# Patient Record
Sex: MALE | Race: WHITE | NOT HISPANIC OR LATINO | Employment: OTHER | URBAN - METROPOLITAN AREA
[De-identification: names, ages, dates, MRNs, and addresses within clinical notes are randomized per-mention and may not be internally consistent; named-entity substitution may affect disease eponyms.]

---

## 2017-01-03 ENCOUNTER — LAB VISIT (OUTPATIENT)
Dept: LAB | Facility: HOSPITAL | Age: 67
End: 2017-01-03
Attending: INTERNAL MEDICINE
Payer: COMMERCIAL

## 2017-01-03 ENCOUNTER — PATIENT MESSAGE (OUTPATIENT)
Dept: ENDOCRINOLOGY | Facility: CLINIC | Age: 67
End: 2017-01-03

## 2017-01-03 DIAGNOSIS — Z00.00 ANNUAL PHYSICAL EXAM: ICD-10-CM

## 2017-01-03 LAB
ALBUMIN SERPL BCP-MCNC: 4.1 G/DL
ALP SERPL-CCNC: 39 U/L
ALT SERPL W/O P-5'-P-CCNC: 15 U/L
ANION GAP SERPL CALC-SCNC: 7 MMOL/L
AST SERPL-CCNC: 18 U/L
BASOPHILS # BLD AUTO: 0.09 K/UL
BASOPHILS NFR BLD: 2.6 %
BILIRUB SERPL-MCNC: 0.5 MG/DL
BUN SERPL-MCNC: 20 MG/DL
CALCIUM SERPL-MCNC: 9.7 MG/DL
CHLORIDE SERPL-SCNC: 106 MMOL/L
CHOLEST/HDLC SERPL: 3.2 {RATIO}
CO2 SERPL-SCNC: 28 MMOL/L
COMPLEXED PSA SERPL-MCNC: 1.6 NG/ML
CREAT SERPL-MCNC: 1.2 MG/DL
DIFFERENTIAL METHOD: ABNORMAL
EOSINOPHIL # BLD AUTO: 0.2 K/UL
EOSINOPHIL NFR BLD: 4.9 %
ERYTHROCYTE [DISTWIDTH] IN BLOOD BY AUTOMATED COUNT: 13.6 %
EST. GFR  (AFRICAN AMERICAN): >60 ML/MIN/1.73 M^2
EST. GFR  (NON AFRICAN AMERICAN): >60 ML/MIN/1.73 M^2
GLUCOSE SERPL-MCNC: 88 MG/DL
HCT VFR BLD AUTO: 42.6 %
HDL/CHOLESTEROL RATIO: 31 %
HDLC SERPL-MCNC: 126 MG/DL
HDLC SERPL-MCNC: 39 MG/DL
HGB BLD-MCNC: 14.3 G/DL
LDLC SERPL CALC-MCNC: 75.4 MG/DL
LYMPHOCYTES # BLD AUTO: 1.6 K/UL
LYMPHOCYTES NFR BLD: 45.7 %
MCH RBC QN AUTO: 29.2 PG
MCHC RBC AUTO-ENTMCNC: 33.6 %
MCV RBC AUTO: 87 FL
MONOCYTES # BLD AUTO: 0.4 K/UL
MONOCYTES NFR BLD: 10.1 %
NEUTROPHILS # BLD AUTO: 1.3 K/UL
NEUTROPHILS NFR BLD: 36.7 %
NONHDLC SERPL-MCNC: 87 MG/DL
PLATELET # BLD AUTO: 287 K/UL
PMV BLD AUTO: 10.7 FL
POTASSIUM SERPL-SCNC: 4.3 MMOL/L
PROT SERPL-MCNC: 7.4 G/DL
RBC # BLD AUTO: 4.89 M/UL
SODIUM SERPL-SCNC: 141 MMOL/L
TRIGL SERPL-MCNC: 58 MG/DL
WBC # BLD AUTO: 3.46 K/UL

## 2017-01-03 PROCEDURE — 85025 COMPLETE CBC W/AUTO DIFF WBC: CPT

## 2017-01-03 PROCEDURE — 80053 COMPREHEN METABOLIC PANEL: CPT

## 2017-01-03 PROCEDURE — 80061 LIPID PANEL: CPT

## 2017-01-03 PROCEDURE — 84153 ASSAY OF PSA TOTAL: CPT

## 2017-01-10 ENCOUNTER — OFFICE VISIT (OUTPATIENT)
Dept: INTERNAL MEDICINE | Facility: CLINIC | Age: 67
End: 2017-01-10
Payer: COMMERCIAL

## 2017-01-10 VITALS
WEIGHT: 201.94 LBS | RESPIRATION RATE: 16 BRPM | SYSTOLIC BLOOD PRESSURE: 108 MMHG | BODY MASS INDEX: 29.91 KG/M2 | DIASTOLIC BLOOD PRESSURE: 74 MMHG | HEIGHT: 69 IN | HEART RATE: 47 BPM | TEMPERATURE: 98 F

## 2017-01-10 DIAGNOSIS — I48.0 AF (PAROXYSMAL ATRIAL FIBRILLATION): ICD-10-CM

## 2017-01-10 DIAGNOSIS — Z00.00 ANNUAL PHYSICAL EXAM: Primary | ICD-10-CM

## 2017-01-10 DIAGNOSIS — I10 ESSENTIAL HYPERTENSION: ICD-10-CM

## 2017-01-10 DIAGNOSIS — Z95.5 S/P CORONARY ARTERY STENT PLACEMENT: ICD-10-CM

## 2017-01-10 DIAGNOSIS — M79.89 SWELLING OF RIGHT HAND: ICD-10-CM

## 2017-01-10 PROCEDURE — 3078F DIAST BP <80 MM HG: CPT | Mod: S$GLB,,, | Performed by: INTERNAL MEDICINE

## 2017-01-10 PROCEDURE — 99999 PR PBB SHADOW E&M-EST. PATIENT-LVL III: CPT | Mod: PBBFAC,,, | Performed by: INTERNAL MEDICINE

## 2017-01-10 PROCEDURE — 99396 PREV VISIT EST AGE 40-64: CPT | Mod: S$GLB,,, | Performed by: INTERNAL MEDICINE

## 2017-01-10 PROCEDURE — 3074F SYST BP LT 130 MM HG: CPT | Mod: S$GLB,,, | Performed by: INTERNAL MEDICINE

## 2017-01-10 RX ORDER — AMLODIPINE AND BENAZEPRIL HYDROCHLORIDE 5; 40 MG/1; MG/1
1 CAPSULE ORAL DAILY
Qty: 90 CAPSULE | Refills: 3 | Status: SHIPPED | OUTPATIENT
Start: 2017-01-10 | End: 2019-01-07 | Stop reason: SDUPTHER

## 2017-01-10 NOTE — PROGRESS NOTES
Subjective:       Patient ID: Dustin Lauren is a 66 y.o. male.    Chief Complaint: Annual Exam (with labs to review)  HISTORY OF PRESENT ILLNESS:  A 66-year-old white male patient comes in with   several complaints.  The patient has some medical problems, for which he is   seeing several people.  The new problem is that he has had some swelling in his   right hand on the MP joints for about the last month.  There is no history of   injury.  The patient has also had left lower back pain on the pelvic rim in the   last 3 weeks and it reactivated the last few days.  The patient has pain in his   left rib anteriorly that is old and had been evaluated before, but it is still   continuing.  The patient also feels that he has some knottiness on his left mid   back laterally.    The patient sees orthopedist at [facility], Endocrine, Dr. Toth; cardiologist,   Dr. Vo, for his atrial fibrillation and was referred to Dr. Lex FLORES (RES)   and Dr. Hugo who are electro-cardiologists to convert him and ablate him, I   believe.  Finally, he sees urologist, Dr. Kent at [facility].    The patient is not on a number of medications.  He needed no refills.    The patient had a blood work done showing normal T4.  Urine normal.  PSA 1.6.    Chemistries normal.  Lipids are normal.  CBC is normal.    PHYSICAL EXAMINATION:  VITAL SIGNS:  Normal.  SKIN:  Fair and healthy.  He has, what appears to be, a lipoma on his left mid   back laterally that is about 1 cm size.  HEENT:  Clear.  NECK:  He has no swelling.  He had some swelling around the angle of the jaw 2   months ago from what appeared to be caterpillar envenomation.  CHEST:  Clear.  HEART:  There is no murmur or gallop.  It appears to be normal sinus.  ABDOMEN:  Obese, nontender.  No organomegaly.  RECTAL:  Not done.  He has actually very prominent MP swelling on his right   hand.  EXTREMITIES:  He has no other abnormalities suggesting rheumatoid, but that is a    consideration since this is quite swollen and he is already scheduled to see   Hand Surgery.  NEUROLOGIC:  Normal.    IMPRESSION:  1.  Prominent swelling, right hand, makes me concerned he may have rheumatoid   arthritis.  2.  Hyperlipidemia, on medication.  3.  History of atrial fibrillation, appears to be in normal sinus now, still on   Eliquis.  4.  Hypertension, controlled.  5.  Hypothyroidism, on replacement.  6.  History of degenerative joint problems, multiple locations, including spine   and knees.    PLAN:  I will see him again if all is well in 1 year.      JDC/HN  dd: 01/15/2017 22:33:29 (CST)  td: 01/16/2017 17:56:04 (CST)  Doc ID   #[id number]  Job ID #[id number]    CC:     Dict [id number]  HPI  Review of Systems   Constitutional: Negative for activity change, appetite change, fatigue and unexpected weight change.   HENT: Negative for dental problem, hearing loss, mouth sores, postnasal drip and sinus pressure.    Eyes: Negative for discharge and visual disturbance.   Respiratory: Negative for cough and shortness of breath.    Cardiovascular: Positive for palpitations. Negative for chest pain.   Gastrointestinal: Negative for abdominal pain, blood in stool, constipation, diarrhea and nausea.   Genitourinary: Negative for dysuria, hematuria and testicular pain.   Musculoskeletal: Positive for arthralgias, back pain and joint swelling. Negative for neck pain.   Skin: Negative for rash.   Neurological: Negative for weakness and headaches.   Psychiatric/Behavioral: Negative for agitation and sleep disturbance.       Objective:      Physical Exam   Constitutional: He is oriented to person, place, and time. He appears well-developed and well-nourished.   HENT:   Head: Normocephalic.   Right Ear: External ear normal.   Left Ear: External ear normal.   Mouth/Throat: Oropharynx is clear and moist.   Eyes: EOM are normal. Pupils are equal, round, and reactive to light. Right eye exhibits no discharge.   Neck: Normal range  of motion. No JVD present. No tracheal deviation present. No thyromegaly present.   Cardiovascular: Normal rate, regular rhythm, normal heart sounds and intact distal pulses.  Exam reveals no gallop.    No murmur heard.  Pulmonary/Chest: Effort normal and breath sounds normal. He has no wheezes. He has no rales.   Abdominal: Soft. Bowel sounds are normal. He exhibits no mass. There is no tenderness.   Genitourinary: Prostate normal and penis normal. Rectal exam shows guaiac negative stool.   Musculoskeletal: Normal range of motion. He exhibits no edema.   Lymphadenopathy:     He has no cervical adenopathy.   Neurological: He is oriented to person, place, and time. He displays normal reflexes. No cranial nerve deficit. Coordination normal.   Skin: Skin is warm. No rash noted. No pallor.   Psychiatric: He has a normal mood and affect.       Assessment:       1. Annual physical exam    2. Essential hypertension    3. S/P coronary artery stent placement    4. AF (paroxysmal atrial fibrillation)    5. Swelling of right hand        Plan:

## 2017-01-19 RX ORDER — AMLODIPINE AND BENAZEPRIL HYDROCHLORIDE 5; 40 MG/1; MG/1
CAPSULE ORAL
Qty: 90 CAPSULE | Refills: 3 | Status: SHIPPED | OUTPATIENT
Start: 2017-01-19 | End: 2017-10-30 | Stop reason: SDUPTHER

## 2017-03-03 ENCOUNTER — LAB VISIT (OUTPATIENT)
Dept: LAB | Facility: HOSPITAL | Age: 67
End: 2017-03-03
Attending: INTERNAL MEDICINE
Payer: COMMERCIAL

## 2017-03-03 DIAGNOSIS — I10 ESSENTIAL HYPERTENSION: ICD-10-CM

## 2017-03-03 DIAGNOSIS — E78.1 HYPERTRIGLYCERIDEMIA: ICD-10-CM

## 2017-03-03 DIAGNOSIS — E89.0 POSTSURGICAL HYPOTHYROIDISM: ICD-10-CM

## 2017-03-03 LAB
ALBUMIN SERPL BCP-MCNC: 3.9 G/DL
ALP SERPL-CCNC: 42 U/L
ALT SERPL W/O P-5'-P-CCNC: 13 U/L
ANION GAP SERPL CALC-SCNC: 5 MMOL/L
AST SERPL-CCNC: 19 U/L
BILIRUB SERPL-MCNC: 0.5 MG/DL
BUN SERPL-MCNC: 20 MG/DL
CALCIUM SERPL-MCNC: 9.1 MG/DL
CHLORIDE SERPL-SCNC: 108 MMOL/L
CO2 SERPL-SCNC: 28 MMOL/L
CREAT SERPL-MCNC: 1.1 MG/DL
EST. GFR  (AFRICAN AMERICAN): >60 ML/MIN/1.73 M^2
EST. GFR  (NON AFRICAN AMERICAN): >60 ML/MIN/1.73 M^2
GLUCOSE SERPL-MCNC: 95 MG/DL
POTASSIUM SERPL-SCNC: 4.4 MMOL/L
PROT SERPL-MCNC: 6.8 G/DL
SODIUM SERPL-SCNC: 141 MMOL/L
T4 FREE SERPL-MCNC: 1.18 NG/DL
TSH SERPL DL<=0.005 MIU/L-ACNC: 1.7 UIU/ML

## 2017-03-03 PROCEDURE — 80053 COMPREHEN METABOLIC PANEL: CPT

## 2017-03-03 PROCEDURE — 84443 ASSAY THYROID STIM HORMONE: CPT

## 2017-03-03 PROCEDURE — 84439 ASSAY OF FREE THYROXINE: CPT

## 2017-03-03 PROCEDURE — 36415 COLL VENOUS BLD VENIPUNCTURE: CPT | Mod: PO

## 2017-03-10 ENCOUNTER — OFFICE VISIT (OUTPATIENT)
Dept: ENDOCRINOLOGY | Facility: CLINIC | Age: 67
End: 2017-03-10
Payer: COMMERCIAL

## 2017-03-10 VITALS
WEIGHT: 203.69 LBS | BODY MASS INDEX: 30.17 KG/M2 | HEART RATE: 64 BPM | SYSTOLIC BLOOD PRESSURE: 138 MMHG | HEIGHT: 69 IN | DIASTOLIC BLOOD PRESSURE: 80 MMHG

## 2017-03-10 DIAGNOSIS — I10 ESSENTIAL HYPERTENSION: ICD-10-CM

## 2017-03-10 DIAGNOSIS — E89.0 POSTSURGICAL HYPOTHYROIDISM: Primary | ICD-10-CM

## 2017-03-10 DIAGNOSIS — E78.1 HYPERTRIGLYCERIDEMIA: ICD-10-CM

## 2017-03-10 PROCEDURE — 1160F RVW MEDS BY RX/DR IN RCRD: CPT | Mod: S$GLB,,, | Performed by: INTERNAL MEDICINE

## 2017-03-10 PROCEDURE — 3079F DIAST BP 80-89 MM HG: CPT | Mod: S$GLB,,, | Performed by: INTERNAL MEDICINE

## 2017-03-10 PROCEDURE — 99999 PR PBB SHADOW E&M-EST. PATIENT-LVL III: CPT | Mod: PBBFAC,,, | Performed by: INTERNAL MEDICINE

## 2017-03-10 PROCEDURE — 99214 OFFICE O/P EST MOD 30 MIN: CPT | Mod: S$GLB,,, | Performed by: INTERNAL MEDICINE

## 2017-03-10 PROCEDURE — 3075F SYST BP GE 130 - 139MM HG: CPT | Mod: S$GLB,,, | Performed by: INTERNAL MEDICINE

## 2017-03-10 NOTE — PROGRESS NOTES
Subjective:      Patient ID: Dustin Lauren is a 66 y.o. male.    Chief Complaint:  Thyroid Problem      History of Present Illness  Mr. Lauren presents today for follow up of hypothyroidism.    66 y/old male with Hypothyroidism s/p surgery for a benign thyroid nodule on 1/27/10.    Had been on 150 mcg daily of thyroid hormone for quite some time, but then developed A Fib in 9/2016. Had a repeat episode of A Fib in 11/2016 and TSH was checked at that time and found to be suppressed at 0.120. Thyroid hormone dose was subsequently decreased to 137 mcg daily and he is taking this dose currently.    Has some fatigue. No cold intolerance. Regular BM's. Has some dry skin. No excessive hair loss. Has occasional PVC's, but no persistent palpitations.     Also with HTN on amlodipine and benazepril and hyperlipidemia on Fibricor and atorvastatin.    Review of Systems   Constitutional: Negative for chills and fever.   Gastrointestinal: Negative for nausea and vomiting.   No CP  No SOB    Objective:   Physical Exam   Nursing note and vitals reviewed.  No thyroid tissue palpated  DTR's 2 +  No tremor  No tachycardia  Lungs CTA b/l  No edema    Lab Review:   Results for DUSTIN LAUREN (MRN [id number]) as of 3/10/2017 16:19   Ref. Range 4/18/2016 07:25 7/25/2016 08:53 11/2/2016 07:42 1/3/2017 08:33 3/3/2017 08:37   TSH Latest Ref Range: 0.400 - 4.000 uIU/mL 0.136 (L) 0.066 (L) 1.471 2.066 1.701   Free T4 Latest Ref Range: 0.71 - 1.51 ng/dL 1.35 1.40 1.08 1.11 1.18     Results for DUSTIN LAUREN (MRN [id number]) as of 3/10/2017 16:19   Ref. Range 3/3/2017 08:37   Sodium Latest Ref Range: 136 - 145 mmol/L 141   Potassium Latest Ref Range: 3.5 - 5.1 mmol/L 4.4   Chloride Latest Ref Range: 95 - 110 mmol/L 108   CO2 Latest Ref Range: 23 - 29 mmol/L 28   Anion Gap Latest Ref Range: 8 - 16 mmol/L 5 (L)   BUN, Bld Latest Ref Range: 8 - 23 mg/dL 20   Creatinine Latest Ref Range: 0.5 - 1.4 mg/dL 1.1   eGFR if non  Latest  Ref Range: >60 mL/min/1.73 m^2 >60.0   eGFR if African American Latest Ref Range: >60 mL/min/1.73 m^2 >60.0   Glucose Latest Ref Range: 70 - 110 mg/dL 95   Calcium Latest Ref Range: 8.7 - 10.5 mg/dL 9.1   Alkaline Phosphatase Latest Ref Range: 55 - 135 U/L 42 (L)   Total Protein Latest Ref Range: 6.0 - 8.4 g/dL 6.8   Albumin Latest Ref Range: 3.5 - 5.2 g/dL 3.9   Total Bilirubin Latest Ref Range: 0.1 - 1.0 mg/dL 0.5   AST Latest Ref Range: 10 - 40 U/L 19   ALT Latest Ref Range: 10 - 44 U/L 13       Assessment:     1. Postsurgical hypothyroidism    2. Hypertriglyceridemia    3. Essential hypertension      Plan:      1. Patient with postsurgical hypothyroidism  --Biochemically and clinically euthyroid  --Will continue Synthroid 137 mcg PO daily at this time  --Will aim to keep TSH in mid normal range to avoid triggering another episode of atrial fibrillation    2.) Continue Fibricor    3.) Bp stable  --Continue current regimen    RTC in 6 months with TSH and Vit D prior to appt    Malick Malik M.D. Staff Endocrinology

## 2017-03-10 NOTE — MR AVS SNAPSHOT
[facility]  [address]  [facility] [address]  Phone: [phone]  Fax: [phone]                  Dustin aLuren   3/10/2017 4:00 PM   Office Visit    Description:  Male : 1950   Provider:  Malick Malik MD   Department:  [facility]           Reason for Visit     Thyroid Problem           Diagnoses this Visit        Comments    Postsurgical hypothyroidism    -  Primary     Hypertriglyceridemia         Essential hypertension                To Do List           Future Appointments        Provider Department Dept Phone    2017 8:30 AM LAB, [facility] [place] [facility] [phone]      Goals (5 Years of Data)     None      [facility]     [facility] Line -  Assistance  Registered nurses in the [facility] provide clinical advisement, health education, appointment booking, and other advisory services.  Call for this free service at [phone].             Medications           Message regarding Medications     Verify the changes and/or additions to your medication regime listed below are the same as discussed with your clinician today.  If any of these changes or additions are incorrect, please notify your healthcare provider.             Verify that the below list of medications is an accurate representation of the medications you are currently taking.  If none reported, the list may be blank. If incorrect, please contact your healthcare provider. Carry this list with you in case of emergency.           Current Medications     amlodipine-benazepril (LOTREL) 5-40 mg per capsule Take 1 capsule by mouth once daily.    amlodipine-benazepril (LOTREL) 5-40 mg per capsule TAKE ONE CAPSULE BY MOUTH ONCE DAILY    apixaban (ELIQUIS) 5 mg Tab Take 5 mg by mouth 2 (two) times daily.    atorvastatin (LIPITOR) 10 MG tablet Take 10 mg by mouth once daily. Every other day    cholecalciferol, vitamin D3, 2,000 unit Cap Take 2  "capsules by mouth once daily.     fenofibric acid (FIBRICOR) 135 mg CpDR TAKE 1 CAPSULE (135 MG TOTAL) BY MOUTH ONCE DAILY.    fish oil-omega-3 fatty acids 300-1,000 mg capsule Take 2 g by mouth 2 (two) times daily.     naftifine (NAFTIN) 2 % Crea Apply 1 application topically daily as needed.    omeprazole (PRILOSEC) 20 MG capsule Take 20 mg by mouth once daily.      prasugrel (EFFIENT) 10 mg Tab Take 10 mg by mouth once daily.    SYNTHROID 137 mcg Tab tablet Take 1 tablet (137 mcg total) by mouth before breakfast.           Clinical Reference Information           Your Vitals Were     BP Pulse Height Weight BMI    138/80 64 5' 9" (1.753 m) 92.4 kg (203 lb 11.3 oz) 30.08 kg/m2      Blood Pressure          Most Recent Value    BP  138/80      Allergies as of 3/10/2017     Codeine    Crestor [Rosuvastatin]    Doxycycline    Keflex [Cephalexin]    Celebrex [Celecoxib]    Mobic [Meloxicam]    Niacin Preparations    Ampicillin    Pcn [Penicillins]      Immunizations Administered on Date of Encounter - 3/10/2017     None      Orders Placed During Today's Visit     Future Labs/Procedures Expected by Expires    TSH  3/10/2017 5/9/2018    Vitamin D  3/10/2017 5/9/2018      Language Assistance Services     ATTENTION: Language assistance services are available, free of charge. Please call [phone].      ATENCIÓN: Si habla español, tiene a camp disposición servicios gratuitos de asistencia lingüística. Llame al [phone].     CHÚ Ý: N?u b?n nói Ti?ng Vi?t, có các d?ch v? h? tr? ngôn ng? mi?n phí dành cho b?n. G?i s? [phone].         [facility] complies with applicable Federal civil rights laws and does not discriminate on the basis of race, color, national origin, age, disability, or sex.        "

## 2017-04-10 ENCOUNTER — TELEPHONE (OUTPATIENT)
Dept: INTERNAL MEDICINE | Facility: CLINIC | Age: 67
End: 2017-04-10

## 2017-04-10 NOTE — TELEPHONE ENCOUNTER
We received paper results from paper chart of  old emg done 3/8/04.  Interpretation was :  Nerve conduction is consistent with moderate bilateral carpal tunnel syndrome. No emg evidence of cervical radiculopathy..    i called and sent patient a copy, he requested originally, and send paper results to [facility] [place] records to have scanned on electronic record.

## 2017-05-16 ENCOUNTER — PATIENT MESSAGE (OUTPATIENT)
Dept: ENDOCRINOLOGY | Facility: CLINIC | Age: 67
End: 2017-05-16

## 2017-05-16 DIAGNOSIS — E89.0 POSTSURGICAL HYPOTHYROIDISM: ICD-10-CM

## 2017-05-17 RX ORDER — LEVOTHYROXINE SODIUM 137 UG/1
137 TABLET ORAL
Qty: 30 TABLET | Refills: 6 | Status: SHIPPED | OUTPATIENT
Start: 2017-05-17 | End: 2017-10-30 | Stop reason: SDUPTHER

## 2017-05-30 DIAGNOSIS — E89.0 POSTSURGICAL HYPOTHYROIDISM: ICD-10-CM

## 2017-06-30 ENCOUNTER — TELEPHONE (OUTPATIENT)
Dept: INTERNAL MEDICINE | Facility: CLINIC | Age: 67
End: 2017-06-30

## 2017-06-30 NOTE — TELEPHONE ENCOUNTER
Wants antibiotic for trip [place] in case needs.  Pharmacy correct.    rx ok?    Please advise   Thanks bernadine (wife called requesting)

## 2017-07-01 RX ORDER — CIPROFLOXACIN 500 MG/1
500 TABLET ORAL 2 TIMES DAILY
Qty: 30 TABLET | Refills: 0 | Status: SHIPPED | OUTPATIENT
Start: 2017-07-01 | End: 2017-10-02

## 2017-07-13 RX ORDER — LEVOTHYROXINE SODIUM 137 UG/1
137 TABLET ORAL
Qty: 30 TABLET | Refills: 6 | OUTPATIENT
Start: 2017-07-13

## 2017-09-11 ENCOUNTER — LAB VISIT (OUTPATIENT)
Dept: LAB | Facility: HOSPITAL | Age: 67
End: 2017-09-11
Attending: INTERNAL MEDICINE
Payer: COMMERCIAL

## 2017-09-11 DIAGNOSIS — E78.1 HYPERTRIGLYCERIDEMIA: ICD-10-CM

## 2017-09-11 DIAGNOSIS — E89.0 POSTSURGICAL HYPOTHYROIDISM: ICD-10-CM

## 2017-09-11 DIAGNOSIS — I10 ESSENTIAL HYPERTENSION: ICD-10-CM

## 2017-09-11 LAB
25(OH)D3+25(OH)D2 SERPL-MCNC: 31 NG/ML
TSH SERPL DL<=0.005 MIU/L-ACNC: 3.44 UIU/ML

## 2017-09-11 PROCEDURE — 84443 ASSAY THYROID STIM HORMONE: CPT

## 2017-09-11 PROCEDURE — 36415 COLL VENOUS BLD VENIPUNCTURE: CPT | Mod: PO

## 2017-09-11 PROCEDURE — 82306 VITAMIN D 25 HYDROXY: CPT

## 2017-09-21 ENCOUNTER — TELEPHONE (OUTPATIENT)
Dept: INTERNAL MEDICINE | Facility: CLINIC | Age: 67
End: 2017-09-21

## 2017-09-21 DIAGNOSIS — E03.9 HYPOTHYROIDISM, UNSPECIFIED TYPE: ICD-10-CM

## 2017-09-21 DIAGNOSIS — Z00.00 ANNUAL PHYSICAL EXAM: ICD-10-CM

## 2017-09-21 DIAGNOSIS — I10 ESSENTIAL HYPERTENSION: Primary | ICD-10-CM

## 2017-09-21 NOTE — TELEPHONE ENCOUNTER
----- Message from Caren Carver sent at 9/21/2017 10:00 AM CDT -----  Contact: self [phone]  Doctor appointment and lab have been scheduled.  Please link lab orders to the lab appointment.  Date of doctor appointment: 01/15   Physical or EP:  Physical   Date of lab appointment:  01/08  Comments:

## 2017-10-02 ENCOUNTER — OFFICE VISIT (OUTPATIENT)
Dept: OPTOMETRY | Facility: CLINIC | Age: 67
End: 2017-10-02
Payer: COMMERCIAL

## 2017-10-02 DIAGNOSIS — H25.13 NUCLEAR SCLEROSIS, BILATERAL: Primary | ICD-10-CM

## 2017-10-02 DIAGNOSIS — H43.393 VITREOUS FLOATERS OF BOTH EYES: ICD-10-CM

## 2017-10-02 PROCEDURE — 92015 DETERMINE REFRACTIVE STATE: CPT | Mod: S$GLB,,, | Performed by: OPTOMETRIST

## 2017-10-02 PROCEDURE — 92014 COMPRE OPH EXAM EST PT 1/>: CPT | Mod: S$GLB,,, | Performed by: OPTOMETRIST

## 2017-10-02 PROCEDURE — 99999 PR PBB SHADOW E&M-EST. PATIENT-LVL II: CPT | Mod: PBBFAC,,, | Performed by: OPTOMETRIST

## 2017-10-02 NOTE — PROGRESS NOTES
HPI     Concerns About Ocular Health    Additional comments: annual exam           Comments   Vision seems the about the same as last visit.  DLS 11/11/16  No itching burning or tearing  No headaches or diplopia  Increased floaters ou  Hx Cats OU       Last edited by Toño Carrillo, OD on 10/2/2017  9:27 AM. (History)        ROS     Negative for: Constitutional, Gastrointestinal, Neurological, Skin,   Genitourinary, Musculoskeletal, HENT, Endocrine, Cardiovascular, Eyes,   Respiratory, Psychiatric, Allergic/Imm, Heme/Lymph    Last edited by Toño Carrillo, OD on 10/2/2017  9:27 AM. (History)        Assessment /Plan     For exam results, see Encounter Report.    Nuclear sclerosis, bilateral    Vitreous floaters of both eyes        1. Cat OU --pt happy w Rx  2. Vitreous floaters--discussed Signs/Symptoms of Retinal Detachment.  Pt to rtc immediately if increased Floaters/Flashes occur      PLAN:    rtc 1 yr

## 2017-10-03 DIAGNOSIS — E78.1 HYPERTRIGLYCERIDEMIA: ICD-10-CM

## 2017-10-03 RX ORDER — FENOFIBRIC ACID 135 MG/1
CAPSULE, DELAYED RELEASE ORAL
Qty: 90 CAPSULE | Refills: 2 | Status: SHIPPED | OUTPATIENT
Start: 2017-10-03 | End: 2017-10-30 | Stop reason: SDUPTHER

## 2017-10-27 ENCOUNTER — PATIENT MESSAGE (OUTPATIENT)
Dept: ENDOCRINOLOGY | Facility: CLINIC | Age: 67
End: 2017-10-27

## 2017-10-27 DIAGNOSIS — E89.0 POSTSURGICAL HYPOTHYROIDISM: Primary | ICD-10-CM

## 2017-10-30 ENCOUNTER — LAB VISIT (OUTPATIENT)
Dept: LAB | Facility: HOSPITAL | Age: 67
End: 2017-10-30
Attending: INTERNAL MEDICINE
Payer: COMMERCIAL

## 2017-10-30 ENCOUNTER — TELEPHONE (OUTPATIENT)
Dept: ENDOCRINOLOGY | Facility: CLINIC | Age: 67
End: 2017-10-30

## 2017-10-30 ENCOUNTER — OFFICE VISIT (OUTPATIENT)
Dept: ENDOCRINOLOGY | Facility: CLINIC | Age: 67
End: 2017-10-30
Payer: COMMERCIAL

## 2017-10-30 VITALS
RESPIRATION RATE: 16 BRPM | HEART RATE: 48 BPM | DIASTOLIC BLOOD PRESSURE: 62 MMHG | HEIGHT: 69 IN | WEIGHT: 202.81 LBS | BODY MASS INDEX: 30.04 KG/M2 | SYSTOLIC BLOOD PRESSURE: 100 MMHG

## 2017-10-30 DIAGNOSIS — I10 ESSENTIAL HYPERTENSION: Primary | ICD-10-CM

## 2017-10-30 DIAGNOSIS — E78.1 HYPERTRIGLYCERIDEMIA: ICD-10-CM

## 2017-10-30 DIAGNOSIS — E89.0 POSTSURGICAL HYPOTHYROIDISM: ICD-10-CM

## 2017-10-30 DIAGNOSIS — E03.9 HYPOTHYROIDISM, UNSPECIFIED TYPE: Primary | ICD-10-CM

## 2017-10-30 DIAGNOSIS — T63.431A: ICD-10-CM

## 2017-10-30 DIAGNOSIS — L03.221 CELLULITIS AND ABSCESS OF NECK: ICD-10-CM

## 2017-10-30 DIAGNOSIS — L02.11 CELLULITIS AND ABSCESS OF NECK: ICD-10-CM

## 2017-10-30 LAB
25(OH)D3+25(OH)D2 SERPL-MCNC: 33 NG/ML
ALBUMIN SERPL BCP-MCNC: 3.9 G/DL
ALP SERPL-CCNC: 41 U/L
ALT SERPL W/O P-5'-P-CCNC: 15 U/L
ANION GAP SERPL CALC-SCNC: 7 MMOL/L
AST SERPL-CCNC: 24 U/L
BILIRUB SERPL-MCNC: 0.5 MG/DL
BUN SERPL-MCNC: 22 MG/DL
CALCIUM SERPL-MCNC: 9.8 MG/DL
CHLORIDE SERPL-SCNC: 104 MMOL/L
CO2 SERPL-SCNC: 28 MMOL/L
CREAT SERPL-MCNC: 1.3 MG/DL
EST. GFR  (AFRICAN AMERICAN): >60 ML/MIN/1.73 M^2
EST. GFR  (NON AFRICAN AMERICAN): 57.3 ML/MIN/1.73 M^2
GLUCOSE SERPL-MCNC: 88 MG/DL
POTASSIUM SERPL-SCNC: 4.4 MMOL/L
PROT SERPL-MCNC: 7.4 G/DL
SODIUM SERPL-SCNC: 139 MMOL/L
T4 FREE SERPL-MCNC: 1.25 NG/DL
TSH SERPL DL<=0.005 MIU/L-ACNC: 4.07 UIU/ML

## 2017-10-30 PROCEDURE — 84439 ASSAY OF FREE THYROXINE: CPT

## 2017-10-30 PROCEDURE — 80053 COMPREHEN METABOLIC PANEL: CPT

## 2017-10-30 PROCEDURE — 84443 ASSAY THYROID STIM HORMONE: CPT

## 2017-10-30 PROCEDURE — 99214 OFFICE O/P EST MOD 30 MIN: CPT | Mod: S$GLB,,, | Performed by: INTERNAL MEDICINE

## 2017-10-30 PROCEDURE — 36415 COLL VENOUS BLD VENIPUNCTURE: CPT

## 2017-10-30 PROCEDURE — 82306 VITAMIN D 25 HYDROXY: CPT

## 2017-10-30 PROCEDURE — 99999 PR PBB SHADOW E&M-EST. PATIENT-LVL III: CPT | Mod: PBBFAC,,, | Performed by: INTERNAL MEDICINE

## 2017-10-30 RX ORDER — LEVOTHYROXINE SODIUM 137 UG/1
137 TABLET ORAL
Qty: 30 TABLET | Refills: 6 | Status: SHIPPED | OUTPATIENT
Start: 2017-10-30 | End: 2017-12-11

## 2017-10-30 RX ORDER — FENOFIBRIC ACID 135 MG/1
CAPSULE, DELAYED RELEASE ORAL
Qty: 90 CAPSULE | Refills: 2 | Status: SHIPPED | OUTPATIENT
Start: 2017-10-30 | End: 2018-05-25 | Stop reason: SDUPTHER

## 2017-10-30 NOTE — PROGRESS NOTES
Subjective:      Patient ID: Dustin Lauren is a 67 y.o. male.    Chief Complaint:  Hypothyroidism      History of Present Illness  Mr. Lauren presents today for follow up of hypothyroidism.  previously seen by  and      Interval HPI:   Could not get an appointment with  so he comes to see  He had an episode of Afib last week - converted to sinus by itself   Has an appointment to see a cardiologist       67 y/old male with Hypothyroidism s/p surgery for a benign thyroid nodule on 1/27/10.    Had been on 150 mcg daily of thyroid hormone for quite some time, but then developed A Fib in 9/2016. Had a repeat episode of A Fib in 11/2016 and TSH was checked at that time and found to be suppressed at 0.120. Thyroid hormone dose was subsequently decreased to 137 mcg daily and he is taking this dose currently.    Has some fatigue. No cold intolerance. Regular BM's. Has some dry skin. No excessive hair loss. Has occasional PVC's, but no persistent palpitations.     Also with HTN on amlodipine and benazepril and hyperlipidemia on Fibricor and atorvastatin.    Review of Systems   Constitutional: Negative for unexpected weight change.   Eyes: Negative for visual disturbance.   Respiratory: Negative for shortness of breath.    Cardiovascular: Negative for chest pain.   Gastrointestinal: Negative for abdominal pain.   Musculoskeletal: Negative for myalgias.   Skin: Negative for wound.   Neurological: Negative for headaches.   Hematological: Does not bruise/bleed easily.   Psychiatric/Behavioral: Negative for sleep disturbance.   No CP  No SOB    Objective:   Physical Exam   Nursing note and vitals reviewed.  No thyroid tissue palpated  DTR's 2 +  No tremor  No tachycardia  Lungs CTA b/l  No edema    Lab Review:   Results for DUSTIN LAUREN (MRN [id number]) as of 3/10/2017 16:19   Ref. Range 4/18/2016 07:25 7/25/2016 08:53 11/2/2016 07:42 1/3/2017 08:33 3/3/2017 08:37   TSH Latest Ref Range: 0.400 -  4.000 uIU/mL 0.136 (L) 0.066 (L) 1.471 2.066 1.701   Free T4 Latest Ref Range: 0.71 - 1.51 ng/dL 1.35 1.40 1.08 1.11 1.18     Results for GAVI DEL RIO (MRN [id number]) as of 3/10/2017 16:19   Ref. Range 3/3/2017 08:37   Sodium Latest Ref Range: 136 - 145 mmol/L 141   Potassium Latest Ref Range: 3.5 - 5.1 mmol/L 4.4   Chloride Latest Ref Range: 95 - 110 mmol/L 108   CO2 Latest Ref Range: 23 - 29 mmol/L 28   Anion Gap Latest Ref Range: 8 - 16 mmol/L 5 (L)   BUN, Bld Latest Ref Range: 8 - 23 mg/dL 20   Creatinine Latest Ref Range: 0.5 - 1.4 mg/dL 1.1   eGFR if non African American Latest Ref Range: >60 mL/min/1.73 m^2 >60.0   eGFR if African American Latest Ref Range: >60 mL/min/1.73 m^2 >60.0   Glucose Latest Ref Range: 70 - 110 mg/dL 95   Calcium Latest Ref Range: 8.7 - 10.5 mg/dL 9.1   Alkaline Phosphatase Latest Ref Range: 55 - 135 U/L 42 (L)   Total Protein Latest Ref Range: 6.0 - 8.4 g/dL 6.8   Albumin Latest Ref Range: 3.5 - 5.2 g/dL 3.9   Total Bilirubin Latest Ref Range: 0.1 - 1.0 mg/dL 0.5   AST Latest Ref Range: 10 - 40 U/L 19   ALT Latest Ref Range: 10 - 44 U/L 13       Assessment:     1. Essential hypertension    2. Postsurgical hypothyroidism    3. Hypertriglyceridemia      Plan:      1. Patient with postsurgical hypothyroidism  --Clinically euthyroid  --Check TSH   --Will continue Synthroid 137 mcg PO daily at this time  --Will aim to keep TSH in mid normal range to avoid triggering another episode of atrial fibrillation    2.) HTn at goal     3.) Afib   Check TSh today   Has cardiologist's appointment today

## 2017-12-03 ENCOUNTER — TELEPHONE (OUTPATIENT)
Dept: INTERNAL MEDICINE | Facility: CLINIC | Age: 67
End: 2017-12-03

## 2017-12-11 DIAGNOSIS — E89.0 POSTSURGICAL HYPOTHYROIDISM: ICD-10-CM

## 2017-12-11 RX ORDER — LEVOTHYROXINE SODIUM 137 UG/1
137 TABLET ORAL
Qty: 30 TABLET | Refills: 5 | Status: SHIPPED | OUTPATIENT
Start: 2017-12-11 | End: 2018-01-09 | Stop reason: SDUPTHER

## 2018-01-02 ENCOUNTER — PATIENT MESSAGE (OUTPATIENT)
Dept: ENDOCRINOLOGY | Facility: CLINIC | Age: 68
End: 2018-01-02

## 2018-01-08 ENCOUNTER — LAB VISIT (OUTPATIENT)
Dept: LAB | Facility: HOSPITAL | Age: 68
End: 2018-01-08
Attending: INTERNAL MEDICINE
Payer: COMMERCIAL

## 2018-01-08 DIAGNOSIS — Z00.00 ANNUAL PHYSICAL EXAM: ICD-10-CM

## 2018-01-08 DIAGNOSIS — E03.9 HYPOTHYROIDISM, UNSPECIFIED TYPE: ICD-10-CM

## 2018-01-08 LAB
ALBUMIN SERPL BCP-MCNC: 4.1 G/DL
ALP SERPL-CCNC: 41 U/L
ALT SERPL W/O P-5'-P-CCNC: 17 U/L
ANION GAP SERPL CALC-SCNC: 10 MMOL/L
AST SERPL-CCNC: 21 U/L
BASOPHILS # BLD AUTO: 0.07 K/UL
BASOPHILS NFR BLD: 1.4 %
BILIRUB SERPL-MCNC: 0.6 MG/DL
BUN SERPL-MCNC: 21 MG/DL
CALCIUM SERPL-MCNC: 9.5 MG/DL
CHLORIDE SERPL-SCNC: 106 MMOL/L
CHOLEST SERPL-MCNC: 145 MG/DL
CHOLEST/HDLC SERPL: 3.2 {RATIO}
CO2 SERPL-SCNC: 25 MMOL/L
COMPLEXED PSA SERPL-MCNC: 1.9 NG/ML
CREAT SERPL-MCNC: 1.3 MG/DL
DIFFERENTIAL METHOD: NORMAL
EOSINOPHIL # BLD AUTO: 0.2 K/UL
EOSINOPHIL NFR BLD: 4 %
ERYTHROCYTE [DISTWIDTH] IN BLOOD BY AUTOMATED COUNT: 13.5 %
EST. GFR  (AFRICAN AMERICAN): >60 ML/MIN/1.73 M^2
EST. GFR  (NON AFRICAN AMERICAN): 57.3 ML/MIN/1.73 M^2
GLUCOSE SERPL-MCNC: 95 MG/DL
HCT VFR BLD AUTO: 42.3 %
HDLC SERPL-MCNC: 45 MG/DL
HDLC SERPL: 31 %
HGB BLD-MCNC: 14.1 G/DL
IMM GRANULOCYTES # BLD AUTO: 0.01 K/UL
IMM GRANULOCYTES NFR BLD AUTO: 0.2 %
LDLC SERPL CALC-MCNC: 90.4 MG/DL
LYMPHOCYTES # BLD AUTO: 1.5 K/UL
LYMPHOCYTES NFR BLD: 30.4 %
MCH RBC QN AUTO: 28.7 PG
MCHC RBC AUTO-ENTMCNC: 33.3 G/DL
MCV RBC AUTO: 86 FL
MONOCYTES # BLD AUTO: 0.7 K/UL
MONOCYTES NFR BLD: 13.5 %
NEUTROPHILS # BLD AUTO: 2.5 K/UL
NEUTROPHILS NFR BLD: 50.5 %
NONHDLC SERPL-MCNC: 100 MG/DL
NRBC BLD-RTO: 0 /100 WBC
PLATELET # BLD AUTO: 247 K/UL
PMV BLD AUTO: 10.9 FL
POTASSIUM SERPL-SCNC: 4.1 MMOL/L
PROT SERPL-MCNC: 7.6 G/DL
RBC # BLD AUTO: 4.92 M/UL
SODIUM SERPL-SCNC: 141 MMOL/L
T4 FREE SERPL-MCNC: 1.02 NG/DL
TRIGL SERPL-MCNC: 48 MG/DL
TSH SERPL DL<=0.005 MIU/L-ACNC: 4.45 UIU/ML
TSH SERPL DL<=0.005 MIU/L-ACNC: 4.45 UIU/ML
WBC # BLD AUTO: 4.96 K/UL

## 2018-01-08 PROCEDURE — 36415 COLL VENOUS BLD VENIPUNCTURE: CPT | Mod: PO

## 2018-01-08 PROCEDURE — 80053 COMPREHEN METABOLIC PANEL: CPT

## 2018-01-08 PROCEDURE — 84443 ASSAY THYROID STIM HORMONE: CPT

## 2018-01-08 PROCEDURE — 84439 ASSAY OF FREE THYROXINE: CPT

## 2018-01-08 PROCEDURE — 85025 COMPLETE CBC W/AUTO DIFF WBC: CPT

## 2018-01-08 PROCEDURE — 84153 ASSAY OF PSA TOTAL: CPT

## 2018-01-08 PROCEDURE — 80061 LIPID PANEL: CPT

## 2018-01-09 ENCOUNTER — PATIENT MESSAGE (OUTPATIENT)
Dept: ENDOCRINOLOGY | Facility: CLINIC | Age: 68
End: 2018-01-09

## 2018-01-09 DIAGNOSIS — E89.0 POSTSURGICAL HYPOTHYROIDISM: ICD-10-CM

## 2018-01-09 RX ORDER — LEVOTHYROXINE SODIUM 137 UG/1
TABLET ORAL
Qty: 32 TABLET | Refills: 5 | Status: SHIPPED | OUTPATIENT
Start: 2018-01-09 | End: 2018-05-25 | Stop reason: SDUPTHER

## 2018-01-10 ENCOUNTER — TELEPHONE (OUTPATIENT)
Dept: INTERNAL MEDICINE | Facility: CLINIC | Age: 68
End: 2018-01-10

## 2018-01-15 ENCOUNTER — OFFICE VISIT (OUTPATIENT)
Dept: INTERNAL MEDICINE | Facility: CLINIC | Age: 68
End: 2018-01-15
Payer: COMMERCIAL

## 2018-01-15 VITALS
TEMPERATURE: 98 F | HEART RATE: 62 BPM | SYSTOLIC BLOOD PRESSURE: 115 MMHG | BODY MASS INDEX: 30.1 KG/M2 | HEIGHT: 69 IN | WEIGHT: 203.25 LBS | DIASTOLIC BLOOD PRESSURE: 71 MMHG | RESPIRATION RATE: 16 BRPM

## 2018-01-15 DIAGNOSIS — Z00.00 ANNUAL PHYSICAL EXAM: Primary | ICD-10-CM

## 2018-01-15 DIAGNOSIS — G57.51 TARSAL TUNNEL SYNDROME OF RIGHT SIDE: ICD-10-CM

## 2018-01-15 DIAGNOSIS — I10 ESSENTIAL HYPERTENSION: ICD-10-CM

## 2018-01-15 DIAGNOSIS — I48.0 AF (PAROXYSMAL ATRIAL FIBRILLATION): ICD-10-CM

## 2018-01-15 DIAGNOSIS — E89.0 POSTSURGICAL HYPOTHYROIDISM: ICD-10-CM

## 2018-01-15 DIAGNOSIS — E78.1 HYPERTRIGLYCERIDEMIA: ICD-10-CM

## 2018-01-15 DIAGNOSIS — Z95.5 S/P CORONARY ARTERY STENT PLACEMENT: ICD-10-CM

## 2018-01-15 PROCEDURE — 99397 PER PM REEVAL EST PAT 65+ YR: CPT | Mod: S$GLB,,, | Performed by: INTERNAL MEDICINE

## 2018-01-15 PROCEDURE — 99999 PR PBB SHADOW E&M-EST. PATIENT-LVL III: CPT | Mod: PBBFAC,,, | Performed by: INTERNAL MEDICINE

## 2018-01-15 NOTE — PROGRESS NOTES
Subjective:       Patient ID: Dustin Lauren is a 67 y.o. male.    Chief Complaint: Annual Exam  HISTORY OF PRESENT ILLNESS:  A 67-year-old white male patient of mine coming in   for an annual review and has had some recent problems.  A week ago, he developed   an upper respiratory infection that seems to be clearing with over-the-counter   medication.  He also has had recurrent atrial fibrillation.  He had an episode   in October that was sustained and had another episode today.  He is on treatment   for this.  He was told that he has that problem.  He sees cardiologist Dr. Vo and rhythm specialist Dr. Pinzon, both at [facility].  The patient   also sees urologist Dr. Kent, also Endocrine at [facility], Dr. Malik, and   orthopedist at [facility].  The patient has no other complaints.  He has been on a   diet and has lost a significant amount of weight.  Since his last exam a year   ago, he is about the same weight.  The patient's heartburn has improved   considerably with the weight loss.    PHYSICAL EXAMINATION:  VITAL SIGNS:  Normal.  SKIN:  Fair and healthy.  He does have rosacea, but that seems better   controlled.  HEENT:  Shows wax in his ears, but he does not complain of it.  I did give him   suggestions on getting the wax out with flushing when he is in the shower.  NECK:  Shows no stiffness, adenopathy or thyroid enlargement.  CHEST:  Clear.  HEART:  There is no murmur or gallop currently, seems to be regular.  ABDOMEN:  Nontender without any organomegaly, mass, fullness.  RECTAL:  Not done.  EXTREMITIES:  He has very prominent somewhat variceal veins of his legs, but he   has no edema.  The feet were checked in detail.  He has intact sensation.  He   has minimal nail disease.  He has no break in the skin.  Good pulses.  I also   found him to have a focal area of numbness that he was aware of on the first toe   of his right foot for the last two months without any injury.    IMPRESSION:  1.  Toe  numbness, likely is from tarsal tunnel.  2.  Paroxysmal atrial fibrillation with a recent episode.  3.  Coronary artery disease with stents.  4.  Status post thyroid ablation, on replacement.  5.  Hypertension, controlled.  6.  High triglyceride.    The patient had lab done prior to this visit showing normal urinalysis, TSH was   in the mid 4 range, but he just had a recent increase in the dose of his   thyroid.  His chemistry is normal, triglycerides now normal, CBC normal.  PSA   and T4 are normal.  I have told him that he should go and get inserts for his   shoes at a running store and gave him a suggestion on wear, and wrote down the   name of his problem for them to fit him.  I will see him again in one year if   all is well.      JDC/HN  dd: 01/15/2018 22:21:42 (CST)  td: 01/16/2018 12:35:58 (CST)  Doc ID   #[id number]  Job ID #[id number]    CC:     Dict [id number]  HPI  Review of Systems   Constitutional: Negative for activity change, appetite change, fatigue and unexpected weight change.   HENT: Positive for congestion, postnasal drip and sinus pressure. Negative for dental problem, hearing loss and mouth sores.    Eyes: Negative for discharge and visual disturbance.   Respiratory: Negative for cough and shortness of breath.    Cardiovascular: Positive for palpitations. Negative for chest pain.   Gastrointestinal: Negative for abdominal pain, blood in stool, constipation, diarrhea and nausea.   Genitourinary: Negative for dysuria, hematuria and testicular pain.   Musculoskeletal: Negative for arthralgias, back pain, joint swelling and neck pain.   Skin: Negative for rash.   Neurological: Positive for numbness. Negative for weakness and headaches.   Psychiatric/Behavioral: Negative for agitation and sleep disturbance.       Objective:      Physical Exam   Constitutional: He is oriented to person, place, and time. He appears well-developed and well-nourished.   HENT:   Head: Normocephalic.   Right Ear: External ear  normal.   Left Ear: External ear normal.   Mouth/Throat: Oropharynx is clear and moist.   Eyes: EOM are normal. Pupils are equal, round, and reactive to light. Right eye exhibits no discharge.   Neck: Normal range of motion. No JVD present. No tracheal deviation present. No thyromegaly present.   Cardiovascular: Normal rate, regular rhythm, normal heart sounds and intact distal pulses.  Exam reveals no gallop.    No murmur heard.  Pulmonary/Chest: Effort normal and breath sounds normal. He has no wheezes. He has no rales.   Abdominal: Soft. Bowel sounds are normal. He exhibits no mass. There is no tenderness.   Genitourinary: Prostate normal and penis normal. Rectal exam shows guaiac negative stool.   Musculoskeletal: Normal range of motion. He exhibits no edema.   Lymphadenopathy:     He has no cervical adenopathy.   Neurological: He is oriented to person, place, and time. He displays normal reflexes. A sensory deficit is present. No cranial nerve deficit. Coordination normal.   Skin: Skin is warm. No rash noted. No pallor.   Psychiatric: He has a normal mood and affect.       Assessment:       1. Annual physical exam    2. AF (paroxysmal atrial fibrillation)    3. S/P coronary artery stent placement    4. Postsurgical hypothyroidism    5. Essential hypertension    6. Hypertriglyceridemia        Plan:

## 2018-02-20 ENCOUNTER — LAB VISIT (OUTPATIENT)
Dept: LAB | Facility: HOSPITAL | Age: 68
End: 2018-02-20
Attending: INTERNAL MEDICINE
Payer: COMMERCIAL

## 2018-02-20 DIAGNOSIS — E89.0 POSTSURGICAL HYPOTHYROIDISM: ICD-10-CM

## 2018-02-20 LAB — TSH SERPL DL<=0.005 MIU/L-ACNC: 1.63 UIU/ML

## 2018-02-20 PROCEDURE — 36415 COLL VENOUS BLD VENIPUNCTURE: CPT | Mod: PO

## 2018-02-20 PROCEDURE — 84443 ASSAY THYROID STIM HORMONE: CPT

## 2018-03-29 RX ORDER — AMLODIPINE AND BENAZEPRIL HYDROCHLORIDE 5; 40 MG/1; MG/1
CAPSULE ORAL
Qty: 90 CAPSULE | Refills: 0 | Status: SHIPPED | OUTPATIENT
Start: 2018-03-29 | End: 2018-07-22 | Stop reason: SDUPTHER

## 2018-05-01 ENCOUNTER — PATIENT MESSAGE (OUTPATIENT)
Dept: ENDOCRINOLOGY | Facility: CLINIC | Age: 68
End: 2018-05-01

## 2018-05-01 DIAGNOSIS — E89.0 POSTSURGICAL HYPOTHYROIDISM: ICD-10-CM

## 2018-05-01 DIAGNOSIS — I10 ESSENTIAL HYPERTENSION: Primary | ICD-10-CM

## 2018-05-18 ENCOUNTER — LAB VISIT (OUTPATIENT)
Dept: LAB | Facility: HOSPITAL | Age: 68
End: 2018-05-18
Attending: INTERNAL MEDICINE
Payer: COMMERCIAL

## 2018-05-18 DIAGNOSIS — I10 ESSENTIAL HYPERTENSION: ICD-10-CM

## 2018-05-18 DIAGNOSIS — E89.0 POSTSURGICAL HYPOTHYROIDISM: ICD-10-CM

## 2018-05-18 LAB
ALBUMIN SERPL BCP-MCNC: 4.1 G/DL
ALP SERPL-CCNC: 36 U/L
ALT SERPL W/O P-5'-P-CCNC: 12 U/L
ANION GAP SERPL CALC-SCNC: 8 MMOL/L
AST SERPL-CCNC: 18 U/L
BILIRUB SERPL-MCNC: 0.5 MG/DL
BUN SERPL-MCNC: 24 MG/DL
CALCIUM SERPL-MCNC: 9.4 MG/DL
CHLORIDE SERPL-SCNC: 105 MMOL/L
CO2 SERPL-SCNC: 26 MMOL/L
CREAT SERPL-MCNC: 1.3 MG/DL
EST. GFR  (AFRICAN AMERICAN): >60 ML/MIN/1.73 M^2
EST. GFR  (NON AFRICAN AMERICAN): 57.3 ML/MIN/1.73 M^2
GLUCOSE SERPL-MCNC: 95 MG/DL
POTASSIUM SERPL-SCNC: 4.5 MMOL/L
PROT SERPL-MCNC: 7.1 G/DL
SODIUM SERPL-SCNC: 139 MMOL/L
T4 FREE SERPL-MCNC: 1.31 NG/DL
TSH SERPL DL<=0.005 MIU/L-ACNC: 0.8 UIU/ML

## 2018-05-18 PROCEDURE — 36415 COLL VENOUS BLD VENIPUNCTURE: CPT | Mod: PO

## 2018-05-18 PROCEDURE — 84439 ASSAY OF FREE THYROXINE: CPT

## 2018-05-18 PROCEDURE — 80053 COMPREHEN METABOLIC PANEL: CPT

## 2018-05-18 PROCEDURE — 84443 ASSAY THYROID STIM HORMONE: CPT

## 2018-05-25 ENCOUNTER — OFFICE VISIT (OUTPATIENT)
Dept: ENDOCRINOLOGY | Facility: CLINIC | Age: 68
End: 2018-05-25
Payer: COMMERCIAL

## 2018-05-25 VITALS
DIASTOLIC BLOOD PRESSURE: 80 MMHG | WEIGHT: 204.38 LBS | HEART RATE: 84 BPM | HEIGHT: 69 IN | BODY MASS INDEX: 30.27 KG/M2 | SYSTOLIC BLOOD PRESSURE: 124 MMHG

## 2018-05-25 DIAGNOSIS — E89.0 POSTSURGICAL HYPOTHYROIDISM: Primary | ICD-10-CM

## 2018-05-25 DIAGNOSIS — E78.1 HYPERTRIGLYCERIDEMIA: ICD-10-CM

## 2018-05-25 DIAGNOSIS — I10 ESSENTIAL HYPERTENSION: ICD-10-CM

## 2018-05-25 PROCEDURE — 3079F DIAST BP 80-89 MM HG: CPT | Mod: CPTII,S$GLB,, | Performed by: INTERNAL MEDICINE

## 2018-05-25 PROCEDURE — 99999 PR PBB SHADOW E&M-EST. PATIENT-LVL III: CPT | Mod: PBBFAC,,, | Performed by: INTERNAL MEDICINE

## 2018-05-25 PROCEDURE — 99214 OFFICE O/P EST MOD 30 MIN: CPT | Mod: S$GLB,,, | Performed by: INTERNAL MEDICINE

## 2018-05-25 PROCEDURE — 3074F SYST BP LT 130 MM HG: CPT | Mod: CPTII,S$GLB,, | Performed by: INTERNAL MEDICINE

## 2018-05-25 PROCEDURE — 3008F BODY MASS INDEX DOCD: CPT | Mod: CPTII,S$GLB,, | Performed by: INTERNAL MEDICINE

## 2018-05-25 RX ORDER — LEVOTHYROXINE SODIUM 137 UG/1
TABLET ORAL
Qty: 32 TABLET | Refills: 5 | Status: SHIPPED | OUTPATIENT
Start: 2018-05-25 | End: 2018-12-10 | Stop reason: SDUPTHER

## 2018-05-25 RX ORDER — FENOFIBRIC ACID 135 MG/1
CAPSULE, DELAYED RELEASE ORAL
Qty: 90 CAPSULE | Refills: 3 | Status: SHIPPED | OUTPATIENT
Start: 2018-05-25 | End: 2018-12-10 | Stop reason: SDUPTHER

## 2018-05-25 NOTE — PROGRESS NOTES
Subjective:      Patient ID: Dustin Lauren is a 67 y.o. male.    Chief Complaint:  Hypothyroidism      History of Present Illness  Mr. Lauren presents today for follow up of hypothyroidism. Last seen by Dr. Sepulveda in 10/2017.     Interval HPI:      67 y/old male with Hypothyroidism s/p surgery for a benign thyroid nodule on 1/27/10.     Had been on 150 mcg daily of thyroid hormone for quite some time, but then developed A Fib in 9/2016. Had a repeat episode of A Fib in 11/2016 and TSH was checked at that time and found to be suppressed at 0.120.     Currently taking Synthroid 137 mcg Mon-Sat with 1.5 tablets on Sunday only.    Good energy. Feels mostly cold during the day. Bowel movements regular. Wt stable. No dry skin.  Rare heart palpitations few seconds at a time. No tremor. Occ anxiousness.     Also with HTN on amlodipine and benazepril and hyperlipidemia on Fibricor and atorvastatin (every other day)    Had brief episode of A Fib in 1/2017.    Review of Systems   Constitutional: Negative for chills and fever.   Gastrointestinal: Negative for nausea and vomiting.   No CP   No SOB    Objective:   Physical Exam   Nursing note and vitals reviewed.  No thyroid tissue palpated  DTR's 2 +  No tremor  No tachycardia  Lungs CTA b/l  No edema    Lab Review:   Results for DUSTIN LAUREN (MRN [id number]) as of 5/26/2018 11:37   Ref. Range 10/30/2017 09:45 1/8/2018 08:44 1/8/2018 08:44 2/20/2018 07:53 5/18/2018 08:41   TSH Latest Ref Range: 0.400 - 4.000 uIU/mL 4.072 (H) 4.453 (H) 4.453 (H) 1.627 0.800   Free T4 Latest Ref Range: 0.71 - 1.51 ng/dL 1.25 1.02   1.31       Assessment:     1. Postsurgical hypothyroidism    2. Hypertriglyceridemia    3. Essential hypertension        Plan:      1. Patient with postsurgical hypothyroidism  --Clinically euthyroid  --TSH WNL  --Will continue Synthroid 137 mcg PO Mon-Sat with 1.5 tablets on Sunday only  --Will avoid TSH suppression with A Fib     2.) On fibrate and statin     3.)   Bp at goal  --Continue current regimen

## 2018-07-06 ENCOUNTER — LAB VISIT (OUTPATIENT)
Dept: LAB | Facility: HOSPITAL | Age: 68
End: 2018-07-06
Attending: INTERNAL MEDICINE
Payer: COMMERCIAL

## 2018-07-06 DIAGNOSIS — E89.0 POSTSURGICAL HYPOTHYROIDISM: ICD-10-CM

## 2018-07-06 DIAGNOSIS — E78.1 HYPERTRIGLYCERIDEMIA: ICD-10-CM

## 2018-07-06 LAB
T4 FREE SERPL-MCNC: 1.15 NG/DL
TSH SERPL DL<=0.005 MIU/L-ACNC: 1.48 UIU/ML

## 2018-07-06 PROCEDURE — 84443 ASSAY THYROID STIM HORMONE: CPT

## 2018-07-06 PROCEDURE — 84439 ASSAY OF FREE THYROXINE: CPT

## 2018-07-06 PROCEDURE — 36415 COLL VENOUS BLD VENIPUNCTURE: CPT | Mod: PO

## 2018-07-22 RX ORDER — AMLODIPINE AND BENAZEPRIL HYDROCHLORIDE 5; 40 MG/1; MG/1
CAPSULE ORAL
Qty: 90 CAPSULE | Refills: 0 | Status: SHIPPED | OUTPATIENT
Start: 2018-07-22 | End: 2018-10-04

## 2018-08-23 ENCOUNTER — TELEPHONE (OUTPATIENT)
Dept: ENDOCRINOLOGY | Facility: CLINIC | Age: 68
End: 2018-08-23

## 2018-08-23 NOTE — TELEPHONE ENCOUNTER
----- Message from Janette Galindo sent at 8/23/2018  3:09 PM CDT -----  Contact: Dustin      [phone]   Patient Requesting Sooner Appointment.     Reason for sooner appt.:  Trying to be seen for a f/u .  Can come any day but not on a Wednesday.   When is the first available appointment?   NO ACCESS     Communication Preference: phone   Additional Information:  Pls call w/ an appt. At either location.

## 2018-10-04 ENCOUNTER — OFFICE VISIT (OUTPATIENT)
Dept: ORTHOPEDICS | Facility: CLINIC | Age: 68
End: 2018-10-04
Payer: COMMERCIAL

## 2018-10-04 ENCOUNTER — HOSPITAL ENCOUNTER (OUTPATIENT)
Dept: RADIOLOGY | Facility: HOSPITAL | Age: 68
Discharge: HOME | End: 2018-10-04
Attending: ORTHOPAEDIC SURGERY
Payer: COMMERCIAL

## 2018-10-04 VITALS
HEART RATE: 57 BPM | DIASTOLIC BLOOD PRESSURE: 69 MMHG | SYSTOLIC BLOOD PRESSURE: 124 MMHG | WEIGHT: 208.75 LBS | BODY MASS INDEX: 30.83 KG/M2

## 2018-10-04 DIAGNOSIS — M25.562 CHRONIC PAIN OF BOTH KNEES: ICD-10-CM

## 2018-10-04 DIAGNOSIS — G89.29 CHRONIC PAIN OF BOTH KNEES: ICD-10-CM

## 2018-10-04 DIAGNOSIS — M25.561 CHRONIC PAIN OF BOTH KNEES: ICD-10-CM

## 2018-10-04 DIAGNOSIS — M17.0 PRIMARY OSTEOARTHRITIS OF BOTH KNEES: ICD-10-CM

## 2018-10-04 DIAGNOSIS — M25.562 CHRONIC PAIN OF BOTH KNEES: Primary | ICD-10-CM

## 2018-10-04 DIAGNOSIS — G89.29 CHRONIC PAIN OF BOTH KNEES: Primary | ICD-10-CM

## 2018-10-04 DIAGNOSIS — M25.561 CHRONIC PAIN OF BOTH KNEES: Primary | ICD-10-CM

## 2018-10-04 PROCEDURE — 73562 X-RAY EXAM OF KNEE 3: CPT | Mod: 26,50,, | Performed by: RADIOLOGY

## 2018-10-04 PROCEDURE — 99999 PR PBB SHADOW E&M-EST. PATIENT-LVL III: CPT | Mod: PBBFAC,,, | Performed by: ORTHOPAEDIC SURGERY

## 2018-10-04 PROCEDURE — 99214 OFFICE O/P EST MOD 30 MIN: CPT | Mod: S$GLB,,, | Performed by: ORTHOPAEDIC SURGERY

## 2018-10-04 PROCEDURE — 3074F SYST BP LT 130 MM HG: CPT | Mod: CPTII,S$GLB,, | Performed by: ORTHOPAEDIC SURGERY

## 2018-10-04 PROCEDURE — 73562 X-RAY EXAM OF KNEE 3: CPT | Mod: TC,50

## 2018-10-04 PROCEDURE — 1101F PT FALLS ASSESS-DOCD LE1/YR: CPT | Mod: CPTII,S$GLB,, | Performed by: ORTHOPAEDIC SURGERY

## 2018-10-04 PROCEDURE — 3078F DIAST BP <80 MM HG: CPT | Mod: CPTII,S$GLB,, | Performed by: ORTHOPAEDIC SURGERY

## 2018-10-04 NOTE — PROGRESS NOTES
HPI:    Dustin Lauren is a 68 y.o. male who is here today for   Chief Complaint   Patient presents with    Follow-up     2yr afterscope both knees   .  Patient has long history of osteoarthritis of both knees.  The left knee has always been the worse and it swelled up the other day while on the treadmill.  Patient has gotten off the treadmill knee seems to be doing better.    Duration: 2 months  Intensity: moderate  Character of pain: achy  Location: He reports that the pain is predominately  medial    Past Medical History:   Diagnosis Date    A-fib     s/p cardioversion    Carpal tunnel syndrome     bilaterally    Cataract     Chronic LBP 11/8/2012    Chronic neck pain 11/8/2012    Coronary artery disease 03/24/2016    s/p 2 stents in RCA and ostium    Diverticulosis of colon     DJD (degenerative joint disease) of cervical spine 11/8/2012    DJD (degenerative joint disease) of lumbar spine 11/8/2012    DJD (degenerative joint disease) of thoracic spine 11/8/2012    GERD (gastroesophageal reflux disease)     Hyperlipidemia     hypertriglyceridemia    Hypertension     Hypothyroidism     s/p surgery    Nephrolithiasis, uric acid     stone evaluation showed uric acid and calcium oxalate    Rosacea     Thyroid disease     Ulcer     Unspecified disorder of kidney and ureter     Vitamin D deficiency disease           Current Outpatient Medications:     amlodipine-benazepril (LOTREL) 5-40 mg per capsule, Take 1 capsule by mouth once daily., Disp: 90 capsule, Rfl: 3    apixaban (ELIQUIS) 5 mg Tab, Take 5 mg by mouth 2 (two) times daily., Disp: , Rfl:     atorvastatin (LIPITOR) 10 MG tablet, Take 10 mg by mouth once daily. Every other day, Disp: , Rfl:     cholecalciferol, vitamin D3, 2,000 unit Cap, Take 2 capsules by mouth once daily. , Disp: , Rfl:     fenofibric acid (FIBRICOR) 135 mg CpDR, TAKE 1 CAPSULE (135 MG TOTAL) BY MOUTH ONCE DAILY., Disp: 90 capsule, Rfl: 3    fish oil-omega-3  fatty acids 300-1,000 mg capsule, Take 2 g by mouth 2 (two) times daily. , Disp: , Rfl:     FLUZONE HIGH-DOSE 2017-18, PF, 180 mcg/0.5 mL vaccine, , Disp: , Rfl:     levothyroxine (SYNTHROID) 137 MCG Tab tablet, Take 1 tablet (137 mcg) by mouth Mon-Sat and take 1.5 tablet (205 mcg) by mouth on Sunday only, Disp: 32 tablet, Rfl: 5    naftifine (NAFTIN) 2 % Crea, Apply 1 application topically daily as needed., Disp: , Rfl:     omeprazole (PRILOSEC) 20 MG capsule, Take 20 mg by mouth once daily.  , Disp: , Rfl:     prasugrel (EFFIENT) 10 mg Tab, Take 10 mg by mouth once daily., Disp: , Rfl:      Review of patient's allergies indicates:   Allergen Reactions    Codeine Nausea Only    Crestor [rosuvastatin] Palpitations    Doxycycline Hives    Keflex [cephalexin] Rash    Celebrex [celecoxib] Other (See Comments)     Upset stomach    Mobic [meloxicam] Other (See Comments)     Stomach pain and nauseous    Niacin preparations Diarrhea    Ampicillin Rash    Pcn [penicillins] Rash        ROS  Constitutional: Negative for fever, Negative for weight loss  HENT Negative for congestion  Cardiovascular: Negative chest pain  Respiratory: Negative Shortness of breath  Heme: Negative excessive bleeding  Skin:NegativeItching, Negative breakdown  Musculoskeletal:Negative for back pain, Positive for joint pain, Positive muscle pain, Positive muscle weakness  Neurological: Negative for numbness and paresthesias   Psychiatric/Behavioral: Negative altered mental status, Negative for depression    Physical Exam:   /69   Pulse (!) 57   Wt 94.7 kg (208 lb 12.4 oz)   BMI 30.83 kg/m²   General appearance: This is a well-developed, Well nourished male No obvious acute distress.  Psychiatric: normal mood and affect;  pleasant and conversant; judgment and thought content normal  Gait is coordinated. Patient has antalgic gait to the left  Cardiovascular: There are no swelling or varicosities present.   Respiratory: normal  respiratory effort   Lymphatic: no adenopathy   Neurologic: alert and oriented to person, place, and time   Deep Tendon Reflexes are normal;  Coordination and Balance: Normal   Musculoskeletal   Neck    ROM shows normal flexion and extension and lateral rotation    Palpation: Non-tender    Stability is normal    Strength is normal    Skin is normal without masses and lesions    Sensation is intact to light touch   Back    ROM showsnormal flexion, extension    and  rotation    Palpation shows no masses    Stability is normal    Strength to flexion and extension well maintained    Core strength is diminished    Skin shows no rashes or cafe au lait spots;     Sensation is intact to light touch    Right Knee  Swelling None  TendernessNone  Range of Motion: 130    Left Knee: Swelling Mild  TendernessMedial joint line  Range of Motion: 125    Radiograph   Osteoarthritis: moderate  Angle: mild varus    Assessment:  Osteoarthritis both knees  Swelling and symptoms resolving in left knee.    Plan:  Low-impact exercises.

## 2018-10-15 RX ORDER — AMLODIPINE AND BENAZEPRIL HYDROCHLORIDE 5; 40 MG/1; MG/1
CAPSULE ORAL
Qty: 90 CAPSULE | Refills: 0 | Status: SHIPPED | OUTPATIENT
Start: 2018-10-15 | End: 2018-11-26 | Stop reason: SDUPTHER

## 2018-11-26 ENCOUNTER — OFFICE VISIT (OUTPATIENT)
Dept: INTERNAL MEDICINE | Facility: CLINIC | Age: 68
End: 2018-11-26
Payer: COMMERCIAL

## 2018-11-26 VITALS
TEMPERATURE: 98 F | RESPIRATION RATE: 16 BRPM | HEART RATE: 60 BPM | HEIGHT: 69 IN | WEIGHT: 207.25 LBS | SYSTOLIC BLOOD PRESSURE: 142 MMHG | DIASTOLIC BLOOD PRESSURE: 80 MMHG | BODY MASS INDEX: 30.7 KG/M2

## 2018-11-26 DIAGNOSIS — J11.1 FLU SYNDROME: Primary | ICD-10-CM

## 2018-11-26 LAB
FLUAV AG SPEC QL IA: NEGATIVE
FLUBV AG SPEC QL IA: NEGATIVE
SPECIMEN SOURCE: NORMAL

## 2018-11-26 PROCEDURE — 1101F PT FALLS ASSESS-DOCD LE1/YR: CPT | Mod: CPTII,S$GLB,, | Performed by: INTERNAL MEDICINE

## 2018-11-26 PROCEDURE — 3077F SYST BP >= 140 MM HG: CPT | Mod: CPTII,S$GLB,, | Performed by: INTERNAL MEDICINE

## 2018-11-26 PROCEDURE — 99214 OFFICE O/P EST MOD 30 MIN: CPT | Mod: S$GLB,,, | Performed by: INTERNAL MEDICINE

## 2018-11-26 PROCEDURE — 87400 INFLUENZA A/B EACH AG IA: CPT | Mod: PO

## 2018-11-26 PROCEDURE — 99999 PR PBB SHADOW E&M-EST. PATIENT-LVL III: CPT | Mod: PBBFAC,,, | Performed by: INTERNAL MEDICINE

## 2018-11-26 PROCEDURE — 3079F DIAST BP 80-89 MM HG: CPT | Mod: CPTII,S$GLB,, | Performed by: INTERNAL MEDICINE

## 2018-11-26 RX ORDER — OSELTAMIVIR PHOSPHATE 75 MG/1
75 CAPSULE ORAL 2 TIMES DAILY
Qty: 10 CAPSULE | Refills: 0 | Status: SHIPPED | OUTPATIENT
Start: 2018-11-26 | End: 2018-12-01

## 2018-11-26 NOTE — PROGRESS NOTES
Subjective:       Patient ID: Dustin Lauren is a 68 y.o. male.    Chief Complaint: Generalized Body Aches (pain 2-4, using tylenol.); Nasal Congestion; Cough (productive cough); Fever (highest at home 101.9  ); and Emesis (just sunday am after pineapple juice.  )  HISTORY OF PRESENT ILLNESS:  A 68-year-old white male patient came in with a   cough that has been productive associated with few days of diarrhea, nausea,   vomiting, headache, generalized achiness, fever.  His nephew was diagnosed as   having influenza and the patient did not receive a flu shot.  The patient is not   short of breath.    PHYSICAL EXAMINATION:  VITAL SIGNS:  Normal.  SKIN:  Fair, healthy.  HEENT:  Clear.  NECK:  Shows no adenopathy, stiffness.  CHEST:  Clear.  ABDOMEN:  Nontender.  No organomegaly.  Bowel sounds are active.    IMPRESSION:  Probable influenza.  The patient was given Tamiflu.  Flu prep was   done that was negative.  He was told to come back after he is cleared in a few   days to get his flu shot.      ELZA/KAVITA  dd: 11/28/2018 14:50:25 (CST)  td: 11/29/2018 09:19:34 (CST)  Doc ID   #[id number]  Job ID #[id number]    CC:     [facility] [id number]  HPI  Review of Systems   Constitutional: Positive for appetite change, chills and fever. Negative for activity change, fatigue and unexpected weight change.   HENT: Positive for congestion, postnasal drip, rhinorrhea, sinus pressure and sore throat. Negative for dental problem, hearing loss and mouth sores.    Eyes: Negative for discharge and visual disturbance.   Respiratory: Positive for cough. Negative for shortness of breath.    Cardiovascular: Negative for chest pain and palpitations.   Gastrointestinal: Negative for abdominal pain, blood in stool, constipation, diarrhea and nausea.   Genitourinary: Negative for dysuria, hematuria and testicular pain.   Musculoskeletal: Positive for myalgias. Negative for arthralgias, back pain, joint swelling and neck pain.   Skin: Negative for rash.    Neurological: Positive for light-headedness and headaches. Negative for weakness.   Psychiatric/Behavioral: Negative for agitation and sleep disturbance.       Objective:      Physical Exam   Constitutional: He is oriented to person, place, and time. He appears well-developed and well-nourished.   HENT:   Head: Normocephalic.   Right Ear: External ear normal.   Left Ear: External ear normal.   Mouth/Throat: Oropharynx is clear and moist.   Eyes: EOM are normal. Pupils are equal, round, and reactive to light. Right eye exhibits no discharge.   Neck: Normal range of motion. No JVD present. No tracheal deviation present. No thyromegaly present.   Cardiovascular: Normal rate, regular rhythm, normal heart sounds and intact distal pulses. Exam reveals no gallop.   No murmur heard.  Pulmonary/Chest: Effort normal and breath sounds normal. He has no wheezes. He has no rales.   Abdominal: Soft. Bowel sounds are normal. He exhibits no mass. There is no tenderness.   Genitourinary: Prostate normal and penis normal. Rectal exam shows guaiac negative stool.   Musculoskeletal: Normal range of motion. He exhibits no edema.   Lymphadenopathy:     He has no cervical adenopathy.   Neurological: He is oriented to person, place, and time. He displays normal reflexes. No cranial nerve deficit. Coordination normal.   Skin: Skin is warm. No rash noted. No pallor.   Psychiatric: He has a normal mood and affect.       Assessment:       1. Flu syndrome        Plan:

## 2018-11-28 ENCOUNTER — PATIENT MESSAGE (OUTPATIENT)
Dept: ENDOCRINOLOGY | Facility: CLINIC | Age: 68
End: 2018-11-28

## 2018-12-03 ENCOUNTER — LAB VISIT (OUTPATIENT)
Dept: LAB | Facility: HOSPITAL | Age: 68
End: 2018-12-03
Attending: INTERNAL MEDICINE
Payer: COMMERCIAL

## 2018-12-03 DIAGNOSIS — E78.1 HYPERTRIGLYCERIDEMIA: ICD-10-CM

## 2018-12-03 DIAGNOSIS — E89.0 POSTSURGICAL HYPOTHYROIDISM: ICD-10-CM

## 2018-12-03 LAB
25(OH)D3+25(OH)D2 SERPL-MCNC: 32 NG/ML
ALBUMIN SERPL BCP-MCNC: 3.9 G/DL
ALP SERPL-CCNC: 43 U/L
ALT SERPL W/O P-5'-P-CCNC: 18 U/L
ANION GAP SERPL CALC-SCNC: 5 MMOL/L
AST SERPL-CCNC: 20 U/L
BILIRUB SERPL-MCNC: 0.5 MG/DL
BUN SERPL-MCNC: 17 MG/DL
CALCIUM SERPL-MCNC: 9.5 MG/DL
CHLORIDE SERPL-SCNC: 108 MMOL/L
CO2 SERPL-SCNC: 29 MMOL/L
CREAT SERPL-MCNC: 1.1 MG/DL
EST. GFR  (AFRICAN AMERICAN): >60 ML/MIN/1.73 M^2
EST. GFR  (NON AFRICAN AMERICAN): >60 ML/MIN/1.73 M^2
GLUCOSE SERPL-MCNC: 102 MG/DL
POTASSIUM SERPL-SCNC: 4.3 MMOL/L
PROT SERPL-MCNC: 7.4 G/DL
SODIUM SERPL-SCNC: 142 MMOL/L
T4 FREE SERPL-MCNC: 1.33 NG/DL
TSH SERPL DL<=0.005 MIU/L-ACNC: 1.37 UIU/ML

## 2018-12-03 PROCEDURE — 84443 ASSAY THYROID STIM HORMONE: CPT

## 2018-12-03 PROCEDURE — 84439 ASSAY OF FREE THYROXINE: CPT

## 2018-12-03 PROCEDURE — 80053 COMPREHEN METABOLIC PANEL: CPT

## 2018-12-03 PROCEDURE — 36415 COLL VENOUS BLD VENIPUNCTURE: CPT | Mod: PO

## 2018-12-03 PROCEDURE — 82306 VITAMIN D 25 HYDROXY: CPT

## 2018-12-10 ENCOUNTER — OFFICE VISIT (OUTPATIENT)
Dept: ENDOCRINOLOGY | Facility: CLINIC | Age: 68
End: 2018-12-10
Attending: INTERNAL MEDICINE
Payer: COMMERCIAL

## 2018-12-10 VITALS
DIASTOLIC BLOOD PRESSURE: 64 MMHG | SYSTOLIC BLOOD PRESSURE: 111 MMHG | HEIGHT: 69 IN | BODY MASS INDEX: 30.89 KG/M2 | WEIGHT: 208.56 LBS | HEART RATE: 89 BPM

## 2018-12-10 DIAGNOSIS — E89.0 POSTSURGICAL HYPOTHYROIDISM: Primary | ICD-10-CM

## 2018-12-10 DIAGNOSIS — I10 ESSENTIAL HYPERTENSION: ICD-10-CM

## 2018-12-10 DIAGNOSIS — E78.1 HYPERTRIGLYCERIDEMIA: ICD-10-CM

## 2018-12-10 PROCEDURE — 3074F SYST BP LT 130 MM HG: CPT | Mod: CPTII,S$GLB,, | Performed by: INTERNAL MEDICINE

## 2018-12-10 PROCEDURE — 99214 OFFICE O/P EST MOD 30 MIN: CPT | Mod: S$GLB,,, | Performed by: INTERNAL MEDICINE

## 2018-12-10 PROCEDURE — 3078F DIAST BP <80 MM HG: CPT | Mod: CPTII,S$GLB,, | Performed by: INTERNAL MEDICINE

## 2018-12-10 PROCEDURE — 1101F PT FALLS ASSESS-DOCD LE1/YR: CPT | Mod: CPTII,S$GLB,, | Performed by: INTERNAL MEDICINE

## 2018-12-10 RX ORDER — FENOFIBRIC ACID 135 MG/1
CAPSULE, DELAYED RELEASE ORAL
Qty: 90 CAPSULE | Refills: 3 | Status: SHIPPED | OUTPATIENT
Start: 2018-12-10 | End: 2019-11-18 | Stop reason: SDUPTHER

## 2018-12-10 RX ORDER — LEVOTHYROXINE SODIUM 137 UG/1
TABLET ORAL
Qty: 32 TABLET | Refills: 11 | Status: SHIPPED | OUTPATIENT
Start: 2018-12-10 | End: 2019-11-18 | Stop reason: SDUPTHER

## 2018-12-10 NOTE — PROGRESS NOTES
Subjective:      Patient ID: Dustin Lauren is a 68 y.o. male.    Chief Complaint:  Hypothyroidism      History of Present Illness  Mr. Lauren presents today for follow up of hypothyroidism. Last visit in 5/2018.     Interval HPI:      68 y/old male with Hypothyroidism s/p surgery for a benign thyroid nodule on 1/27/10.     Had been on 150 mcg of thyroid hormone for quite some time, but then developed A Fib in 9/2016. Had a repeat episode of A Fib in 11/2016 and TSH was checked at that time and found to be suppressed at 0.120.      Currently taking Synthroid 137 mcg Mon-Sat with 1.5 tablets on Sunday only.  Denies missing any doses. Waits over an hour to eat or take other meds.      No overt fatigue. More cold than hot. Bowel movements regular. Wt is stable.   Having occ heart palpitations since he is back in Afib. No tremor or increased anxiousness.      Also with HTN on amlodipine and benazepril.    For HLP he is taking atorvastatin 10 mg every other day and Fibricor daily.     Has been back in A Fib for one week (since 10/3). Per patient ablation being considered.    For vitamin d deficiency he is taking 4000 units of D3 daily.     Review of Systems   Constitutional: Negative for chills and fever.   Gastrointestinal: Negative for nausea and vomiting.       Objective:   Physical Exam   Nursing note and vitals reviewed.  No thyroid tissue palpated  No tremor  DTR's 2 +    Lab Review:   Results for DUSTIN LAUREN (MRN [id number]) as of 12/10/2018 08:26   Ref. Range 12/3/2018 08:52   Sodium Latest Ref Range: 136 - 145 mmol/L 142   Potassium Latest Ref Range: 3.5 - 5.1 mmol/L 4.3   Chloride Latest Ref Range: 95 - 110 mmol/L 108   CO2 Latest Ref Range: 23 - 29 mmol/L 29   Anion Gap Latest Ref Range: 8 - 16 mmol/L 5 (L)   BUN, Bld Latest Ref Range: 8 - 23 mg/dL 17   Creatinine Latest Ref Range: 0.5 - 1.4 mg/dL 1.1   eGFR if non African American Latest Ref Range: >60 mL/min/1.73 m^2 >60.0   eGFR if African American  Latest Ref Range: >60 mL/min/1.73 m^2 >60.0   Glucose Latest Ref Range: 70 - 110 mg/dL 102   Calcium Latest Ref Range: 8.7 - 10.5 mg/dL 9.5   Alkaline Phosphatase Latest Ref Range: 55 - 135 U/L 43 (L)   Total Protein Latest Ref Range: 6.0 - 8.4 g/dL 7.4   Albumin Latest Ref Range: 3.5 - 5.2 g/dL 3.9   Total Bilirubin Latest Ref Range: 0.1 - 1.0 mg/dL 0.5   AST Latest Ref Range: 10 - 40 U/L 20   ALT Latest Ref Range: 10 - 44 U/L 18   Vit D, 25-Hydroxy Latest Ref Range: 30 - 96 ng/mL 32   TSH Latest Ref Range: 0.400 - 4.000 uIU/mL 1.368   Free T4 Latest Ref Range: 0.71 - 1.51 ng/dL 1.33       Assessment:     1. Postsurgical hypothyroidism    2. Essential hypertension      Plan:      1. Patient with postsurgical hypothyroidism  --Clinically and biochemically euthyroid  --TSH WNL  --Will continue Synthroid 137 mcg PO Mon-Sat with 1.5 tablets on Sunday only  --Will avoid TSH suppression with A Fib     2.) On fibrate and statin     3.)  Bp at goal  --Continue current regimen    Malick Malik M.D. Staff Endocrinology

## 2018-12-31 ENCOUNTER — TELEPHONE (OUTPATIENT)
Dept: INTERNAL MEDICINE | Facility: CLINIC | Age: 68
End: 2018-12-31

## 2018-12-31 DIAGNOSIS — Z00.00 ANNUAL PHYSICAL EXAM: Primary | ICD-10-CM

## 2018-12-31 NOTE — TELEPHONE ENCOUNTER
----- Message from Edwin Betancur sent at 12/31/2018 11:25 AM CST -----  Doctor appointment and lab have been scheduled.  Please link lab orders to the lab appointment.  Date of doctor appointment:  1/7  Physical or EP:  Physical  Date of lab appointment:  1/4  Comments:

## 2019-01-04 ENCOUNTER — LAB VISIT (OUTPATIENT)
Dept: LAB | Facility: HOSPITAL | Age: 69
End: 2019-01-04
Attending: INTERNAL MEDICINE
Payer: COMMERCIAL

## 2019-01-04 DIAGNOSIS — Z00.00 ANNUAL PHYSICAL EXAM: ICD-10-CM

## 2019-01-04 LAB
BASOPHILS # BLD AUTO: 0.08 K/UL
BASOPHILS NFR BLD: 2.2 %
CHOLEST SERPL-MCNC: 127 MG/DL
CHOLEST/HDLC SERPL: 3.1 {RATIO}
COMPLEXED PSA SERPL-MCNC: 1.9 NG/ML
DIFFERENTIAL METHOD: ABNORMAL
EOSINOPHIL # BLD AUTO: 0.1 K/UL
EOSINOPHIL NFR BLD: 3.3 %
ERYTHROCYTE [DISTWIDTH] IN BLOOD BY AUTOMATED COUNT: 14 %
HCT VFR BLD AUTO: 44.9 %
HDLC SERPL-MCNC: 41 MG/DL
HDLC SERPL: 32.3 %
HGB BLD-MCNC: 14.4 G/DL
IMM GRANULOCYTES # BLD AUTO: 0.01 K/UL
IMM GRANULOCYTES NFR BLD AUTO: 0.3 %
LDLC SERPL CALC-MCNC: 74 MG/DL
LYMPHOCYTES # BLD AUTO: 1.6 K/UL
LYMPHOCYTES NFR BLD: 42.7 %
MCH RBC QN AUTO: 29.4 PG
MCHC RBC AUTO-ENTMCNC: 32.1 G/DL
MCV RBC AUTO: 92 FL
MONOCYTES # BLD AUTO: 0.4 K/UL
MONOCYTES NFR BLD: 9.5 %
NEUTROPHILS # BLD AUTO: 1.6 K/UL
NEUTROPHILS NFR BLD: 42 %
NONHDLC SERPL-MCNC: 86 MG/DL
NRBC BLD-RTO: 0 /100 WBC
PLATELET # BLD AUTO: 257 K/UL
PMV BLD AUTO: 11.3 FL
RBC # BLD AUTO: 4.9 M/UL
TRIGL SERPL-MCNC: 60 MG/DL
WBC # BLD AUTO: 3.68 K/UL

## 2019-01-04 PROCEDURE — 85025 COMPLETE CBC W/AUTO DIFF WBC: CPT

## 2019-01-04 PROCEDURE — 36415 COLL VENOUS BLD VENIPUNCTURE: CPT | Mod: PO

## 2019-01-04 PROCEDURE — 80061 LIPID PANEL: CPT

## 2019-01-04 PROCEDURE — 84153 ASSAY OF PSA TOTAL: CPT

## 2019-01-07 ENCOUNTER — OFFICE VISIT (OUTPATIENT)
Dept: INTERNAL MEDICINE | Facility: CLINIC | Age: 69
End: 2019-01-07
Payer: COMMERCIAL

## 2019-01-07 VITALS
HEIGHT: 69 IN | TEMPERATURE: 98 F | HEART RATE: 64 BPM | WEIGHT: 202.63 LBS | DIASTOLIC BLOOD PRESSURE: 60 MMHG | SYSTOLIC BLOOD PRESSURE: 100 MMHG | RESPIRATION RATE: 20 BRPM | BODY MASS INDEX: 30.01 KG/M2

## 2019-01-07 DIAGNOSIS — I48.0 AF (PAROXYSMAL ATRIAL FIBRILLATION): ICD-10-CM

## 2019-01-07 DIAGNOSIS — M47.896 OTHER OSTEOARTHRITIS OF SPINE, LUMBAR REGION: ICD-10-CM

## 2019-01-07 DIAGNOSIS — Z00.00 ANNUAL PHYSICAL EXAM: Primary | ICD-10-CM

## 2019-01-07 DIAGNOSIS — E03.9 HYPOTHYROIDISM, UNSPECIFIED TYPE: ICD-10-CM

## 2019-01-07 DIAGNOSIS — Z95.5 S/P CORONARY ARTERY STENT PLACEMENT: ICD-10-CM

## 2019-01-07 DIAGNOSIS — E78.1 HYPERTRIGLYCERIDEMIA: ICD-10-CM

## 2019-01-07 DIAGNOSIS — I10 ESSENTIAL HYPERTENSION: ICD-10-CM

## 2019-01-07 PROCEDURE — 99397 PR PREVENTIVE VISIT,EST,65 & OVER: ICD-10-PCS | Mod: 25,S$GLB,, | Performed by: INTERNAL MEDICINE

## 2019-01-07 PROCEDURE — 90471 IMMUNIZATION ADMIN: CPT | Mod: S$GLB,,, | Performed by: INTERNAL MEDICINE

## 2019-01-07 PROCEDURE — 99999 PR PBB SHADOW E&M-EST. PATIENT-LVL III: ICD-10-PCS | Mod: PBBFAC,,, | Performed by: INTERNAL MEDICINE

## 2019-01-07 PROCEDURE — 90662 FLU VACCINE - HIGH DOSE (65+) PRESERVATIVE FREE IM: ICD-10-PCS | Mod: S$GLB,,, | Performed by: INTERNAL MEDICINE

## 2019-01-07 PROCEDURE — 3074F PR MOST RECENT SYSTOLIC BLOOD PRESSURE < 130 MM HG: ICD-10-PCS | Mod: CPTII,S$GLB,, | Performed by: INTERNAL MEDICINE

## 2019-01-07 PROCEDURE — 3078F PR MOST RECENT DIASTOLIC BLOOD PRESSURE < 80 MM HG: ICD-10-PCS | Mod: CPTII,S$GLB,, | Performed by: INTERNAL MEDICINE

## 2019-01-07 PROCEDURE — 3078F DIAST BP <80 MM HG: CPT | Mod: CPTII,S$GLB,, | Performed by: INTERNAL MEDICINE

## 2019-01-07 PROCEDURE — 3074F SYST BP LT 130 MM HG: CPT | Mod: CPTII,S$GLB,, | Performed by: INTERNAL MEDICINE

## 2019-01-07 PROCEDURE — 99999 PR PBB SHADOW E&M-EST. PATIENT-LVL III: CPT | Mod: PBBFAC,,, | Performed by: INTERNAL MEDICINE

## 2019-01-07 PROCEDURE — 99397 PER PM REEVAL EST PAT 65+ YR: CPT | Mod: 25,S$GLB,, | Performed by: INTERNAL MEDICINE

## 2019-01-07 PROCEDURE — 90662 IIV NO PRSV INCREASED AG IM: CPT | Mod: S$GLB,,, | Performed by: INTERNAL MEDICINE

## 2019-01-07 PROCEDURE — 90471 FLU VACCINE - HIGH DOSE (65+) PRESERVATIVE FREE IM: ICD-10-PCS | Mod: S$GLB,,, | Performed by: INTERNAL MEDICINE

## 2019-01-07 RX ORDER — AMLODIPINE AND BENAZEPRIL HYDROCHLORIDE 5; 40 MG/1; MG/1
1 CAPSULE ORAL DAILY
Qty: 90 CAPSULE | Refills: 3 | Status: SHIPPED | OUTPATIENT
Start: 2019-01-07 | End: 2019-11-24 | Stop reason: ALTCHOICE

## 2019-01-07 NOTE — PROGRESS NOTES
Subjective:       Patient ID: Dustin Lauren is a 68 y.o. male.    Chief Complaint: No chief complaint on file.  HISTORY OF PRESENT ILLNESS:  A 68-year-old white male patient of mine coming in   for annual review and has been overall doing pretty well.  However, he had an   episode of atrial fibrillation again for which he went to [facility] on   December 18th and was electro-converted by Dr. Pinzon.  His medication was not   changed otherwise.  The patient had blood work done prior to this visit, which   was reviewed with him showing urinalysis with a small amount of blood in it,   which I told him we will follow and normal PSA, CBC, lipids.  The patient also   sees urologist, Dr. Kent, cardiologist, Dr. Vo, and endocrine Dr. Malick Malik.  The patient has had no further atrial fibrillation.    PHYSICAL EXAMINATION:  VITAL SIGNS:  Normal.  SKIN:  Fair, healthy except for a bruise on his left leg medially and distal   part of his thigh.  He is not certain how he got that.  HEENT:  Clear.  NECK:  Shows no adenopathy, thyroid enlargement or bruit.  CHEST:  Clear.  HEART:  There is no murmur or gallop.  ABDOMEN:  Nontender without any organomegaly.  RECTAL:  Not done since he sees Urology.  EXTREMITIES:  Shows some prominent variceal veins in his legs, but no edema.    The feet were checked in detail.  He has good sensation, minimal nail disease.    No break in the skin.  Good pulses.    IMPRESSION:  1.  Recent episode of atrial fibrillation, now appears to be in normal sinus.  2.  Status post coronary PTCA.  3.  Coronary artery disease.  4.  Hypertension, controlled.  5.  Hyperlipidemia, on medication.  6.  Hypothyroidism, followed by Endocrine.  7.  Bruise, left leg, appears to be just from an injury.  8.  Spinal osteoarthritis, currently giving him no trouble.  He was given a flu   shot.  I will see him again in six months.      JNICHOLAS/IN  dd: 01/27/2019 19:24:56 (CST)  td: 01/27/2019 23:41:38 (CST)  Doc  ID   #[id number]  Job ID #[id number]    CC:     [facility] [id number]  [facility]  Review of Systems   Constitutional: Negative for activity change, appetite change, fatigue and unexpected weight change.   HENT: Negative for dental problem, hearing loss, mouth sores, postnasal drip and sinus pressure.    Eyes: Negative for discharge and visual disturbance.   Respiratory: Negative for cough and shortness of breath.    Cardiovascular: Positive for palpitations. Negative for chest pain.   Gastrointestinal: Negative for abdominal pain, blood in stool, constipation, diarrhea and nausea.   Genitourinary: Positive for enuresis and frequency. Negative for dysuria, hematuria and testicular pain.   Musculoskeletal: Negative for arthralgias, back pain, joint swelling and neck pain.   Skin: Positive for wound. Negative for rash.   Neurological: Negative for weakness and headaches.   Psychiatric/Behavioral: Negative for agitation and sleep disturbance.       Objective:      Physical Exam   Constitutional: He is oriented to person, place, and time. He appears well-developed and well-nourished.   HENT:   Head: Normocephalic.   Right Ear: External ear normal.   Left Ear: External ear normal.   Mouth/Throat: Oropharynx is clear and moist.   Eyes: EOM are normal. Pupils are equal, round, and reactive to light. Right eye exhibits no discharge.   Neck: Normal range of motion. No JVD present. No tracheal deviation present. No thyromegaly present.   Cardiovascular: Normal rate, regular rhythm, normal heart sounds and intact distal pulses. Exam reveals no gallop.   No murmur heard.  Pulmonary/Chest: Effort normal and breath sounds normal. He has no wheezes. He has no rales.   Abdominal: Soft. Bowel sounds are normal. He exhibits no mass. There is no tenderness.   Genitourinary: Prostate normal and penis normal. Rectal exam shows guaiac negative stool.   Musculoskeletal: Normal range of motion. He exhibits no edema.   Lymphadenopathy:     He has no cervical  adenopathy.   Neurological: He is oriented to person, place, and time. He displays normal reflexes. No cranial nerve deficit. Coordination normal.   Skin: Skin is warm. Rash noted. There is erythema. No pallor.   Psychiatric: He has a normal mood and affect.       Assessment:       1. Annual physical exam    2. AF (paroxysmal atrial fibrillation)    3. S/P coronary artery stent placement    4. Essential hypertension    5. Hypertriglyceridemia    6. Hypothyroidism, unspecified type    7. Other osteoarthritis of spine, lumbar region        Plan:

## 2019-02-21 ENCOUNTER — OFFICE VISIT (OUTPATIENT)
Dept: OPTOMETRY | Facility: CLINIC | Age: 69
End: 2019-02-21
Payer: COMMERCIAL

## 2019-02-21 DIAGNOSIS — Z13.5 GLAUCOMA SCREENING: ICD-10-CM

## 2019-02-21 DIAGNOSIS — H25.13 NUCLEAR SCLEROSIS, BILATERAL: Primary | ICD-10-CM

## 2019-02-21 DIAGNOSIS — H52.03 HYPEROPIA WITH PRESBYOPIA OF BOTH EYES: ICD-10-CM

## 2019-02-21 DIAGNOSIS — H52.4 HYPEROPIA WITH PRESBYOPIA OF BOTH EYES: ICD-10-CM

## 2019-02-21 PROCEDURE — 92015 PR REFRACTION: ICD-10-PCS | Mod: S$GLB,,, | Performed by: OPTOMETRIST

## 2019-02-21 PROCEDURE — 99999 PR PBB SHADOW E&M-EST. PATIENT-LVL II: CPT | Mod: PBBFAC,,, | Performed by: OPTOMETRIST

## 2019-02-21 PROCEDURE — 92015 DETERMINE REFRACTIVE STATE: CPT | Mod: S$GLB,,, | Performed by: OPTOMETRIST

## 2019-02-21 PROCEDURE — 92014 PR EYE EXAM, EST PATIENT,COMPREHESV: ICD-10-PCS | Mod: S$GLB,,, | Performed by: OPTOMETRIST

## 2019-02-21 PROCEDURE — 92014 COMPRE OPH EXAM EST PT 1/>: CPT | Mod: S$GLB,,, | Performed by: OPTOMETRIST

## 2019-02-21 PROCEDURE — 99999 PR PBB SHADOW E&M-EST. PATIENT-LVL II: ICD-10-PCS | Mod: PBBFAC,,, | Performed by: OPTOMETRIST

## 2019-02-21 NOTE — PROGRESS NOTES
ERUM PAGE 10/2017  Glasses about 1 yr. Old.  Night driving not as good. Using   systane prn.  Hx cats OU    Last edited by Toño Carrillo, OD on 2/21/2019  8:33 AM. (History)        ROS     Negative for: Constitutional, Gastrointestinal, Neurological, Skin,   Genitourinary, Musculoskeletal, HENT, Endocrine, Cardiovascular, Eyes,   Respiratory, Psychiatric, Allergic/Imm, Heme/Lymph    Last edited by Toño Carrillo, OD on 2/21/2019  8:33 AM. (History)        Assessment /Plan     For exam results, see Encounter Report.    Nuclear sclerosis, bilateral    Glaucoma screening    Hyperopia with presbyopia of both eyes      1. Cat OU--pt happy w spex  2. D-chalasis/brow ptosis--discussed surgery--pt wishes to wait    PLAN:    rtc 1 yr

## 2019-03-29 ENCOUNTER — TELEPHONE (OUTPATIENT)
Dept: ADMINISTRATIVE | Facility: HOSPITAL | Age: 69
End: 2019-03-29

## 2019-05-14 ENCOUNTER — PATIENT MESSAGE (OUTPATIENT)
Dept: ENDOCRINOLOGY | Facility: CLINIC | Age: 69
End: 2019-05-14

## 2019-05-24 ENCOUNTER — LAB VISIT (OUTPATIENT)
Dept: LAB | Facility: HOSPITAL | Age: 69
End: 2019-05-24
Attending: INTERNAL MEDICINE
Payer: COMMERCIAL

## 2019-05-24 DIAGNOSIS — E89.0 POSTSURGICAL HYPOTHYROIDISM: ICD-10-CM

## 2019-05-24 DIAGNOSIS — I10 ESSENTIAL HYPERTENSION: ICD-10-CM

## 2019-05-24 DIAGNOSIS — E78.1 HYPERTRIGLYCERIDEMIA: ICD-10-CM

## 2019-05-24 LAB
ALBUMIN SERPL BCP-MCNC: 3.9 G/DL (ref 3.5–5.2)
ALP SERPL-CCNC: 37 U/L (ref 55–135)
ALT SERPL W/O P-5'-P-CCNC: 15 U/L (ref 10–44)
ANION GAP SERPL CALC-SCNC: 5 MMOL/L (ref 8–16)
AST SERPL-CCNC: 18 U/L (ref 10–40)
BILIRUB SERPL-MCNC: 0.5 MG/DL (ref 0.1–1)
BUN SERPL-MCNC: 22 MG/DL (ref 8–23)
CALCIUM SERPL-MCNC: 9.8 MG/DL (ref 8.7–10.5)
CHLORIDE SERPL-SCNC: 106 MMOL/L (ref 95–110)
CO2 SERPL-SCNC: 29 MMOL/L (ref 23–29)
CREAT SERPL-MCNC: 1.2 MG/DL (ref 0.5–1.4)
EST. GFR  (AFRICAN AMERICAN): >60 ML/MIN/1.73 M^2
EST. GFR  (NON AFRICAN AMERICAN): >60 ML/MIN/1.73 M^2
GLUCOSE SERPL-MCNC: 93 MG/DL (ref 70–110)
POTASSIUM SERPL-SCNC: 4.7 MMOL/L (ref 3.5–5.1)
PROT SERPL-MCNC: 7.1 G/DL (ref 6–8.4)
SODIUM SERPL-SCNC: 140 MMOL/L (ref 136–145)
T4 FREE SERPL-MCNC: 1.14 NG/DL (ref 0.71–1.51)
TSH SERPL DL<=0.005 MIU/L-ACNC: 1.67 UIU/ML (ref 0.4–4)

## 2019-05-24 PROCEDURE — 80053 COMPREHEN METABOLIC PANEL: CPT

## 2019-05-24 PROCEDURE — 36415 COLL VENOUS BLD VENIPUNCTURE: CPT | Mod: PO

## 2019-05-24 PROCEDURE — 84439 ASSAY OF FREE THYROXINE: CPT

## 2019-05-24 PROCEDURE — 84443 ASSAY THYROID STIM HORMONE: CPT

## 2019-06-10 ENCOUNTER — OFFICE VISIT (OUTPATIENT)
Dept: ENDOCRINOLOGY | Facility: CLINIC | Age: 69
End: 2019-06-10
Attending: INTERNAL MEDICINE
Payer: COMMERCIAL

## 2019-06-10 VITALS
HEIGHT: 69 IN | WEIGHT: 212.06 LBS | HEART RATE: 60 BPM | DIASTOLIC BLOOD PRESSURE: 60 MMHG | BODY MASS INDEX: 31.41 KG/M2 | SYSTOLIC BLOOD PRESSURE: 120 MMHG

## 2019-06-10 DIAGNOSIS — E89.0 POSTSURGICAL HYPOTHYROIDISM: Primary | ICD-10-CM

## 2019-06-10 DIAGNOSIS — I10 ESSENTIAL HYPERTENSION: ICD-10-CM

## 2019-06-10 DIAGNOSIS — E78.5 HYPERLIPIDEMIA, UNSPECIFIED HYPERLIPIDEMIA TYPE: ICD-10-CM

## 2019-06-10 PROCEDURE — 99214 OFFICE O/P EST MOD 30 MIN: CPT | Mod: S$GLB,,, | Performed by: INTERNAL MEDICINE

## 2019-06-10 PROCEDURE — 3074F SYST BP LT 130 MM HG: CPT | Mod: CPTII,S$GLB,, | Performed by: INTERNAL MEDICINE

## 2019-06-10 PROCEDURE — 99214 PR OFFICE/OUTPT VISIT, EST, LEVL IV, 30-39 MIN: ICD-10-PCS | Mod: S$GLB,,, | Performed by: INTERNAL MEDICINE

## 2019-06-10 PROCEDURE — 3078F DIAST BP <80 MM HG: CPT | Mod: CPTII,S$GLB,, | Performed by: INTERNAL MEDICINE

## 2019-06-10 PROCEDURE — 3078F PR MOST RECENT DIASTOLIC BLOOD PRESSURE < 80 MM HG: ICD-10-PCS | Mod: CPTII,S$GLB,, | Performed by: INTERNAL MEDICINE

## 2019-06-10 PROCEDURE — 3074F PR MOST RECENT SYSTOLIC BLOOD PRESSURE < 130 MM HG: ICD-10-PCS | Mod: CPTII,S$GLB,, | Performed by: INTERNAL MEDICINE

## 2019-06-10 PROCEDURE — 1101F PT FALLS ASSESS-DOCD LE1/YR: CPT | Mod: CPTII,S$GLB,, | Performed by: INTERNAL MEDICINE

## 2019-06-10 PROCEDURE — 1101F PR PT FALLS ASSESS DOC 0-1 FALLS W/OUT INJ PAST YR: ICD-10-PCS | Mod: CPTII,S$GLB,, | Performed by: INTERNAL MEDICINE

## 2019-06-10 RX ORDER — TAMSULOSIN HYDROCHLORIDE 0.4 MG/1
0.4 CAPSULE ORAL DAILY
COMMUNITY
Start: 2019-05-29 | End: 2023-03-03 | Stop reason: SDUPTHER

## 2019-06-10 NOTE — PROGRESS NOTES
Subjective:      Patient ID: Dustin Lauren is a 68 y.o. male.    Chief Complaint:  postsurgical hypothyroidism      History of Present Illness  Mr. Lauren presents today for follow up of hypothyroidism. Last visit in 12/2018.     Interval HPI:   With Hypothyroidism s/p surgery for a benign thyroid nodule on 1/27/10.     Had been on 150 mcg of thyroid hormone for quite some time, but then developed A Fib in 9/2016. Had a repeat episode of A Fib in 11/2016 and TSH was checked at that time and found to be suppressed at 0.120.      Currently taking Synthroid 137 mcg Mon-Sat with 1.5 tablets on Sunday only.  Denies missing any doses. Waits over an hour to eat or take other meds.  More recently he had been taking the thyroid hormone with amlodipine, but he will plan to start  the meds again.      No overt fatigue. More cold than hot. Bowel movements regular. Wt is stable.   Had cardioversion after last visit and no longer in A Fib. No tremor or increased anxiousness.      Also with HTN on amlodipine and benazepril.     For HLP he is taking atorvastatin 10 mg every other day and Fibricor daily.     For vitamin d deficiency he is taking 4000 units of D3 daily.      Review of Systems   Constitutional: Negative for chills and fever.   Gastrointestinal: Negative for nausea and vomiting.   No CP  No SOB    Objective:   Physical Exam   Nursing note and vitals reviewed.  No thyroid tissue palpated  No tremor  DTR's 2 +  Lungs CTA b/l    Lab Review:   Results for DUSTIN LAUREN (MRN [id number]) as of 6/10/2019 08:10   Ref. Range 5/24/2019 08:43   Sodium Latest Ref Range: 136 - 145 mmol/L 140   Potassium Latest Ref Range: 3.5 - 5.1 mmol/L 4.7   Chloride Latest Ref Range: 95 - 110 mmol/L 106   CO2 Latest Ref Range: 23 - 29 mmol/L 29   Anion Gap Latest Ref Range: 8 - 16 mmol/L 5 (L)   BUN, Bld Latest Ref Range: 8 - 23 mg/dL 22   Creatinine Latest Ref Range: 0.5 - 1.4 mg/dL 1.2   eGFR if non  Latest Ref  Range: >60 mL/min/1.73 m^2 >60.0   eGFR if African American Latest Ref Range: >60 mL/min/1.73 m^2 >60.0   Glucose Latest Ref Range: 70 - 110 mg/dL 93   Calcium Latest Ref Range: 8.7 - 10.5 mg/dL 9.8   Alkaline Phosphatase Latest Ref Range: 55 - 135 U/L 37 (L)   PROTEIN TOTAL Latest Ref Range: 6.0 - 8.4 g/dL 7.1   Albumin Latest Ref Range: 3.5 - 5.2 g/dL 3.9   BILIRUBIN TOTAL Latest Ref Range: 0.1 - 1.0 mg/dL 0.5   AST Latest Ref Range: 10 - 40 U/L 18   ALT Latest Ref Range: 10 - 44 U/L 15   TSH Latest Ref Range: 0.400 - 4.000 uIU/mL 1.669   Free T4 Latest Ref Range: 0.71 - 1.51 ng/dL 1.14       Assessment:     1. Postsurgical hypothyroidism    2. Hyperlipidemia, unspecified hyperlipidemia type    3. Essential hypertension        Plan:      1. Patient with postsurgical hypothyroidism  --Clinically and biochemically euthyroid  --Recent TSH at goal  --Will continue Synthroid 137 mcg PO Mon-Sat with 1.5 tablets on Sunday only  --Will avoid TSH suppression with A Fib     2.) On fibrate and statin     3.)  Bp at goal  --Continue current regimen    RTC in 6-8 months with labs prior to appt   Malick Malik M.D. Staff Endocrinology

## 2019-11-11 ENCOUNTER — LAB VISIT (OUTPATIENT)
Dept: LAB | Facility: HOSPITAL | Age: 69
End: 2019-11-11
Attending: INTERNAL MEDICINE
Payer: COMMERCIAL

## 2019-11-11 DIAGNOSIS — I10 ESSENTIAL HYPERTENSION: ICD-10-CM

## 2019-11-11 DIAGNOSIS — E78.5 HYPERLIPIDEMIA, UNSPECIFIED HYPERLIPIDEMIA TYPE: ICD-10-CM

## 2019-11-11 DIAGNOSIS — E89.0 POSTSURGICAL HYPOTHYROIDISM: ICD-10-CM

## 2019-11-11 LAB
ALBUMIN SERPL BCP-MCNC: 4.3 G/DL (ref 3.5–5.2)
ALP SERPL-CCNC: 38 U/L (ref 55–135)
ALT SERPL W/O P-5'-P-CCNC: 15 U/L (ref 10–44)
ANION GAP SERPL CALC-SCNC: 7 MMOL/L (ref 8–16)
AST SERPL-CCNC: 22 U/L (ref 10–40)
BILIRUB SERPL-MCNC: 0.6 MG/DL (ref 0.1–1)
BUN SERPL-MCNC: 20 MG/DL (ref 8–23)
CALCIUM SERPL-MCNC: 9.4 MG/DL (ref 8.7–10.5)
CHLORIDE SERPL-SCNC: 106 MMOL/L (ref 95–110)
CO2 SERPL-SCNC: 28 MMOL/L (ref 23–29)
CREAT SERPL-MCNC: 1.2 MG/DL (ref 0.5–1.4)
EST. GFR  (AFRICAN AMERICAN): >60 ML/MIN/1.73 M^2
EST. GFR  (NON AFRICAN AMERICAN): >60 ML/MIN/1.73 M^2
GLUCOSE SERPL-MCNC: 100 MG/DL (ref 70–110)
POTASSIUM SERPL-SCNC: 4.2 MMOL/L (ref 3.5–5.1)
PROT SERPL-MCNC: 7.3 G/DL (ref 6–8.4)
SODIUM SERPL-SCNC: 141 MMOL/L (ref 136–145)
T4 FREE SERPL-MCNC: 1.31 NG/DL (ref 0.71–1.51)
TSH SERPL DL<=0.005 MIU/L-ACNC: 1.39 UIU/ML (ref 0.4–4)

## 2019-11-11 PROCEDURE — 80053 COMPREHEN METABOLIC PANEL: CPT

## 2019-11-11 PROCEDURE — 84439 ASSAY OF FREE THYROXINE: CPT

## 2019-11-11 PROCEDURE — 36415 COLL VENOUS BLD VENIPUNCTURE: CPT | Mod: PO

## 2019-11-11 PROCEDURE — 84443 ASSAY THYROID STIM HORMONE: CPT

## 2019-11-14 ENCOUNTER — PATIENT OUTREACH (OUTPATIENT)
Dept: ADMINISTRATIVE | Facility: OTHER | Age: 69
End: 2019-11-14

## 2019-11-18 ENCOUNTER — OFFICE VISIT (OUTPATIENT)
Dept: ENDOCRINOLOGY | Facility: CLINIC | Age: 69
End: 2019-11-18
Attending: INTERNAL MEDICINE
Payer: COMMERCIAL

## 2019-11-18 VITALS
WEIGHT: 206.44 LBS | HEART RATE: 60 BPM | BODY MASS INDEX: 30.58 KG/M2 | HEIGHT: 69 IN | DIASTOLIC BLOOD PRESSURE: 63 MMHG | SYSTOLIC BLOOD PRESSURE: 120 MMHG

## 2019-11-18 DIAGNOSIS — E89.0 POSTSURGICAL HYPOTHYROIDISM: Primary | ICD-10-CM

## 2019-11-18 DIAGNOSIS — E78.5 HYPERLIPIDEMIA, UNSPECIFIED HYPERLIPIDEMIA TYPE: ICD-10-CM

## 2019-11-18 DIAGNOSIS — I10 ESSENTIAL HYPERTENSION: ICD-10-CM

## 2019-11-18 DIAGNOSIS — E78.1 HYPERTRIGLYCERIDEMIA: ICD-10-CM

## 2019-11-18 PROCEDURE — 1101F PT FALLS ASSESS-DOCD LE1/YR: CPT | Mod: CPTII,S$GLB,, | Performed by: INTERNAL MEDICINE

## 2019-11-18 PROCEDURE — 3074F PR MOST RECENT SYSTOLIC BLOOD PRESSURE < 130 MM HG: ICD-10-PCS | Mod: CPTII,S$GLB,, | Performed by: INTERNAL MEDICINE

## 2019-11-18 PROCEDURE — 3078F DIAST BP <80 MM HG: CPT | Mod: CPTII,S$GLB,, | Performed by: INTERNAL MEDICINE

## 2019-11-18 PROCEDURE — 99214 PR OFFICE/OUTPT VISIT, EST, LEVL IV, 30-39 MIN: ICD-10-PCS | Mod: S$GLB,,, | Performed by: INTERNAL MEDICINE

## 2019-11-18 PROCEDURE — 99214 OFFICE O/P EST MOD 30 MIN: CPT | Mod: S$GLB,,, | Performed by: INTERNAL MEDICINE

## 2019-11-18 PROCEDURE — 3074F SYST BP LT 130 MM HG: CPT | Mod: CPTII,S$GLB,, | Performed by: INTERNAL MEDICINE

## 2019-11-18 PROCEDURE — 1101F PR PT FALLS ASSESS DOC 0-1 FALLS W/OUT INJ PAST YR: ICD-10-PCS | Mod: CPTII,S$GLB,, | Performed by: INTERNAL MEDICINE

## 2019-11-18 PROCEDURE — 3078F PR MOST RECENT DIASTOLIC BLOOD PRESSURE < 80 MM HG: ICD-10-PCS | Mod: CPTII,S$GLB,, | Performed by: INTERNAL MEDICINE

## 2019-11-18 RX ORDER — LEVOTHYROXINE SODIUM 137 UG/1
TABLET ORAL
Qty: 32 TABLET | Refills: 11 | Status: SHIPPED | OUTPATIENT
Start: 2019-11-18 | End: 2020-11-10 | Stop reason: SDUPTHER

## 2019-11-18 RX ORDER — FENOFIBRIC ACID 135 MG/1
CAPSULE, DELAYED RELEASE ORAL
Qty: 90 CAPSULE | Refills: 3 | Status: SHIPPED | OUTPATIENT
Start: 2019-11-18 | End: 2020-12-22 | Stop reason: SDUPTHER

## 2019-11-18 NOTE — ASSESSMENT & PLAN NOTE
--Patient with postsurgical hypothyroidism  --Clinically and biochemically euthyroid  --TSH remains at normal and at goal  --Will continue Synthroid 137 mcg PO Mon-Sat with 1.5 tablets on Sunday only - refill sent to pharmacy  --Will avoid TSH suppression with A Fib

## 2019-11-18 NOTE — PROGRESS NOTES
Subjective:      Patient ID: Dustin Lauren is a 69 y.o. male.    Chief Complaint:  Hypothyroidism      History of Present Illness  Mr. Lauren presents today for follow up of hypothyroidism. Last visit in 6/2019.      Interval HPI:   With Hypothyroidism s/p surgery for a benign thyroid nodule on 1/27/10.     Had been on 150 mcg of thyroid hormone for quite some time, but then developed A Fib in 9/2016. Had a repeat episode of A Fib in 11/2016 and TSH was checked at that time and found to be suppressed at 0.120.      Currently taking Synthroid 137 mcg Mon-Sat with 1.5 tablets on Sunday only.  Denies missing any doses. Waits at least one hour before taking other meds or food.   Denies palpitations, tremor or increased anxiousness.     Had a brief episode of A Fib a few months ago that resolved in less than a day without intervention.      Also with HTN on amlodipine and benazepril.     For HLP he is taking atorvastatin 10 mg every other day and Fibricor daily.     For vitamin d deficiency he is taking 4000 units of D3 daily.     Review of Systems   Constitutional: Negative for chills and fever.   Gastrointestinal: Negative for nausea and vomiting.       Objective:   Physical Exam   Nursing note and vitals reviewed.  No thyroid tissue palpated  No tremor   DTR's 2 +  No tachycardia  Lungs CTA b/l    BP Readings from Last 3 Encounters:   11/18/19 120/63   06/10/19 120/60   01/07/19 100/60     Wt Readings from Last 1 Encounters:   11/18/19 1428 93.6 kg (206 lb 7.4 oz)       Body mass index is 30.49 kg/m².    Lab Review:   Lab Results   Component Value Date    HGBA1C 5.6 04/17/2006     Lab Results   Component Value Date    CHOL 127 01/04/2019    HDL 41 01/04/2019    LDLCALC 74.0 01/04/2019    TRIG 60 01/04/2019    CHOLHDL 32.3 01/04/2019     Lab Results   Component Value Date     11/11/2019    K 4.2 11/11/2019     11/11/2019    CO2 28 11/11/2019     11/11/2019    BUN 20 11/11/2019    CREATININE 1.2  11/11/2019    CALCIUM 9.4 11/11/2019    PROT 7.3 11/11/2019    ALBUMIN 4.3 11/11/2019    BILITOT 0.6 11/11/2019    ALKPHOS 38 (L) 11/11/2019    AST 22 11/11/2019    ALT 15 11/11/2019    ANIONGAP 7 (L) 11/11/2019    ESTGFRAFRICA >60.0 11/11/2019    EGFRNONAA >60.0 11/11/2019    TSH 1.394 11/11/2019         Assessment and Plan     Postsurgical hypothyroidism  --Patient with postsurgical hypothyroidism  --Clinically and biochemically euthyroid  --Recent TSH at goal  --Will continue Synthroid 137 mcg PO Mon-Sat with 1.5 tablets on Sunday only  --Will avoid TSH suppression with A Fib    Hyperlipidemia  --On fibrate and statin    Hypertension  --Bp at goal  --Continue current regimen    RTC in 6 months    Malick Malik M.D. Staff Endocrinology

## 2019-11-24 RX ORDER — AMLODIPINE AND BENAZEPRIL HYDROCHLORIDE 5; 40 MG/1; MG/1
CAPSULE ORAL
Qty: 90 CAPSULE | Refills: 3 | OUTPATIENT
Start: 2019-11-24

## 2019-11-24 RX ORDER — AMLODIPINE AND VALSARTAN 5; 160 MG/1; MG/1
1 TABLET ORAL DAILY
Qty: 90 TABLET | Refills: 3 | Status: SHIPPED | OUTPATIENT
Start: 2019-11-24 | End: 2020-11-19

## 2019-11-24 NOTE — TELEPHONE ENCOUNTER
I have switched his BP med and would like him to record BP and bring them in with him for visit in two weeks

## 2019-11-25 ENCOUNTER — TELEPHONE (OUTPATIENT)
Dept: INTERNAL MEDICINE | Facility: CLINIC | Age: 69
End: 2019-11-25

## 2019-11-25 NOTE — TELEPHONE ENCOUNTER
----- Message from Tawana Leiva sent at 11/25/2019  2:46 PM CST -----  Contact: self/[phone]  Patient is returning a phone call.  Who left a message for the patient: Conchita  Does patient know what this is regarding: Medication change    Comments:

## 2019-11-25 NOTE — TELEPHONE ENCOUNTER
I discussed change in rx  with patient and he is going to check with card at  dr rausch to make sure he is ok with this..

## 2019-12-18 RX ORDER — AMLODIPINE AND BENAZEPRIL HYDROCHLORIDE 5; 40 MG/1; MG/1
CAPSULE ORAL
Qty: 30 CAPSULE | Refills: 2 | OUTPATIENT
Start: 2019-12-18

## 2019-12-27 ENCOUNTER — TELEPHONE (OUTPATIENT)
Dept: INTERNAL MEDICINE | Facility: CLINIC | Age: 69
End: 2019-12-27

## 2019-12-27 DIAGNOSIS — Z00.00 ANNUAL PHYSICAL EXAM: Primary | ICD-10-CM

## 2019-12-27 NOTE — TELEPHONE ENCOUNTER
----- Message from Sreekanth Mcnally sent at 12/27/2019  2:49 PM CST -----  Contact: self    Doctor appointment and lab have been scheduled.  Please link lab orders to the lab appointment.  Date of doctor appointment:  01/07/20  Date of lab appointment:  01/03/20  Physical or EP: physical  Comments: Patient would like a PSA

## 2020-01-03 ENCOUNTER — LAB VISIT (OUTPATIENT)
Dept: LAB | Facility: HOSPITAL | Age: 70
End: 2020-01-03
Attending: INTERNAL MEDICINE
Payer: COMMERCIAL

## 2020-01-03 DIAGNOSIS — Z00.00 ANNUAL PHYSICAL EXAM: ICD-10-CM

## 2020-01-03 LAB
ALBUMIN SERPL BCP-MCNC: 3.9 G/DL (ref 3.5–5.2)
ALP SERPL-CCNC: 42 U/L (ref 55–135)
ALT SERPL W/O P-5'-P-CCNC: 14 U/L (ref 10–44)
ANION GAP SERPL CALC-SCNC: 8 MMOL/L (ref 8–16)
AST SERPL-CCNC: 18 U/L (ref 10–40)
BASOPHILS # BLD AUTO: 0.07 K/UL (ref 0–0.2)
BASOPHILS NFR BLD: 1.7 % (ref 0–1.9)
BILIRUB SERPL-MCNC: 0.5 MG/DL (ref 0.1–1)
BUN SERPL-MCNC: 20 MG/DL (ref 8–23)
CALCIUM SERPL-MCNC: 9.3 MG/DL (ref 8.7–10.5)
CHLORIDE SERPL-SCNC: 107 MMOL/L (ref 95–110)
CHOLEST SERPL-MCNC: 117 MG/DL (ref 120–199)
CHOLEST/HDLC SERPL: 3 {RATIO} (ref 2–5)
CO2 SERPL-SCNC: 26 MMOL/L (ref 23–29)
COMPLEXED PSA SERPL-MCNC: 2.2 NG/ML (ref 0–4)
CREAT SERPL-MCNC: 1.4 MG/DL (ref 0.5–1.4)
DIFFERENTIAL METHOD: ABNORMAL
EOSINOPHIL # BLD AUTO: 0.1 K/UL (ref 0–0.5)
EOSINOPHIL NFR BLD: 2.5 % (ref 0–8)
ERYTHROCYTE [DISTWIDTH] IN BLOOD BY AUTOMATED COUNT: 13.9 % (ref 11.5–14.5)
EST. GFR  (AFRICAN AMERICAN): 59.7 ML/MIN/1.73 M^2
EST. GFR  (NON AFRICAN AMERICAN): 51.6 ML/MIN/1.73 M^2
ESTIMATED AVG GLUCOSE: 120 MG/DL (ref 68–131)
GLUCOSE SERPL-MCNC: 96 MG/DL (ref 70–110)
HBA1C MFR BLD HPLC: 5.8 % (ref 4–5.6)
HCT VFR BLD AUTO: 41.5 % (ref 40–54)
HDLC SERPL-MCNC: 39 MG/DL (ref 40–75)
HDLC SERPL: 33.3 % (ref 20–50)
HGB BLD-MCNC: 13.2 G/DL (ref 14–18)
IMM GRANULOCYTES # BLD AUTO: 0.01 K/UL (ref 0–0.04)
IMM GRANULOCYTES NFR BLD AUTO: 0.2 % (ref 0–0.5)
LDLC SERPL CALC-MCNC: 65.6 MG/DL (ref 63–159)
LYMPHOCYTES # BLD AUTO: 1.5 K/UL (ref 1–4.8)
LYMPHOCYTES NFR BLD: 38 % (ref 18–48)
MCH RBC QN AUTO: 28.9 PG (ref 27–31)
MCHC RBC AUTO-ENTMCNC: 31.8 G/DL (ref 32–36)
MCV RBC AUTO: 91 FL (ref 82–98)
MONOCYTES # BLD AUTO: 0.4 K/UL (ref 0.3–1)
MONOCYTES NFR BLD: 8.7 % (ref 4–15)
NEUTROPHILS # BLD AUTO: 2 K/UL (ref 1.8–7.7)
NEUTROPHILS NFR BLD: 48.9 % (ref 38–73)
NONHDLC SERPL-MCNC: 78 MG/DL
NRBC BLD-RTO: 0 /100 WBC
PLATELET # BLD AUTO: 248 K/UL (ref 150–350)
PMV BLD AUTO: 11 FL (ref 9.2–12.9)
POTASSIUM SERPL-SCNC: 4.2 MMOL/L (ref 3.5–5.1)
PROT SERPL-MCNC: 7.1 G/DL (ref 6–8.4)
RBC # BLD AUTO: 4.57 M/UL (ref 4.6–6.2)
SODIUM SERPL-SCNC: 141 MMOL/L (ref 136–145)
T4 FREE SERPL-MCNC: 1.17 NG/DL (ref 0.71–1.51)
TRIGL SERPL-MCNC: 62 MG/DL (ref 30–150)
TSH SERPL DL<=0.005 MIU/L-ACNC: 1.63 UIU/ML (ref 0.4–4)
WBC # BLD AUTO: 4.03 K/UL (ref 3.9–12.7)

## 2020-01-03 PROCEDURE — 84443 ASSAY THYROID STIM HORMONE: CPT

## 2020-01-03 PROCEDURE — 83036 HEMOGLOBIN GLYCOSYLATED A1C: CPT

## 2020-01-03 PROCEDURE — 84153 ASSAY OF PSA TOTAL: CPT

## 2020-01-03 PROCEDURE — 84439 ASSAY OF FREE THYROXINE: CPT

## 2020-01-03 PROCEDURE — 85025 COMPLETE CBC W/AUTO DIFF WBC: CPT

## 2020-01-03 PROCEDURE — 80053 COMPREHEN METABOLIC PANEL: CPT

## 2020-01-03 PROCEDURE — 36415 COLL VENOUS BLD VENIPUNCTURE: CPT | Mod: PO

## 2020-01-03 PROCEDURE — 80061 LIPID PANEL: CPT

## 2020-01-07 ENCOUNTER — OFFICE VISIT (OUTPATIENT)
Dept: INTERNAL MEDICINE | Facility: CLINIC | Age: 70
End: 2020-01-07
Payer: COMMERCIAL

## 2020-01-07 VITALS
DIASTOLIC BLOOD PRESSURE: 62 MMHG | SYSTOLIC BLOOD PRESSURE: 112 MMHG | RESPIRATION RATE: 16 BRPM | BODY MASS INDEX: 31.22 KG/M2 | WEIGHT: 218.06 LBS | HEART RATE: 51 BPM | HEIGHT: 70 IN | TEMPERATURE: 98 F

## 2020-01-07 DIAGNOSIS — E89.0 POSTSURGICAL HYPOTHYROIDISM: ICD-10-CM

## 2020-01-07 DIAGNOSIS — E03.9 HYPOTHYROIDISM, UNSPECIFIED TYPE: ICD-10-CM

## 2020-01-07 DIAGNOSIS — Z95.5 S/P CORONARY ARTERY STENT PLACEMENT: ICD-10-CM

## 2020-01-07 DIAGNOSIS — E78.1 HYPERTRIGLYCERIDEMIA: ICD-10-CM

## 2020-01-07 DIAGNOSIS — I10 ESSENTIAL HYPERTENSION: ICD-10-CM

## 2020-01-07 DIAGNOSIS — I48.0 AF (PAROXYSMAL ATRIAL FIBRILLATION): ICD-10-CM

## 2020-01-07 DIAGNOSIS — Z00.00 ANNUAL PHYSICAL EXAM: Primary | ICD-10-CM

## 2020-01-07 PROCEDURE — 3074F PR MOST RECENT SYSTOLIC BLOOD PRESSURE < 130 MM HG: ICD-10-PCS | Mod: CPTII,S$GLB,, | Performed by: INTERNAL MEDICINE

## 2020-01-07 PROCEDURE — 99397 PER PM REEVAL EST PAT 65+ YR: CPT | Mod: S$GLB,,, | Performed by: INTERNAL MEDICINE

## 2020-01-07 PROCEDURE — 99999 PR PBB SHADOW E&M-EST. PATIENT-LVL III: CPT | Mod: PBBFAC,,, | Performed by: INTERNAL MEDICINE

## 2020-01-07 PROCEDURE — 3078F PR MOST RECENT DIASTOLIC BLOOD PRESSURE < 80 MM HG: ICD-10-PCS | Mod: CPTII,S$GLB,, | Performed by: INTERNAL MEDICINE

## 2020-01-07 PROCEDURE — 3074F SYST BP LT 130 MM HG: CPT | Mod: CPTII,S$GLB,, | Performed by: INTERNAL MEDICINE

## 2020-01-07 PROCEDURE — 99999 PR PBB SHADOW E&M-EST. PATIENT-LVL III: ICD-10-PCS | Mod: PBBFAC,,, | Performed by: INTERNAL MEDICINE

## 2020-01-07 PROCEDURE — 3078F DIAST BP <80 MM HG: CPT | Mod: CPTII,S$GLB,, | Performed by: INTERNAL MEDICINE

## 2020-01-07 PROCEDURE — 99397 PR PREVENTIVE VISIT,EST,65 & OVER: ICD-10-PCS | Mod: S$GLB,,, | Performed by: INTERNAL MEDICINE

## 2020-01-07 RX ORDER — OMEGA-3 FATTY ACIDS 1000 MG
2000 CAPSULE ORAL
COMMUNITY
Start: 2016-03-22

## 2020-01-07 RX ORDER — OMEPRAZOLE 20 MG/1
20 TABLET, DELAYED RELEASE ORAL
COMMUNITY
Start: 2016-03-22 | End: 2021-01-12

## 2020-01-07 RX ORDER — OMEPRAZOLE 40 MG/1
40 CAPSULE, DELAYED RELEASE ORAL DAILY
Refills: 2 | COMMUNITY
Start: 2019-10-30 | End: 2024-01-12

## 2020-01-07 RX ORDER — ASCORBIC ACID 125 MG
2000 TABLET,CHEWABLE ORAL DAILY
COMMUNITY
Start: 2016-03-22

## 2020-01-07 RX ORDER — FINASTERIDE 5 MG/1
5 TABLET, FILM COATED ORAL DAILY
Refills: 11 | COMMUNITY
Start: 2019-12-06 | End: 2023-03-03 | Stop reason: SDUPTHER

## 2020-01-08 NOTE — PROGRESS NOTES
Subjective:       Patient ID: Dustin Lauren is a 69 y.o. male.    Chief Complaint: No chief complaint on file.  Dictation #1  MRN:[id number]  [facility]:[id number]  Dict 69-year-old white male patient mine coming in for annual review. Patient is some generally felt healthy but has had a number of evaluations done through the year.  He had colonoscopy done in August which was negative.  He has seen his urologist, Dr. Kent, who did a cystoscopy that was negative.  He was found to have poor emptying secondary to prostate enlargement so he is placed on medication with some improvement.  He is also seen is cardiologist, orthopedics, an endocrine.  He has occasional pain in his left buttock and occasionally that is associated with pain in his left foot and some numbness on occasion as well.  He denies any back pain. Neither the pain in his buttock nor the numbness in his foot are persistent or particularly frequent.  They do not awaken him at night.    Physical exam:  Vital signs-normal  Skin-few age-related changes otherwise normal  Neck-no adenopathy, bruit, thyroid enlargement  Chest-clear percussion auscultation  Heart-no murmur, gallop, irregularity  Abdomen-nontender, no mass, no fullness, no pain, bowel sounds active.  He does have a palpable liver edge that is slightly rounded just below the costal margin which is something have not felt before.  Rectal-not done since he sees Urology  Extremities-normal muscles, pulses, joints  Neurologic-normal.  He had negative straight leg raise on the left side though he has only about 40-50 degree elevation.  There was no discernible numbness in his left foot.    Impression:  1.  Hypertension controlled   2.  Status post thyroid surgery for nodule removal with normal function.  Followed by Endocrine  3.  Prostatism-followed by Urology and now on medication  4.  Hypothyroidism-normal on medication  5.  Status post coronary stent  6.  History of atrial fibrillation appears to be in  normal sinus now  7.  History of lipid disorder on medication  8.  Probable mild lumbar radiculopathy for which I gave him instructions on exercise stretching  9.  Palpable liver edge-his liver chemistries are normal I am getting his ultrasound done by Dr. Kent at [facility] to see if we can assess his liver size  HPI  Review of Systems   Constitutional: Negative for activity change and unexpected weight change.   HENT: Negative for hearing loss, rhinorrhea and trouble swallowing.    Eyes: Negative for discharge and visual disturbance.   Respiratory: Negative for chest tightness and wheezing.    Cardiovascular: Negative for chest pain and palpitations.   Gastrointestinal: Negative for blood in stool, constipation, diarrhea and vomiting.   Endocrine: Positive for polyuria. Negative for polydipsia.   Genitourinary: Positive for difficulty urinating and urgency. Negative for hematuria.   Musculoskeletal: Positive for arthralgias and back pain. Negative for joint swelling and neck pain.   Neurological: Negative for weakness and headaches.   Psychiatric/Behavioral: Negative for confusion and dysphoric mood.       Objective:      Physical Exam   Constitutional: He is oriented to person, place, and time. He appears well-developed and well-nourished.   HENT:   Head: Normocephalic.   Right Ear: External ear normal.   Left Ear: External ear normal.   Mouth/Throat: Oropharynx is clear and moist.   Eyes: Pupils are equal, round, and reactive to light. EOM are normal. Right eye exhibits no discharge.   Neck: Normal range of motion. No JVD present. No tracheal deviation present. No thyromegaly present.   Cardiovascular: Normal rate, regular rhythm, normal heart sounds and intact distal pulses. Exam reveals no gallop.   No murmur heard.  Pulmonary/Chest: Effort normal and breath sounds normal. He has no wheezes. He has no rales.   Abdominal: Soft. Bowel sounds are normal. He exhibits no mass. There is no tenderness.    Genitourinary: Prostate normal and penis normal. Rectal exam shows guaiac negative stool.   Musculoskeletal: Normal range of motion. He exhibits tenderness. He exhibits no edema.   Lymphadenopathy:     He has no cervical adenopathy.   Neurological: He is oriented to person, place, and time. He displays normal reflexes. No cranial nerve deficit. Coordination normal.   Skin: Skin is warm. No rash noted. No pallor.   Psychiatric: He has a normal mood and affect.       Assessment:       1. Annual physical exam    2. AF (paroxysmal atrial fibrillation)    3. S/P coronary artery stent placement    4. Essential hypertension    5. Hypertriglyceridemia    6. Hypothyroidism, unspecified type    7. Postsurgical hypothyroidism        Plan:

## 2020-03-30 ENCOUNTER — TELEPHONE (OUTPATIENT)
Dept: INTERNAL MEDICINE | Facility: CLINIC | Age: 70
End: 2020-03-30

## 2020-03-30 NOTE — TELEPHONE ENCOUNTER
----- Message from Cheyenne Smith sent at 3/30/2020 12:55 PM CDT -----  Contact: self [phone]  Patient needs to speak with someone regarding being in contact with sister who was diagnosed with Covid 19. Please call him back and advise  thanks

## 2020-05-12 ENCOUNTER — PATIENT MESSAGE (OUTPATIENT)
Dept: ENDOCRINOLOGY | Facility: CLINIC | Age: 70
End: 2020-05-12

## 2020-05-25 ENCOUNTER — LAB VISIT (OUTPATIENT)
Dept: LAB | Facility: HOSPITAL | Age: 70
End: 2020-05-25
Attending: INTERNAL MEDICINE
Payer: COMMERCIAL

## 2020-05-25 DIAGNOSIS — E89.0 POSTSURGICAL HYPOTHYROIDISM: ICD-10-CM

## 2020-05-25 DIAGNOSIS — E78.1 HYPERTRIGLYCERIDEMIA: ICD-10-CM

## 2020-05-25 DIAGNOSIS — E78.5 HYPERLIPIDEMIA, UNSPECIFIED HYPERLIPIDEMIA TYPE: ICD-10-CM

## 2020-05-25 DIAGNOSIS — I10 ESSENTIAL HYPERTENSION: ICD-10-CM

## 2020-05-25 LAB
ALBUMIN SERPL BCP-MCNC: 4.1 G/DL (ref 3.5–5.2)
ALP SERPL-CCNC: 39 U/L (ref 55–135)
ALT SERPL W/O P-5'-P-CCNC: 11 U/L (ref 10–44)
ANION GAP SERPL CALC-SCNC: 11 MMOL/L (ref 8–16)
AST SERPL-CCNC: 18 U/L (ref 10–40)
BILIRUB SERPL-MCNC: 0.4 MG/DL (ref 0.1–1)
BUN SERPL-MCNC: 20 MG/DL (ref 8–23)
CALCIUM SERPL-MCNC: 9.2 MG/DL (ref 8.7–10.5)
CHLORIDE SERPL-SCNC: 107 MMOL/L (ref 95–110)
CO2 SERPL-SCNC: 23 MMOL/L (ref 23–29)
CREAT SERPL-MCNC: 1.2 MG/DL (ref 0.5–1.4)
EST. GFR  (AFRICAN AMERICAN): >60 ML/MIN/1.73 M^2
EST. GFR  (NON AFRICAN AMERICAN): >60 ML/MIN/1.73 M^2
GLUCOSE SERPL-MCNC: 98 MG/DL (ref 70–110)
POTASSIUM SERPL-SCNC: 4.1 MMOL/L (ref 3.5–5.1)
PROT SERPL-MCNC: 7.1 G/DL (ref 6–8.4)
SODIUM SERPL-SCNC: 141 MMOL/L (ref 136–145)
T4 FREE SERPL-MCNC: 1.11 NG/DL (ref 0.71–1.51)
TSH SERPL DL<=0.005 MIU/L-ACNC: 3.07 UIU/ML (ref 0.4–4)

## 2020-05-25 PROCEDURE — 84439 ASSAY OF FREE THYROXINE: CPT

## 2020-05-25 PROCEDURE — 36415 COLL VENOUS BLD VENIPUNCTURE: CPT | Mod: PO

## 2020-05-25 PROCEDURE — 84443 ASSAY THYROID STIM HORMONE: CPT

## 2020-05-25 PROCEDURE — 80053 COMPREHEN METABOLIC PANEL: CPT

## 2020-05-29 ENCOUNTER — TELEPHONE (OUTPATIENT)
Dept: ENDOCRINOLOGY | Facility: CLINIC | Age: 70
End: 2020-05-29

## 2020-05-31 ENCOUNTER — PATIENT OUTREACH (OUTPATIENT)
Dept: ADMINISTRATIVE | Facility: OTHER | Age: 70
End: 2020-05-31

## 2020-06-01 ENCOUNTER — OFFICE VISIT (OUTPATIENT)
Dept: ENDOCRINOLOGY | Facility: CLINIC | Age: 70
End: 2020-06-01
Attending: INTERNAL MEDICINE
Payer: COMMERCIAL

## 2020-06-01 VITALS
SYSTOLIC BLOOD PRESSURE: 120 MMHG | WEIGHT: 203.94 LBS | DIASTOLIC BLOOD PRESSURE: 61 MMHG | HEIGHT: 70 IN | HEART RATE: 60 BPM | BODY MASS INDEX: 29.2 KG/M2

## 2020-06-01 DIAGNOSIS — I10 ESSENTIAL HYPERTENSION: ICD-10-CM

## 2020-06-01 DIAGNOSIS — R73.02 IMPAIRED GLUCOSE TOLERANCE: ICD-10-CM

## 2020-06-01 DIAGNOSIS — E89.0 POSTSURGICAL HYPOTHYROIDISM: Primary | ICD-10-CM

## 2020-06-01 DIAGNOSIS — E78.5 HYPERLIPIDEMIA, UNSPECIFIED HYPERLIPIDEMIA TYPE: ICD-10-CM

## 2020-06-01 PROCEDURE — 1126F PR PAIN SEVERITY QUANTIFIED, NO PAIN PRESENT: ICD-10-PCS | Mod: S$GLB,,, | Performed by: INTERNAL MEDICINE

## 2020-06-01 PROCEDURE — 3078F DIAST BP <80 MM HG: CPT | Mod: CPTII,S$GLB,, | Performed by: INTERNAL MEDICINE

## 2020-06-01 PROCEDURE — 1159F MED LIST DOCD IN RCRD: CPT | Mod: S$GLB,,, | Performed by: INTERNAL MEDICINE

## 2020-06-01 PROCEDURE — 3078F PR MOST RECENT DIASTOLIC BLOOD PRESSURE < 80 MM HG: ICD-10-PCS | Mod: CPTII,S$GLB,, | Performed by: INTERNAL MEDICINE

## 2020-06-01 PROCEDURE — 99214 OFFICE O/P EST MOD 30 MIN: CPT | Mod: S$GLB,,, | Performed by: INTERNAL MEDICINE

## 2020-06-01 PROCEDURE — 3074F PR MOST RECENT SYSTOLIC BLOOD PRESSURE < 130 MM HG: ICD-10-PCS | Mod: CPTII,S$GLB,, | Performed by: INTERNAL MEDICINE

## 2020-06-01 PROCEDURE — 1126F AMNT PAIN NOTED NONE PRSNT: CPT | Mod: S$GLB,,, | Performed by: INTERNAL MEDICINE

## 2020-06-01 PROCEDURE — 99214 PR OFFICE/OUTPT VISIT, EST, LEVL IV, 30-39 MIN: ICD-10-PCS | Mod: S$GLB,,, | Performed by: INTERNAL MEDICINE

## 2020-06-01 PROCEDURE — 1159F PR MEDICATION LIST DOCUMENTED IN MEDICAL RECORD: ICD-10-PCS | Mod: S$GLB,,, | Performed by: INTERNAL MEDICINE

## 2020-06-01 PROCEDURE — 3074F SYST BP LT 130 MM HG: CPT | Mod: CPTII,S$GLB,, | Performed by: INTERNAL MEDICINE

## 2020-06-01 NOTE — PROGRESS NOTES
Chart reviewed.   Immunizations: Triggered Imm Registry     Orders placed: n/a  Upcoming appts to satisfy KAMARI topics: n/a

## 2020-06-01 NOTE — ASSESSMENT & PLAN NOTE
--Patient with postsurgical hypothyroidism  --Clinically and biochemically euthyroid  --TSH remains normal and at goal  --Will continue Synthroid 137 mcg PO Mon-Sat with 1.5 tablets on Sunday only   --Will avoid TSH suppression with A Fib

## 2020-06-01 NOTE — PROGRESS NOTES
"Subjective:      Patient ID: Dustin Lauren is a 69 y.o. male.    Chief Complaint:  Postsurgical hypothyroidism      History of Present Illness  Mr. Lauren presents today for follow up of hypothyroidism. Last visit in 11/2019.     Interval HPI:   With Hypothyroidism s/p surgery for a benign thyroid nodule on 1/27/10.     Had been on 150 mcg of thyroid hormone for quite some time, but then developed A Fib in 9/2016. Had a repeat episode of A Fib in 11/2016 and TSH was checked at that time and found to be suppressed at 0.120.      Currently taking Synthroid 137 mcg Mon-Sat with 1.5 tablets on Sunday only.  Denies missing any doses. Waits at least one hour before taking other meds or food.   No tremor or increased anxiousness. Reports a few episodes of "flutters" a few weeks back that have resolved, and he does not think this was A Fib.   No overt fatigue. BM's regular.      Also with HTN on amlodipine and valsartan.      For HLP he is taking atorvastatin 10 mg every other day and Fibricor daily.     For vitamin d deficiency he is taking 2000 units of D3 daily.    Found to have A1c of 5.8% in 1/2020.    Review of Systems   Constitutional: Negative for chills and fever.   Gastrointestinal: Negative for nausea and vomiting.   No CP  No SOB    Objective:   Physical Exam   Nursing note and vitals reviewed.  No thyroid tissue palpated  No tremor  DTR's 2 +  No tachycardia    BP Readings from Last 3 Encounters:   06/01/20 120/61   01/07/20 112/62   11/18/19 120/63     Wt Readings from Last 1 Encounters:   06/01/20 1014 92.5 kg (203 lb 14.8 oz)       Body mass index is 29.26 kg/m².    Lab Review:   Lab Results   Component Value Date    HGBA1C 5.8 (H) 01/03/2020     Lab Results   Component Value Date    CHOL 117 (L) 01/03/2020    HDL 39 (L) 01/03/2020    LDLCALC 65.6 01/03/2020    TRIG 62 01/03/2020    CHOLHDL 33.3 01/03/2020     Lab Results   Component Value Date     05/25/2020    K 4.1 05/25/2020     " 05/25/2020    CO2 23 05/25/2020    GLU 98 05/25/2020    BUN 20 05/25/2020    CREATININE 1.2 05/25/2020    CALCIUM 9.2 05/25/2020    PROT 7.1 05/25/2020    ALBUMIN 4.1 05/25/2020    BILITOT 0.4 05/25/2020    ALKPHOS 39 (L) 05/25/2020    AST 18 05/25/2020    ALT 11 05/25/2020    ANIONGAP 11 05/25/2020    ESTGFRAFRICA >60.0 05/25/2020    EGFRNONAA >60.0 05/25/2020    TSH 3.067 05/25/2020         Assessment and Plan     Postsurgical hypothyroidism  --Patient with postsurgical hypothyroidism  --Clinically and biochemically euthyroid  --TSH remains normal and at goal  --Will continue Synthroid 137 mcg PO Mon-Sat with 1.5 tablets on Sunday only   --Will avoid TSH suppression with A Fib    Hyperlipidemia  --On fibrate and statin    Hypertension  --Bp at goal  --Continue current regimen    Impaired glucose tolerance  --Last A1c 5.8%  --Counseled on diet and exercise  --Check A1c periodically    RTC in 6 months with labs prior to appt    Malick Malik M.D. Staff Endocrinology

## 2020-09-29 ENCOUNTER — PATIENT OUTREACH (OUTPATIENT)
Dept: ADMINISTRATIVE | Facility: OTHER | Age: 70
End: 2020-09-29

## 2020-10-01 ENCOUNTER — OFFICE VISIT (OUTPATIENT)
Dept: ORTHOPEDICS | Facility: CLINIC | Age: 70
End: 2020-10-01
Payer: COMMERCIAL

## 2020-10-01 ENCOUNTER — HOSPITAL ENCOUNTER (OUTPATIENT)
Dept: RADIOLOGY | Facility: HOSPITAL | Age: 70
Discharge: HOME | End: 2020-10-01
Attending: ORTHOPAEDIC SURGERY
Payer: COMMERCIAL

## 2020-10-01 VITALS — WEIGHT: 205.94 LBS | BODY MASS INDEX: 29.48 KG/M2 | HEIGHT: 70 IN

## 2020-10-01 DIAGNOSIS — M25.561 CHRONIC PAIN OF BOTH KNEES: Primary | ICD-10-CM

## 2020-10-01 DIAGNOSIS — M25.561 CHRONIC PAIN OF BOTH KNEES: ICD-10-CM

## 2020-10-01 DIAGNOSIS — G89.29 CHRONIC PAIN OF BOTH KNEES: Primary | ICD-10-CM

## 2020-10-01 DIAGNOSIS — M17.0 PRIMARY OSTEOARTHRITIS OF BOTH KNEES: ICD-10-CM

## 2020-10-01 DIAGNOSIS — G89.29 CHRONIC PAIN OF BOTH KNEES: ICD-10-CM

## 2020-10-01 DIAGNOSIS — M25.562 CHRONIC PAIN OF BOTH KNEES: ICD-10-CM

## 2020-10-01 DIAGNOSIS — M25.562 CHRONIC PAIN OF BOTH KNEES: Primary | ICD-10-CM

## 2020-10-01 PROCEDURE — 1125F AMNT PAIN NOTED PAIN PRSNT: CPT | Mod: S$GLB,,, | Performed by: ORTHOPAEDIC SURGERY

## 2020-10-01 PROCEDURE — 73562 X-RAY EXAM OF KNEE 3: CPT | Mod: TC,50

## 2020-10-01 PROCEDURE — 1159F MED LIST DOCD IN RCRD: CPT | Mod: S$GLB,,, | Performed by: ORTHOPAEDIC SURGERY

## 2020-10-01 PROCEDURE — 99999 PR PBB SHADOW E&M-EST. PATIENT-LVL IV: ICD-10-PCS | Mod: PBBFAC,,, | Performed by: ORTHOPAEDIC SURGERY

## 2020-10-01 PROCEDURE — 1125F PR PAIN SEVERITY QUANTIFIED, PAIN PRESENT: ICD-10-PCS | Mod: S$GLB,,, | Performed by: ORTHOPAEDIC SURGERY

## 2020-10-01 PROCEDURE — 3008F BODY MASS INDEX DOCD: CPT | Mod: CPTII,S$GLB,, | Performed by: ORTHOPAEDIC SURGERY

## 2020-10-01 PROCEDURE — 73562 X-RAY EXAM OF KNEE 3: CPT | Mod: 26,,, | Performed by: RADIOLOGY

## 2020-10-01 PROCEDURE — 73562 XR KNEE ORTHO BILAT: ICD-10-PCS | Mod: 26,,, | Performed by: RADIOLOGY

## 2020-10-01 PROCEDURE — 99214 OFFICE O/P EST MOD 30 MIN: CPT | Mod: S$GLB,,, | Performed by: ORTHOPAEDIC SURGERY

## 2020-10-01 PROCEDURE — 99214 PR OFFICE/OUTPT VISIT, EST, LEVL IV, 30-39 MIN: ICD-10-PCS | Mod: S$GLB,,, | Performed by: ORTHOPAEDIC SURGERY

## 2020-10-01 PROCEDURE — 99999 PR PBB SHADOW E&M-EST. PATIENT-LVL IV: CPT | Mod: PBBFAC,,, | Performed by: ORTHOPAEDIC SURGERY

## 2020-10-01 PROCEDURE — 1159F PR MEDICATION LIST DOCUMENTED IN MEDICAL RECORD: ICD-10-PCS | Mod: S$GLB,,, | Performed by: ORTHOPAEDIC SURGERY

## 2020-10-01 PROCEDURE — 3008F PR BODY MASS INDEX (BMI) DOCUMENTED: ICD-10-PCS | Mod: CPTII,S$GLB,, | Performed by: ORTHOPAEDIC SURGERY

## 2020-10-01 NOTE — PROGRESS NOTES
HPI:    Dustin Lauren is a 70 y.o. male who is here today for   Chief Complaint   Patient presents with    Left Knee - Pain    Right Knee - Pain   .  Osteoarthritis of the knees.  Both knees had arthroscopic medial meniscectomies  Right 2014  Left 2015  Patient states he has had a little more pain lately.    Duration: 2 months  Intensity: moderate  Character of pain: achy  Location: He reports that the pain is predominately  medial    Past Medical History:   Diagnosis Date    A-fib     s/p cardioversion    Carpal tunnel syndrome     bilaterally    Cataract     Chronic LBP 11/8/2012    Chronic neck pain 11/8/2012    Coronary artery disease 03/24/2016    s/p 2 stents in RCA and ostium    Diverticulosis of colon     DJD (degenerative joint disease) of cervical spine 11/8/2012    DJD (degenerative joint disease) of lumbar spine 11/8/2012    DJD (degenerative joint disease) of thoracic spine 11/8/2012    GERD (gastroesophageal reflux disease)     Hyperlipidemia     hypertriglyceridemia    Hypertension     Hypothyroidism     s/p surgery    Nephrolithiasis, uric acid     stone evaluation showed uric acid and calcium oxalate    Rosacea     Thyroid disease     Ulcer     Unspecified disorder of kidney and ureter     Vitamin D deficiency disease           Current Outpatient Medications:     amlodipine-valsartan (EXFORGE) 5-160 mg per tablet, Take 1 tablet by mouth once daily., Disp: 90 tablet, Rfl: 3    apixaban (ELIQUIS) 5 mg Tab, Take 5 mg by mouth 2 (two) times daily., Disp: , Rfl:     atorvastatin (LIPITOR) 10 MG tablet, Take 10 mg by mouth once daily. Every other day, Disp: , Rfl:     cholecalciferol, vitamin D3, (VITAMIN D3) 1,000 unit Chew, Take 2,000 Int'l Units by mouth., Disp: , Rfl:     fenofibric acid (FIBRICOR) 135 mg CpDR, TAKE 1 CAPSULE (135 MG TOTAL) BY MOUTH ONCE DAILY., Disp: 90 capsule, Rfl: 3    finasteride (PROSCAR) 5 mg tablet, Take 5 mg by mouth once daily., Disp: , Rfl:  "11    levothyroxine (SYNTHROID) 137 MCG Tab tablet, Take 1 tablet (137 mcg) by mouth Mon-Sat and take 1.5 tablet (205 mcg) by mouth on Sunday only, Brand name Synthroid only, Disp: 32 tablet, Rfl: 11    naftifine (NAFTIN) 2 % Crea, Apply 1 application topically daily as needed., Disp: , Rfl:     omega-3 fatty acids 1,000 mg Cap, Take 2,000 mg by mouth., Disp: , Rfl:     omeprazole (PRILOSEC) 40 MG capsule, Take 40 mg by mouth once daily., Disp: , Rfl: 2    prasugrel (EFFIENT) 10 mg Tab, Take 10 mg by mouth once daily., Disp: , Rfl:     tamsulosin (FLOMAX) 0.4 mg Cap, , Disp: , Rfl:     cholecalciferol, vitamin D3, 2,000 unit Cap, Take 2 capsules by mouth once daily. , Disp: , Rfl:     fish oil-omega-3 fatty acids 300-1,000 mg capsule, Take 2 g by mouth 2 (two) times daily. , Disp: , Rfl:     omeprazole (PRILOSEC OTC) 20 MG tablet, Take 20 mg by mouth., Disp: , Rfl:      Review of patient's allergies indicates:   Allergen Reactions    Codeine Nausea Only    Crestor [rosuvastatin] Palpitations    Doxycycline Hives    Keflex [cephalexin] Rash    Celebrex [celecoxib] Other (See Comments)     Upset stomach    Mobic [meloxicam] Other (See Comments)     Stomach pain and nauseous    Niacin Diarrhea    Niacin preparations Diarrhea    Ampicillin Rash    Pcn [penicillins] Rash        ROS  Constitutional: Negative for fever, Positive for weight loss  HENT Negative for congestion  Cardiovascular: Negative chest pain  Respiratory: Negative Shortness of breath  Heme: Negative excessive bleeding  Skin:NegativeItching, Negative breakdown  Musculoskeletal:Negative for back pain, Positive for joint pain, Negative muscle pain, Negative muscle weakness  Neurological: Negative for numbness and paresthesias   Psychiatric/Behavioral: Negative altered mental status, Negative for depression    Physical Exam:   Ht 5' 10" (1.778 m)   Wt 93.4 kg (205 lb 14.6 oz)   BMI 29.54 kg/m²   General appearance: This is a " well-developed, Well nourished male No obvious acute distress.  Psychiatric: normal mood and affect;  pleasant and conversant; judgment and thought content normal  Gait is coordinated.   Cardiovascular: There are no swelling or varicosities present.   Respiratory: normal respiratory effort   Lymphatic: no adenopathy   Neurologic: alert and oriented to person, place, and time   Deep Tendon Reflexes are normal;  Coordination and Balance: Normal   Musculoskeletal   Neck    ROM shows normal flexion and extension and lateral rotation    Palpation: Non-tender    Stability is normal    Strength is normal    Skin is normal without masses and lesions    Sensation is intact to light touch   Back    ROM showsnormal flexion, extension    and  rotation    Palpation shows no masses    Stability is normal    Strength to flexion and extension well maintained    Core strength is diminished    Skin shows no rashes or cafe au lait spots;     Sensation is intact to light touch    Right Knee  Swelling None  TendernessMedial joint line  Range of Motion: 130    Left Knee: Swelling None  TendernessMedial joint line  Range of Motion: 130    Radiograph   Osteoarthritis: moderate  Angle: mild varus    Assessment:  The arthritis in his knees have shown mild progression over the last 4 years.    Plan:  Patient has functional knees and still has some cartilage space left.  At this point I would continue with the low-impact exercises.

## 2020-10-06 ENCOUNTER — OFFICE VISIT (OUTPATIENT)
Dept: OPTOMETRY | Facility: CLINIC | Age: 70
End: 2020-10-06
Payer: COMMERCIAL

## 2020-10-06 DIAGNOSIS — Z13.5 GLAUCOMA SCREENING: ICD-10-CM

## 2020-10-06 DIAGNOSIS — H25.13 NUCLEAR SCLEROSIS, BILATERAL: Primary | ICD-10-CM

## 2020-10-06 DIAGNOSIS — H52.4 HYPEROPIA WITH PRESBYOPIA OF BOTH EYES: ICD-10-CM

## 2020-10-06 DIAGNOSIS — H52.03 HYPEROPIA WITH PRESBYOPIA OF BOTH EYES: ICD-10-CM

## 2020-10-06 PROCEDURE — 92015 PR REFRACTION: ICD-10-PCS | Mod: S$GLB,,, | Performed by: OPTOMETRIST

## 2020-10-06 PROCEDURE — 99999 PR PBB SHADOW E&M-EST. PATIENT-LVL III: ICD-10-PCS | Mod: PBBFAC,,, | Performed by: OPTOMETRIST

## 2020-10-06 PROCEDURE — 92014 COMPRE OPH EXAM EST PT 1/>: CPT | Mod: S$GLB,,, | Performed by: OPTOMETRIST

## 2020-10-06 PROCEDURE — 92014 PR EYE EXAM, EST PATIENT,COMPREHESV: ICD-10-PCS | Mod: S$GLB,,, | Performed by: OPTOMETRIST

## 2020-10-06 PROCEDURE — 92015 DETERMINE REFRACTIVE STATE: CPT | Mod: S$GLB,,, | Performed by: OPTOMETRIST

## 2020-10-06 PROCEDURE — 99999 PR PBB SHADOW E&M-EST. PATIENT-LVL III: CPT | Mod: PBBFAC,,, | Performed by: OPTOMETRIST

## 2020-10-06 NOTE — PROGRESS NOTES
HPI     DLS; 2/21/19  Pt states no VA problems, pt states he has been feeling like something is   in his right eyes think a lash might be turning in.   Occ. Floaters, no flashes   systane gtts     Last edited by Renetta Fuentes MA on 10/6/2020 10:02 AM. (History)        ROS     Negative for: Constitutional, Gastrointestinal, Neurological, Skin,   Genitourinary, Musculoskeletal, HENT, Endocrine, Cardiovascular, Eyes,   Respiratory, Psychiatric, Allergic/Imm, Heme/Lymph    Last edited by Toño Carrillo, MARIA ELENA on 10/6/2020 10:24 AM. (History)        Assessment /Plan     For exam results, see Encounter Report.    Nuclear sclerosis, bilateral    Glaucoma screening    Hyperopia with presbyopia of both eyes        1. Cat OU--pt wishes new Rx  2. D-chalasis/brow ptosis--discussed surgery--pt wishes to wait    PLAN:    rtc 1 yr

## 2020-11-10 DIAGNOSIS — E89.0 POSTSURGICAL HYPOTHYROIDISM: ICD-10-CM

## 2020-11-10 RX ORDER — LEVOTHYROXINE SODIUM 137 UG/1
TABLET ORAL
Qty: 32 TABLET | Refills: 11 | Status: SHIPPED | OUTPATIENT
Start: 2020-11-10 | End: 2021-11-03

## 2020-11-11 ENCOUNTER — TELEPHONE (OUTPATIENT)
Dept: INTERNAL MEDICINE | Facility: CLINIC | Age: 70
End: 2020-11-11

## 2020-11-11 DIAGNOSIS — E89.0 POSTSURGICAL HYPOTHYROIDISM: ICD-10-CM

## 2020-11-11 DIAGNOSIS — M54.50 RIGHT-SIDED LOW BACK PAIN WITHOUT SCIATICA, UNSPECIFIED CHRONICITY: ICD-10-CM

## 2020-11-11 DIAGNOSIS — E66.9 OBESITY, UNSPECIFIED CLASSIFICATION, UNSPECIFIED OBESITY TYPE, UNSPECIFIED WHETHER SERIOUS COMORBIDITY PRESENT: ICD-10-CM

## 2020-11-11 DIAGNOSIS — M47.812 OSTEOARTHRITIS OF CERVICAL SPINE, UNSPECIFIED SPINAL OSTEOARTHRITIS COMPLICATION STATUS: ICD-10-CM

## 2020-11-11 DIAGNOSIS — Z98.890 STATUS POST ARTHROSCOPY OF LEFT KNEE: ICD-10-CM

## 2020-11-11 DIAGNOSIS — Z12.5 PROSTATE CANCER SCREENING: ICD-10-CM

## 2020-11-11 DIAGNOSIS — G89.29 CHRONIC NECK PAIN: ICD-10-CM

## 2020-11-11 DIAGNOSIS — M25.562 LEFT KNEE PAIN, UNSPECIFIED CHRONICITY: ICD-10-CM

## 2020-11-11 DIAGNOSIS — M54.42 LEFT-SIDED LOW BACK PAIN WITH LEFT-SIDED SCIATICA, UNSPECIFIED CHRONICITY: ICD-10-CM

## 2020-11-11 DIAGNOSIS — R25.3 EYELID TWITCH: ICD-10-CM

## 2020-11-11 DIAGNOSIS — R73.02 IMPAIRED GLUCOSE TOLERANCE: ICD-10-CM

## 2020-11-11 DIAGNOSIS — E78.5 HYPERLIPIDEMIA, UNSPECIFIED HYPERLIPIDEMIA TYPE: ICD-10-CM

## 2020-11-11 DIAGNOSIS — M54.50 CHRONIC LOW BACK PAIN, UNSPECIFIED BACK PAIN LATERALITY, UNSPECIFIED WHETHER SCIATICA PRESENT: ICD-10-CM

## 2020-11-11 DIAGNOSIS — M47.896 OTHER OSTEOARTHRITIS OF SPINE, LUMBAR REGION: ICD-10-CM

## 2020-11-11 DIAGNOSIS — R00.2 FLUTTERING SENSATION OF HEART: ICD-10-CM

## 2020-11-11 DIAGNOSIS — M47.814 OSTEOARTHRITIS OF THORACIC SPINE, UNSPECIFIED SPINAL OSTEOARTHRITIS COMPLICATION STATUS: ICD-10-CM

## 2020-11-11 DIAGNOSIS — G89.29 CHRONIC LOW BACK PAIN, UNSPECIFIED BACK PAIN LATERALITY, UNSPECIFIED WHETHER SCIATICA PRESENT: ICD-10-CM

## 2020-11-11 DIAGNOSIS — Z00.00 PREVENTATIVE HEALTH CARE: Primary | ICD-10-CM

## 2020-11-11 DIAGNOSIS — H25.13 NUCLEAR SCLEROSIS OF BOTH EYES: ICD-10-CM

## 2020-11-11 DIAGNOSIS — M54.2 CHRONIC NECK PAIN: ICD-10-CM

## 2020-11-11 DIAGNOSIS — G56.03 CARPAL TUNNEL SYNDROME, BILATERAL: ICD-10-CM

## 2020-11-11 DIAGNOSIS — K52.9 GE (GASTROENTERITIS): ICD-10-CM

## 2020-11-11 DIAGNOSIS — R10.31 RIGHT GROIN PAIN: ICD-10-CM

## 2020-11-11 DIAGNOSIS — I10 ESSENTIAL HYPERTENSION: ICD-10-CM

## 2020-11-11 DIAGNOSIS — S89.90XS KNEE INJURY, UNSPECIFIED LATERALITY, SEQUELA: ICD-10-CM

## 2020-12-02 ENCOUNTER — PATIENT MESSAGE (OUTPATIENT)
Dept: ENDOCRINOLOGY | Facility: CLINIC | Age: 70
End: 2020-12-02

## 2020-12-22 DIAGNOSIS — E78.1 HYPERTRIGLYCERIDEMIA: ICD-10-CM

## 2020-12-22 RX ORDER — FENOFIBRIC ACID 135 MG/1
CAPSULE, DELAYED RELEASE ORAL
Qty: 90 CAPSULE | Refills: 3 | Status: SHIPPED | OUTPATIENT
Start: 2020-12-22 | End: 2022-01-12

## 2021-01-06 ENCOUNTER — LAB VISIT (OUTPATIENT)
Dept: LAB | Facility: HOSPITAL | Age: 71
End: 2021-01-06
Attending: INTERNAL MEDICINE
Payer: COMMERCIAL

## 2021-01-06 DIAGNOSIS — E66.9 OBESITY, UNSPECIFIED CLASSIFICATION, UNSPECIFIED OBESITY TYPE, UNSPECIFIED WHETHER SERIOUS COMORBIDITY PRESENT: ICD-10-CM

## 2021-01-06 DIAGNOSIS — M54.2 CHRONIC NECK PAIN: ICD-10-CM

## 2021-01-06 DIAGNOSIS — M54.50 RIGHT-SIDED LOW BACK PAIN WITHOUT SCIATICA, UNSPECIFIED CHRONICITY: ICD-10-CM

## 2021-01-06 DIAGNOSIS — Z00.00 PREVENTATIVE HEALTH CARE: ICD-10-CM

## 2021-01-06 DIAGNOSIS — R73.02 IMPAIRED GLUCOSE TOLERANCE: ICD-10-CM

## 2021-01-06 DIAGNOSIS — Z98.890 STATUS POST ARTHROSCOPY OF LEFT KNEE: ICD-10-CM

## 2021-01-06 DIAGNOSIS — G89.29 CHRONIC NECK PAIN: ICD-10-CM

## 2021-01-06 DIAGNOSIS — H25.13 NUCLEAR SCLEROSIS OF BOTH EYES: ICD-10-CM

## 2021-01-06 DIAGNOSIS — I10 ESSENTIAL HYPERTENSION: ICD-10-CM

## 2021-01-06 DIAGNOSIS — M54.42 LEFT-SIDED LOW BACK PAIN WITH LEFT-SIDED SCIATICA, UNSPECIFIED CHRONICITY: ICD-10-CM

## 2021-01-06 DIAGNOSIS — E78.5 HYPERLIPIDEMIA, UNSPECIFIED HYPERLIPIDEMIA TYPE: ICD-10-CM

## 2021-01-06 DIAGNOSIS — E89.0 POSTSURGICAL HYPOTHYROIDISM: ICD-10-CM

## 2021-01-06 DIAGNOSIS — M54.50 CHRONIC LOW BACK PAIN, UNSPECIFIED BACK PAIN LATERALITY, UNSPECIFIED WHETHER SCIATICA PRESENT: ICD-10-CM

## 2021-01-06 DIAGNOSIS — S89.90XS KNEE INJURY, UNSPECIFIED LATERALITY, SEQUELA: ICD-10-CM

## 2021-01-06 DIAGNOSIS — M25.562 LEFT KNEE PAIN, UNSPECIFIED CHRONICITY: ICD-10-CM

## 2021-01-06 DIAGNOSIS — K52.9 GE (GASTROENTERITIS): ICD-10-CM

## 2021-01-06 DIAGNOSIS — M47.814 OSTEOARTHRITIS OF THORACIC SPINE, UNSPECIFIED SPINAL OSTEOARTHRITIS COMPLICATION STATUS: ICD-10-CM

## 2021-01-06 DIAGNOSIS — M47.896 OTHER OSTEOARTHRITIS OF SPINE, LUMBAR REGION: ICD-10-CM

## 2021-01-06 DIAGNOSIS — R00.2 FLUTTERING SENSATION OF HEART: ICD-10-CM

## 2021-01-06 DIAGNOSIS — G89.29 CHRONIC LOW BACK PAIN, UNSPECIFIED BACK PAIN LATERALITY, UNSPECIFIED WHETHER SCIATICA PRESENT: ICD-10-CM

## 2021-01-06 DIAGNOSIS — G56.03 CARPAL TUNNEL SYNDROME, BILATERAL: ICD-10-CM

## 2021-01-06 DIAGNOSIS — R10.31 RIGHT GROIN PAIN: ICD-10-CM

## 2021-01-06 DIAGNOSIS — Z12.5 PROSTATE CANCER SCREENING: ICD-10-CM

## 2021-01-06 DIAGNOSIS — R25.3 EYELID TWITCH: ICD-10-CM

## 2021-01-06 DIAGNOSIS — M47.812 OSTEOARTHRITIS OF CERVICAL SPINE, UNSPECIFIED SPINAL OSTEOARTHRITIS COMPLICATION STATUS: ICD-10-CM

## 2021-01-06 LAB
25(OH)D3+25(OH)D2 SERPL-MCNC: 29 NG/ML (ref 30–96)
ALBUMIN SERPL BCP-MCNC: 4 G/DL (ref 3.5–5.2)
ALP SERPL-CCNC: 38 U/L (ref 55–135)
ALT SERPL W/O P-5'-P-CCNC: 12 U/L (ref 10–44)
ANION GAP SERPL CALC-SCNC: 7 MMOL/L (ref 8–16)
AST SERPL-CCNC: 15 U/L (ref 10–40)
BASOPHILS # BLD AUTO: 0.07 K/UL (ref 0–0.2)
BASOPHILS NFR BLD: 2 % (ref 0–1.9)
BILIRUB SERPL-MCNC: 0.6 MG/DL (ref 0.1–1)
BUN SERPL-MCNC: 21 MG/DL (ref 8–23)
CALCIUM SERPL-MCNC: 9.1 MG/DL (ref 8.7–10.5)
CHLORIDE SERPL-SCNC: 106 MMOL/L (ref 95–110)
CHOLEST SERPL-MCNC: 119 MG/DL (ref 120–199)
CHOLEST/HDLC SERPL: 3 {RATIO} (ref 2–5)
CO2 SERPL-SCNC: 27 MMOL/L (ref 23–29)
COMPLEXED PSA SERPL-MCNC: 1.2 NG/ML (ref 0–4)
CREAT SERPL-MCNC: 1.3 MG/DL (ref 0.5–1.4)
DIFFERENTIAL METHOD: ABNORMAL
EOSINOPHIL # BLD AUTO: 0.1 K/UL (ref 0–0.5)
EOSINOPHIL NFR BLD: 3.2 % (ref 0–8)
ERYTHROCYTE [DISTWIDTH] IN BLOOD BY AUTOMATED COUNT: 13.7 % (ref 11.5–14.5)
EST. GFR  (AFRICAN AMERICAN): >60 ML/MIN/1.73 M^2
EST. GFR  (NON AFRICAN AMERICAN): 56.1 ML/MIN/1.73 M^2
ESTIMATED AVG GLUCOSE: 117 MG/DL (ref 68–131)
GLUCOSE SERPL-MCNC: 98 MG/DL (ref 70–110)
HBA1C MFR BLD HPLC: 5.7 % (ref 4–5.6)
HCT VFR BLD AUTO: 41.8 % (ref 40–54)
HDLC SERPL-MCNC: 40 MG/DL (ref 40–75)
HDLC SERPL: 33.6 % (ref 20–50)
HGB BLD-MCNC: 13.1 G/DL (ref 14–18)
IMM GRANULOCYTES # BLD AUTO: 0.01 K/UL (ref 0–0.04)
IMM GRANULOCYTES NFR BLD AUTO: 0.3 % (ref 0–0.5)
LDLC SERPL CALC-MCNC: 66 MG/DL (ref 63–159)
LYMPHOCYTES # BLD AUTO: 1.5 K/UL (ref 1–4.8)
LYMPHOCYTES NFR BLD: 42.3 % (ref 18–48)
MCH RBC QN AUTO: 28.8 PG (ref 27–31)
MCHC RBC AUTO-ENTMCNC: 31.3 G/DL (ref 32–36)
MCV RBC AUTO: 92 FL (ref 82–98)
MONOCYTES # BLD AUTO: 0.4 K/UL (ref 0.3–1)
MONOCYTES NFR BLD: 10.5 % (ref 4–15)
NEUTROPHILS # BLD AUTO: 1.4 K/UL (ref 1.8–7.7)
NEUTROPHILS NFR BLD: 41.7 % (ref 38–73)
NONHDLC SERPL-MCNC: 79 MG/DL
NRBC BLD-RTO: 0 /100 WBC
PLATELET # BLD AUTO: 233 K/UL (ref 150–350)
PMV BLD AUTO: 11.4 FL (ref 9.2–12.9)
POTASSIUM SERPL-SCNC: 4.3 MMOL/L (ref 3.5–5.1)
PROT SERPL-MCNC: 7.1 G/DL (ref 6–8.4)
RBC # BLD AUTO: 4.55 M/UL (ref 4.6–6.2)
SODIUM SERPL-SCNC: 140 MMOL/L (ref 136–145)
T4 FREE SERPL-MCNC: 1.13 NG/DL (ref 0.71–1.51)
TRIGL SERPL-MCNC: 65 MG/DL (ref 30–150)
TSH SERPL DL<=0.005 MIU/L-ACNC: 2.36 UIU/ML (ref 0.4–4)
WBC # BLD AUTO: 3.43 K/UL (ref 3.9–12.7)

## 2021-01-06 PROCEDURE — 82306 VITAMIN D 25 HYDROXY: CPT

## 2021-01-06 PROCEDURE — 85025 COMPLETE CBC W/AUTO DIFF WBC: CPT

## 2021-01-06 PROCEDURE — 84443 ASSAY THYROID STIM HORMONE: CPT

## 2021-01-06 PROCEDURE — 80053 COMPREHEN METABOLIC PANEL: CPT

## 2021-01-06 PROCEDURE — 36415 COLL VENOUS BLD VENIPUNCTURE: CPT | Mod: PO

## 2021-01-06 PROCEDURE — 80061 LIPID PANEL: CPT

## 2021-01-06 PROCEDURE — 84439 ASSAY OF FREE THYROXINE: CPT

## 2021-01-06 PROCEDURE — 84153 ASSAY OF PSA TOTAL: CPT

## 2021-01-06 PROCEDURE — 83036 HEMOGLOBIN GLYCOSYLATED A1C: CPT

## 2021-01-08 ENCOUNTER — PATIENT MESSAGE (OUTPATIENT)
Dept: ENDOCRINOLOGY | Facility: CLINIC | Age: 71
End: 2021-01-08

## 2021-01-11 ENCOUNTER — OFFICE VISIT (OUTPATIENT)
Dept: ENDOCRINOLOGY | Facility: CLINIC | Age: 71
End: 2021-01-11
Attending: INTERNAL MEDICINE
Payer: COMMERCIAL

## 2021-01-11 VITALS
BODY MASS INDEX: 29.51 KG/M2 | WEIGHT: 206.13 LBS | SYSTOLIC BLOOD PRESSURE: 119 MMHG | HEART RATE: 58 BPM | HEIGHT: 70 IN | DIASTOLIC BLOOD PRESSURE: 60 MMHG

## 2021-01-11 DIAGNOSIS — R73.02 IMPAIRED GLUCOSE TOLERANCE: ICD-10-CM

## 2021-01-11 DIAGNOSIS — I10 ESSENTIAL HYPERTENSION: ICD-10-CM

## 2021-01-11 DIAGNOSIS — E78.5 HYPERLIPIDEMIA, UNSPECIFIED HYPERLIPIDEMIA TYPE: ICD-10-CM

## 2021-01-11 DIAGNOSIS — E89.0 POSTSURGICAL HYPOTHYROIDISM: Primary | ICD-10-CM

## 2021-01-11 PROCEDURE — 99214 OFFICE O/P EST MOD 30 MIN: CPT | Mod: S$GLB,,, | Performed by: INTERNAL MEDICINE

## 2021-01-11 PROCEDURE — 1159F PR MEDICATION LIST DOCUMENTED IN MEDICAL RECORD: ICD-10-PCS | Mod: S$GLB,,, | Performed by: INTERNAL MEDICINE

## 2021-01-11 PROCEDURE — 3074F PR MOST RECENT SYSTOLIC BLOOD PRESSURE < 130 MM HG: ICD-10-PCS | Mod: CPTII,S$GLB,, | Performed by: INTERNAL MEDICINE

## 2021-01-11 PROCEDURE — 1126F PR PAIN SEVERITY QUANTIFIED, NO PAIN PRESENT: ICD-10-PCS | Mod: S$GLB,,, | Performed by: INTERNAL MEDICINE

## 2021-01-11 PROCEDURE — 1126F AMNT PAIN NOTED NONE PRSNT: CPT | Mod: S$GLB,,, | Performed by: INTERNAL MEDICINE

## 2021-01-11 PROCEDURE — 1159F MED LIST DOCD IN RCRD: CPT | Mod: S$GLB,,, | Performed by: INTERNAL MEDICINE

## 2021-01-11 PROCEDURE — 3008F PR BODY MASS INDEX (BMI) DOCUMENTED: ICD-10-PCS | Mod: CPTII,S$GLB,, | Performed by: INTERNAL MEDICINE

## 2021-01-11 PROCEDURE — 3078F PR MOST RECENT DIASTOLIC BLOOD PRESSURE < 80 MM HG: ICD-10-PCS | Mod: CPTII,S$GLB,, | Performed by: INTERNAL MEDICINE

## 2021-01-11 PROCEDURE — 3008F BODY MASS INDEX DOCD: CPT | Mod: CPTII,S$GLB,, | Performed by: INTERNAL MEDICINE

## 2021-01-11 PROCEDURE — 3078F DIAST BP <80 MM HG: CPT | Mod: CPTII,S$GLB,, | Performed by: INTERNAL MEDICINE

## 2021-01-11 PROCEDURE — 99214 PR OFFICE/OUTPT VISIT, EST, LEVL IV, 30-39 MIN: ICD-10-PCS | Mod: S$GLB,,, | Performed by: INTERNAL MEDICINE

## 2021-01-11 PROCEDURE — 3074F SYST BP LT 130 MM HG: CPT | Mod: CPTII,S$GLB,, | Performed by: INTERNAL MEDICINE

## 2021-01-12 ENCOUNTER — OFFICE VISIT (OUTPATIENT)
Dept: INTERNAL MEDICINE | Facility: CLINIC | Age: 71
End: 2021-01-12
Payer: COMMERCIAL

## 2021-01-12 VITALS
DIASTOLIC BLOOD PRESSURE: 70 MMHG | SYSTOLIC BLOOD PRESSURE: 130 MMHG | BODY MASS INDEX: 29.48 KG/M2 | HEIGHT: 70 IN | WEIGHT: 205.94 LBS | TEMPERATURE: 97 F | HEART RATE: 52 BPM

## 2021-01-12 DIAGNOSIS — I10 ESSENTIAL HYPERTENSION: ICD-10-CM

## 2021-01-12 DIAGNOSIS — I48.91 ATRIAL FIBRILLATION, UNSPECIFIED TYPE: ICD-10-CM

## 2021-01-12 DIAGNOSIS — E89.0 POSTSURGICAL HYPOTHYROIDISM: ICD-10-CM

## 2021-01-12 DIAGNOSIS — Z00.00 PREVENTATIVE HEALTH CARE: Primary | ICD-10-CM

## 2021-01-12 DIAGNOSIS — N40.1 BENIGN PROSTATIC HYPERPLASIA WITH URINARY FREQUENCY: ICD-10-CM

## 2021-01-12 DIAGNOSIS — Z23 NEED FOR VACCINATION WITH 13-POLYVALENT PNEUMOCOCCAL CONJUGATE VACCINE: ICD-10-CM

## 2021-01-12 DIAGNOSIS — I25.10 CORONARY ARTERY DISEASE, ANGINA PRESENCE UNSPECIFIED, UNSPECIFIED VESSEL OR LESION TYPE, UNSPECIFIED WHETHER NATIVE OR TRANSPLANTED HEART: ICD-10-CM

## 2021-01-12 DIAGNOSIS — R73.02 IMPAIRED GLUCOSE TOLERANCE: ICD-10-CM

## 2021-01-12 DIAGNOSIS — Z95.5 STENTED CORONARY ARTERY: ICD-10-CM

## 2021-01-12 DIAGNOSIS — R35.0 BENIGN PROSTATIC HYPERPLASIA WITH URINARY FREQUENCY: ICD-10-CM

## 2021-01-12 DIAGNOSIS — E78.5 HYPERLIPIDEMIA, UNSPECIFIED HYPERLIPIDEMIA TYPE: ICD-10-CM

## 2021-01-12 PROCEDURE — 90670 PCV13 VACCINE IM: CPT | Mod: S$GLB,,, | Performed by: FAMILY MEDICINE

## 2021-01-12 PROCEDURE — 3078F PR MOST RECENT DIASTOLIC BLOOD PRESSURE < 80 MM HG: ICD-10-PCS | Mod: CPTII,S$GLB,, | Performed by: FAMILY MEDICINE

## 2021-01-12 PROCEDURE — 3078F DIAST BP <80 MM HG: CPT | Mod: CPTII,S$GLB,, | Performed by: FAMILY MEDICINE

## 2021-01-12 PROCEDURE — 3008F BODY MASS INDEX DOCD: CPT | Mod: CPTII,S$GLB,, | Performed by: FAMILY MEDICINE

## 2021-01-12 PROCEDURE — 3288F FALL RISK ASSESSMENT DOCD: CPT | Mod: CPTII,S$GLB,, | Performed by: FAMILY MEDICINE

## 2021-01-12 PROCEDURE — 90670 PNEUMOCOCCAL CONJUGATE VACCINE 13-VALENT LESS THAN 5YO & GREATER THAN: ICD-10-PCS | Mod: S$GLB,,, | Performed by: FAMILY MEDICINE

## 2021-01-12 PROCEDURE — 1126F PR PAIN SEVERITY QUANTIFIED, NO PAIN PRESENT: ICD-10-PCS | Mod: S$GLB,,, | Performed by: FAMILY MEDICINE

## 2021-01-12 PROCEDURE — 1101F PT FALLS ASSESS-DOCD LE1/YR: CPT | Mod: CPTII,S$GLB,, | Performed by: FAMILY MEDICINE

## 2021-01-12 PROCEDURE — 3008F PR BODY MASS INDEX (BMI) DOCUMENTED: ICD-10-PCS | Mod: CPTII,S$GLB,, | Performed by: FAMILY MEDICINE

## 2021-01-12 PROCEDURE — 1101F PR PT FALLS ASSESS DOC 0-1 FALLS W/OUT INJ PAST YR: ICD-10-PCS | Mod: CPTII,S$GLB,, | Performed by: FAMILY MEDICINE

## 2021-01-12 PROCEDURE — 3288F PR FALLS RISK ASSESSMENT DOCUMENTED: ICD-10-PCS | Mod: CPTII,S$GLB,, | Performed by: FAMILY MEDICINE

## 2021-01-12 PROCEDURE — 1126F AMNT PAIN NOTED NONE PRSNT: CPT | Mod: S$GLB,,, | Performed by: FAMILY MEDICINE

## 2021-01-12 PROCEDURE — 90471 IMMUNIZATION ADMIN: CPT | Mod: S$GLB,,, | Performed by: FAMILY MEDICINE

## 2021-01-12 PROCEDURE — 3075F PR MOST RECENT SYSTOLIC BLOOD PRESS GE 130-139MM HG: ICD-10-PCS | Mod: CPTII,S$GLB,, | Performed by: FAMILY MEDICINE

## 2021-01-12 PROCEDURE — 99999 PR PBB SHADOW E&M-EST. PATIENT-LVL IV: CPT | Mod: PBBFAC,,, | Performed by: FAMILY MEDICINE

## 2021-01-12 PROCEDURE — 99214 PR OFFICE/OUTPT VISIT, EST, LEVL IV, 30-39 MIN: ICD-10-PCS | Mod: 25,S$GLB,, | Performed by: FAMILY MEDICINE

## 2021-01-12 PROCEDURE — 90471 PNEUMOCOCCAL CONJUGATE VACCINE 13-VALENT LESS THAN 5YO & GREATER THAN: ICD-10-PCS | Mod: S$GLB,,, | Performed by: FAMILY MEDICINE

## 2021-01-12 PROCEDURE — 3075F SYST BP GE 130 - 139MM HG: CPT | Mod: CPTII,S$GLB,, | Performed by: FAMILY MEDICINE

## 2021-01-12 PROCEDURE — 99214 OFFICE O/P EST MOD 30 MIN: CPT | Mod: 25,S$GLB,, | Performed by: FAMILY MEDICINE

## 2021-01-12 PROCEDURE — 99999 PR PBB SHADOW E&M-EST. PATIENT-LVL IV: ICD-10-PCS | Mod: PBBFAC,,, | Performed by: FAMILY MEDICINE

## 2021-01-12 RX ORDER — FLUTICASONE PROPIONATE 50 MCG
2 SPRAY, SUSPENSION (ML) NASAL DAILY
COMMUNITY
Start: 2020-10-24

## 2021-01-12 RX ORDER — NITROGLYCERIN 0.3 MG/1
TABLET SUBLINGUAL
COMMUNITY
Start: 2020-11-13 | End: 2023-09-12 | Stop reason: SDUPTHER

## 2021-03-31 ENCOUNTER — PATIENT MESSAGE (OUTPATIENT)
Dept: ENDOCRINOLOGY | Facility: CLINIC | Age: 71
End: 2021-03-31

## 2021-04-01 ENCOUNTER — CLINICAL SUPPORT (OUTPATIENT)
Dept: URGENT CARE | Facility: CLINIC | Age: 71
End: 2021-04-01
Payer: COMMERCIAL

## 2021-04-01 ENCOUNTER — PATIENT MESSAGE (OUTPATIENT)
Dept: INTERNAL MEDICINE | Facility: CLINIC | Age: 71
End: 2021-04-01

## 2021-04-01 DIAGNOSIS — Z11.59 SCREENING FOR VIRAL DISEASE: Primary | ICD-10-CM

## 2021-04-01 LAB
CTP QC/QA: YES
SARS-COV-2 RDRP RESP QL NAA+PROBE: NEGATIVE

## 2021-04-01 PROCEDURE — 99211 PR OFFICE/OUTPT VISIT, EST, LEVL I: ICD-10-PCS | Mod: S$GLB,,, | Performed by: NURSE PRACTITIONER

## 2021-04-01 PROCEDURE — U0002: ICD-10-PCS | Mod: QW,S$GLB,, | Performed by: NURSE PRACTITIONER

## 2021-04-01 PROCEDURE — 99211 OFF/OP EST MAY X REQ PHY/QHP: CPT | Mod: S$GLB,,, | Performed by: NURSE PRACTITIONER

## 2021-04-01 PROCEDURE — U0002 COVID-19 LAB TEST NON-CDC: HCPCS | Mod: QW,S$GLB,, | Performed by: NURSE PRACTITIONER

## 2021-05-19 ENCOUNTER — PATIENT MESSAGE (OUTPATIENT)
Dept: ENDOCRINOLOGY | Facility: CLINIC | Age: 71
End: 2021-05-19

## 2021-05-23 ENCOUNTER — PATIENT MESSAGE (OUTPATIENT)
Dept: INTERNAL MEDICINE | Facility: CLINIC | Age: 71
End: 2021-05-23

## 2021-05-25 ENCOUNTER — LAB VISIT (OUTPATIENT)
Dept: LAB | Facility: HOSPITAL | Age: 71
End: 2021-05-25
Attending: INTERNAL MEDICINE
Payer: COMMERCIAL

## 2021-05-25 DIAGNOSIS — E78.5 HYPERLIPIDEMIA, UNSPECIFIED HYPERLIPIDEMIA TYPE: ICD-10-CM

## 2021-05-25 DIAGNOSIS — R73.02 IMPAIRED GLUCOSE TOLERANCE: ICD-10-CM

## 2021-05-25 DIAGNOSIS — E89.0 POSTSURGICAL HYPOTHYROIDISM: ICD-10-CM

## 2021-05-25 LAB
ALBUMIN SERPL BCP-MCNC: 3.9 G/DL (ref 3.5–5.2)
ALP SERPL-CCNC: 39 U/L (ref 55–135)
ALT SERPL W/O P-5'-P-CCNC: 14 U/L (ref 10–44)
ANION GAP SERPL CALC-SCNC: 10 MMOL/L (ref 8–16)
AST SERPL-CCNC: 20 U/L (ref 10–40)
BILIRUB SERPL-MCNC: 0.6 MG/DL (ref 0.1–1)
BUN SERPL-MCNC: 21 MG/DL (ref 8–23)
CALCIUM SERPL-MCNC: 9 MG/DL (ref 8.7–10.5)
CHLORIDE SERPL-SCNC: 106 MMOL/L (ref 95–110)
CO2 SERPL-SCNC: 23 MMOL/L (ref 23–29)
CREAT SERPL-MCNC: 1.2 MG/DL (ref 0.5–1.4)
EST. GFR  (AFRICAN AMERICAN): >60 ML/MIN/1.73 M^2
EST. GFR  (NON AFRICAN AMERICAN): >60 ML/MIN/1.73 M^2
ESTIMATED AVG GLUCOSE: 117 MG/DL (ref 68–131)
GLUCOSE SERPL-MCNC: 92 MG/DL (ref 70–110)
HBA1C MFR BLD: 5.7 % (ref 4–5.6)
POTASSIUM SERPL-SCNC: 3.9 MMOL/L (ref 3.5–5.1)
PROT SERPL-MCNC: 7.1 G/DL (ref 6–8.4)
SODIUM SERPL-SCNC: 139 MMOL/L (ref 136–145)
T4 FREE SERPL-MCNC: 1.11 NG/DL (ref 0.71–1.51)
TSH SERPL DL<=0.005 MIU/L-ACNC: 1.37 UIU/ML (ref 0.4–4)

## 2021-05-25 PROCEDURE — 83036 HEMOGLOBIN GLYCOSYLATED A1C: CPT | Performed by: INTERNAL MEDICINE

## 2021-05-25 PROCEDURE — 84439 ASSAY OF FREE THYROXINE: CPT | Performed by: INTERNAL MEDICINE

## 2021-05-25 PROCEDURE — 36415 COLL VENOUS BLD VENIPUNCTURE: CPT | Mod: PO | Performed by: INTERNAL MEDICINE

## 2021-05-25 PROCEDURE — 80053 COMPREHEN METABOLIC PANEL: CPT | Performed by: INTERNAL MEDICINE

## 2021-05-25 PROCEDURE — 84443 ASSAY THYROID STIM HORMONE: CPT | Performed by: INTERNAL MEDICINE

## 2021-06-01 ENCOUNTER — OFFICE VISIT (OUTPATIENT)
Dept: ENDOCRINOLOGY | Facility: CLINIC | Age: 71
End: 2021-06-01
Payer: COMMERCIAL

## 2021-06-01 VITALS
BODY MASS INDEX: 28.97 KG/M2 | SYSTOLIC BLOOD PRESSURE: 124 MMHG | HEIGHT: 70 IN | DIASTOLIC BLOOD PRESSURE: 80 MMHG | WEIGHT: 202.38 LBS

## 2021-06-01 DIAGNOSIS — R73.02 IMPAIRED GLUCOSE TOLERANCE: ICD-10-CM

## 2021-06-01 DIAGNOSIS — I10 ESSENTIAL HYPERTENSION: ICD-10-CM

## 2021-06-01 DIAGNOSIS — E78.5 HYPERLIPIDEMIA, UNSPECIFIED HYPERLIPIDEMIA TYPE: ICD-10-CM

## 2021-06-01 DIAGNOSIS — E89.0 POSTSURGICAL HYPOTHYROIDISM: Primary | ICD-10-CM

## 2021-06-01 PROCEDURE — 3008F PR BODY MASS INDEX (BMI) DOCUMENTED: ICD-10-PCS | Mod: CPTII,S$GLB,, | Performed by: INTERNAL MEDICINE

## 2021-06-01 PROCEDURE — 99999 PR PBB SHADOW E&M-EST. PATIENT-LVL IV: CPT | Mod: PBBFAC,,, | Performed by: INTERNAL MEDICINE

## 2021-06-01 PROCEDURE — 1159F PR MEDICATION LIST DOCUMENTED IN MEDICAL RECORD: ICD-10-PCS | Mod: S$GLB,,, | Performed by: INTERNAL MEDICINE

## 2021-06-01 PROCEDURE — 99214 OFFICE O/P EST MOD 30 MIN: CPT | Mod: S$GLB,,, | Performed by: INTERNAL MEDICINE

## 2021-06-01 PROCEDURE — 99999 PR PBB SHADOW E&M-EST. PATIENT-LVL IV: ICD-10-PCS | Mod: PBBFAC,,, | Performed by: INTERNAL MEDICINE

## 2021-06-01 PROCEDURE — 3008F BODY MASS INDEX DOCD: CPT | Mod: CPTII,S$GLB,, | Performed by: INTERNAL MEDICINE

## 2021-06-01 PROCEDURE — 99214 PR OFFICE/OUTPT VISIT, EST, LEVL IV, 30-39 MIN: ICD-10-PCS | Mod: S$GLB,,, | Performed by: INTERNAL MEDICINE

## 2021-06-01 PROCEDURE — 1159F MED LIST DOCD IN RCRD: CPT | Mod: S$GLB,,, | Performed by: INTERNAL MEDICINE

## 2021-06-01 PROCEDURE — 1125F AMNT PAIN NOTED PAIN PRSNT: CPT | Mod: S$GLB,,, | Performed by: INTERNAL MEDICINE

## 2021-06-01 PROCEDURE — 1125F PR PAIN SEVERITY QUANTIFIED, PAIN PRESENT: ICD-10-PCS | Mod: S$GLB,,, | Performed by: INTERNAL MEDICINE

## 2021-07-16 DIAGNOSIS — I49.8 OTHER SPECIFIED CARDIAC ARRHYTHMIAS: Primary | ICD-10-CM

## 2021-07-22 ENCOUNTER — OFFICE VISIT (OUTPATIENT)
Dept: ELECTROPHYSIOLOGY | Facility: CLINIC | Age: 71
End: 2021-07-22
Payer: COMMERCIAL

## 2021-07-22 VITALS
HEART RATE: 49 BPM | SYSTOLIC BLOOD PRESSURE: 138 MMHG | WEIGHT: 201.5 LBS | DIASTOLIC BLOOD PRESSURE: 84 MMHG | HEIGHT: 70 IN | BODY MASS INDEX: 28.85 KG/M2

## 2021-07-22 DIAGNOSIS — M54.50 CHRONIC LOW BACK PAIN, UNSPECIFIED BACK PAIN LATERALITY, UNSPECIFIED WHETHER SCIATICA PRESENT: ICD-10-CM

## 2021-07-22 DIAGNOSIS — I48.19 PERSISTENT ATRIAL FIBRILLATION: Primary | ICD-10-CM

## 2021-07-22 DIAGNOSIS — N40.1 BENIGN PROSTATIC HYPERPLASIA WITH URINARY FREQUENCY: ICD-10-CM

## 2021-07-22 DIAGNOSIS — I10 ESSENTIAL HYPERTENSION: ICD-10-CM

## 2021-07-22 DIAGNOSIS — R35.0 BENIGN PROSTATIC HYPERPLASIA WITH URINARY FREQUENCY: ICD-10-CM

## 2021-07-22 DIAGNOSIS — I49.8 OTHER SPECIFIED CARDIAC ARRHYTHMIAS: ICD-10-CM

## 2021-07-22 DIAGNOSIS — E89.0 POSTSURGICAL HYPOTHYROIDISM: ICD-10-CM

## 2021-07-22 DIAGNOSIS — G89.29 CHRONIC LOW BACK PAIN, UNSPECIFIED BACK PAIN LATERALITY, UNSPECIFIED WHETHER SCIATICA PRESENT: ICD-10-CM

## 2021-07-22 DIAGNOSIS — I49.5 TACHY-BRADY SYNDROME: ICD-10-CM

## 2021-07-22 DIAGNOSIS — I25.10 CORONARY ARTERY DISEASE, ANGINA PRESENCE UNSPECIFIED, UNSPECIFIED VESSEL OR LESION TYPE, UNSPECIFIED WHETHER NATIVE OR TRANSPLANTED HEART: ICD-10-CM

## 2021-07-22 PROCEDURE — 99205 PR OFFICE/OUTPT VISIT, NEW, LEVL V, 60-74 MIN: ICD-10-PCS | Mod: S$GLB,,, | Performed by: INTERNAL MEDICINE

## 2021-07-22 PROCEDURE — 99205 OFFICE O/P NEW HI 60 MIN: CPT | Mod: S$GLB,,, | Performed by: INTERNAL MEDICINE

## 2021-07-22 PROCEDURE — 1126F PR PAIN SEVERITY QUANTIFIED, NO PAIN PRESENT: ICD-10-PCS | Mod: CPTII,S$GLB,, | Performed by: INTERNAL MEDICINE

## 2021-07-22 PROCEDURE — 3075F PR MOST RECENT SYSTOLIC BLOOD PRESS GE 130-139MM HG: ICD-10-PCS | Mod: CPTII,S$GLB,, | Performed by: INTERNAL MEDICINE

## 2021-07-22 PROCEDURE — 1101F PT FALLS ASSESS-DOCD LE1/YR: CPT | Mod: CPTII,S$GLB,, | Performed by: INTERNAL MEDICINE

## 2021-07-22 PROCEDURE — 99999 PR PBB SHADOW E&M-EST. PATIENT-LVL IV: CPT | Mod: PBBFAC,,, | Performed by: INTERNAL MEDICINE

## 2021-07-22 PROCEDURE — 1101F PR PT FALLS ASSESS DOC 0-1 FALLS W/OUT INJ PAST YR: ICD-10-PCS | Mod: CPTII,S$GLB,, | Performed by: INTERNAL MEDICINE

## 2021-07-22 PROCEDURE — 3079F DIAST BP 80-89 MM HG: CPT | Mod: CPTII,S$GLB,, | Performed by: INTERNAL MEDICINE

## 2021-07-22 PROCEDURE — 99999 PR PBB SHADOW E&M-EST. PATIENT-LVL IV: ICD-10-PCS | Mod: PBBFAC,,, | Performed by: INTERNAL MEDICINE

## 2021-07-22 PROCEDURE — 93005 ELECTROCARDIOGRAM TRACING: CPT | Mod: S$GLB,,, | Performed by: INTERNAL MEDICINE

## 2021-07-22 PROCEDURE — 1159F PR MEDICATION LIST DOCUMENTED IN MEDICAL RECORD: ICD-10-PCS | Mod: CPTII,S$GLB,, | Performed by: INTERNAL MEDICINE

## 2021-07-22 PROCEDURE — 1159F MED LIST DOCD IN RCRD: CPT | Mod: CPTII,S$GLB,, | Performed by: INTERNAL MEDICINE

## 2021-07-22 PROCEDURE — 1126F AMNT PAIN NOTED NONE PRSNT: CPT | Mod: CPTII,S$GLB,, | Performed by: INTERNAL MEDICINE

## 2021-07-22 PROCEDURE — 3008F PR BODY MASS INDEX (BMI) DOCUMENTED: ICD-10-PCS | Mod: CPTII,S$GLB,, | Performed by: INTERNAL MEDICINE

## 2021-07-22 PROCEDURE — 93010 RHYTHM STRIP: ICD-10-PCS | Mod: S$GLB,,, | Performed by: INTERNAL MEDICINE

## 2021-07-22 PROCEDURE — 93005 RHYTHM STRIP: ICD-10-PCS | Mod: S$GLB,,, | Performed by: INTERNAL MEDICINE

## 2021-07-22 PROCEDURE — 3288F PR FALLS RISK ASSESSMENT DOCUMENTED: ICD-10-PCS | Mod: CPTII,S$GLB,, | Performed by: INTERNAL MEDICINE

## 2021-07-22 PROCEDURE — 3079F PR MOST RECENT DIASTOLIC BLOOD PRESSURE 80-89 MM HG: ICD-10-PCS | Mod: CPTII,S$GLB,, | Performed by: INTERNAL MEDICINE

## 2021-07-22 PROCEDURE — 3075F SYST BP GE 130 - 139MM HG: CPT | Mod: CPTII,S$GLB,, | Performed by: INTERNAL MEDICINE

## 2021-07-22 PROCEDURE — 3008F BODY MASS INDEX DOCD: CPT | Mod: CPTII,S$GLB,, | Performed by: INTERNAL MEDICINE

## 2021-07-22 PROCEDURE — 93010 ELECTROCARDIOGRAM REPORT: CPT | Mod: S$GLB,,, | Performed by: INTERNAL MEDICINE

## 2021-07-22 PROCEDURE — 3288F FALL RISK ASSESSMENT DOCD: CPT | Mod: CPTII,S$GLB,, | Performed by: INTERNAL MEDICINE

## 2021-07-23 DIAGNOSIS — I49.8 OTHER SPECIFIED CARDIAC ARRHYTHMIAS: ICD-10-CM

## 2021-07-23 DIAGNOSIS — I48.19 PERSISTENT ATRIAL FIBRILLATION: Primary | ICD-10-CM

## 2021-08-02 ENCOUNTER — HOSPITAL ENCOUNTER (OUTPATIENT)
Dept: RADIOLOGY | Facility: HOSPITAL | Age: 71
Discharge: HOME | End: 2021-08-02
Attending: INTERNAL MEDICINE
Payer: COMMERCIAL

## 2021-08-02 ENCOUNTER — TELEPHONE (OUTPATIENT)
Dept: ELECTROPHYSIOLOGY | Facility: CLINIC | Age: 71
End: 2021-08-02

## 2021-08-02 DIAGNOSIS — I49.8 OTHER SPECIFIED CARDIAC ARRHYTHMIAS: ICD-10-CM

## 2021-08-02 DIAGNOSIS — R91.1 SOLITARY PULMONARY NODULE ON LUNG CT: Primary | ICD-10-CM

## 2021-08-02 DIAGNOSIS — I48.19 PERSISTENT ATRIAL FIBRILLATION: ICD-10-CM

## 2021-08-02 LAB
CREAT SERPL-MCNC: 1.2 MG/DL (ref 0.5–1.4)
SAMPLE: NORMAL

## 2021-08-02 PROCEDURE — 71275 CT ANGIOGRAPHY CHEST: CPT | Mod: TC

## 2021-08-02 PROCEDURE — 71275 CTA CHEST NON CORONARY: ICD-10-PCS | Mod: 26,,, | Performed by: RADIOLOGY

## 2021-08-02 PROCEDURE — 25500020 PHARM REV CODE 255: Performed by: INTERNAL MEDICINE

## 2021-08-02 PROCEDURE — 71275 CT ANGIOGRAPHY CHEST: CPT | Mod: 26,,, | Performed by: RADIOLOGY

## 2021-08-02 RX ADMIN — IOHEXOL 100 ML: 350 INJECTION, SOLUTION INTRAVENOUS at 08:08

## 2021-08-08 ENCOUNTER — PATIENT MESSAGE (OUTPATIENT)
Dept: MEDSURG UNIT | Facility: HOSPITAL | Age: 71
End: 2021-08-08

## 2021-08-09 DIAGNOSIS — Z01.812 ENCOUNTER FOR PRE-OPERATIVE LABORATORY TESTING: Primary | ICD-10-CM

## 2021-08-20 ENCOUNTER — OFFICE VISIT (OUTPATIENT)
Dept: PULMONOLOGY | Facility: CLINIC | Age: 71
End: 2021-08-20
Payer: COMMERCIAL

## 2021-08-20 VITALS
WEIGHT: 201.5 LBS | SYSTOLIC BLOOD PRESSURE: 126 MMHG | HEIGHT: 70 IN | HEART RATE: 54 BPM | DIASTOLIC BLOOD PRESSURE: 72 MMHG | OXYGEN SATURATION: 97 % | BODY MASS INDEX: 28.85 KG/M2

## 2021-08-20 DIAGNOSIS — R91.1 SOLITARY PULMONARY NODULE: ICD-10-CM

## 2021-08-20 DIAGNOSIS — R91.1 SOLITARY PULMONARY NODULE ON LUNG CT: ICD-10-CM

## 2021-08-20 PROCEDURE — 99999 PR PBB SHADOW E&M-EST. PATIENT-LVL IV: CPT | Mod: PBBFAC,,, | Performed by: STUDENT IN AN ORGANIZED HEALTH CARE EDUCATION/TRAINING PROGRAM

## 2021-08-20 PROCEDURE — 99999 PR PBB SHADOW E&M-EST. PATIENT-LVL IV: ICD-10-PCS | Mod: PBBFAC,,, | Performed by: STUDENT IN AN ORGANIZED HEALTH CARE EDUCATION/TRAINING PROGRAM

## 2021-08-20 PROCEDURE — 99203 PR OFFICE/OUTPT VISIT, NEW, LEVL III, 30-44 MIN: ICD-10-PCS | Mod: S$GLB,,, | Performed by: STUDENT IN AN ORGANIZED HEALTH CARE EDUCATION/TRAINING PROGRAM

## 2021-08-20 PROCEDURE — 99203 OFFICE O/P NEW LOW 30 MIN: CPT | Mod: S$GLB,,, | Performed by: STUDENT IN AN ORGANIZED HEALTH CARE EDUCATION/TRAINING PROGRAM

## 2021-08-27 ENCOUNTER — PATIENT MESSAGE (OUTPATIENT)
Dept: ELECTROPHYSIOLOGY | Facility: CLINIC | Age: 71
End: 2021-08-27

## 2021-09-10 ENCOUNTER — PATIENT MESSAGE (OUTPATIENT)
Dept: ELECTROPHYSIOLOGY | Facility: CLINIC | Age: 71
End: 2021-09-10

## 2021-09-10 ENCOUNTER — TELEPHONE (OUTPATIENT)
Dept: ELECTROPHYSIOLOGY | Facility: CLINIC | Age: 71
End: 2021-09-10

## 2021-09-13 ENCOUNTER — LAB VISIT (OUTPATIENT)
Dept: LAB | Facility: HOSPITAL | Age: 71
End: 2021-09-13
Attending: INTERNAL MEDICINE
Payer: COMMERCIAL

## 2021-09-13 ENCOUNTER — LAB VISIT (OUTPATIENT)
Dept: SURGERY | Facility: CLINIC | Age: 71
End: 2021-09-13
Payer: COMMERCIAL

## 2021-09-13 DIAGNOSIS — Z01.812 ENCOUNTER FOR PRE-OPERATIVE LABORATORY TESTING: ICD-10-CM

## 2021-09-13 DIAGNOSIS — I48.19 PERSISTENT ATRIAL FIBRILLATION: ICD-10-CM

## 2021-09-13 DIAGNOSIS — I49.8 OTHER SPECIFIED CARDIAC ARRHYTHMIAS: ICD-10-CM

## 2021-09-13 LAB
ANION GAP SERPL CALC-SCNC: 10 MMOL/L (ref 8–16)
APTT BLDCRRT: 28.3 SEC (ref 21–32)
BASOPHILS # BLD AUTO: 0.05 K/UL (ref 0–0.2)
BASOPHILS NFR BLD: 1.3 % (ref 0–1.9)
BUN SERPL-MCNC: 18 MG/DL (ref 8–23)
CALCIUM SERPL-MCNC: 9.5 MG/DL (ref 8.7–10.5)
CHLORIDE SERPL-SCNC: 106 MMOL/L (ref 95–110)
CO2 SERPL-SCNC: 24 MMOL/L (ref 23–29)
CREAT SERPL-MCNC: 1.2 MG/DL (ref 0.5–1.4)
DIFFERENTIAL METHOD: ABNORMAL
EOSINOPHIL # BLD AUTO: 0.1 K/UL (ref 0–0.5)
EOSINOPHIL NFR BLD: 2.4 % (ref 0–8)
ERYTHROCYTE [DISTWIDTH] IN BLOOD BY AUTOMATED COUNT: 13.9 % (ref 11.5–14.5)
EST. GFR  (AFRICAN AMERICAN): >60 ML/MIN/1.73 M^2
EST. GFR  (NON AFRICAN AMERICAN): >60 ML/MIN/1.73 M^2
GLUCOSE SERPL-MCNC: 97 MG/DL (ref 70–110)
HCT VFR BLD AUTO: 41.2 % (ref 40–54)
HGB BLD-MCNC: 13.8 G/DL (ref 14–18)
IMM GRANULOCYTES # BLD AUTO: 0.01 K/UL (ref 0–0.04)
IMM GRANULOCYTES NFR BLD AUTO: 0.3 % (ref 0–0.5)
INR PPP: 1.1 (ref 0.8–1.2)
LYMPHOCYTES # BLD AUTO: 1.6 K/UL (ref 1–4.8)
LYMPHOCYTES NFR BLD: 41.7 % (ref 18–48)
MCH RBC QN AUTO: 29.7 PG (ref 27–31)
MCHC RBC AUTO-ENTMCNC: 33.5 G/DL (ref 32–36)
MCV RBC AUTO: 89 FL (ref 82–98)
MONOCYTES # BLD AUTO: 0.4 K/UL (ref 0.3–1)
MONOCYTES NFR BLD: 11 % (ref 4–15)
NEUTROPHILS # BLD AUTO: 1.6 K/UL (ref 1.8–7.7)
NEUTROPHILS NFR BLD: 43.3 % (ref 38–73)
NRBC BLD-RTO: 0 /100 WBC
PLATELET # BLD AUTO: 264 K/UL (ref 150–450)
PMV BLD AUTO: 11 FL (ref 9.2–12.9)
POTASSIUM SERPL-SCNC: 4.3 MMOL/L (ref 3.5–5.1)
PROTHROMBIN TIME: 11.8 SEC (ref 9–12.5)
RBC # BLD AUTO: 4.65 M/UL (ref 4.6–6.2)
SARS-COV-2 RNA RESP QL NAA+PROBE: NOT DETECTED
SARS-COV-2- CYCLE NUMBER: NORMAL
SODIUM SERPL-SCNC: 140 MMOL/L (ref 136–145)
WBC # BLD AUTO: 3.72 K/UL (ref 3.9–12.7)

## 2021-09-13 PROCEDURE — 85730 THROMBOPLASTIN TIME PARTIAL: CPT | Performed by: INTERNAL MEDICINE

## 2021-09-13 PROCEDURE — 85025 COMPLETE CBC W/AUTO DIFF WBC: CPT | Performed by: INTERNAL MEDICINE

## 2021-09-13 PROCEDURE — 80048 BASIC METABOLIC PNL TOTAL CA: CPT | Performed by: INTERNAL MEDICINE

## 2021-09-13 PROCEDURE — U0003 INFECTIOUS AGENT DETECTION BY NUCLEIC ACID (DNA OR RNA); SEVERE ACUTE RESPIRATORY SYNDROME CORONAVIRUS 2 (SARS-COV-2) (CORONAVIRUS DISEASE [COVID-19]), AMPLIFIED PROBE TECHNIQUE, MAKING USE OF HIGH THROUGHPUT TECHNOLOGIES AS DESCRIBED BY CMS-2020-01-R: HCPCS | Performed by: INTERNAL MEDICINE

## 2021-09-13 PROCEDURE — 85610 PROTHROMBIN TIME: CPT | Performed by: INTERNAL MEDICINE

## 2021-09-13 PROCEDURE — U0005 INFEC AGEN DETEC AMPLI PROBE: HCPCS | Performed by: INTERNAL MEDICINE

## 2021-09-13 PROCEDURE — 36415 COLL VENOUS BLD VENIPUNCTURE: CPT | Performed by: INTERNAL MEDICINE

## 2021-09-15 ENCOUNTER — TELEPHONE (OUTPATIENT)
Dept: ELECTROPHYSIOLOGY | Facility: CLINIC | Age: 71
End: 2021-09-15

## 2021-09-16 ENCOUNTER — HOSPITAL ENCOUNTER (OUTPATIENT)
Facility: HOSPITAL | Age: 71
Discharge: HOME | End: 2021-09-17
Attending: INTERNAL MEDICINE | Admitting: INTERNAL MEDICINE
Payer: COMMERCIAL

## 2021-09-16 ENCOUNTER — ANESTHESIA (OUTPATIENT)
Dept: MEDSURG UNIT | Facility: HOSPITAL | Age: 71
End: 2021-09-16
Payer: COMMERCIAL

## 2021-09-16 ENCOUNTER — ANESTHESIA EVENT (OUTPATIENT)
Dept: MEDSURG UNIT | Facility: HOSPITAL | Age: 71
End: 2021-09-16
Payer: COMMERCIAL

## 2021-09-16 ENCOUNTER — HOSPITAL ENCOUNTER (OUTPATIENT)
Dept: CARDIOLOGY | Facility: HOSPITAL | Age: 71
Discharge: HOME | End: 2021-09-16
Attending: INTERNAL MEDICINE | Admitting: INTERNAL MEDICINE
Payer: COMMERCIAL

## 2021-09-16 VITALS
WEIGHT: 199 LBS | HEIGHT: 70 IN | SYSTOLIC BLOOD PRESSURE: 127 MMHG | DIASTOLIC BLOOD PRESSURE: 59 MMHG | BODY MASS INDEX: 28.49 KG/M2

## 2021-09-16 DIAGNOSIS — I49.8 OTHER SPECIFIED CARDIAC ARRHYTHMIAS: ICD-10-CM

## 2021-09-16 DIAGNOSIS — I48.19 PERSISTENT ATRIAL FIBRILLATION: ICD-10-CM

## 2021-09-16 DIAGNOSIS — I48.91 ATRIAL FIBRILLATION: ICD-10-CM

## 2021-09-16 DIAGNOSIS — I49.9 ARRHYTHMIA: ICD-10-CM

## 2021-09-16 LAB
ASCENDING AORTA: 4 CM
BSA FOR ECHO PROCEDURE: 2.1 M2
EJECTION FRACTION: 55 %
SINUS: 3.9 CM
STJ: 3.2 CM

## 2021-09-16 PROCEDURE — 37000009 HC ANESTHESIA EA ADD 15 MINS: Performed by: INTERNAL MEDICINE

## 2021-09-16 PROCEDURE — D9220A PRA ANESTHESIA: Mod: CRNA,,, | Performed by: NURSE ANESTHETIST, CERTIFIED REGISTERED

## 2021-09-16 PROCEDURE — 93613 PR INTRACARD ELECTROPHYS 3-DIMENS MAPPING: ICD-10-PCS | Mod: ,,, | Performed by: INTERNAL MEDICINE

## 2021-09-16 PROCEDURE — 27201423 OPTIME MED/SURG SUP & DEVICES STERILE SUPPLY: Performed by: INTERNAL MEDICINE

## 2021-09-16 PROCEDURE — 27201037 HC PRESSURE MONITORING SET UP

## 2021-09-16 PROCEDURE — 25000003 PHARM REV CODE 250: Performed by: INTERNAL MEDICINE

## 2021-09-16 PROCEDURE — 37000008 HC ANESTHESIA 1ST 15 MINUTES: Performed by: INTERNAL MEDICINE

## 2021-09-16 PROCEDURE — 93662 INTRACARDIAC ECG (ICE): CPT | Performed by: INTERNAL MEDICINE

## 2021-09-16 PROCEDURE — 93325 DOPPLER ECHO COLOR FLOW MAPG: CPT | Mod: 26,,, | Performed by: INTERNAL MEDICINE

## 2021-09-16 PROCEDURE — C2630 CATH, EP, COOL-TIP: HCPCS | Performed by: INTERNAL MEDICINE

## 2021-09-16 PROCEDURE — 93325 TRANSESOPHAGEAL ECHO (TEE) (CUPID ONLY): ICD-10-PCS | Mod: 26,,, | Performed by: INTERNAL MEDICINE

## 2021-09-16 PROCEDURE — 93005 ELECTROCARDIOGRAM TRACING: CPT

## 2021-09-16 PROCEDURE — 25000003 PHARM REV CODE 250: Performed by: NURSE ANESTHETIST, CERTIFIED REGISTERED

## 2021-09-16 PROCEDURE — 93312 TRANSESOPHAGEAL ECHO (TEE) (CUPID ONLY): ICD-10-PCS | Mod: 26,,, | Performed by: INTERNAL MEDICINE

## 2021-09-16 PROCEDURE — 93010 EKG 12-LEAD: ICD-10-PCS | Mod: ,,, | Performed by: INTERNAL MEDICINE

## 2021-09-16 PROCEDURE — 36620 PR INSERT CATH,ART,PERCUT,SHORTTERM: ICD-10-PCS | Mod: 59,,, | Performed by: ANESTHESIOLOGY

## 2021-09-16 PROCEDURE — 63600175 PHARM REV CODE 636 W HCPCS: Performed by: STUDENT IN AN ORGANIZED HEALTH CARE EDUCATION/TRAINING PROGRAM

## 2021-09-16 PROCEDURE — 36620 INSERTION CATHETER ARTERY: CPT | Mod: 59,,, | Performed by: ANESTHESIOLOGY

## 2021-09-16 PROCEDURE — C1753 CATH, INTRAVAS ULTRASOUND: HCPCS | Performed by: INTERNAL MEDICINE

## 2021-09-16 PROCEDURE — 25000003 PHARM REV CODE 250: Performed by: HOSPITALIST

## 2021-09-16 PROCEDURE — 93662 INTRACARDIAC ECG (ICE): CPT | Mod: 26,,, | Performed by: INTERNAL MEDICINE

## 2021-09-16 PROCEDURE — 63600175 PHARM REV CODE 636 W HCPCS: Performed by: HOSPITALIST

## 2021-09-16 PROCEDURE — 93320 TRANSESOPHAGEAL ECHO (TEE) (CUPID ONLY): ICD-10-PCS | Mod: 26,,, | Performed by: INTERNAL MEDICINE

## 2021-09-16 PROCEDURE — 63600175 PHARM REV CODE 636 W HCPCS: Performed by: NURSE ANESTHETIST, CERTIFIED REGISTERED

## 2021-09-16 PROCEDURE — 25000003 PHARM REV CODE 250: Performed by: STUDENT IN AN ORGANIZED HEALTH CARE EDUCATION/TRAINING PROGRAM

## 2021-09-16 PROCEDURE — 63600175 PHARM REV CODE 636 W HCPCS: Performed by: ANESTHESIOLOGY

## 2021-09-16 PROCEDURE — C1730 CATH, EP, 19 OR FEW ELECT: HCPCS | Performed by: INTERNAL MEDICINE

## 2021-09-16 PROCEDURE — 93656 COMPRE EP EVAL ABLTJ ATR FIB: CPT | Mod: ,,, | Performed by: INTERNAL MEDICINE

## 2021-09-16 PROCEDURE — 93320 DOPPLER ECHO COMPLETE: CPT | Mod: 26,,, | Performed by: INTERNAL MEDICINE

## 2021-09-16 PROCEDURE — 93613 INTRACARDIAC EPHYS 3D MAPG: CPT | Mod: ,,, | Performed by: INTERNAL MEDICINE

## 2021-09-16 PROCEDURE — 93010 ELECTROCARDIOGRAM REPORT: CPT | Mod: ,,, | Performed by: INTERNAL MEDICINE

## 2021-09-16 PROCEDURE — 92960 PR CARDIOVERSION, ELECTIVE;EXTERN: ICD-10-PCS | Mod: 59,,, | Performed by: INTERNAL MEDICINE

## 2021-09-16 PROCEDURE — 93312 ECHO TRANSESOPHAGEAL: CPT | Mod: 26,,, | Performed by: INTERNAL MEDICINE

## 2021-09-16 PROCEDURE — 93312 ECHO TRANSESOPHAGEAL: CPT

## 2021-09-16 PROCEDURE — 25000003 PHARM REV CODE 250: Performed by: ANESTHESIOLOGY

## 2021-09-16 PROCEDURE — 92960 CARDIOVERSION ELECTRIC EXT: CPT | Mod: 59 | Performed by: INTERNAL MEDICINE

## 2021-09-16 PROCEDURE — 93656 PR ELECTROPHYS EVAL, COMPREHEN, W/ABLATION OF ATRIAL FIBR: ICD-10-PCS | Mod: ,,, | Performed by: INTERNAL MEDICINE

## 2021-09-16 PROCEDURE — 92960 CARDIOVERSION ELECTRIC EXT: CPT | Mod: 59,,, | Performed by: INTERNAL MEDICINE

## 2021-09-16 PROCEDURE — D9220A PRA ANESTHESIA: Mod: ANES,,, | Performed by: ANESTHESIOLOGY

## 2021-09-16 PROCEDURE — 93613 INTRACARDIAC EPHYS 3D MAPG: CPT | Performed by: INTERNAL MEDICINE

## 2021-09-16 PROCEDURE — 93662 PR INTRACARD ECHO, THER/DX INTERVENT: ICD-10-PCS | Mod: 26,,, | Performed by: INTERNAL MEDICINE

## 2021-09-16 PROCEDURE — D9220A PRA ANESTHESIA: ICD-10-PCS | Mod: ANES,,, | Performed by: ANESTHESIOLOGY

## 2021-09-16 PROCEDURE — D9220A PRA ANESTHESIA: ICD-10-PCS | Mod: CRNA,,, | Performed by: NURSE ANESTHETIST, CERTIFIED REGISTERED

## 2021-09-16 PROCEDURE — C1894 INTRO/SHEATH, NON-LASER: HCPCS | Performed by: INTERNAL MEDICINE

## 2021-09-16 PROCEDURE — C1766 INTRO/SHEATH,STRBLE,NON-PEEL: HCPCS | Performed by: INTERNAL MEDICINE

## 2021-09-16 PROCEDURE — 93656 COMPRE EP EVAL ABLTJ ATR FIB: CPT | Performed by: INTERNAL MEDICINE

## 2021-09-16 PROCEDURE — 92960 CARDIOVERSION ELECTRIC EXT: CPT | Mod: 59

## 2021-09-16 PROCEDURE — 63600175 PHARM REV CODE 636 W HCPCS: Performed by: INTERNAL MEDICINE

## 2021-09-16 RX ORDER — LIDOCAINE HYDROCHLORIDE 20 MG/ML
INJECTION INTRAVENOUS
Status: DISCONTINUED | OUTPATIENT
Start: 2021-09-16 | End: 2021-09-16

## 2021-09-16 RX ORDER — FENTANYL CITRATE 50 UG/ML
25 INJECTION, SOLUTION INTRAMUSCULAR; INTRAVENOUS EVERY 5 MIN PRN
Status: DISCONTINUED | OUTPATIENT
Start: 2021-09-16 | End: 2021-09-17 | Stop reason: HOSPADM

## 2021-09-16 RX ORDER — TAMSULOSIN HYDROCHLORIDE 0.4 MG/1
0.4 CAPSULE ORAL DAILY
Status: DISCONTINUED | OUTPATIENT
Start: 2021-09-17 | End: 2021-09-17 | Stop reason: HOSPADM

## 2021-09-16 RX ORDER — DOPAMINE HYDROCHLORIDE 160 MG/100ML
INJECTION, SOLUTION INTRAVENOUS CONTINUOUS PRN
Status: DISCONTINUED | OUTPATIENT
Start: 2021-09-16 | End: 2021-09-16

## 2021-09-16 RX ORDER — PANTOPRAZOLE SODIUM 40 MG/1
40 TABLET, DELAYED RELEASE ORAL DAILY
Refills: 2 | Status: DISCONTINUED | OUTPATIENT
Start: 2021-09-17 | End: 2021-09-17 | Stop reason: HOSPADM

## 2021-09-16 RX ORDER — SCOLOPAMINE TRANSDERMAL SYSTEM 1 MG/1
1 PATCH, EXTENDED RELEASE TRANSDERMAL
Status: DISCONTINUED | OUTPATIENT
Start: 2021-09-16 | End: 2021-09-17 | Stop reason: HOSPADM

## 2021-09-16 RX ORDER — ONDANSETRON 4 MG/1
4 TABLET, FILM COATED ORAL ONCE
Status: COMPLETED | OUTPATIENT
Start: 2021-09-16 | End: 2021-09-16

## 2021-09-16 RX ORDER — ACETAMINOPHEN 325 MG/1
650 TABLET ORAL EVERY 4 HOURS PRN
Status: DISCONTINUED | OUTPATIENT
Start: 2021-09-16 | End: 2021-09-17 | Stop reason: HOSPADM

## 2021-09-16 RX ORDER — VASOPRESSIN 20 [USP'U]/ML
INJECTION, SOLUTION INTRAMUSCULAR; SUBCUTANEOUS
Status: DISCONTINUED | OUTPATIENT
Start: 2021-09-16 | End: 2021-09-16

## 2021-09-16 RX ORDER — HYDROMORPHONE HYDROCHLORIDE 1 MG/ML
0.2 INJECTION, SOLUTION INTRAMUSCULAR; INTRAVENOUS; SUBCUTANEOUS EVERY 5 MIN PRN
Status: DISCONTINUED | OUTPATIENT
Start: 2021-09-16 | End: 2021-09-17 | Stop reason: HOSPADM

## 2021-09-16 RX ORDER — PROTAMINE SULFATE 10 MG/ML
INJECTION, SOLUTION INTRAVENOUS
Status: DISCONTINUED | OUTPATIENT
Start: 2021-09-16 | End: 2021-09-16

## 2021-09-16 RX ORDER — FENTANYL CITRATE 50 UG/ML
INJECTION, SOLUTION INTRAMUSCULAR; INTRAVENOUS
Status: DISCONTINUED | OUTPATIENT
Start: 2021-09-16 | End: 2021-09-16

## 2021-09-16 RX ORDER — LIDOCAINE HYDROCHLORIDE 20 MG/ML
INJECTION, SOLUTION EPIDURAL; INFILTRATION; INTRACAUDAL; PERINEURAL
Status: DISCONTINUED | OUTPATIENT
Start: 2021-09-16 | End: 2021-09-17 | Stop reason: HOSPADM

## 2021-09-16 RX ORDER — FUROSEMIDE 10 MG/ML
20 INJECTION INTRAMUSCULAR; INTRAVENOUS ONCE
Status: COMPLETED | OUTPATIENT
Start: 2021-09-16 | End: 2021-09-16

## 2021-09-16 RX ORDER — FINASTERIDE 5 MG/1
5 TABLET, FILM COATED ORAL DAILY
Status: DISCONTINUED | OUTPATIENT
Start: 2021-09-17 | End: 2021-09-17 | Stop reason: HOSPADM

## 2021-09-16 RX ORDER — PROPOFOL 10 MG/ML
VIAL (ML) INTRAVENOUS
Status: DISCONTINUED | OUTPATIENT
Start: 2021-09-16 | End: 2021-09-16

## 2021-09-16 RX ORDER — ONDANSETRON 2 MG/ML
INJECTION INTRAMUSCULAR; INTRAVENOUS
Status: DISPENSED
Start: 2021-09-16 | End: 2021-09-17

## 2021-09-16 RX ORDER — HEPARIN SODIUM 10000 [USP'U]/100ML
INJECTION, SOLUTION INTRAVENOUS CONTINUOUS PRN
Status: DISCONTINUED | OUTPATIENT
Start: 2021-09-16 | End: 2021-09-16

## 2021-09-16 RX ORDER — ROCURONIUM BROMIDE 10 MG/ML
INJECTION, SOLUTION INTRAVENOUS
Status: DISCONTINUED | OUTPATIENT
Start: 2021-09-16 | End: 2021-09-16

## 2021-09-16 RX ORDER — MIDAZOLAM HYDROCHLORIDE 1 MG/ML
INJECTION, SOLUTION INTRAMUSCULAR; INTRAVENOUS
Status: DISCONTINUED | OUTPATIENT
Start: 2021-09-16 | End: 2021-09-16

## 2021-09-16 RX ORDER — ATORVASTATIN CALCIUM 10 MG/1
10 TABLET, FILM COATED ORAL DAILY
Status: DISCONTINUED | OUTPATIENT
Start: 2021-09-17 | End: 2021-09-17 | Stop reason: HOSPADM

## 2021-09-16 RX ORDER — ONDANSETRON 2 MG/ML
4 INJECTION INTRAMUSCULAR; INTRAVENOUS ONCE AS NEEDED
Status: COMPLETED | OUTPATIENT
Start: 2021-09-16 | End: 2021-09-16

## 2021-09-16 RX ORDER — DEXAMETHASONE SODIUM PHOSPHATE 4 MG/ML
INJECTION, SOLUTION INTRA-ARTICULAR; INTRALESIONAL; INTRAMUSCULAR; INTRAVENOUS; SOFT TISSUE
Status: DISCONTINUED | OUTPATIENT
Start: 2021-09-16 | End: 2021-09-16

## 2021-09-16 RX ORDER — DIPHENHYDRAMINE HYDROCHLORIDE 50 MG/ML
25 INJECTION INTRAMUSCULAR; INTRAVENOUS EVERY 6 HOURS PRN
Status: DISCONTINUED | OUTPATIENT
Start: 2021-09-16 | End: 2021-09-17 | Stop reason: HOSPADM

## 2021-09-16 RX ORDER — ONDANSETRON 2 MG/ML
4 INJECTION INTRAMUSCULAR; INTRAVENOUS ONCE
Status: COMPLETED | OUTPATIENT
Start: 2021-09-16 | End: 2021-09-16

## 2021-09-16 RX ORDER — PHENYLEPHRINE HYDROCHLORIDE 10 MG/ML
INJECTION INTRAVENOUS
Status: DISCONTINUED | OUTPATIENT
Start: 2021-09-16 | End: 2021-09-16

## 2021-09-16 RX ORDER — HEPARIN SODIUM 1000 [USP'U]/ML
INJECTION, SOLUTION INTRAVENOUS; SUBCUTANEOUS
Status: DISCONTINUED | OUTPATIENT
Start: 2021-09-16 | End: 2021-09-16

## 2021-09-16 RX ORDER — HEPARIN SOD,PORCINE/0.9 % NACL 1000/500ML
INTRAVENOUS SOLUTION INTRAVENOUS
Status: DISCONTINUED | OUTPATIENT
Start: 2021-09-16 | End: 2021-09-17 | Stop reason: HOSPADM

## 2021-09-16 RX ADMIN — VASOPRESSIN 1 UNITS: 20 INJECTION, SOLUTION INTRAMUSCULAR; SUBCUTANEOUS at 11:09

## 2021-09-16 RX ADMIN — ONDANSETRON 4 MG: 2 INJECTION INTRAMUSCULAR; INTRAVENOUS at 10:09

## 2021-09-16 RX ADMIN — ACETAMINOPHEN 650 MG: 325 TABLET ORAL at 07:09

## 2021-09-16 RX ADMIN — SODIUM CHLORIDE 0.5 MCG/KG/MIN: 9 INJECTION, SOLUTION INTRAVENOUS at 11:09

## 2021-09-16 RX ADMIN — LIDOCAINE HYDROCHLORIDE 75 MG: 20 INJECTION, SOLUTION INTRAVENOUS at 10:09

## 2021-09-16 RX ADMIN — HEPARIN SODIUM 3000 UNITS: 1000 INJECTION INTRAVENOUS; SUBCUTANEOUS at 12:09

## 2021-09-16 RX ADMIN — ONDANSETRON HYDROCHLORIDE 4 MG: 4 TABLET, FILM COATED ORAL at 08:09

## 2021-09-16 RX ADMIN — HEPARIN SODIUM 5442 UNITS: 1000 INJECTION INTRAVENOUS; SUBCUTANEOUS at 11:09

## 2021-09-16 RX ADMIN — APIXABAN 5 MG: 5 TABLET, FILM COATED ORAL at 09:09

## 2021-09-16 RX ADMIN — FENTANYL CITRATE 100 MCG: 50 INJECTION INTRAMUSCULAR; INTRAVENOUS at 10:09

## 2021-09-16 RX ADMIN — PROPOFOL 160 MG: 10 INJECTION, EMULSION INTRAVENOUS at 10:09

## 2021-09-16 RX ADMIN — SODIUM CHLORIDE: 0.9 INJECTION, SOLUTION INTRAVENOUS at 10:09

## 2021-09-16 RX ADMIN — DEXAMETHASONE SODIUM PHOSPHATE 4 MG: 4 INJECTION, SOLUTION INTRAMUSCULAR; INTRAVENOUS at 11:09

## 2021-09-16 RX ADMIN — ROCURONIUM BROMIDE 50 MG: 10 INJECTION, SOLUTION INTRAVENOUS at 10:09

## 2021-09-16 RX ADMIN — HEPARIN SODIUM 2709 UNITS: 1000 INJECTION INTRAVENOUS; SUBCUTANEOUS at 01:09

## 2021-09-16 RX ADMIN — HEPARIN SODIUM 3000 UNITS: 1000 INJECTION INTRAVENOUS; SUBCUTANEOUS at 01:09

## 2021-09-16 RX ADMIN — PHENYLEPHRINE HYDROCHLORIDE 200 MCG: 10 INJECTION, SOLUTION INTRAMUSCULAR; INTRAVENOUS; SUBCUTANEOUS at 10:09

## 2021-09-16 RX ADMIN — MIDAZOLAM 2 MG: 1 INJECTION INTRAMUSCULAR; INTRAVENOUS at 10:09

## 2021-09-16 RX ADMIN — VASOPRESSIN 2 UNITS: 20 INJECTION, SOLUTION INTRAMUSCULAR; SUBCUTANEOUS at 11:09

## 2021-09-16 RX ADMIN — HEPARIN SODIUM 13575 UNITS: 1000 INJECTION INTRAVENOUS; SUBCUTANEOUS at 11:09

## 2021-09-16 RX ADMIN — VASOPRESSIN 3 UNITS: 20 INJECTION, SOLUTION INTRAMUSCULAR; SUBCUTANEOUS at 11:09

## 2021-09-16 RX ADMIN — PROTAMINE SULFATE 70 MG: 10 INJECTION, SOLUTION INTRAVENOUS at 02:09

## 2021-09-16 RX ADMIN — ACETAMINOPHEN 650 MG: 325 TABLET ORAL at 03:09

## 2021-09-16 RX ADMIN — FENTANYL CITRATE 25 MCG: 50 INJECTION INTRAMUSCULAR; INTRAVENOUS at 03:09

## 2021-09-16 RX ADMIN — FUROSEMIDE 20 MG: 10 INJECTION, SOLUTION INTRAVENOUS at 10:09

## 2021-09-16 RX ADMIN — SCOPALAMINE 1 PATCH: 1 PATCH, EXTENDED RELEASE TRANSDERMAL at 10:09

## 2021-09-16 RX ADMIN — HEPARIN SODIUM AND DEXTROSE 12 UNITS/KG/HR: 10000; 5 INJECTION INTRAVENOUS at 11:09

## 2021-09-16 RX ADMIN — ONDANSETRON 4 MG: 2 INJECTION INTRAMUSCULAR; INTRAVENOUS at 06:09

## 2021-09-16 RX ADMIN — DOPAMINE HYDROCHLORIDE 10 MCG/KG/MIN: 160 INJECTION, SOLUTION INTRAVENOUS at 12:09

## 2021-09-17 VITALS
HEART RATE: 56 BPM | BODY MASS INDEX: 28.49 KG/M2 | HEIGHT: 70 IN | OXYGEN SATURATION: 98 % | SYSTOLIC BLOOD PRESSURE: 113 MMHG | DIASTOLIC BLOOD PRESSURE: 68 MMHG | RESPIRATION RATE: 18 BRPM | WEIGHT: 199 LBS | TEMPERATURE: 99 F

## 2021-09-17 LAB
ANION GAP SERPL CALC-SCNC: 11 MMOL/L (ref 8–16)
BASOPHILS # BLD AUTO: 0.01 K/UL (ref 0–0.2)
BASOPHILS NFR BLD: 0.1 % (ref 0–1.9)
BUN SERPL-MCNC: 23 MG/DL (ref 8–23)
CALCIUM SERPL-MCNC: 8.3 MG/DL (ref 8.7–10.5)
CHLORIDE SERPL-SCNC: 101 MMOL/L (ref 95–110)
CO2 SERPL-SCNC: 21 MMOL/L (ref 23–29)
CREAT SERPL-MCNC: 1.2 MG/DL (ref 0.5–1.4)
DIFFERENTIAL METHOD: ABNORMAL
EOSINOPHIL # BLD AUTO: 0 K/UL (ref 0–0.5)
EOSINOPHIL NFR BLD: 0 % (ref 0–8)
ERYTHROCYTE [DISTWIDTH] IN BLOOD BY AUTOMATED COUNT: 13.7 % (ref 11.5–14.5)
EST. GFR  (AFRICAN AMERICAN): >60 ML/MIN/1.73 M^2
EST. GFR  (NON AFRICAN AMERICAN): >60 ML/MIN/1.73 M^2
GLUCOSE SERPL-MCNC: 127 MG/DL (ref 70–110)
HCT VFR BLD AUTO: 34.4 % (ref 40–54)
HGB BLD-MCNC: 11.3 G/DL (ref 14–18)
IMM GRANULOCYTES # BLD AUTO: 0.02 K/UL (ref 0–0.04)
IMM GRANULOCYTES NFR BLD AUTO: 0.3 % (ref 0–0.5)
LYMPHOCYTES # BLD AUTO: 0.8 K/UL (ref 1–4.8)
LYMPHOCYTES NFR BLD: 11.6 % (ref 18–48)
MCH RBC QN AUTO: 29.4 PG (ref 27–31)
MCHC RBC AUTO-ENTMCNC: 32.8 G/DL (ref 32–36)
MCV RBC AUTO: 90 FL (ref 82–98)
MONOCYTES # BLD AUTO: 0.5 K/UL (ref 0.3–1)
MONOCYTES NFR BLD: 6.6 % (ref 4–15)
NEUTROPHILS # BLD AUTO: 5.7 K/UL (ref 1.8–7.7)
NEUTROPHILS NFR BLD: 81.4 % (ref 38–73)
NRBC BLD-RTO: 0 /100 WBC
PLATELET # BLD AUTO: 258 K/UL (ref 150–450)
PMV BLD AUTO: 10.7 FL (ref 9.2–12.9)
POTASSIUM SERPL-SCNC: 3.6 MMOL/L (ref 3.5–5.1)
RBC # BLD AUTO: 3.84 M/UL (ref 4.6–6.2)
SODIUM SERPL-SCNC: 133 MMOL/L (ref 136–145)
WBC # BLD AUTO: 7.01 K/UL (ref 3.9–12.7)

## 2021-09-17 PROCEDURE — 63600175 PHARM REV CODE 636 W HCPCS: Performed by: HOSPITALIST

## 2021-09-17 PROCEDURE — 80048 BASIC METABOLIC PNL TOTAL CA: CPT | Performed by: STUDENT IN AN ORGANIZED HEALTH CARE EDUCATION/TRAINING PROGRAM

## 2021-09-17 PROCEDURE — 25000003 PHARM REV CODE 250: Performed by: STUDENT IN AN ORGANIZED HEALTH CARE EDUCATION/TRAINING PROGRAM

## 2021-09-17 PROCEDURE — 85025 COMPLETE CBC W/AUTO DIFF WBC: CPT | Performed by: STUDENT IN AN ORGANIZED HEALTH CARE EDUCATION/TRAINING PROGRAM

## 2021-09-17 PROCEDURE — 25000003 PHARM REV CODE 250: Performed by: HOSPITALIST

## 2021-09-17 PROCEDURE — 36415 COLL VENOUS BLD VENIPUNCTURE: CPT | Performed by: STUDENT IN AN ORGANIZED HEALTH CARE EDUCATION/TRAINING PROGRAM

## 2021-09-17 RX ADMIN — LEVOTHYROXINE SODIUM 137 MCG: 25 TABLET ORAL at 05:09

## 2021-09-17 RX ADMIN — APIXABAN 5 MG: 5 TABLET, FILM COATED ORAL at 08:09

## 2021-09-17 RX ADMIN — PANTOPRAZOLE SODIUM 40 MG: 40 TABLET, DELAYED RELEASE ORAL at 08:09

## 2021-09-17 RX ADMIN — PROMETHAZINE HYDROCHLORIDE 12.5 MG: 25 INJECTION INTRAMUSCULAR; INTRAVENOUS at 12:09

## 2021-09-17 RX ADMIN — FINASTERIDE 5 MG: 5 TABLET, FILM COATED ORAL at 08:09

## 2021-09-20 ENCOUNTER — TELEPHONE (OUTPATIENT)
Dept: ELECTROPHYSIOLOGY | Facility: CLINIC | Age: 71
End: 2021-09-20

## 2021-09-20 LAB
POC ACTIVATED CLOTTING TIME K: 136 SEC (ref 74–137)
POC ACTIVATED CLOTTING TIME K: 213 SEC (ref 74–137)
POC ACTIVATED CLOTTING TIME K: 279 SEC (ref 74–137)
POC ACTIVATED CLOTTING TIME K: 313 SEC (ref 74–137)
POC ACTIVATED CLOTTING TIME K: 318 SEC (ref 74–137)
POC ACTIVATED CLOTTING TIME K: 340 SEC (ref 74–137)
POC ACTIVATED CLOTTING TIME K: 345 SEC (ref 74–137)
SAMPLE: ABNORMAL
SAMPLE: NORMAL

## 2021-09-20 RX ORDER — FUROSEMIDE 20 MG/1
20 TABLET ORAL DAILY
Qty: 5 TABLET | Refills: 0 | Status: SHIPPED | OUTPATIENT
Start: 2021-09-20 | End: 2021-12-16

## 2021-09-30 ENCOUNTER — TELEPHONE (OUTPATIENT)
Dept: ELECTROPHYSIOLOGY | Facility: CLINIC | Age: 71
End: 2021-09-30

## 2021-09-30 ENCOUNTER — PATIENT MESSAGE (OUTPATIENT)
Dept: ELECTROPHYSIOLOGY | Facility: CLINIC | Age: 71
End: 2021-09-30

## 2021-10-05 ENCOUNTER — PATIENT MESSAGE (OUTPATIENT)
Dept: ELECTROPHYSIOLOGY | Facility: CLINIC | Age: 71
End: 2021-10-05

## 2021-10-11 ENCOUNTER — IMMUNIZATION (OUTPATIENT)
Dept: INTERNAL MEDICINE | Facility: CLINIC | Age: 71
End: 2021-10-11
Payer: COMMERCIAL

## 2021-10-11 DIAGNOSIS — Z23 NEED FOR VACCINATION: Primary | ICD-10-CM

## 2021-10-11 PROCEDURE — 91300 COVID-19, MRNA, LNP-S, PF, 30 MCG/0.3 ML DOSE VACCINE: CPT | Mod: PBBFAC | Performed by: INTERNAL MEDICINE

## 2021-10-11 PROCEDURE — 0003A COVID-19, MRNA, LNP-S, PF, 30 MCG/0.3 ML DOSE VACCINE: CPT | Mod: CV19,PBBFAC | Performed by: INTERNAL MEDICINE

## 2021-10-26 ENCOUNTER — TELEPHONE (OUTPATIENT)
Dept: OPTOMETRY | Facility: CLINIC | Age: 71
End: 2021-10-26
Payer: COMMERCIAL

## 2021-11-04 ENCOUNTER — PATIENT OUTREACH (OUTPATIENT)
Dept: ADMINISTRATIVE | Facility: OTHER | Age: 71
End: 2021-11-04
Payer: COMMERCIAL

## 2021-11-05 ENCOUNTER — OFFICE VISIT (OUTPATIENT)
Dept: PULMONOLOGY | Facility: CLINIC | Age: 71
End: 2021-11-05
Payer: COMMERCIAL

## 2021-11-05 ENCOUNTER — HOSPITAL ENCOUNTER (OUTPATIENT)
Dept: RADIOLOGY | Facility: HOSPITAL | Age: 71
Discharge: HOME | End: 2021-11-05
Attending: STUDENT IN AN ORGANIZED HEALTH CARE EDUCATION/TRAINING PROGRAM
Payer: COMMERCIAL

## 2021-11-05 VITALS
WEIGHT: 202.63 LBS | DIASTOLIC BLOOD PRESSURE: 66 MMHG | BODY MASS INDEX: 30.01 KG/M2 | HEART RATE: 53 BPM | HEIGHT: 69 IN | SYSTOLIC BLOOD PRESSURE: 116 MMHG | OXYGEN SATURATION: 99 %

## 2021-11-05 DIAGNOSIS — R91.1 SOLITARY PULMONARY NODULE: ICD-10-CM

## 2021-11-05 DIAGNOSIS — R91.1 SOLITARY PULMONARY NODULE ON LUNG CT: Primary | ICD-10-CM

## 2021-11-05 PROCEDURE — 99999 PR PBB SHADOW E&M-EST. PATIENT-LVL IV: CPT | Mod: PBBFAC,,, | Performed by: STUDENT IN AN ORGANIZED HEALTH CARE EDUCATION/TRAINING PROGRAM

## 2021-11-05 PROCEDURE — 99213 OFFICE O/P EST LOW 20 MIN: CPT | Mod: S$GLB,,, | Performed by: STUDENT IN AN ORGANIZED HEALTH CARE EDUCATION/TRAINING PROGRAM

## 2021-11-05 PROCEDURE — 71250 CT CHEST WITHOUT CONTRAST: ICD-10-PCS | Mod: 26,,, | Performed by: RADIOLOGY

## 2021-11-05 PROCEDURE — 71250 CT THORAX DX C-: CPT | Mod: 26,,, | Performed by: RADIOLOGY

## 2021-11-05 PROCEDURE — 99213 PR OFFICE/OUTPT VISIT, EST, LEVL III, 20-29 MIN: ICD-10-PCS | Mod: S$GLB,,, | Performed by: STUDENT IN AN ORGANIZED HEALTH CARE EDUCATION/TRAINING PROGRAM

## 2021-11-05 PROCEDURE — 71250 CT THORAX DX C-: CPT | Mod: TC

## 2021-11-05 PROCEDURE — 99999 PR PBB SHADOW E&M-EST. PATIENT-LVL IV: ICD-10-PCS | Mod: PBBFAC,,, | Performed by: STUDENT IN AN ORGANIZED HEALTH CARE EDUCATION/TRAINING PROGRAM

## 2021-11-08 ENCOUNTER — TELEPHONE (OUTPATIENT)
Dept: INTERNAL MEDICINE | Facility: CLINIC | Age: 71
End: 2021-11-08
Payer: COMMERCIAL

## 2021-11-08 DIAGNOSIS — R35.0 BENIGN PROSTATIC HYPERPLASIA WITH URINARY FREQUENCY: ICD-10-CM

## 2021-11-08 DIAGNOSIS — M54.50 RIGHT-SIDED LOW BACK PAIN WITHOUT SCIATICA, UNSPECIFIED CHRONICITY: ICD-10-CM

## 2021-11-08 DIAGNOSIS — K52.9 GE (GASTROENTERITIS): ICD-10-CM

## 2021-11-08 DIAGNOSIS — E66.9 OBESITY, UNSPECIFIED CLASSIFICATION, UNSPECIFIED OBESITY TYPE, UNSPECIFIED WHETHER SERIOUS COMORBIDITY PRESENT: ICD-10-CM

## 2021-11-08 DIAGNOSIS — I10 PRIMARY HYPERTENSION: ICD-10-CM

## 2021-11-08 DIAGNOSIS — E89.0 POSTSURGICAL HYPOTHYROIDISM: ICD-10-CM

## 2021-11-08 DIAGNOSIS — I25.10 CORONARY ARTERY DISEASE, UNSPECIFIED VESSEL OR LESION TYPE, UNSPECIFIED WHETHER ANGINA PRESENT, UNSPECIFIED WHETHER NATIVE OR TRANSPLANTED HEART: ICD-10-CM

## 2021-11-08 DIAGNOSIS — E78.5 HYPERLIPIDEMIA, UNSPECIFIED HYPERLIPIDEMIA TYPE: ICD-10-CM

## 2021-11-08 DIAGNOSIS — R73.02 IMPAIRED GLUCOSE TOLERANCE: ICD-10-CM

## 2021-11-08 DIAGNOSIS — N40.1 BENIGN PROSTATIC HYPERPLASIA WITH URINARY FREQUENCY: ICD-10-CM

## 2021-11-08 DIAGNOSIS — M25.562 LEFT KNEE PAIN, UNSPECIFIED CHRONICITY: ICD-10-CM

## 2021-11-08 DIAGNOSIS — I48.19 PERSISTENT ATRIAL FIBRILLATION: ICD-10-CM

## 2021-11-08 DIAGNOSIS — Z00.00 PREVENTATIVE HEALTH CARE: Primary | ICD-10-CM

## 2021-11-08 DIAGNOSIS — Z95.5 STENTED CORONARY ARTERY: ICD-10-CM

## 2021-11-08 DIAGNOSIS — R91.1 SOLITARY PULMONARY NODULE ON LUNG CT: ICD-10-CM

## 2021-11-08 DIAGNOSIS — I49.5 TACHY-BRADY SYNDROME: ICD-10-CM

## 2021-11-08 DIAGNOSIS — R10.31 RIGHT GROIN PAIN: ICD-10-CM

## 2021-11-08 DIAGNOSIS — R25.3 EYELID TWITCH: ICD-10-CM

## 2021-11-08 RX ORDER — AMLODIPINE AND VALSARTAN 5; 160 MG/1; MG/1
TABLET ORAL
Qty: 90 TABLET | Refills: 0 | Status: SHIPPED | OUTPATIENT
Start: 2021-11-08 | End: 2022-01-14 | Stop reason: SDUPTHER

## 2021-11-09 ENCOUNTER — TELEPHONE (OUTPATIENT)
Dept: INTERNAL MEDICINE | Facility: CLINIC | Age: 71
End: 2021-11-09
Payer: COMMERCIAL

## 2021-11-10 ENCOUNTER — PATIENT MESSAGE (OUTPATIENT)
Dept: CRITICAL CARE MEDICINE | Facility: HOSPITAL | Age: 71
End: 2021-11-10
Payer: COMMERCIAL

## 2021-11-10 DIAGNOSIS — R91.1 SOLITARY PULMONARY NODULE ON LUNG CT: Primary | ICD-10-CM

## 2021-11-29 ENCOUNTER — OFFICE VISIT (OUTPATIENT)
Dept: OPTOMETRY | Facility: CLINIC | Age: 71
End: 2021-11-29
Payer: COMMERCIAL

## 2021-11-29 ENCOUNTER — IMMUNIZATION (OUTPATIENT)
Dept: INTERNAL MEDICINE | Facility: CLINIC | Age: 71
End: 2021-11-29
Payer: COMMERCIAL

## 2021-11-29 DIAGNOSIS — H52.03 HYPEROPIA WITH PRESBYOPIA OF BOTH EYES: ICD-10-CM

## 2021-11-29 DIAGNOSIS — H25.13 NUCLEAR SCLEROSIS, BILATERAL: Primary | ICD-10-CM

## 2021-11-29 DIAGNOSIS — Z13.5 GLAUCOMA SCREENING: ICD-10-CM

## 2021-11-29 DIAGNOSIS — H52.4 HYPEROPIA WITH PRESBYOPIA OF BOTH EYES: ICD-10-CM

## 2021-11-29 PROCEDURE — 4010F ACE/ARB THERAPY RXD/TAKEN: CPT | Mod: CPTII,S$GLB,, | Performed by: OPTOMETRIST

## 2021-11-29 PROCEDURE — 99999 PR PBB SHADOW E&M-EST. PATIENT-LVL III: ICD-10-PCS | Mod: PBBFAC,,, | Performed by: OPTOMETRIST

## 2021-11-29 PROCEDURE — 90694 VACC AIIV4 NO PRSRV 0.5ML IM: CPT | Mod: S$GLB,,, | Performed by: INTERNAL MEDICINE

## 2021-11-29 PROCEDURE — 3061F PR NEG MICROALBUMINURIA RESULT DOCUMENTED/REVIEW: ICD-10-PCS | Mod: CPTII,S$GLB,, | Performed by: OPTOMETRIST

## 2021-11-29 PROCEDURE — 92014 COMPRE OPH EXAM EST PT 1/>: CPT | Mod: S$GLB,,, | Performed by: OPTOMETRIST

## 2021-11-29 PROCEDURE — 90471 IMMUNIZATION ADMIN: CPT | Mod: S$GLB,,, | Performed by: INTERNAL MEDICINE

## 2021-11-29 PROCEDURE — 92014 PR EYE EXAM, EST PATIENT,COMPREHESV: ICD-10-PCS | Mod: S$GLB,,, | Performed by: OPTOMETRIST

## 2021-11-29 PROCEDURE — 3066F PR DOCUMENTATION OF TREATMENT FOR NEPHROPATHY: ICD-10-PCS | Mod: CPTII,S$GLB,, | Performed by: OPTOMETRIST

## 2021-11-29 PROCEDURE — 3066F NEPHROPATHY DOC TX: CPT | Mod: CPTII,S$GLB,, | Performed by: OPTOMETRIST

## 2021-11-29 PROCEDURE — 4010F PR ACE/ARB THEARPY RXD/TAKEN: ICD-10-PCS | Mod: CPTII,S$GLB,, | Performed by: OPTOMETRIST

## 2021-11-29 PROCEDURE — 90694 FLU VACCINE - QUADRIVALENT - ADJUVANTED: ICD-10-PCS | Mod: S$GLB,,, | Performed by: INTERNAL MEDICINE

## 2021-11-29 PROCEDURE — 92015 PR REFRACTION: ICD-10-PCS | Mod: S$GLB,,, | Performed by: OPTOMETRIST

## 2021-11-29 PROCEDURE — 90471 FLU VACCINE - QUADRIVALENT - ADJUVANTED: ICD-10-PCS | Mod: S$GLB,,, | Performed by: INTERNAL MEDICINE

## 2021-11-29 PROCEDURE — 92015 DETERMINE REFRACTIVE STATE: CPT | Mod: S$GLB,,, | Performed by: OPTOMETRIST

## 2021-11-29 PROCEDURE — 99999 PR PBB SHADOW E&M-EST. PATIENT-LVL III: CPT | Mod: PBBFAC,,, | Performed by: OPTOMETRIST

## 2021-11-29 PROCEDURE — 3061F NEG MICROALBUMINURIA REV: CPT | Mod: CPTII,S$GLB,, | Performed by: OPTOMETRIST

## 2021-12-16 ENCOUNTER — OFFICE VISIT (OUTPATIENT)
Dept: ELECTROPHYSIOLOGY | Facility: CLINIC | Age: 71
End: 2021-12-16
Payer: COMMERCIAL

## 2021-12-16 ENCOUNTER — HOSPITAL ENCOUNTER (OUTPATIENT)
Dept: CARDIOLOGY | Facility: CLINIC | Age: 71
Discharge: HOME | End: 2021-12-16
Payer: COMMERCIAL

## 2021-12-16 VITALS
DIASTOLIC BLOOD PRESSURE: 72 MMHG | WEIGHT: 194.25 LBS | HEIGHT: 70 IN | SYSTOLIC BLOOD PRESSURE: 118 MMHG | HEART RATE: 50 BPM | BODY MASS INDEX: 27.81 KG/M2

## 2021-12-16 DIAGNOSIS — I10 PRIMARY HYPERTENSION: ICD-10-CM

## 2021-12-16 DIAGNOSIS — I49.8 OTHER SPECIFIED CARDIAC ARRHYTHMIAS: ICD-10-CM

## 2021-12-16 DIAGNOSIS — I48.19 PERSISTENT ATRIAL FIBRILLATION: Primary | ICD-10-CM

## 2021-12-16 DIAGNOSIS — Z98.890 HISTORY OF RADIOFREQUENCY ABLATION (RFA) PROCEDURE FOR CARDIAC ARRHYTHMIA: ICD-10-CM

## 2021-12-16 DIAGNOSIS — I49.5 TACHY-BRADY SYNDROME: ICD-10-CM

## 2021-12-16 PROCEDURE — 4010F PR ACE/ARB THEARPY RXD/TAKEN: ICD-10-PCS | Mod: CPTII,S$GLB,, | Performed by: NURSE PRACTITIONER

## 2021-12-16 PROCEDURE — 3061F PR NEG MICROALBUMINURIA RESULT DOCUMENTED/REVIEW: ICD-10-PCS | Mod: CPTII,S$GLB,, | Performed by: NURSE PRACTITIONER

## 2021-12-16 PROCEDURE — 4010F ACE/ARB THERAPY RXD/TAKEN: CPT | Mod: CPTII,S$GLB,, | Performed by: NURSE PRACTITIONER

## 2021-12-16 PROCEDURE — 93005 RHYTHM STRIP: ICD-10-PCS | Mod: S$GLB,,, | Performed by: INTERNAL MEDICINE

## 2021-12-16 PROCEDURE — 3061F NEG MICROALBUMINURIA REV: CPT | Mod: CPTII,S$GLB,, | Performed by: NURSE PRACTITIONER

## 2021-12-16 PROCEDURE — 99999 PR PBB SHADOW E&M-EST. PATIENT-LVL IV: ICD-10-PCS | Mod: PBBFAC,,, | Performed by: NURSE PRACTITIONER

## 2021-12-16 PROCEDURE — 93010 ELECTROCARDIOGRAM REPORT: CPT | Mod: S$GLB,,, | Performed by: INTERNAL MEDICINE

## 2021-12-16 PROCEDURE — 3066F NEPHROPATHY DOC TX: CPT | Mod: CPTII,S$GLB,, | Performed by: NURSE PRACTITIONER

## 2021-12-16 PROCEDURE — 99214 PR OFFICE/OUTPT VISIT, EST, LEVL IV, 30-39 MIN: ICD-10-PCS | Mod: S$GLB,,, | Performed by: NURSE PRACTITIONER

## 2021-12-16 PROCEDURE — 93010 RHYTHM STRIP: ICD-10-PCS | Mod: S$GLB,,, | Performed by: INTERNAL MEDICINE

## 2021-12-16 PROCEDURE — 99999 PR PBB SHADOW E&M-EST. PATIENT-LVL IV: CPT | Mod: PBBFAC,,, | Performed by: NURSE PRACTITIONER

## 2021-12-16 PROCEDURE — 3066F PR DOCUMENTATION OF TREATMENT FOR NEPHROPATHY: ICD-10-PCS | Mod: CPTII,S$GLB,, | Performed by: NURSE PRACTITIONER

## 2021-12-16 PROCEDURE — 99214 OFFICE O/P EST MOD 30 MIN: CPT | Mod: S$GLB,,, | Performed by: NURSE PRACTITIONER

## 2021-12-16 PROCEDURE — 93005 ELECTROCARDIOGRAM TRACING: CPT | Mod: S$GLB,,, | Performed by: INTERNAL MEDICINE

## 2022-01-07 ENCOUNTER — LAB VISIT (OUTPATIENT)
Dept: LAB | Facility: HOSPITAL | Age: 72
End: 2022-01-07
Attending: FAMILY MEDICINE
Payer: COMMERCIAL

## 2022-01-07 DIAGNOSIS — Z95.5 STENTED CORONARY ARTERY: ICD-10-CM

## 2022-01-07 DIAGNOSIS — I10 PRIMARY HYPERTENSION: ICD-10-CM

## 2022-01-07 DIAGNOSIS — Z00.00 PREVENTATIVE HEALTH CARE: ICD-10-CM

## 2022-01-07 DIAGNOSIS — N40.1 BENIGN PROSTATIC HYPERPLASIA WITH URINARY FREQUENCY: ICD-10-CM

## 2022-01-07 DIAGNOSIS — R91.1 SOLITARY PULMONARY NODULE ON LUNG CT: ICD-10-CM

## 2022-01-07 DIAGNOSIS — E78.5 HYPERLIPIDEMIA, UNSPECIFIED HYPERLIPIDEMIA TYPE: ICD-10-CM

## 2022-01-07 DIAGNOSIS — E89.0 POSTSURGICAL HYPOTHYROIDISM: ICD-10-CM

## 2022-01-07 DIAGNOSIS — R73.02 IMPAIRED GLUCOSE TOLERANCE: ICD-10-CM

## 2022-01-07 DIAGNOSIS — M54.50 RIGHT-SIDED LOW BACK PAIN WITHOUT SCIATICA, UNSPECIFIED CHRONICITY: ICD-10-CM

## 2022-01-07 DIAGNOSIS — I48.19 PERSISTENT ATRIAL FIBRILLATION: ICD-10-CM

## 2022-01-07 DIAGNOSIS — R35.0 BENIGN PROSTATIC HYPERPLASIA WITH URINARY FREQUENCY: ICD-10-CM

## 2022-01-07 DIAGNOSIS — K52.9 GE (GASTROENTERITIS): ICD-10-CM

## 2022-01-07 DIAGNOSIS — I25.10 CORONARY ARTERY DISEASE, UNSPECIFIED VESSEL OR LESION TYPE, UNSPECIFIED WHETHER ANGINA PRESENT, UNSPECIFIED WHETHER NATIVE OR TRANSPLANTED HEART: ICD-10-CM

## 2022-01-07 DIAGNOSIS — R25.3 EYELID TWITCH: ICD-10-CM

## 2022-01-07 DIAGNOSIS — E66.9 OBESITY, UNSPECIFIED CLASSIFICATION, UNSPECIFIED OBESITY TYPE, UNSPECIFIED WHETHER SERIOUS COMORBIDITY PRESENT: ICD-10-CM

## 2022-01-07 DIAGNOSIS — M25.562 LEFT KNEE PAIN, UNSPECIFIED CHRONICITY: ICD-10-CM

## 2022-01-07 DIAGNOSIS — I49.5 TACHY-BRADY SYNDROME: ICD-10-CM

## 2022-01-07 DIAGNOSIS — R10.31 RIGHT GROIN PAIN: ICD-10-CM

## 2022-01-07 LAB
25(OH)D3+25(OH)D2 SERPL-MCNC: 35 NG/ML (ref 30–96)
ALBUMIN SERPL BCP-MCNC: 4.1 G/DL (ref 3.5–5.2)
ALP SERPL-CCNC: 34 U/L (ref 55–135)
ALT SERPL W/O P-5'-P-CCNC: 12 U/L (ref 10–44)
ANION GAP SERPL CALC-SCNC: 8 MMOL/L (ref 8–16)
AST SERPL-CCNC: 19 U/L (ref 10–40)
BASOPHILS # BLD AUTO: 0.06 K/UL (ref 0–0.2)
BASOPHILS NFR BLD: 1.8 % (ref 0–1.9)
BILIRUB SERPL-MCNC: 0.5 MG/DL (ref 0.1–1)
BUN SERPL-MCNC: 24 MG/DL (ref 8–23)
CALCIUM SERPL-MCNC: 9.9 MG/DL (ref 8.7–10.5)
CHLORIDE SERPL-SCNC: 103 MMOL/L (ref 95–110)
CHOLEST SERPL-MCNC: 116 MG/DL (ref 120–199)
CHOLEST/HDLC SERPL: 2.6 {RATIO} (ref 2–5)
CO2 SERPL-SCNC: 27 MMOL/L (ref 23–29)
COMPLEXED PSA SERPL-MCNC: 1.4 NG/ML (ref 0–4)
CREAT SERPL-MCNC: 1.2 MG/DL (ref 0.5–1.4)
DIFFERENTIAL METHOD: ABNORMAL
EOSINOPHIL # BLD AUTO: 0.1 K/UL (ref 0–0.5)
EOSINOPHIL NFR BLD: 3 % (ref 0–8)
ERYTHROCYTE [DISTWIDTH] IN BLOOD BY AUTOMATED COUNT: 13.3 % (ref 11.5–14.5)
EST. GFR  (AFRICAN AMERICAN): >60 ML/MIN/1.73 M^2
EST. GFR  (NON AFRICAN AMERICAN): >60 ML/MIN/1.73 M^2
ESTIMATED AVG GLUCOSE: 117 MG/DL (ref 68–131)
GLUCOSE SERPL-MCNC: 95 MG/DL (ref 70–110)
HBA1C MFR BLD: 5.7 % (ref 4–5.6)
HCT VFR BLD AUTO: 41.2 % (ref 40–54)
HDLC SERPL-MCNC: 45 MG/DL (ref 40–75)
HDLC SERPL: 38.8 % (ref 20–50)
HGB BLD-MCNC: 13.4 G/DL (ref 14–18)
IMM GRANULOCYTES # BLD AUTO: 0.01 K/UL (ref 0–0.04)
IMM GRANULOCYTES NFR BLD AUTO: 0.3 % (ref 0–0.5)
LDLC SERPL CALC-MCNC: 62.8 MG/DL (ref 63–159)
LYMPHOCYTES # BLD AUTO: 1.4 K/UL (ref 1–4.8)
LYMPHOCYTES NFR BLD: 41.6 % (ref 18–48)
MCH RBC QN AUTO: 29.5 PG (ref 27–31)
MCHC RBC AUTO-ENTMCNC: 32.5 G/DL (ref 32–36)
MCV RBC AUTO: 91 FL (ref 82–98)
MONOCYTES # BLD AUTO: 0.3 K/UL (ref 0.3–1)
MONOCYTES NFR BLD: 9.3 % (ref 4–15)
NEUTROPHILS # BLD AUTO: 1.5 K/UL (ref 1.8–7.7)
NEUTROPHILS NFR BLD: 44 % (ref 38–73)
NONHDLC SERPL-MCNC: 71 MG/DL
NRBC BLD-RTO: 0 /100 WBC
PLATELET # BLD AUTO: 241 K/UL (ref 150–450)
PMV BLD AUTO: 11.1 FL (ref 9.2–12.9)
POTASSIUM SERPL-SCNC: 4.2 MMOL/L (ref 3.5–5.1)
PROT SERPL-MCNC: 7.2 G/DL (ref 6–8.4)
RBC # BLD AUTO: 4.55 M/UL (ref 4.6–6.2)
SODIUM SERPL-SCNC: 138 MMOL/L (ref 136–145)
T4 FREE SERPL-MCNC: 1.14 NG/DL (ref 0.71–1.51)
TRIGL SERPL-MCNC: 41 MG/DL (ref 30–150)
TSH SERPL DL<=0.005 MIU/L-ACNC: 3.2 UIU/ML (ref 0.4–4)
WBC # BLD AUTO: 3.34 K/UL (ref 3.9–12.7)

## 2022-01-07 PROCEDURE — 84443 ASSAY THYROID STIM HORMONE: CPT | Performed by: FAMILY MEDICINE

## 2022-01-07 PROCEDURE — 80053 COMPREHEN METABOLIC PANEL: CPT | Performed by: FAMILY MEDICINE

## 2022-01-07 PROCEDURE — 80061 LIPID PANEL: CPT | Performed by: FAMILY MEDICINE

## 2022-01-07 PROCEDURE — 85025 COMPLETE CBC W/AUTO DIFF WBC: CPT | Performed by: FAMILY MEDICINE

## 2022-01-07 PROCEDURE — 82306 VITAMIN D 25 HYDROXY: CPT | Performed by: FAMILY MEDICINE

## 2022-01-07 PROCEDURE — 83036 HEMOGLOBIN GLYCOSYLATED A1C: CPT | Performed by: FAMILY MEDICINE

## 2022-01-07 PROCEDURE — 36415 COLL VENOUS BLD VENIPUNCTURE: CPT | Mod: PO | Performed by: FAMILY MEDICINE

## 2022-01-07 PROCEDURE — 84153 ASSAY OF PSA TOTAL: CPT | Performed by: FAMILY MEDICINE

## 2022-01-07 PROCEDURE — 84439 ASSAY OF FREE THYROXINE: CPT | Performed by: FAMILY MEDICINE

## 2022-01-09 ENCOUNTER — PATIENT MESSAGE (OUTPATIENT)
Dept: ENDOCRINOLOGY | Facility: CLINIC | Age: 72
End: 2022-01-09
Payer: COMMERCIAL

## 2022-01-11 DIAGNOSIS — E78.1 HYPERTRIGLYCERIDEMIA: ICD-10-CM

## 2022-01-12 RX ORDER — FENOFIBRIC ACID 135 MG/1
CAPSULE, DELAYED RELEASE ORAL
Qty: 90 CAPSULE | Refills: 1 | Status: SHIPPED | OUTPATIENT
Start: 2022-01-12 | End: 2022-01-14

## 2022-01-14 ENCOUNTER — OFFICE VISIT (OUTPATIENT)
Dept: INTERNAL MEDICINE | Facility: CLINIC | Age: 72
End: 2022-01-14
Payer: COMMERCIAL

## 2022-01-14 VITALS
WEIGHT: 204.38 LBS | HEIGHT: 70 IN | TEMPERATURE: 98 F | DIASTOLIC BLOOD PRESSURE: 80 MMHG | SYSTOLIC BLOOD PRESSURE: 115 MMHG | HEART RATE: 52 BPM | OXYGEN SATURATION: 100 % | BODY MASS INDEX: 29.26 KG/M2

## 2022-01-14 DIAGNOSIS — G89.29 CHRONIC LOW BACK PAIN, UNSPECIFIED BACK PAIN LATERALITY, UNSPECIFIED WHETHER SCIATICA PRESENT: ICD-10-CM

## 2022-01-14 DIAGNOSIS — I10 PRIMARY HYPERTENSION: ICD-10-CM

## 2022-01-14 DIAGNOSIS — Z23 NEED FOR 23-POLYVALENT PNEUMOCOCCAL POLYSACCHARIDE VACCINE: ICD-10-CM

## 2022-01-14 DIAGNOSIS — R73.02 IMPAIRED GLUCOSE TOLERANCE: ICD-10-CM

## 2022-01-14 DIAGNOSIS — Z00.00 PREVENTATIVE HEALTH CARE: Primary | ICD-10-CM

## 2022-01-14 DIAGNOSIS — R35.0 BENIGN PROSTATIC HYPERPLASIA WITH URINARY FREQUENCY: ICD-10-CM

## 2022-01-14 DIAGNOSIS — E78.5 HYPERLIPIDEMIA, UNSPECIFIED HYPERLIPIDEMIA TYPE: ICD-10-CM

## 2022-01-14 DIAGNOSIS — E89.0 POSTSURGICAL HYPOTHYROIDISM: ICD-10-CM

## 2022-01-14 DIAGNOSIS — Z98.890 HISTORY OF RADIOFREQUENCY ABLATION (RFA) PROCEDURE FOR CARDIAC ARRHYTHMIA: ICD-10-CM

## 2022-01-14 DIAGNOSIS — N40.1 BENIGN PROSTATIC HYPERPLASIA WITH URINARY FREQUENCY: ICD-10-CM

## 2022-01-14 DIAGNOSIS — M54.50 CHRONIC LOW BACK PAIN, UNSPECIFIED BACK PAIN LATERALITY, UNSPECIFIED WHETHER SCIATICA PRESENT: ICD-10-CM

## 2022-01-14 DIAGNOSIS — Z95.5 STENTED CORONARY ARTERY: ICD-10-CM

## 2022-01-14 DIAGNOSIS — I25.10 CORONARY ARTERY DISEASE, UNSPECIFIED VESSEL OR LESION TYPE, UNSPECIFIED WHETHER ANGINA PRESENT, UNSPECIFIED WHETHER NATIVE OR TRANSPLANTED HEART: ICD-10-CM

## 2022-01-14 DIAGNOSIS — I48.19 PERSISTENT ATRIAL FIBRILLATION: ICD-10-CM

## 2022-01-14 PROCEDURE — 90471 PNEUMOCOCCAL POLYSACCHARIDE VACCINE 23-VALENT =>2YO SQ IM: ICD-10-PCS | Mod: S$GLB,,, | Performed by: FAMILY MEDICINE

## 2022-01-14 PROCEDURE — 99999 PR PBB SHADOW E&M-EST. PATIENT-LVL V: CPT | Mod: PBBFAC,,, | Performed by: FAMILY MEDICINE

## 2022-01-14 PROCEDURE — 3079F PR MOST RECENT DIASTOLIC BLOOD PRESSURE 80-89 MM HG: ICD-10-PCS | Mod: CPTII,S$GLB,, | Performed by: FAMILY MEDICINE

## 2022-01-14 PROCEDURE — 90732 PNEUMOCOCCAL POLYSACCHARIDE VACCINE 23-VALENT =>2YO SQ IM: ICD-10-PCS | Mod: S$GLB,,, | Performed by: FAMILY MEDICINE

## 2022-01-14 PROCEDURE — 99397 PR PREVENTIVE VISIT,EST,65 & OVER: ICD-10-PCS | Mod: 25,S$GLB,, | Performed by: FAMILY MEDICINE

## 2022-01-14 PROCEDURE — 1160F PR REVIEW ALL MEDS BY PRESCRIBER/CLIN PHARMACIST DOCUMENTED: ICD-10-PCS | Mod: CPTII,S$GLB,, | Performed by: FAMILY MEDICINE

## 2022-01-14 PROCEDURE — 3044F PR MOST RECENT HEMOGLOBIN A1C LEVEL <7.0%: ICD-10-PCS | Mod: CPTII,S$GLB,, | Performed by: FAMILY MEDICINE

## 2022-01-14 PROCEDURE — 99397 PER PM REEVAL EST PAT 65+ YR: CPT | Mod: 25,S$GLB,, | Performed by: FAMILY MEDICINE

## 2022-01-14 PROCEDURE — 3288F FALL RISK ASSESSMENT DOCD: CPT | Mod: CPTII,S$GLB,, | Performed by: FAMILY MEDICINE

## 2022-01-14 PROCEDURE — 3074F SYST BP LT 130 MM HG: CPT | Mod: CPTII,S$GLB,, | Performed by: FAMILY MEDICINE

## 2022-01-14 PROCEDURE — 3079F DIAST BP 80-89 MM HG: CPT | Mod: CPTII,S$GLB,, | Performed by: FAMILY MEDICINE

## 2022-01-14 PROCEDURE — 3074F PR MOST RECENT SYSTOLIC BLOOD PRESSURE < 130 MM HG: ICD-10-PCS | Mod: CPTII,S$GLB,, | Performed by: FAMILY MEDICINE

## 2022-01-14 PROCEDURE — 1126F AMNT PAIN NOTED NONE PRSNT: CPT | Mod: CPTII,S$GLB,, | Performed by: FAMILY MEDICINE

## 2022-01-14 PROCEDURE — 1126F PR PAIN SEVERITY QUANTIFIED, NO PAIN PRESENT: ICD-10-PCS | Mod: CPTII,S$GLB,, | Performed by: FAMILY MEDICINE

## 2022-01-14 PROCEDURE — 4010F ACE/ARB THERAPY RXD/TAKEN: CPT | Mod: CPTII,S$GLB,, | Performed by: FAMILY MEDICINE

## 2022-01-14 PROCEDURE — 1101F PR PT FALLS ASSESS DOC 0-1 FALLS W/OUT INJ PAST YR: ICD-10-PCS | Mod: CPTII,S$GLB,, | Performed by: FAMILY MEDICINE

## 2022-01-14 PROCEDURE — 99999 PR PBB SHADOW E&M-EST. PATIENT-LVL V: ICD-10-PCS | Mod: PBBFAC,,, | Performed by: FAMILY MEDICINE

## 2022-01-14 PROCEDURE — 3044F HG A1C LEVEL LT 7.0%: CPT | Mod: CPTII,S$GLB,, | Performed by: FAMILY MEDICINE

## 2022-01-14 PROCEDURE — 3008F BODY MASS INDEX DOCD: CPT | Mod: CPTII,S$GLB,, | Performed by: FAMILY MEDICINE

## 2022-01-14 PROCEDURE — 90471 IMMUNIZATION ADMIN: CPT | Mod: S$GLB,,, | Performed by: FAMILY MEDICINE

## 2022-01-14 PROCEDURE — 90732 PPSV23 VACC 2 YRS+ SUBQ/IM: CPT | Mod: S$GLB,,, | Performed by: FAMILY MEDICINE

## 2022-01-14 PROCEDURE — 1159F PR MEDICATION LIST DOCUMENTED IN MEDICAL RECORD: ICD-10-PCS | Mod: CPTII,S$GLB,, | Performed by: FAMILY MEDICINE

## 2022-01-14 PROCEDURE — 1159F MED LIST DOCD IN RCRD: CPT | Mod: CPTII,S$GLB,, | Performed by: FAMILY MEDICINE

## 2022-01-14 PROCEDURE — 1101F PT FALLS ASSESS-DOCD LE1/YR: CPT | Mod: CPTII,S$GLB,, | Performed by: FAMILY MEDICINE

## 2022-01-14 PROCEDURE — 3008F PR BODY MASS INDEX (BMI) DOCUMENTED: ICD-10-PCS | Mod: CPTII,S$GLB,, | Performed by: FAMILY MEDICINE

## 2022-01-14 PROCEDURE — 3288F PR FALLS RISK ASSESSMENT DOCUMENTED: ICD-10-PCS | Mod: CPTII,S$GLB,, | Performed by: FAMILY MEDICINE

## 2022-01-14 PROCEDURE — 1160F RVW MEDS BY RX/DR IN RCRD: CPT | Mod: CPTII,S$GLB,, | Performed by: FAMILY MEDICINE

## 2022-01-14 PROCEDURE — 4010F PR ACE/ARB THEARPY RXD/TAKEN: ICD-10-PCS | Mod: CPTII,S$GLB,, | Performed by: FAMILY MEDICINE

## 2022-01-14 RX ORDER — AMLODIPINE AND VALSARTAN 5; 160 MG/1; MG/1
1 TABLET ORAL DAILY
Qty: 90 TABLET | Refills: 3 | Status: SHIPPED | OUTPATIENT
Start: 2022-01-14 | End: 2023-01-09 | Stop reason: SDUPTHER

## 2022-01-14 NOTE — PROGRESS NOTES
Subjective:       Patient ID: Dustin Lauren is a 71 y.o. male.    Chief Complaint: Annual Exam    HPI 71-year-old white male presents to clinic today for annual physical exam.  He continues to be followed by Dr. Vo, cardiology, secondary to coronary artery disease, atrial fibrillation, and hypertension.  He is current with status post atrial ablation eye remains stable at this time.  He continues to take Eliquis 5 mg b.i.d., Effient 10 mg daily, and Exforge 5/160 mg daily.  Hyperlipidemia remains well controlled on Lipitor 10 mg daily and Fibricor 135 mg daily.  He continues to be followed by Urology for treatment of BPH which remains well controlled on Flomax 0.4 mg daily and finasteride 5 mg daily.  He continues to be followed by Endocrinology secondary to postsurgical hypothyroidism which remains stable on levothyroxine at this time.  He continues to have stable impaired fasting glucose with an A1c of 5.7.  He has a past surgical history of cholecystectomy, thyroidectomy, left knee arthroscopic meniscus repair, right knee replacement, coronary angioplasty with stent placement, and atrial ablation.  He has a strong family history of arthritis, cancer, heart disease, and thalassemia.  He is up-to-date with all screening exams.  Pneumovax has been given today.  Review of Systems   Constitutional: Negative for activity change, appetite change, chills, fatigue, fever and unexpected weight change.   HENT: Negative for nasal congestion, ear pain, hearing loss, postnasal drip, rhinorrhea, sinus pressure/congestion, sore throat, tinnitus and trouble swallowing.    Eyes: Negative for discharge, redness, itching and visual disturbance.   Respiratory: Negative for cough, chest tightness, shortness of breath and wheezing.    Cardiovascular: Negative for chest pain and palpitations.   Gastrointestinal: Negative for abdominal pain, blood in stool, constipation, diarrhea, nausea and vomiting.   Endocrine: Negative for  polydipsia and polyuria.   Genitourinary: Positive for urgency. Negative for decreased urine volume, difficulty urinating, dysuria, frequency and hematuria.   Musculoskeletal: Positive for arthralgias and neck pain. Negative for back pain, joint swelling, myalgias and neck stiffness.   Integumentary:  Negative for rash.   Neurological: Negative for dizziness, weakness, light-headedness and headaches.   Psychiatric/Behavioral: Negative.  Negative for confusion and dysphoric mood.         Objective:      Physical Exam  Vitals and nursing note reviewed.   Constitutional:       General: He is not in acute distress.     Appearance: He is well-developed and well-nourished. He is not diaphoretic.   HENT:      Head: Normocephalic and atraumatic.      Right Ear: External ear normal.      Left Ear: External ear normal.      Nose: Nose normal.      Mouth/Throat:      Mouth: Oropharynx is clear and moist.      Pharynx: No oropharyngeal exudate.   Eyes:      General: No scleral icterus.        Right eye: No discharge.         Left eye: No discharge.      Extraocular Movements: EOM normal.      Conjunctiva/sclera: Conjunctivae normal.      Pupils: Pupils are equal, round, and reactive to light.   Neck:      Thyroid: No thyromegaly.      Vascular: No JVD.      Trachea: No tracheal deviation.   Cardiovascular:      Rate and Rhythm: Normal rate and regular rhythm.      Pulses: Intact distal pulses.      Heart sounds: Normal heart sounds. No murmur heard.  No friction rub. No gallop.    Pulmonary:      Effort: Pulmonary effort is normal. No respiratory distress.      Breath sounds: Normal breath sounds. No stridor. No wheezing or rales.   Abdominal:      General: Bowel sounds are normal. There is no distension.      Palpations: Abdomen is soft. There is no mass.      Tenderness: There is no abdominal tenderness. There is no guarding or rebound.   Musculoskeletal:         General: No tenderness or edema. Normal range of motion.       Cervical back: Normal range of motion and neck supple.   Lymphadenopathy:      Cervical: No cervical adenopathy.   Skin:     General: Skin is warm and dry.      Coloration: Skin is not pale.      Findings: No erythema or rash.   Neurological:      Mental Status: He is alert and oriented to person, place, and time.   Psychiatric:         Mood and Affect: Mood and affect normal.         Behavior: Behavior normal.         Thought Content: Thought content normal.         Judgment: Judgment normal.         Assessment:       Problem List Items Addressed This Visit     Benign prostatic hyperplasia with urinary frequency    Chronic LBP    Coronary artery disease    History of radiofrequency ablation (RFA) procedure for cardiac arrhythmia    Hyperlipidemia    Hypertension    Impaired glucose tolerance    Persistent atrial fibrillation    Postsurgical hypothyroidism    Stented coronary artery      Other Visit Diagnoses     Preventative health care    -  Primary    Need for 23-polyvalent pneumococcal polysaccharide vaccine        Relevant Orders    Pneumococcal Polysaccharide Vaccine (23 Valent) (SQ/IM) (Completed)          Plan:       1. Labs have been reviewed and are oval hole within normal limits.  2. Continue Effient 10 mg daily, Eliquis 5 mg b.i.d., and Exforge 5/160 mg daily.  Atrial fibrillation, coronary artery disease, and hypertension remained stable.  Continue follow-up with Cardiology as scheduled.  3. Continue Lipitor 10 mg daily and Fibricor 135 mg daily.  Hyperlipidemia is well controlled.  4. Continue levothyroxine as prescribed and continue follow-up with Endocrinology as scheduled.  Postsurgical hypothyroidism remained stable.  5. Continue finasteride and tamsulosin as prescribed and continue follow-up with Urology as scheduled.  BPH is stable.  6. Impaired fasting glucose is well controlled.  A1c is 5.7.  7. Chronic low back pain remains stable.  8. Pneumovax given.  9. Return to clinic as needed or in 1  year for annual physical exam.

## 2022-01-20 ENCOUNTER — OFFICE VISIT (OUTPATIENT)
Dept: ENDOCRINOLOGY | Facility: CLINIC | Age: 72
End: 2022-01-20
Payer: COMMERCIAL

## 2022-01-20 VITALS
WEIGHT: 204.94 LBS | SYSTOLIC BLOOD PRESSURE: 124 MMHG | DIASTOLIC BLOOD PRESSURE: 80 MMHG | BODY MASS INDEX: 29.34 KG/M2 | HEIGHT: 70 IN

## 2022-01-20 DIAGNOSIS — E78.5 HYPERLIPIDEMIA, UNSPECIFIED HYPERLIPIDEMIA TYPE: ICD-10-CM

## 2022-01-20 DIAGNOSIS — I10 PRIMARY HYPERTENSION: ICD-10-CM

## 2022-01-20 DIAGNOSIS — E89.0 POSTSURGICAL HYPOTHYROIDISM: Primary | ICD-10-CM

## 2022-01-20 DIAGNOSIS — R73.02 IMPAIRED GLUCOSE TOLERANCE: ICD-10-CM

## 2022-01-20 PROCEDURE — 3044F PR MOST RECENT HEMOGLOBIN A1C LEVEL <7.0%: ICD-10-PCS | Mod: CPTII,S$GLB,, | Performed by: INTERNAL MEDICINE

## 2022-01-20 PROCEDURE — 4010F ACE/ARB THERAPY RXD/TAKEN: CPT | Mod: CPTII,S$GLB,, | Performed by: INTERNAL MEDICINE

## 2022-01-20 PROCEDURE — 99214 OFFICE O/P EST MOD 30 MIN: CPT | Mod: S$GLB,,, | Performed by: INTERNAL MEDICINE

## 2022-01-20 PROCEDURE — 3079F PR MOST RECENT DIASTOLIC BLOOD PRESSURE 80-89 MM HG: ICD-10-PCS | Mod: CPTII,S$GLB,, | Performed by: INTERNAL MEDICINE

## 2022-01-20 PROCEDURE — 3044F HG A1C LEVEL LT 7.0%: CPT | Mod: CPTII,S$GLB,, | Performed by: INTERNAL MEDICINE

## 2022-01-20 PROCEDURE — 1159F PR MEDICATION LIST DOCUMENTED IN MEDICAL RECORD: ICD-10-PCS | Mod: CPTII,S$GLB,, | Performed by: INTERNAL MEDICINE

## 2022-01-20 PROCEDURE — 99214 PR OFFICE/OUTPT VISIT, EST, LEVL IV, 30-39 MIN: ICD-10-PCS | Mod: S$GLB,,, | Performed by: INTERNAL MEDICINE

## 2022-01-20 PROCEDURE — 99999 PR PBB SHADOW E&M-EST. PATIENT-LVL IV: ICD-10-PCS | Mod: PBBFAC,,, | Performed by: INTERNAL MEDICINE

## 2022-01-20 PROCEDURE — 3008F BODY MASS INDEX DOCD: CPT | Mod: CPTII,S$GLB,, | Performed by: INTERNAL MEDICINE

## 2022-01-20 PROCEDURE — 3074F PR MOST RECENT SYSTOLIC BLOOD PRESSURE < 130 MM HG: ICD-10-PCS | Mod: CPTII,S$GLB,, | Performed by: INTERNAL MEDICINE

## 2022-01-20 PROCEDURE — 3008F PR BODY MASS INDEX (BMI) DOCUMENTED: ICD-10-PCS | Mod: CPTII,S$GLB,, | Performed by: INTERNAL MEDICINE

## 2022-01-20 PROCEDURE — 4010F PR ACE/ARB THEARPY RXD/TAKEN: ICD-10-PCS | Mod: CPTII,S$GLB,, | Performed by: INTERNAL MEDICINE

## 2022-01-20 PROCEDURE — 1160F PR REVIEW ALL MEDS BY PRESCRIBER/CLIN PHARMACIST DOCUMENTED: ICD-10-PCS | Mod: CPTII,S$GLB,, | Performed by: INTERNAL MEDICINE

## 2022-01-20 PROCEDURE — 1159F MED LIST DOCD IN RCRD: CPT | Mod: CPTII,S$GLB,, | Performed by: INTERNAL MEDICINE

## 2022-01-20 PROCEDURE — 99999 PR PBB SHADOW E&M-EST. PATIENT-LVL IV: CPT | Mod: PBBFAC,,, | Performed by: INTERNAL MEDICINE

## 2022-01-20 PROCEDURE — 3079F DIAST BP 80-89 MM HG: CPT | Mod: CPTII,S$GLB,, | Performed by: INTERNAL MEDICINE

## 2022-01-20 PROCEDURE — 1160F RVW MEDS BY RX/DR IN RCRD: CPT | Mod: CPTII,S$GLB,, | Performed by: INTERNAL MEDICINE

## 2022-01-20 PROCEDURE — 1126F PR PAIN SEVERITY QUANTIFIED, NO PAIN PRESENT: ICD-10-PCS | Mod: CPTII,S$GLB,, | Performed by: INTERNAL MEDICINE

## 2022-01-20 PROCEDURE — 1126F AMNT PAIN NOTED NONE PRSNT: CPT | Mod: CPTII,S$GLB,, | Performed by: INTERNAL MEDICINE

## 2022-01-20 PROCEDURE — 3074F SYST BP LT 130 MM HG: CPT | Mod: CPTII,S$GLB,, | Performed by: INTERNAL MEDICINE

## 2022-01-20 NOTE — ASSESSMENT & PLAN NOTE
--Patient with postsurgical hypothyroidism  --Clinically and biochemically euthyroid  --TSH remains normal and at goal  --Will continue Synthroid 137 mcg PO Mon-Sat with 1.5 tablets on Sunday only   --Will aim to keep TSH mid to high normal given age and A fib history

## 2022-01-20 NOTE — PROGRESS NOTES
Subjective:      Patient ID: Dustin Lauren is a 71 y.o. male.    Chief Complaint:  Postsurgical hypothyroidism       History of Present Illness  Mr. Lauren presents today for follow up of hypothyroidism. Last visit in 6/2021.      Interval HPI:   With Hypothyroidism s/p surgery for a benign thyroid nodule on 1/27/10.     Had been on 150 mcg of thyroid hormone for quite some time, but then developed A Fib in 9/2016. Had cardiac ablation in 9/2021 for atrial fibrillation. No further episodes of A Fib since ablation.      Currently taking Synthroid 137 mcg Mon-Sat with 1.5 tablets on Sunday only.  Denies missing any doses. Waits at least one hour before taking other meds or food.   No tremor or increased anxiousness. No palpitations.   No overt fatigue. BM's regular.      Also with HTN on amlodipine and valsartan.      For HLP he is taking atorvastatin 10 mg every other day and Fibricor daily.     For vitamin d deficiency he is taking 2000 units of D3 daily.    Results for DUSTIN LAUREN (MRN [id number]) as of 1/20/2022 06:59   Ref. Range 1/7/2022 08:54   Hemoglobin A1C External Latest Ref Range: 4.0 - 5.6 % 5.7 (H)   Estimated Avg Glucose Latest Ref Range: 68 - 131 mg/dL 117       Review of Systems   Constitutional: Negative for chills and fever.   Gastrointestinal: Negative for nausea and vomiting.   No CP  No SOB    Objective:   Physical Exam  Vitals and nursing note reviewed.       BP Readings from Last 3 Encounters:   01/20/22 124/80   01/14/22 115/80   12/16/21 118/72     Wt Readings from Last 1 Encounters:   01/20/22 0742 92.9 kg (204 lb 14.7 oz)       Body mass index is 29.4 kg/m².    Lab Review:   Lab Results   Component Value Date    HGBA1C 5.7 (H) 01/07/2022     Lab Results   Component Value Date    CHOL 116 (L) 01/07/2022    HDL 45 01/07/2022    LDLCALC 62.8 (L) 01/07/2022    TRIG 41 01/07/2022    CHOLHDL 38.8 01/07/2022     Lab Results   Component Value Date     01/07/2022    K 4.2 01/07/2022      01/07/2022    CO2 27 01/07/2022    GLU 95 01/07/2022    BUN 24 (H) 01/07/2022    CREATININE 1.2 01/07/2022    CALCIUM 9.9 01/07/2022    PROT 7.2 01/07/2022    ALBUMIN 4.1 01/07/2022    BILITOT 0.5 01/07/2022    ALKPHOS 34 (L) 01/07/2022    AST 19 01/07/2022    ALT 12 01/07/2022    ANIONGAP 8 01/07/2022    ESTGFRAFRICA >60.0 01/07/2022    EGFRNONAA >60.0 01/07/2022    TSH 3.198 01/07/2022         Assessment and Plan     Postsurgical hypothyroidism  --Patient with postsurgical hypothyroidism  --Clinically and biochemically euthyroid  --TSH remains normal and at goal  --Will continue Synthroid 137 mcg PO Mon-Sat with 1.5 tablets on Sunday only   --Will aim to keep TSH mid to high normal given age and A fib history    Hyperlipidemia  --On fibrate and statin    Hypertension  --Bp at goal  --Continue current regimen    Impaired glucose tolerance  --Recent A1c 5.7%  --Continued diet and exercise  --Check A1c periodically      RTC in 6 months with labs prior to appt      Malick Malik M.D. Staff Endocrinology

## 2022-01-23 ENCOUNTER — PATIENT MESSAGE (OUTPATIENT)
Dept: ELECTROPHYSIOLOGY | Facility: CLINIC | Age: 72
End: 2022-01-23
Payer: COMMERCIAL

## 2022-02-01 ENCOUNTER — PATIENT MESSAGE (OUTPATIENT)
Dept: ELECTROPHYSIOLOGY | Facility: CLINIC | Age: 72
End: 2022-02-01
Payer: COMMERCIAL

## 2022-02-02 ENCOUNTER — TELEPHONE (OUTPATIENT)
Dept: ELECTROPHYSIOLOGY | Facility: CLINIC | Age: 72
End: 2022-02-02
Payer: COMMERCIAL

## 2022-02-03 ENCOUNTER — TELEPHONE (OUTPATIENT)
Dept: ELECTROPHYSIOLOGY | Facility: CLINIC | Age: 72
End: 2022-02-03
Payer: COMMERCIAL

## 2022-02-03 ENCOUNTER — PATIENT MESSAGE (OUTPATIENT)
Dept: ELECTROPHYSIOLOGY | Facility: CLINIC | Age: 72
End: 2022-02-03
Payer: COMMERCIAL

## 2022-02-03 NOTE — TELEPHONE ENCOUNTER
Attempted to return call to Ángela, no answer and unable to leave a message.   I did check cover my meds and saw the following:    PA Case: [id number], Status: Approved, Coverage Starts on: 2/2/2022 12:00:00 AM, Coverage Ends on: 12/31/9999 12:00:00 AM. Questions? Contact [phone].

## 2022-02-03 NOTE — TELEPHONE ENCOUNTER
----- Message from Alvin Hernandez MA sent at 2/3/2022  3:17 PM CST -----  Contact: [phone]    ----- Message -----  From: Janette Miller MA  Sent: 2/3/2022   3:12 PM CST  To: Anne Puentes Staff    She is calling to ask  office if you received a pre-auth paper for med for Eliquis on pt. Please call

## 2022-03-11 NOTE — PROGRESS NOTES
Mr. Lauren is a patient of Dr. Rodríguez and was last seen in clinic 12/16/2021.      Subjective:   Patient ID:  Dustin Lauren is a 71 y.o. male who presents for follow-up of Atrial Fibrillation  .     HPI:    Mr. Lauren is a 71 y.o. male with atrial fibrillation (PVI 9/2021), Htn, CAD s/p PCI, BPH, hypothyroidism (s/o thyroidectomy), low back pain, tachy-darek syndrome here for follow up.     Background:    Primary cardiologist is Dr. Vo.  Primary EP has been Dr. Burnett. Presents for second opinion.  He is the uncle of Libra Littlejohn  He owns [employer]  Initially developed symptomatic paroxysmal AF 9/16. Progressed to persistent AF. Underwent DCCV in 2016.  Ultimately developed recurrence.  DCCV 12/18/18.  48 hr holter 6/20 nsr  Developed recurrence. DCCV 2/2/21 4/21 had an event lasting a couple of hours.  Is symptomatic with the AF, noting palpitations, on occasion associated with lightheadedness.  Echo 7/30/20 normal biventricular structure and function normal left atrial size, trace MR  ECG reveals sinus bradycardia at 49 bpm.  There has been inability to add medications due to his bradycardia.  Has eliminated caffeine and alcohol.  Atrial fibrillation, symptomatic. Paroxysmal to persistent.  Has bradycardia limiting his medications. Recommend PVI.    9/16/2021: Successful pulmonary vein RF ablation.    12/16/2021: He is 3 mo s/p PVI.  Doing well since procedure, with no sustained palpitations during blanking period. Brief palps only. Pericarditis symptoms resolved with course of NSAIDs.  HIEU 9/2021 showed normal LVEF. Chronic bradycardia - not on AV blockers. CHADSVASc 3 and it is recommended he remain on OAC. He is on eliquis for CVA prophylaxis.  He exercises regularly.     Update (03/21/2022):    Today he reports rare irregular beats, lasting only a few seconds, at rest. Only ~3 times over the past 6 months. No sustained arrhythmias. He feels well. Continues cardiac rehab III 2-3 days a week  x 1 hr. No limitations in activity tolerance. No CP, RILEY, LH, syncope.    He is currently taking eliquis 5mg BID for stroke prophylaxis and denies significant bleeding episodes. Kidney function is stable, with a creatinine of 1.2 on 1/7/2022.    I have personally reviewed the patient's EKG today, which shows sinus bradycardia at 51bpm. VT interval is 184. QRS is 90. QT is 462.    Relevant Cardiac Test Results:    HIEU (9/16/2021):  · Atrial fibrillation observed.  · HIEU to evaluate for SHINE prior PVI for atrial fibrillation.  · The left ventricle is normal in size with normal systolic function. The estimated ejection fraction is 55%.  · Moderate right ventricular enlargement with low normal right ventricular systolic function.  · Normal appearing left atrial appendage. No thrombus is present in the appendage. Normal appendage velocities 0.64 m/s.  · Biatrial enlargement.  · Moderate tricuspid regurgitation. There is prolapse of the septal TV leaflet.  · Grade 3 plaque present in the descending aorta.  · The ascending aorta is mildly dilated measured at 4 cm.    Current Outpatient Medications   Medication Sig    amlodipine-valsartan (EXFORGE) 5-160 mg per tablet Take 1 tablet by mouth once daily.    apixaban (ELIQUIS) 5 mg Tab Take 1 tablet (5 mg total) by mouth 2 (two) times daily.    atorvastatin (LIPITOR) 10 MG tablet Take 10 mg by mouth once daily. Every other day    cholecalciferol, vitamin D3, 25 mcg (1,000 unit) Chew Take 2,000 Int'l Units by mouth.    fenofibrate micronized (LOFIBRA) 134 MG Cap Take 1 capsule (134 mg total) by mouth daily with breakfast. .    finasteride (PROSCAR) 5 mg tablet Take 5 mg by mouth once daily.    fluticasone propionate (FLONASE) 50 mcg/actuation nasal spray 2 sprays by Each Nostril route once daily.    levothyroxine (SYNTHROID) 137 MCG Tab tablet TAKE 1 TABLET BY MOUTH MON-SAT AND TAKE 1.5 TABLET BY MOUTH ON SUNDAY ONLY    multivit-min/FA/lycopen/lutein (CENTRUM SILVER MEN  ORAL) Take by mouth.    multivitamin (THERAGRAN) per tablet Take 1 tablet by mouth.    naftifine 2 % Crea Apply 1 application topically daily as needed.    nitroGLYCERIN (NITROSTAT) 0.3 MG SL tablet DISSOLVE 1 TABLET UNDER THE TONGUE AS NEEDED FOR CHEST PAIN    omega-3 fatty acids 1,000 mg Cap Take 2,000 mg by mouth.    omeprazole (PRILOSEC) 40 MG capsule Take 40 mg by mouth once daily.    prasugreL (EFFIENT) 10 mg Tab Take 10 mg by mouth once daily.    tamsulosin (FLOMAX) 0.4 mg Cap      No current facility-administered medications for this visit.     Review of Systems   Constitutional: Negative for malaise/fatigue.   Cardiovascular: Positive for irregular heartbeat. Negative for chest pain, dyspnea on exertion, leg swelling and palpitations.   Respiratory: Negative for shortness of breath.    Hematologic/Lymphatic: Negative for bleeding problem.   Skin: Negative for rash.   Musculoskeletal: Negative for myalgias.   Gastrointestinal: Negative for hematemesis, hematochezia and nausea.   Genitourinary: Negative for hematuria.   Neurological: Negative for light-headedness.   Psychiatric/Behavioral: Negative for altered mental status.   Allergic/Immunologic: Negative for persistent infections.     Objective:        /65   Pulse (!) 48   Wt 93.9 kg (207 lb 0.2 oz)   BMI 29.70 kg/m²     Physical Exam  Vitals and nursing note reviewed.   Constitutional:       Appearance: Normal appearance. He is well-developed.   HENT:      Head: Normocephalic.      Nose: Nose normal.   Eyes:      Pupils: Pupils are equal, round, and reactive to light.   Cardiovascular:      Rate and Rhythm: Regular rhythm. Bradycardia present.   Pulmonary:      Effort: No respiratory distress.      Breath sounds: Normal breath sounds.   Musculoskeletal:         General: Normal range of motion.   Skin:     General: Skin is warm and dry.      Findings: No erythema.   Neurological:      Mental Status: He is alert and oriented to person,  place, and time.   Psychiatric:         Speech: Speech normal.         Behavior: Behavior normal.       Lab Results   Component Value Date     01/07/2022    K 4.2 01/07/2022    BUN 24 (H) 01/07/2022    CREATININE 1.2 01/07/2022    ALT 12 01/07/2022    AST 19 01/07/2022    HGB 13.4 (L) 01/07/2022    HCT 41.2 01/07/2022    TSH 3.198 01/07/2022    LDLCALC 62.8 (L) 01/07/2022       Recent Labs   Lab 09/13/21  0853   INR 1.1         Assessment:     1. Persistent atrial fibrillation    2. Primary hypertension    3. Tachy-darek syndrome    4. History of radiofrequency ablation (RFA) procedure for cardiac arrhythmia      Plan:     In summary, Mr. Lauren is a 71 y.o. male with atrial fibrillation (PVI 9/2021), Htn, CAD s/p PCI, BPH, hypothyroidism (s/o thyroidectomy), low back pain, tachy-darek syndrome here for follow up.   He is 6 months s/p PVI. He is doing well from a rhythm standpoint, with no documented or symptomatic recurrence of arrhythmia since procedure. No sustained palpitations; brief rare palps likely ectopy.   Bradycardic at baseline. Not on AVN blockers. CHADSVASc 3 on eliquis for CVA prophylaxis. He feels well and remains active.    Continue current medications  RTC 6 mo, sooner if needed.    *A copy of this note has been sent to Dr. Rodríguez*    Follow up in about 6 months (around 9/21/2022).    ------------------------------------------------------------------    CASSANDRA Arenas, NP-C  Cardiac Electrophysiology

## 2022-03-18 NOTE — TELEPHONE ENCOUNTER
Pt scheduled for labs.   
TSH is actually on the upper end of normal   Repeat TSH in 2 months     
9

## 2022-03-21 ENCOUNTER — HOSPITAL ENCOUNTER (OUTPATIENT)
Dept: CARDIOLOGY | Facility: CLINIC | Age: 72
Discharge: HOME | End: 2022-03-21
Payer: COMMERCIAL

## 2022-03-21 ENCOUNTER — OFFICE VISIT (OUTPATIENT)
Dept: ELECTROPHYSIOLOGY | Facility: CLINIC | Age: 72
End: 2022-03-21
Payer: COMMERCIAL

## 2022-03-21 VITALS
HEART RATE: 48 BPM | SYSTOLIC BLOOD PRESSURE: 129 MMHG | WEIGHT: 207 LBS | BODY MASS INDEX: 29.7 KG/M2 | DIASTOLIC BLOOD PRESSURE: 65 MMHG

## 2022-03-21 DIAGNOSIS — I48.19 PERSISTENT ATRIAL FIBRILLATION: Primary | ICD-10-CM

## 2022-03-21 DIAGNOSIS — I10 PRIMARY HYPERTENSION: ICD-10-CM

## 2022-03-21 DIAGNOSIS — I49.8 OTHER SPECIFIED CARDIAC ARRHYTHMIAS: ICD-10-CM

## 2022-03-21 DIAGNOSIS — Z98.890 HISTORY OF RADIOFREQUENCY ABLATION (RFA) PROCEDURE FOR CARDIAC ARRHYTHMIA: ICD-10-CM

## 2022-03-21 DIAGNOSIS — I49.5 TACHY-BRADY SYNDROME: ICD-10-CM

## 2022-03-21 PROCEDURE — 3008F BODY MASS INDEX DOCD: CPT | Mod: CPTII,S$GLB,, | Performed by: NURSE PRACTITIONER

## 2022-03-21 PROCEDURE — 93005 RHYTHM STRIP: ICD-10-PCS | Mod: S$GLB,,, | Performed by: INTERNAL MEDICINE

## 2022-03-21 PROCEDURE — 1160F RVW MEDS BY RX/DR IN RCRD: CPT | Mod: CPTII,S$GLB,, | Performed by: NURSE PRACTITIONER

## 2022-03-21 PROCEDURE — 1160F PR REVIEW ALL MEDS BY PRESCRIBER/CLIN PHARMACIST DOCUMENTED: ICD-10-PCS | Mod: CPTII,S$GLB,, | Performed by: NURSE PRACTITIONER

## 2022-03-21 PROCEDURE — 99999 PR PBB SHADOW E&M-EST. PATIENT-LVL IV: CPT | Mod: PBBFAC,,, | Performed by: NURSE PRACTITIONER

## 2022-03-21 PROCEDURE — 4010F ACE/ARB THERAPY RXD/TAKEN: CPT | Mod: CPTII,S$GLB,, | Performed by: NURSE PRACTITIONER

## 2022-03-21 PROCEDURE — 93005 ELECTROCARDIOGRAM TRACING: CPT | Mod: S$GLB,,, | Performed by: INTERNAL MEDICINE

## 2022-03-21 PROCEDURE — 93010 RHYTHM STRIP: ICD-10-PCS | Mod: S$GLB,,, | Performed by: INTERNAL MEDICINE

## 2022-03-21 PROCEDURE — 3044F HG A1C LEVEL LT 7.0%: CPT | Mod: CPTII,S$GLB,, | Performed by: NURSE PRACTITIONER

## 2022-03-21 PROCEDURE — 3008F PR BODY MASS INDEX (BMI) DOCUMENTED: ICD-10-PCS | Mod: CPTII,S$GLB,, | Performed by: NURSE PRACTITIONER

## 2022-03-21 PROCEDURE — 1159F PR MEDICATION LIST DOCUMENTED IN MEDICAL RECORD: ICD-10-PCS | Mod: CPTII,S$GLB,, | Performed by: NURSE PRACTITIONER

## 2022-03-21 PROCEDURE — 3074F SYST BP LT 130 MM HG: CPT | Mod: CPTII,S$GLB,, | Performed by: NURSE PRACTITIONER

## 2022-03-21 PROCEDURE — 99214 OFFICE O/P EST MOD 30 MIN: CPT | Mod: S$GLB,,, | Performed by: NURSE PRACTITIONER

## 2022-03-21 PROCEDURE — 1126F AMNT PAIN NOTED NONE PRSNT: CPT | Mod: CPTII,S$GLB,, | Performed by: NURSE PRACTITIONER

## 2022-03-21 PROCEDURE — 1159F MED LIST DOCD IN RCRD: CPT | Mod: CPTII,S$GLB,, | Performed by: NURSE PRACTITIONER

## 2022-03-21 PROCEDURE — 3078F DIAST BP <80 MM HG: CPT | Mod: CPTII,S$GLB,, | Performed by: NURSE PRACTITIONER

## 2022-03-21 PROCEDURE — 3078F PR MOST RECENT DIASTOLIC BLOOD PRESSURE < 80 MM HG: ICD-10-PCS | Mod: CPTII,S$GLB,, | Performed by: NURSE PRACTITIONER

## 2022-03-21 PROCEDURE — 1101F PT FALLS ASSESS-DOCD LE1/YR: CPT | Mod: CPTII,S$GLB,, | Performed by: NURSE PRACTITIONER

## 2022-03-21 PROCEDURE — 3288F PR FALLS RISK ASSESSMENT DOCUMENTED: ICD-10-PCS | Mod: CPTII,S$GLB,, | Performed by: NURSE PRACTITIONER

## 2022-03-21 PROCEDURE — 93010 ELECTROCARDIOGRAM REPORT: CPT | Mod: S$GLB,,, | Performed by: INTERNAL MEDICINE

## 2022-03-21 PROCEDURE — 99999 PR PBB SHADOW E&M-EST. PATIENT-LVL IV: ICD-10-PCS | Mod: PBBFAC,,, | Performed by: NURSE PRACTITIONER

## 2022-03-21 PROCEDURE — 4010F PR ACE/ARB THEARPY RXD/TAKEN: ICD-10-PCS | Mod: CPTII,S$GLB,, | Performed by: NURSE PRACTITIONER

## 2022-03-21 PROCEDURE — 3044F PR MOST RECENT HEMOGLOBIN A1C LEVEL <7.0%: ICD-10-PCS | Mod: CPTII,S$GLB,, | Performed by: NURSE PRACTITIONER

## 2022-03-21 PROCEDURE — 1126F PR PAIN SEVERITY QUANTIFIED, NO PAIN PRESENT: ICD-10-PCS | Mod: CPTII,S$GLB,, | Performed by: NURSE PRACTITIONER

## 2022-03-21 PROCEDURE — 1101F PR PT FALLS ASSESS DOC 0-1 FALLS W/OUT INJ PAST YR: ICD-10-PCS | Mod: CPTII,S$GLB,, | Performed by: NURSE PRACTITIONER

## 2022-03-21 PROCEDURE — 3074F PR MOST RECENT SYSTOLIC BLOOD PRESSURE < 130 MM HG: ICD-10-PCS | Mod: CPTII,S$GLB,, | Performed by: NURSE PRACTITIONER

## 2022-03-21 PROCEDURE — 99214 PR OFFICE/OUTPT VISIT, EST, LEVL IV, 30-39 MIN: ICD-10-PCS | Mod: S$GLB,,, | Performed by: NURSE PRACTITIONER

## 2022-03-21 PROCEDURE — 3288F FALL RISK ASSESSMENT DOCD: CPT | Mod: CPTII,S$GLB,, | Performed by: NURSE PRACTITIONER

## 2022-03-21 RX ORDER — MULTIVITAMIN
1 TABLET ORAL
COMMUNITY
End: 2022-08-18

## 2022-05-11 ENCOUNTER — OFFICE VISIT (OUTPATIENT)
Dept: OPTOMETRY | Facility: CLINIC | Age: 72
End: 2022-05-11
Payer: COMMERCIAL

## 2022-05-11 DIAGNOSIS — H00.015 HORDEOLUM EXTERNUM LEFT LOWER EYELID: Primary | ICD-10-CM

## 2022-05-11 PROCEDURE — 4010F ACE/ARB THERAPY RXD/TAKEN: CPT | Mod: CPTII,S$GLB,, | Performed by: OPTOMETRIST

## 2022-05-11 PROCEDURE — 1159F MED LIST DOCD IN RCRD: CPT | Mod: CPTII,S$GLB,, | Performed by: OPTOMETRIST

## 2022-05-11 PROCEDURE — 1160F PR REVIEW ALL MEDS BY PRESCRIBER/CLIN PHARMACIST DOCUMENTED: ICD-10-PCS | Mod: CPTII,S$GLB,, | Performed by: OPTOMETRIST

## 2022-05-11 PROCEDURE — 1159F PR MEDICATION LIST DOCUMENTED IN MEDICAL RECORD: ICD-10-PCS | Mod: CPTII,S$GLB,, | Performed by: OPTOMETRIST

## 2022-05-11 PROCEDURE — 99999 PR PBB SHADOW E&M-EST. PATIENT-LVL II: ICD-10-PCS | Mod: PBBFAC,,, | Performed by: OPTOMETRIST

## 2022-05-11 PROCEDURE — 3044F PR MOST RECENT HEMOGLOBIN A1C LEVEL <7.0%: ICD-10-PCS | Mod: CPTII,S$GLB,, | Performed by: OPTOMETRIST

## 2022-05-11 PROCEDURE — 99999 PR PBB SHADOW E&M-EST. PATIENT-LVL II: CPT | Mod: PBBFAC,,, | Performed by: OPTOMETRIST

## 2022-05-11 PROCEDURE — 4010F PR ACE/ARB THEARPY RXD/TAKEN: ICD-10-PCS | Mod: CPTII,S$GLB,, | Performed by: OPTOMETRIST

## 2022-05-11 PROCEDURE — 92012 INTRM OPH EXAM EST PATIENT: CPT | Mod: S$GLB,,, | Performed by: OPTOMETRIST

## 2022-05-11 PROCEDURE — 3044F HG A1C LEVEL LT 7.0%: CPT | Mod: CPTII,S$GLB,, | Performed by: OPTOMETRIST

## 2022-05-11 PROCEDURE — 1160F RVW MEDS BY RX/DR IN RCRD: CPT | Mod: CPTII,S$GLB,, | Performed by: OPTOMETRIST

## 2022-05-11 PROCEDURE — 92012 PR EYE EXAM, EST PATIENT,INTERMED: ICD-10-PCS | Mod: S$GLB,,, | Performed by: OPTOMETRIST

## 2022-05-11 RX ORDER — CIPROFLOXACIN 500 MG/1
500 TABLET ORAL 2 TIMES DAILY
Qty: 14 TABLET | Refills: 0 | Status: SHIPPED | OUTPATIENT
Start: 2022-05-11 | End: 2022-05-18

## 2022-05-11 NOTE — PROGRESS NOTES
HPI     72 Y/o male is here with C/o swollen eye lid OS x last week pt put   Systane drops in eye BID and warm compressor TID Pt bottom eyelid is   swollen with a small white bump on the eyelid OS   Pt denies pain   No f/f    Eye med: Systane Balance       Last edited by Lluvia Montes MA on 5/11/2022  2:01 PM. (History)        ROS     Negative for: Constitutional, Gastrointestinal, Neurological, Skin,   Genitourinary, Musculoskeletal, HENT, Endocrine, Cardiovascular, Eyes,   Respiratory, Psychiatric, Allergic/Imm, Heme/Lymph    Last edited by Toño Carrillo, OD on 5/11/2022  2:32 PM. (History)        Assessment /Plan     For exam results, see Encounter Report.    Hordeolum externum left lower eyelid  -     ciprofloxacin HCl (CIPRO) 500 MG tablet; Take 1 tablet (500 mg total) by mouth 2 (two) times daily. for 7 days  Dispense: 14 tablet; Refill: 0      Ext angelita LLL--no improvement I=on hot cx.  Will try ABs--see pts allergy list:  Many meds he has problems with, but he reports has taken CIPRO without problems (and Z-pack)    ARVIND:    1. CIPRO 500mg BID PO X 1 week  2. OBEY HEAT MASK 5 min QID  3. Discussed chalazion formation  4. ot will call if sx inc, or persist beyond 1 week, and will get eyelid consult.  Otherwise rtc as sched for routine

## 2022-05-13 ENCOUNTER — IMMUNIZATION (OUTPATIENT)
Dept: INTERNAL MEDICINE | Facility: CLINIC | Age: 72
End: 2022-05-13
Payer: COMMERCIAL

## 2022-05-13 DIAGNOSIS — Z23 NEED FOR VACCINATION: Primary | ICD-10-CM

## 2022-05-13 PROCEDURE — 91300 COVID-19, MRNA, LNP-S, PF, 30 MCG/0.3 ML DOSE VACCINE: CPT | Mod: PBBFAC | Performed by: INTERNAL MEDICINE

## 2022-05-31 ENCOUNTER — PATIENT MESSAGE (OUTPATIENT)
Dept: ENDOCRINOLOGY | Facility: CLINIC | Age: 72
End: 2022-05-31
Payer: COMMERCIAL

## 2022-06-06 ENCOUNTER — PATIENT MESSAGE (OUTPATIENT)
Dept: ENDOCRINOLOGY | Facility: CLINIC | Age: 72
End: 2022-06-06
Payer: COMMERCIAL

## 2022-07-08 ENCOUNTER — HOSPITAL ENCOUNTER (OUTPATIENT)
Dept: RADIOLOGY | Facility: HOSPITAL | Age: 72
Discharge: HOME | End: 2022-07-08
Attending: INTERNAL MEDICINE
Payer: COMMERCIAL

## 2022-07-08 DIAGNOSIS — R91.1 SOLITARY PULMONARY NODULE ON LUNG CT: ICD-10-CM

## 2022-07-08 PROCEDURE — 71250 CT CHEST WITHOUT CONTRAST: ICD-10-PCS | Mod: 26,,, | Performed by: RADIOLOGY

## 2022-07-08 PROCEDURE — 71250 CT THORAX DX C-: CPT | Mod: 26,,, | Performed by: RADIOLOGY

## 2022-07-08 PROCEDURE — 71250 CT THORAX DX C-: CPT | Mod: TC

## 2022-07-14 ENCOUNTER — PATIENT MESSAGE (OUTPATIENT)
Dept: INTERNAL MEDICINE | Facility: CLINIC | Age: 72
End: 2022-07-14
Payer: COMMERCIAL

## 2022-07-14 ENCOUNTER — TELEPHONE (OUTPATIENT)
Dept: INTERNAL MEDICINE | Facility: CLINIC | Age: 72
End: 2022-07-14
Payer: COMMERCIAL

## 2022-07-14 DIAGNOSIS — U07.1 COVID: Primary | ICD-10-CM

## 2022-07-14 NOTE — TELEPHONE ENCOUNTER
PT states that he tested positive for COVID with an at home test last night     Pt states that he is having Hoarseness, drip in throat,Last night fever 101*, headache , and aches and pains,No real congestion except for stuffy nose and some throat congestion.    PT  started taking Musinex and Tylenol.    Pt would like to know if you have any recommendations or directions?      LOV:  01/14/22

## 2022-07-15 ENCOUNTER — INFUSION (OUTPATIENT)
Dept: INFECTIOUS DISEASES | Facility: HOSPITAL | Age: 72
End: 2022-07-15
Attending: FAMILY MEDICINE
Payer: COMMERCIAL

## 2022-07-15 VITALS
OXYGEN SATURATION: 98 % | BODY MASS INDEX: 29.35 KG/M2 | DIASTOLIC BLOOD PRESSURE: 67 MMHG | HEART RATE: 61 BPM | TEMPERATURE: 99 F | WEIGHT: 205 LBS | SYSTOLIC BLOOD PRESSURE: 117 MMHG | HEIGHT: 70 IN | RESPIRATION RATE: 18 BRPM

## 2022-07-15 DIAGNOSIS — U07.1 COVID-19: Primary | ICD-10-CM

## 2022-07-15 DIAGNOSIS — U07.1 COVID: ICD-10-CM

## 2022-07-15 PROCEDURE — M0222 HC IV INJECTION, BEBTELOVIMAB, INCL POST ADMIN MONIT: HCPCS | Performed by: INTERNAL MEDICINE

## 2022-07-15 PROCEDURE — 63600175 PHARM REV CODE 636 W HCPCS: Performed by: INTERNAL MEDICINE

## 2022-07-15 RX ORDER — ONDANSETRON 4 MG/1
4 TABLET, ORALLY DISINTEGRATING ORAL
Status: ACTIVE | OUTPATIENT
Start: 2022-07-15 | End: 2022-07-16

## 2022-07-15 RX ORDER — ALBUTEROL SULFATE 90 UG/1
2 AEROSOL, METERED RESPIRATORY (INHALATION)
Status: ACTIVE | OUTPATIENT
Start: 2022-07-15 | End: 2022-07-18

## 2022-07-15 RX ORDER — DIPHENHYDRAMINE HYDROCHLORIDE 50 MG/ML
25 INJECTION INTRAMUSCULAR; INTRAVENOUS
Status: ACTIVE | OUTPATIENT
Start: 2022-07-15 | End: 2022-07-16

## 2022-07-15 RX ORDER — BEBTELOVIMAB 87.5 MG/ML
175 INJECTION, SOLUTION INTRAVENOUS
Status: COMPLETED | OUTPATIENT
Start: 2022-07-15 | End: 2022-07-15

## 2022-07-15 RX ORDER — ACETAMINOPHEN 325 MG/1
650 TABLET ORAL
Status: ACTIVE | OUTPATIENT
Start: 2022-07-15 | End: 2022-07-16

## 2022-07-15 RX ORDER — EPINEPHRINE 0.3 MG/.3ML
0.3 INJECTION SUBCUTANEOUS
Status: ACTIVE | OUTPATIENT
Start: 2022-07-15 | End: 2022-07-18

## 2022-07-15 RX ADMIN — BEBTELOVIMAB 175 MG: 87.5 INJECTION, SOLUTION INTRAVENOUS at 09:07

## 2022-07-15 NOTE — PROGRESS NOTES
0925   Bebtelovimab IV Injection administered. Pt tolerated injection well. No S/S of injection reaction noted at present time. One hour observation started.

## 2022-07-15 NOTE — PROGRESS NOTES
1025 Patient remains with no signs of complications noted. Patient received Bebtelovimab IV Injection according to FDA recommendations and [facility] SOP without complications noted and left with mask in place. Drug fact sheet provided. Pt discharged home. Ambulatory. Remains AAox3. No distress noted. RR even and unlabored.

## 2022-07-15 NOTE — PROGRESS NOTES
Patient arrives for Bebtelovimab IV Injection. Ambulatory. Pt AAox3. No distress noted. RR even and unlabored.     Symptoms and onset date:  Nasal drip, achey, HA, tiredness, fever on 07/11/22    Tested COVID + on 07/13/22

## 2022-07-22 ENCOUNTER — OFFICE VISIT (OUTPATIENT)
Dept: PULMONOLOGY | Facility: CLINIC | Age: 72
End: 2022-07-22
Payer: COMMERCIAL

## 2022-07-22 VITALS
BODY MASS INDEX: 29.82 KG/M2 | DIASTOLIC BLOOD PRESSURE: 64 MMHG | SYSTOLIC BLOOD PRESSURE: 102 MMHG | WEIGHT: 208.31 LBS | HEIGHT: 70 IN | HEART RATE: 64 BPM | OXYGEN SATURATION: 97 %

## 2022-07-22 DIAGNOSIS — R91.1 SOLITARY PULMONARY NODULE ON LUNG CT: ICD-10-CM

## 2022-07-22 PROCEDURE — 3078F DIAST BP <80 MM HG: CPT | Mod: CPTII,S$GLB,, | Performed by: STUDENT IN AN ORGANIZED HEALTH CARE EDUCATION/TRAINING PROGRAM

## 2022-07-22 PROCEDURE — 3074F PR MOST RECENT SYSTOLIC BLOOD PRESSURE < 130 MM HG: ICD-10-PCS | Mod: CPTII,S$GLB,, | Performed by: STUDENT IN AN ORGANIZED HEALTH CARE EDUCATION/TRAINING PROGRAM

## 2022-07-22 PROCEDURE — 99214 OFFICE O/P EST MOD 30 MIN: CPT | Mod: S$GLB,,, | Performed by: STUDENT IN AN ORGANIZED HEALTH CARE EDUCATION/TRAINING PROGRAM

## 2022-07-22 PROCEDURE — 1160F RVW MEDS BY RX/DR IN RCRD: CPT | Mod: CPTII,S$GLB,, | Performed by: STUDENT IN AN ORGANIZED HEALTH CARE EDUCATION/TRAINING PROGRAM

## 2022-07-22 PROCEDURE — 1159F PR MEDICATION LIST DOCUMENTED IN MEDICAL RECORD: ICD-10-PCS | Mod: CPTII,S$GLB,, | Performed by: STUDENT IN AN ORGANIZED HEALTH CARE EDUCATION/TRAINING PROGRAM

## 2022-07-22 PROCEDURE — 3074F SYST BP LT 130 MM HG: CPT | Mod: CPTII,S$GLB,, | Performed by: STUDENT IN AN ORGANIZED HEALTH CARE EDUCATION/TRAINING PROGRAM

## 2022-07-22 PROCEDURE — 3044F HG A1C LEVEL LT 7.0%: CPT | Mod: CPTII,S$GLB,, | Performed by: STUDENT IN AN ORGANIZED HEALTH CARE EDUCATION/TRAINING PROGRAM

## 2022-07-22 PROCEDURE — 99214 PR OFFICE/OUTPT VISIT, EST, LEVL IV, 30-39 MIN: ICD-10-PCS | Mod: S$GLB,,, | Performed by: STUDENT IN AN ORGANIZED HEALTH CARE EDUCATION/TRAINING PROGRAM

## 2022-07-22 PROCEDURE — 3008F BODY MASS INDEX DOCD: CPT | Mod: CPTII,S$GLB,, | Performed by: STUDENT IN AN ORGANIZED HEALTH CARE EDUCATION/TRAINING PROGRAM

## 2022-07-22 PROCEDURE — 3288F FALL RISK ASSESSMENT DOCD: CPT | Mod: CPTII,S$GLB,, | Performed by: STUDENT IN AN ORGANIZED HEALTH CARE EDUCATION/TRAINING PROGRAM

## 2022-07-22 PROCEDURE — 4010F PR ACE/ARB THEARPY RXD/TAKEN: ICD-10-PCS | Mod: CPTII,S$GLB,, | Performed by: STUDENT IN AN ORGANIZED HEALTH CARE EDUCATION/TRAINING PROGRAM

## 2022-07-22 PROCEDURE — 1159F MED LIST DOCD IN RCRD: CPT | Mod: CPTII,S$GLB,, | Performed by: STUDENT IN AN ORGANIZED HEALTH CARE EDUCATION/TRAINING PROGRAM

## 2022-07-22 PROCEDURE — 99999 PR PBB SHADOW E&M-EST. PATIENT-LVL IV: CPT | Mod: PBBFAC,,, | Performed by: STUDENT IN AN ORGANIZED HEALTH CARE EDUCATION/TRAINING PROGRAM

## 2022-07-22 PROCEDURE — 3288F PR FALLS RISK ASSESSMENT DOCUMENTED: ICD-10-PCS | Mod: CPTII,S$GLB,, | Performed by: STUDENT IN AN ORGANIZED HEALTH CARE EDUCATION/TRAINING PROGRAM

## 2022-07-22 PROCEDURE — 1101F PT FALLS ASSESS-DOCD LE1/YR: CPT | Mod: CPTII,S$GLB,, | Performed by: STUDENT IN AN ORGANIZED HEALTH CARE EDUCATION/TRAINING PROGRAM

## 2022-07-22 PROCEDURE — 1101F PR PT FALLS ASSESS DOC 0-1 FALLS W/OUT INJ PAST YR: ICD-10-PCS | Mod: CPTII,S$GLB,, | Performed by: STUDENT IN AN ORGANIZED HEALTH CARE EDUCATION/TRAINING PROGRAM

## 2022-07-22 PROCEDURE — 3008F PR BODY MASS INDEX (BMI) DOCUMENTED: ICD-10-PCS | Mod: CPTII,S$GLB,, | Performed by: STUDENT IN AN ORGANIZED HEALTH CARE EDUCATION/TRAINING PROGRAM

## 2022-07-22 PROCEDURE — 1160F PR REVIEW ALL MEDS BY PRESCRIBER/CLIN PHARMACIST DOCUMENTED: ICD-10-PCS | Mod: CPTII,S$GLB,, | Performed by: STUDENT IN AN ORGANIZED HEALTH CARE EDUCATION/TRAINING PROGRAM

## 2022-07-22 PROCEDURE — 1126F PR PAIN SEVERITY QUANTIFIED, NO PAIN PRESENT: ICD-10-PCS | Mod: CPTII,S$GLB,, | Performed by: STUDENT IN AN ORGANIZED HEALTH CARE EDUCATION/TRAINING PROGRAM

## 2022-07-22 PROCEDURE — 3044F PR MOST RECENT HEMOGLOBIN A1C LEVEL <7.0%: ICD-10-PCS | Mod: CPTII,S$GLB,, | Performed by: STUDENT IN AN ORGANIZED HEALTH CARE EDUCATION/TRAINING PROGRAM

## 2022-07-22 PROCEDURE — 99999 PR PBB SHADOW E&M-EST. PATIENT-LVL IV: ICD-10-PCS | Mod: PBBFAC,,, | Performed by: STUDENT IN AN ORGANIZED HEALTH CARE EDUCATION/TRAINING PROGRAM

## 2022-07-22 PROCEDURE — 4010F ACE/ARB THERAPY RXD/TAKEN: CPT | Mod: CPTII,S$GLB,, | Performed by: STUDENT IN AN ORGANIZED HEALTH CARE EDUCATION/TRAINING PROGRAM

## 2022-07-22 PROCEDURE — 1126F AMNT PAIN NOTED NONE PRSNT: CPT | Mod: CPTII,S$GLB,, | Performed by: STUDENT IN AN ORGANIZED HEALTH CARE EDUCATION/TRAINING PROGRAM

## 2022-07-22 PROCEDURE — 3078F PR MOST RECENT DIASTOLIC BLOOD PRESSURE < 80 MM HG: ICD-10-PCS | Mod: CPTII,S$GLB,, | Performed by: STUDENT IN AN ORGANIZED HEALTH CARE EDUCATION/TRAINING PROGRAM

## 2022-07-22 NOTE — PROGRESS NOTES
[facility]    SUBJECTIVE:     Chief Complaint: Follow up SPN    History of Present Illness:  Mr. Lauren is a 73 yo who presents for follow-up visit; he was initially referred for evaluation of a solitary pulmonary nodule, ALEX, 0.9mm.  Initial CT was done 8/2021. No personal hx of cancer, has never been a smoker. He has no concerning symptoms such as cough, dyspnea, weight loss, night sweats. His brother was a heavy smoker and was diagnosed with lung cancer 5 years ago. He had repeat CT chest done 11/2021 and his ALEX lesion was less conspicuous and on follow up CT chest 7/2022 it has resolved entirely, some faint possible scarring remains. Since his last visit he reports doing well and is w/o pulmonary complaints other than an occaisonal cough- just got over covid-19 last week, has post-viral cough. Denies dyspnea, weight loss, hemoptysis, fever.       Review of patient's allergies indicates:   Allergen Reactions    Codeine Nausea Only    Crestor [rosuvastatin] Palpitations    Doxycycline Hives    Keflex [cephalexin] Rash    Celebrex [celecoxib] Other (See Comments)     Upset stomach    Mobic [meloxicam] Other (See Comments)     Stomach pain and nauseous    Niacin Diarrhea    Niacin preparations Diarrhea    Ampicillin Rash    Pcn [penicillins] Rash       Past Medical History:   Diagnosis Date    A-fib     s/p cardioversion    Carpal tunnel syndrome     bilaterally    Cataract     Chronic LBP 11/8/2012    Chronic neck pain 11/8/2012    Coronary artery disease 03/24/2016    s/p 2 stents in RCA and ostium    Diverticulosis of colon     DJD (degenerative joint disease) of cervical spine 11/8/2012    DJD (degenerative joint disease) of lumbar spine 11/8/2012    DJD (degenerative joint disease) of thoracic spine 11/8/2012    GERD (gastroesophageal reflux disease)     Hyperlipidemia     hypertriglyceridemia    Hypertension     Hypothyroidism     s/p surgery    Nephrolithiasis, uric  acid     stone evaluation showed uric acid and calcium oxalate    PONV (postoperative nausea and vomiting)     Rosacea     Thyroid disease     Ulcer     Unspecified disorder of kidney and ureter     Vitamin D deficiency disease      Past Surgical History:   Procedure Laterality Date    ABLATION OF ARRHYTHMOGENIC FOCUS FOR ATRIAL FIBRILLATION N/A 9/16/2021    Procedure: Ablation atrial fibrillation;  Surgeon: Dhaval Rodríguez MD;  Location: [facility];  Service: Cardiology;  Laterality: N/A;  AF, HIEU (Cx if compliant and in SR), PVI, RFA, DAWSON, Gen, SK, 3 Prep    CARDIOVERSION      for AFib    CHOLECYSTECTOMY      COLONOSCOPY W/ BIOPSIES AND POLYPECTOMY  10/2013    CORONARY ANGIOPLASTY WITH STENT PLACEMENT      Stent in RCA and another stent in ostium    KNEE ARTHROSCOPY  10-13-14    right TKA    KNEE ARTHROSCOPY Left 10-7-15    ascope    KNEE ARTHROSCOPY W/ MENISCAL REPAIR Left 10/7/15    THYROIDECTOMY      TREATMENT OF CARDIAC ARRHYTHMIA  9/16/2021    Procedure: Cardioversion or Defibrillation;  Surgeon: Dhaval Rodríguez MD;  Location: [facility];  Service: Cardiology;;     Family History   Problem Relation Age of Onset    Hypertension Mother     Stroke Mother     Cancer Father         pancreas    Heart disease Father         MI    Thyroid disease Sister     Hyperlipidemia Sister     Thalassemia Sister         alpha Thalassemia anemia    Kidney disease Sister     Seizures Brother         s/p meningioma resection    Hypertension Brother     Cancer Brother         lung     Coronary artery disease Brother         stent in place    Thyroid disease Sister     Arthritis Sister         RA    Thalassemia Sister         alpha Thalassemia anemia    Arthritis Brother     Hypertension Brother     Arthritis Brother      Social History     Socioeconomic History    Marital status:    Tobacco Use    Smoking status: Never Smoker    Smokeless tobacco: Never Used   Substance and Sexual Activity  "   Alcohol use: Yes     Alcohol/week: 0.0 standard drinks     Comment: rarely    Drug use: No     Social Determinants of Health     Financial Resource Strain: Unknown    Difficulty of Paying Living Expenses: Patient refused   Food Insecurity: Unknown    Worried About Running Out of Food in the Last Year: Patient refused    Ran Out of Food in the Last Year: Patient refused   Transportation Needs: Unknown    Lack of Transportation (Medical): Patient refused    Lack of Transportation (Non-Medical): Patient refused   Physical Activity: Sufficiently Active    Days of Exercise per Week: 3 days    Minutes of Exercise per Session: 60 min   Stress: Stress Concern Present    Feeling of Stress : To some extent   Social Connections: Unknown    Frequency of Communication with Friends and Family: Patient refused    Frequency of Social Gatherings with Friends and Family: Patient refused    Active Member of Clubs or Organizations: Patient refused    Attends Club or Organization Meetings: Patient refused    Marital Status:    Housing Stability: Unknown    Unable to Pay for Housing in the Last Year: Patient refused    Unstable Housing in the Last Year: Patient refused       Review of Systems:  ROS  CV: no syncope  ENT: no sore throat  Resp: per hpi  Eyes: no eye pain  Gastrointestinal: no nausea or vomiting  Integument/Breast: no rash  Musculoskeletal: no arthralgias  Neurological: no headaches  Behavioral/Psych: no confusion or depression  Heme: no bleeding      OBJECTIVE:     Vital Signs  Vitals:    07/22/22 1602   BP: 102/64   BP Location: Right arm   Patient Position: Sitting   Pulse: 64   SpO2: 97%   Weight: 94.5 kg (208 lb 5.4 oz)   Height: 5' 10" (1.778 m)     Body mass index is 29.89 kg/m².    Physical Exam:  Gen: NAD. AAOx3. Comfortable at rest without dyspnea  HEENT: MMM. No oral lesions. Normal conjunctiva.  Neck: Supple. No JVD. No lymphadenopathy.  Resp: CTA B/L. No wheezes or crackles.  CV: RRR. " No murmurs, rubs, gallops  Abd: Soft NT/ND. +BS  Ext: No clubbing, cyanosis, edema.  Skin: No obvious rashes  Neuro: No gross focal deficits.    Laboratory:  CBC  Lab Results   Component Value Date    WBC 3.34 (L) 01/07/2022    HGB 13.4 (L) 01/07/2022    HCT 41.2 01/07/2022     01/07/2022    MCV 91 01/07/2022    RDW 13.3 01/07/2022     BMP  Lab Results   Component Value Date     01/07/2022    K 4.2 01/07/2022     01/07/2022    CO2 27 01/07/2022    BUN 24 (H) 01/07/2022    CREATININE 1.2 01/07/2022    GLU 95 01/07/2022    CALCIUM 9.9 01/07/2022     LFTs  Lab Results   Component Value Date    PROT 7.2 01/07/2022    ALBUMIN 4.1 01/07/2022    BILITOT 0.5 01/07/2022    AST 19 01/07/2022    ALKPHOS 34 (L) 01/07/2022    ALT 12 01/07/2022       Chest Imaging, My Impression:   Reviewed CT chest from 7/2022, ALEX nodule is no longer appreciable. No other pulmonary parenchymal abnormalities.     Diagnostic Results:  CT: Reviewed    ASSESSMENT/PLAN:     Problem Noted   Solitary Pulmonary Nodule On Lung CT 8/20/2021    Initially observed subcentimeter SPN in ALEX 8/2021 during CT scan for ablation. Improvement noted on follow up CT chest last visit 11/2021. Repeat CT 7/2022 with resolution of nodule. He is low risk and is a never smoker.        Problem List Items Addressed This Visit        Pulmonary    Solitary pulmonary nodule on lung CT    Overview     Initially observed subcentimeter SPN in ALEX 8/2021 during CT scan for ablation. Improvement noted on follow up CT chest last visit 11/2021. Repeat CT 7/2022 with resolution of nodule. He is low risk and is a never smoker.            Current Assessment & Plan     No further CT needed.               Hasmukh Becerra, DO  PGY-V, [employer] PCCM Fellow

## 2022-07-25 NOTE — PROGRESS NOTES
I have reviewed the notes, assessments, and/or procedures performed by Mariam, I concur with her/his documentation of Dustin Lauren.

## 2022-08-01 ENCOUNTER — TELEPHONE (OUTPATIENT)
Dept: INTERNAL MEDICINE | Facility: CLINIC | Age: 72
End: 2022-08-01
Payer: COMMERCIAL

## 2022-08-01 DIAGNOSIS — Z00.00 PREVENTATIVE HEALTH CARE: Primary | ICD-10-CM

## 2022-08-01 DIAGNOSIS — E89.0 POSTSURGICAL HYPOTHYROIDISM: ICD-10-CM

## 2022-08-01 DIAGNOSIS — R35.0 BENIGN PROSTATIC HYPERPLASIA WITH URINARY FREQUENCY: ICD-10-CM

## 2022-08-01 DIAGNOSIS — I25.10 CORONARY ARTERY DISEASE, UNSPECIFIED VESSEL OR LESION TYPE, UNSPECIFIED WHETHER ANGINA PRESENT, UNSPECIFIED WHETHER NATIVE OR TRANSPLANTED HEART: ICD-10-CM

## 2022-08-01 DIAGNOSIS — I48.19 PERSISTENT ATRIAL FIBRILLATION: ICD-10-CM

## 2022-08-01 DIAGNOSIS — I10 PRIMARY HYPERTENSION: ICD-10-CM

## 2022-08-01 DIAGNOSIS — R73.02 IMPAIRED GLUCOSE TOLERANCE: ICD-10-CM

## 2022-08-01 DIAGNOSIS — R25.3 EYELID TWITCH: ICD-10-CM

## 2022-08-01 DIAGNOSIS — Z95.5 STENTED CORONARY ARTERY: ICD-10-CM

## 2022-08-01 DIAGNOSIS — I49.5 TACHY-BRADY SYNDROME: ICD-10-CM

## 2022-08-01 DIAGNOSIS — E78.5 HYPERLIPIDEMIA, UNSPECIFIED HYPERLIPIDEMIA TYPE: ICD-10-CM

## 2022-08-01 DIAGNOSIS — N40.1 BENIGN PROSTATIC HYPERPLASIA WITH URINARY FREQUENCY: ICD-10-CM

## 2022-08-01 DIAGNOSIS — Z98.890 HISTORY OF RADIOFREQUENCY ABLATION (RFA) PROCEDURE FOR CARDIAC ARRHYTHMIA: ICD-10-CM

## 2022-08-02 ENCOUNTER — PATIENT MESSAGE (OUTPATIENT)
Dept: ENDOCRINOLOGY | Facility: CLINIC | Age: 72
End: 2022-08-02
Payer: COMMERCIAL

## 2022-08-02 ENCOUNTER — TELEPHONE (OUTPATIENT)
Dept: INTERNAL MEDICINE | Facility: CLINIC | Age: 72
End: 2022-08-02
Payer: COMMERCIAL

## 2022-08-11 ENCOUNTER — LAB VISIT (OUTPATIENT)
Dept: LAB | Facility: HOSPITAL | Age: 72
End: 2022-08-11
Attending: INTERNAL MEDICINE
Payer: COMMERCIAL

## 2022-08-11 DIAGNOSIS — R73.02 IMPAIRED GLUCOSE TOLERANCE: ICD-10-CM

## 2022-08-11 DIAGNOSIS — E89.0 POSTSURGICAL HYPOTHYROIDISM: ICD-10-CM

## 2022-08-11 LAB
ESTIMATED AVG GLUCOSE: 117 MG/DL (ref 68–131)
HBA1C MFR BLD: 5.7 % (ref 4–5.6)
TSH SERPL DL<=0.005 MIU/L-ACNC: 2.92 UIU/ML (ref 0.4–4)

## 2022-08-11 PROCEDURE — 36415 COLL VENOUS BLD VENIPUNCTURE: CPT | Mod: PO | Performed by: INTERNAL MEDICINE

## 2022-08-11 PROCEDURE — 83036 HEMOGLOBIN GLYCOSYLATED A1C: CPT | Performed by: INTERNAL MEDICINE

## 2022-08-11 PROCEDURE — 84443 ASSAY THYROID STIM HORMONE: CPT | Performed by: INTERNAL MEDICINE

## 2022-08-18 ENCOUNTER — OFFICE VISIT (OUTPATIENT)
Dept: ENDOCRINOLOGY | Facility: CLINIC | Age: 72
End: 2022-08-18
Payer: COMMERCIAL

## 2022-08-18 VITALS
WEIGHT: 209.56 LBS | SYSTOLIC BLOOD PRESSURE: 124 MMHG | BODY MASS INDEX: 30 KG/M2 | DIASTOLIC BLOOD PRESSURE: 78 MMHG | HEIGHT: 70 IN

## 2022-08-18 DIAGNOSIS — E78.5 HYPERLIPIDEMIA, UNSPECIFIED HYPERLIPIDEMIA TYPE: ICD-10-CM

## 2022-08-18 DIAGNOSIS — E89.0 POSTSURGICAL HYPOTHYROIDISM: Primary | ICD-10-CM

## 2022-08-18 DIAGNOSIS — R73.02 IMPAIRED GLUCOSE TOLERANCE: ICD-10-CM

## 2022-08-18 DIAGNOSIS — I10 PRIMARY HYPERTENSION: ICD-10-CM

## 2022-08-18 PROCEDURE — 99214 PR OFFICE/OUTPT VISIT, EST, LEVL IV, 30-39 MIN: ICD-10-PCS | Mod: S$GLB,,, | Performed by: INTERNAL MEDICINE

## 2022-08-18 PROCEDURE — 99999 PR PBB SHADOW E&M-EST. PATIENT-LVL IV: ICD-10-PCS | Mod: PBBFAC,,, | Performed by: INTERNAL MEDICINE

## 2022-08-18 PROCEDURE — 1160F PR REVIEW ALL MEDS BY PRESCRIBER/CLIN PHARMACIST DOCUMENTED: ICD-10-PCS | Mod: CPTII,S$GLB,, | Performed by: INTERNAL MEDICINE

## 2022-08-18 PROCEDURE — 1125F PR PAIN SEVERITY QUANTIFIED, PAIN PRESENT: ICD-10-PCS | Mod: CPTII,S$GLB,, | Performed by: INTERNAL MEDICINE

## 2022-08-18 PROCEDURE — 3008F BODY MASS INDEX DOCD: CPT | Mod: CPTII,S$GLB,, | Performed by: INTERNAL MEDICINE

## 2022-08-18 PROCEDURE — 3044F HG A1C LEVEL LT 7.0%: CPT | Mod: CPTII,S$GLB,, | Performed by: INTERNAL MEDICINE

## 2022-08-18 PROCEDURE — 1159F PR MEDICATION LIST DOCUMENTED IN MEDICAL RECORD: ICD-10-PCS | Mod: CPTII,S$GLB,, | Performed by: INTERNAL MEDICINE

## 2022-08-18 PROCEDURE — 3074F PR MOST RECENT SYSTOLIC BLOOD PRESSURE < 130 MM HG: ICD-10-PCS | Mod: CPTII,S$GLB,, | Performed by: INTERNAL MEDICINE

## 2022-08-18 PROCEDURE — 3044F PR MOST RECENT HEMOGLOBIN A1C LEVEL <7.0%: ICD-10-PCS | Mod: CPTII,S$GLB,, | Performed by: INTERNAL MEDICINE

## 2022-08-18 PROCEDURE — 3078F PR MOST RECENT DIASTOLIC BLOOD PRESSURE < 80 MM HG: ICD-10-PCS | Mod: CPTII,S$GLB,, | Performed by: INTERNAL MEDICINE

## 2022-08-18 PROCEDURE — 4010F ACE/ARB THERAPY RXD/TAKEN: CPT | Mod: CPTII,S$GLB,, | Performed by: INTERNAL MEDICINE

## 2022-08-18 PROCEDURE — 3008F PR BODY MASS INDEX (BMI) DOCUMENTED: ICD-10-PCS | Mod: CPTII,S$GLB,, | Performed by: INTERNAL MEDICINE

## 2022-08-18 PROCEDURE — 99999 PR PBB SHADOW E&M-EST. PATIENT-LVL IV: CPT | Mod: PBBFAC,,, | Performed by: INTERNAL MEDICINE

## 2022-08-18 PROCEDURE — 4010F PR ACE/ARB THEARPY RXD/TAKEN: ICD-10-PCS | Mod: CPTII,S$GLB,, | Performed by: INTERNAL MEDICINE

## 2022-08-18 PROCEDURE — 3078F DIAST BP <80 MM HG: CPT | Mod: CPTII,S$GLB,, | Performed by: INTERNAL MEDICINE

## 2022-08-18 PROCEDURE — 3074F SYST BP LT 130 MM HG: CPT | Mod: CPTII,S$GLB,, | Performed by: INTERNAL MEDICINE

## 2022-08-18 PROCEDURE — 99214 OFFICE O/P EST MOD 30 MIN: CPT | Mod: S$GLB,,, | Performed by: INTERNAL MEDICINE

## 2022-08-18 PROCEDURE — 1159F MED LIST DOCD IN RCRD: CPT | Mod: CPTII,S$GLB,, | Performed by: INTERNAL MEDICINE

## 2022-08-18 PROCEDURE — 1160F RVW MEDS BY RX/DR IN RCRD: CPT | Mod: CPTII,S$GLB,, | Performed by: INTERNAL MEDICINE

## 2022-08-18 PROCEDURE — 1125F AMNT PAIN NOTED PAIN PRSNT: CPT | Mod: CPTII,S$GLB,, | Performed by: INTERNAL MEDICINE

## 2022-08-18 RX ORDER — LEVOTHYROXINE SODIUM 137 UG/1
TABLET ORAL
Qty: 32 TABLET | Refills: 11 | Status: SHIPPED | OUTPATIENT
Start: 2022-08-18 | End: 2022-10-17

## 2022-08-18 NOTE — PROGRESS NOTES
Subjective:      Patient ID: Dustin Lauren is a 72 y.o. male.    Chief Complaint:  Postsurgical hypothyroidism      History of Present Illness  Mr. Lauren presents today for follow up of hypothyroidism. Last visit in 1/2022.     Interval HPI:   With Hypothyroidism s/p surgery for a benign thyroid nodule on 1/27/10.     Had been on 150 mcg of thyroid hormone for quite some time, but then developed A Fib in 9/2016. Had cardiac ablation in 9/2021 for atrial fibrillation. No further episodes of A Fib since ablation.      Currently taking Synthroid 137 mcg Mon-Sat with 1.5 tablets on Sunday only.  Denies missing any doses. Waits at least one hour before taking other meds or food.   No tremor or increased anxiousness. No recent palpitations.   Has felt more tired recently. BM's regular.      Also with HTN on amlodipine and valsartan.      For HLP he is taking atorvastatin 10 mg every other day and Fibricor daily.     Latest Reference Range & Units 08/11/22 08:33   Hemoglobin A1C External 4.0 - 5.6 % 5.7 (H)   Estimated Avg Glucose 68 - 131 mg/dL 117   TSH 0.400 - 4.000 uIU/mL 2.924          Review of Systems   Constitutional: Negative for chills and fever.   Gastrointestinal: Negative for nausea and vomiting.       Objective:   Physical Exam  Vitals and nursing note reviewed.       BP Readings from Last 3 Encounters:   08/18/22 124/78   07/22/22 102/64   07/15/22 117/67     Wt Readings from Last 1 Encounters:   08/18/22 0745 95.1 kg (209 lb 8.8 oz)       Body mass index is 30.07 kg/m².    Lab Review:   Lab Results   Component Value Date    HGBA1C 5.7 (H) 08/11/2022     Lab Results   Component Value Date    CHOL 116 (L) 01/07/2022    HDL 45 01/07/2022    LDLCALC 62.8 (L) 01/07/2022    TRIG 41 01/07/2022    CHOLHDL 38.8 01/07/2022     Lab Results   Component Value Date     01/07/2022    K 4.2 01/07/2022     01/07/2022    CO2 27 01/07/2022    GLU 95 01/07/2022    BUN 24 (H) 01/07/2022    CREATININE 1.2  01/07/2022    CALCIUM 9.9 01/07/2022    PROT 7.2 01/07/2022    ALBUMIN 4.1 01/07/2022    BILITOT 0.5 01/07/2022    ALKPHOS 34 (L) 01/07/2022    AST 19 01/07/2022    ALT 12 01/07/2022    ANIONGAP 8 01/07/2022    ESTGFRAFRICA >60.0 01/07/2022    EGFRNONAA >60.0 01/07/2022    TSH 2.924 08/11/2022         Assessment and Plan     Postsurgical hypothyroidism  --Patient with postsurgical hypothyroidism  --Clinically and biochemically euthyroid  --TSH remains normal and at goal  --Will continue Synthroid 137 mcg PO Mon-Sat with 1.5 tablets on Sunday only   --Will aim to keep TSH mid to high normal given age and A fib history    Hyperlipidemia  --On fibrate and statin    Hypertension  --Bp at goal  --Continue current regimen    Impaired glucose tolerance  --Recent A1c 5.7%  --Continued diet and exercise  --Check A1c periodically      Follow up in 6 months with labs prior to appt      Malick Malik M.D. Staff Endocrinology

## 2022-09-15 NOTE — PROGRESS NOTES
Mr. Lauren is a patient of Dr. Rodríguez and was last seen in clinic 3/21/2022.      Subjective:   Patient ID:  Dustin Lauren is a 72 y.o. male who presents for follow-up of Atrial Fibrillation  .     HPI:    Mr. Lauren is a 72 y.o. male with atrial fibrillation (PVI 9/2021), Htn, CAD s/p PCI, BPH, hypothyroidism (s/o thyroidectomy), low back pain, tachy-darek syndrome here for follow up.     Background:    Primary cardiologist is Dr. Vo.  Primary EP has been Dr. Burnett. Presents for second opinion.  He is the uncle of Libra Littlejohn  He owns [employer]  Initially developed symptomatic paroxysmal AF 9/16. Progressed to persistent AF. Underwent DCCV in 2016.  Ultimately developed recurrence.  DCCV 12/18/18.  48 hr holter 6/20 nsr  Developed recurrence. DCCV 2/2/21 4/21 had an event lasting a couple of hours.  Is symptomatic with the AF, noting palpitations, on occasion associated with lightheadedness.  Echo 7/30/20 normal biventricular structure and function normal left atrial size, trace MR  ECG reveals sinus bradycardia at 49 bpm.  There has been inability to add medications due to his bradycardia.  Has eliminated caffeine and alcohol.  Atrial fibrillation, symptomatic. Paroxysmal to persistent.  Has bradycardia limiting his medications. Recommend PVI.    9/16/2021: Successful pulmonary vein RF ablation.    12/16/2021: He is 3 mo s/p PVI.  Doing well since procedure, with no sustained palpitations during blanking period. Brief palps only. Pericarditis symptoms resolved with course of NSAIDs.  HIEU 9/2021 showed normal LVEF. Chronic bradycardia - not on AV blockers. CHADSVASc 3 and it is recommended he remain on OAC. He is on eliquis for CVA prophylaxis.  He exercises regularly.     3/12/2022: He is 6 months s/p PVI. He is doing well from a rhythm standpoint, with no documented or symptomatic recurrence of arrhythmia since procedure. No sustained palpitations; brief rare palps likely ectopy.    Bradycardic at baseline. Not on AVN blockers. CHADSVASc 3 on eliquis for CVA prophylaxis. He feels well and remains active.    Update (09/19/2022):    Today he says he feel well. No sustained palpitations. No CP, RILEY, LH, syncope.    He is currently taking eliquis 5mg BID for stroke prophylaxis and denies significant bleeding episodes. Kidney function is stable, with a creatinine of 1.2 on 1/7/2022.    I have personally reviewed the patient's EKG today, which shows sinus bradycardia at 50bpm. IN interval is 182. QRS is 94. QTc is 434.    Relevant Cardiac Test Results:    HIEU (9/16/2021):  Atrial fibrillation observed.  HIEU to evaluate for SHINE prior PVI for atrial fibrillation.  The left ventricle is normal in size with normal systolic function. The estimated ejection fraction is 55%.  Moderate right ventricular enlargement with low normal right ventricular systolic function.  Normal appearing left atrial appendage. No thrombus is present in the appendage. Normal appendage velocities 0.64 m/s.  Biatrial enlargement.  Moderate tricuspid regurgitation. There is prolapse of the septal TV leaflet.  Grade 3 plaque present in the descending aorta.  The ascending aorta is mildly dilated measured at 4 cm.    Current Outpatient Medications   Medication Sig    amlodipine-valsartan (EXFORGE) 5-160 mg per tablet Take 1 tablet by mouth once daily.    apixaban (ELIQUIS) 5 mg Tab Take 1 tablet (5 mg total) by mouth 2 (two) times daily.    atorvastatin (LIPITOR) 10 MG tablet Take 10 mg by mouth once daily. Every other day    cholecalciferol, vitamin D3, 25 mcg (1,000 unit) Chew Take 2,000 Int'l Units by mouth.    fenofibrate micronized (LOFIBRA) 134 MG Cap TAKE 1 CAPSULE (134 MG TOTAL) BY MOUTH DAILY WITH BREAKFAST. .    finasteride (PROSCAR) 5 mg tablet Take 5 mg by mouth once daily.    fluticasone propionate (FLONASE) 50 mcg/actuation nasal spray 2 sprays by Each Nostril route once daily.    levothyroxine (SYNTHROID) 137 MCG Tab  "tablet TAKE 1 TABLET BY MOUTH MON-SAT AND TAKE 1.5 TABLET BY MOUTH ON SUNDAY ONLY, Brand name Synthroid    multivit-min/FA/lycopen/lutein (CENTRUM SILVER MEN ORAL) Take by mouth.    naftifine 2 % Crea Apply 1 application topically daily as needed.    nitroGLYCERIN (NITROSTAT) 0.3 MG SL tablet DISSOLVE 1 TABLET UNDER THE TONGUE AS NEEDED FOR CHEST PAIN    omega-3 fatty acids 1,000 mg Cap Take 2,000 mg by mouth.    omeprazole (PRILOSEC) 40 MG capsule Take 40 mg by mouth once daily.    prasugreL (EFFIENT) 10 mg Tab Take 10 mg by mouth once daily.    tamsulosin (FLOMAX) 0.4 mg Cap      No current facility-administered medications for this visit.       Review of Systems   Constitutional: Negative for malaise/fatigue.   Cardiovascular:  Negative for chest pain, dyspnea on exertion, irregular heartbeat, leg swelling and palpitations.   Respiratory:  Negative for shortness of breath.    Hematologic/Lymphatic: Negative for bleeding problem.   Skin:  Negative for rash.   Musculoskeletal:  Negative for myalgias.   Gastrointestinal:  Negative for hematemesis, hematochezia and nausea.   Genitourinary:  Negative for hematuria.   Neurological:  Negative for light-headedness.   Psychiatric/Behavioral:  Negative for altered mental status.    Allergic/Immunologic: Negative for persistent infections.     Objective:          /68   Pulse (!) 50   Ht 5' 10" (1.778 m)   Wt 95.3 kg (210 lb 1.6 oz)   BMI 30.15 kg/m²     Physical Exam  Vitals and nursing note reviewed.   Constitutional:       Appearance: Normal appearance. He is well-developed.   HENT:      Head: Normocephalic.      Nose: Nose normal.   Eyes:      Pupils: Pupils are equal, round, and reactive to light.   Cardiovascular:      Rate and Rhythm: Regular rhythm. Bradycardia present.   Pulmonary:      Effort: No respiratory distress.      Breath sounds: Normal breath sounds.   Musculoskeletal:         General: Normal range of motion.   Skin:     General: Skin is warm and " dry.      Findings: No erythema.   Neurological:      Mental Status: He is alert and oriented to person, place, and time.   Psychiatric:         Speech: Speech normal.         Behavior: Behavior normal.         Lab Results   Component Value Date     01/07/2022    K 4.2 01/07/2022    BUN 24 (H) 01/07/2022    CREATININE 1.2 01/07/2022    ALT 12 01/07/2022    AST 19 01/07/2022    HGB 13.4 (L) 01/07/2022    HCT 41.2 01/07/2022    TSH 2.924 08/11/2022    LDLCALC 62.8 (L) 01/07/2022       Recent Labs   Lab 09/13/21  0853   INR 1.1         Assessment:     1. Persistent atrial fibrillation    2. Primary hypertension    3. Tachy-darek syndrome      Plan:     In summary, Mr. Lauren is a 72 y.o. male with atrial fibrillation (PVI 9/2021), Htn, CAD s/p PCI, BPH, hypothyroidism (s/o thyroidectomy), low back pain, tachy-darek syndrome here for follow up.   One year s/p PVI. Continues to do well from a rhythm standpoint, with no documented or symptomatic recurrence of arrhythmia since procedure. Bradycardic at baseline, asymptomatic.  CHADSVASc 3 on eliquis. No AAD or AVN blockers. Normally symptomatic with AF. He will notify clinic if he develops new symptoms. He is also considering switching general cardiology care to [facility]. Referral provided.    Continue regimen  RTC 1 yr, sooner if needed    *A copy of this note has been sent to Dr. Rodríguez*    Follow up in about 1 year (around 9/19/2023).    ------------------------------------------------------------------    CASSANDRA Arenas, NP-C  Cardiac Electrophysiology

## 2022-09-19 ENCOUNTER — HOSPITAL ENCOUNTER (OUTPATIENT)
Dept: CARDIOLOGY | Facility: CLINIC | Age: 72
Discharge: HOME | End: 2022-09-19
Payer: COMMERCIAL

## 2022-09-19 ENCOUNTER — OFFICE VISIT (OUTPATIENT)
Dept: ELECTROPHYSIOLOGY | Facility: CLINIC | Age: 72
End: 2022-09-19
Payer: COMMERCIAL

## 2022-09-19 ENCOUNTER — IMMUNIZATION (OUTPATIENT)
Dept: INTERNAL MEDICINE | Facility: CLINIC | Age: 72
End: 2022-09-19
Payer: COMMERCIAL

## 2022-09-19 VITALS
WEIGHT: 210.13 LBS | BODY MASS INDEX: 30.08 KG/M2 | HEART RATE: 50 BPM | DIASTOLIC BLOOD PRESSURE: 68 MMHG | SYSTOLIC BLOOD PRESSURE: 114 MMHG | HEIGHT: 70 IN

## 2022-09-19 DIAGNOSIS — I25.10 CORONARY ARTERY DISEASE DUE TO CALCIFIED CORONARY LESION: ICD-10-CM

## 2022-09-19 DIAGNOSIS — I25.84 CORONARY ARTERY DISEASE DUE TO CALCIFIED CORONARY LESION: ICD-10-CM

## 2022-09-19 DIAGNOSIS — I48.19 PERSISTENT ATRIAL FIBRILLATION: Primary | ICD-10-CM

## 2022-09-19 DIAGNOSIS — I49.8 OTHER SPECIFIED CARDIAC ARRHYTHMIAS: ICD-10-CM

## 2022-09-19 DIAGNOSIS — I10 PRIMARY HYPERTENSION: ICD-10-CM

## 2022-09-19 DIAGNOSIS — I49.5 TACHY-BRADY SYNDROME: ICD-10-CM

## 2022-09-19 PROCEDURE — 93005 RHYTHM STRIP: ICD-10-PCS | Mod: S$GLB,,, | Performed by: INTERNAL MEDICINE

## 2022-09-19 PROCEDURE — 4010F ACE/ARB THERAPY RXD/TAKEN: CPT | Mod: CPTII,S$GLB,, | Performed by: NURSE PRACTITIONER

## 2022-09-19 PROCEDURE — 3044F PR MOST RECENT HEMOGLOBIN A1C LEVEL <7.0%: ICD-10-PCS | Mod: CPTII,S$GLB,, | Performed by: NURSE PRACTITIONER

## 2022-09-19 PROCEDURE — 1159F MED LIST DOCD IN RCRD: CPT | Mod: CPTII,S$GLB,, | Performed by: NURSE PRACTITIONER

## 2022-09-19 PROCEDURE — 3074F PR MOST RECENT SYSTOLIC BLOOD PRESSURE < 130 MM HG: ICD-10-PCS | Mod: CPTII,S$GLB,, | Performed by: NURSE PRACTITIONER

## 2022-09-19 PROCEDURE — 90471 FLU VACCINE - QUADRIVALENT - ADJUVANTED: ICD-10-PCS | Mod: S$GLB,,, | Performed by: INTERNAL MEDICINE

## 2022-09-19 PROCEDURE — 1125F AMNT PAIN NOTED PAIN PRSNT: CPT | Mod: CPTII,S$GLB,, | Performed by: NURSE PRACTITIONER

## 2022-09-19 PROCEDURE — 3288F FALL RISK ASSESSMENT DOCD: CPT | Mod: CPTII,S$GLB,, | Performed by: NURSE PRACTITIONER

## 2022-09-19 PROCEDURE — 3078F PR MOST RECENT DIASTOLIC BLOOD PRESSURE < 80 MM HG: ICD-10-PCS | Mod: CPTII,S$GLB,, | Performed by: NURSE PRACTITIONER

## 2022-09-19 PROCEDURE — 1160F PR REVIEW ALL MEDS BY PRESCRIBER/CLIN PHARMACIST DOCUMENTED: ICD-10-PCS | Mod: CPTII,S$GLB,, | Performed by: NURSE PRACTITIONER

## 2022-09-19 PROCEDURE — 93005 ELECTROCARDIOGRAM TRACING: CPT | Mod: S$GLB,,, | Performed by: INTERNAL MEDICINE

## 2022-09-19 PROCEDURE — 3078F DIAST BP <80 MM HG: CPT | Mod: CPTII,S$GLB,, | Performed by: NURSE PRACTITIONER

## 2022-09-19 PROCEDURE — 1101F PR PT FALLS ASSESS DOC 0-1 FALLS W/OUT INJ PAST YR: ICD-10-PCS | Mod: CPTII,S$GLB,, | Performed by: NURSE PRACTITIONER

## 2022-09-19 PROCEDURE — 99999 PR PBB SHADOW E&M-EST. PATIENT-LVL V: CPT | Mod: PBBFAC,,, | Performed by: NURSE PRACTITIONER

## 2022-09-19 PROCEDURE — 3044F HG A1C LEVEL LT 7.0%: CPT | Mod: CPTII,S$GLB,, | Performed by: NURSE PRACTITIONER

## 2022-09-19 PROCEDURE — 3074F SYST BP LT 130 MM HG: CPT | Mod: CPTII,S$GLB,, | Performed by: NURSE PRACTITIONER

## 2022-09-19 PROCEDURE — 90471 IMMUNIZATION ADMIN: CPT | Mod: S$GLB,,, | Performed by: INTERNAL MEDICINE

## 2022-09-19 PROCEDURE — 1159F PR MEDICATION LIST DOCUMENTED IN MEDICAL RECORD: ICD-10-PCS | Mod: CPTII,S$GLB,, | Performed by: NURSE PRACTITIONER

## 2022-09-19 PROCEDURE — 1160F RVW MEDS BY RX/DR IN RCRD: CPT | Mod: CPTII,S$GLB,, | Performed by: NURSE PRACTITIONER

## 2022-09-19 PROCEDURE — 4010F PR ACE/ARB THEARPY RXD/TAKEN: ICD-10-PCS | Mod: CPTII,S$GLB,, | Performed by: NURSE PRACTITIONER

## 2022-09-19 PROCEDURE — 1101F PT FALLS ASSESS-DOCD LE1/YR: CPT | Mod: CPTII,S$GLB,, | Performed by: NURSE PRACTITIONER

## 2022-09-19 PROCEDURE — 99214 PR OFFICE/OUTPT VISIT, EST, LEVL IV, 30-39 MIN: ICD-10-PCS | Mod: S$GLB,,, | Performed by: NURSE PRACTITIONER

## 2022-09-19 PROCEDURE — 99214 OFFICE O/P EST MOD 30 MIN: CPT | Mod: S$GLB,,, | Performed by: NURSE PRACTITIONER

## 2022-09-19 PROCEDURE — 93010 RHYTHM STRIP: ICD-10-PCS | Mod: S$GLB,,, | Performed by: INTERNAL MEDICINE

## 2022-09-19 PROCEDURE — 1125F PR PAIN SEVERITY QUANTIFIED, PAIN PRESENT: ICD-10-PCS | Mod: CPTII,S$GLB,, | Performed by: NURSE PRACTITIONER

## 2022-09-19 PROCEDURE — 3008F PR BODY MASS INDEX (BMI) DOCUMENTED: ICD-10-PCS | Mod: CPTII,S$GLB,, | Performed by: NURSE PRACTITIONER

## 2022-09-19 PROCEDURE — 93010 ELECTROCARDIOGRAM REPORT: CPT | Mod: S$GLB,,, | Performed by: INTERNAL MEDICINE

## 2022-09-19 PROCEDURE — 99999 PR PBB SHADOW E&M-EST. PATIENT-LVL V: ICD-10-PCS | Mod: PBBFAC,,, | Performed by: NURSE PRACTITIONER

## 2022-09-19 PROCEDURE — 90694 VACC AIIV4 NO PRSRV 0.5ML IM: CPT | Mod: S$GLB,,, | Performed by: INTERNAL MEDICINE

## 2022-09-19 PROCEDURE — 3288F PR FALLS RISK ASSESSMENT DOCUMENTED: ICD-10-PCS | Mod: CPTII,S$GLB,, | Performed by: NURSE PRACTITIONER

## 2022-09-19 PROCEDURE — 3008F BODY MASS INDEX DOCD: CPT | Mod: CPTII,S$GLB,, | Performed by: NURSE PRACTITIONER

## 2022-09-19 PROCEDURE — 90694 FLU VACCINE - QUADRIVALENT - ADJUVANTED: ICD-10-PCS | Mod: S$GLB,,, | Performed by: INTERNAL MEDICINE

## 2022-10-07 ENCOUNTER — TELEPHONE (OUTPATIENT)
Dept: INTERNAL MEDICINE | Facility: CLINIC | Age: 72
End: 2022-10-07
Payer: COMMERCIAL

## 2022-10-07 NOTE — TELEPHONE ENCOUNTER
Returned pts call in regards to :    Pt said that he is calling in regards to he is having a nose bleed and is on blood thinners pt wants to know if he can be seen today by Dr Gibbs or if he needs to schedule an appt with an Ent.          Tried to schedule pt today     No openings      Pt stated he will go to urgent care to be evaluated

## 2022-10-07 NOTE — TELEPHONE ENCOUNTER
----- Message from Kristin Urbina sent at 10/7/2022  9:23 AM CDT -----  Contact: Self/[phone]  Pt said that he is calling in regards to he is having a nose bleed and is on blood thinners pt wants to know if he can be seen today by Dr Gibbs or if he needs to schedule an appt with an Ent. Please advise

## 2022-10-20 DIAGNOSIS — N28.1 RENAL CYST: Primary | ICD-10-CM

## 2022-11-02 ENCOUNTER — OFFICE VISIT (OUTPATIENT)
Dept: UROLOGY | Facility: CLINIC | Age: 72
End: 2022-11-02
Payer: COMMERCIAL

## 2022-11-02 VITALS — SYSTOLIC BLOOD PRESSURE: 172 MMHG | DIASTOLIC BLOOD PRESSURE: 80 MMHG | HEART RATE: 50 BPM

## 2022-11-02 DIAGNOSIS — N28.1 COMPLEX RENAL CYST: ICD-10-CM

## 2022-11-02 DIAGNOSIS — N40.1 BPH WITH OBSTRUCTION/LOWER URINARY TRACT SYMPTOMS: ICD-10-CM

## 2022-11-02 DIAGNOSIS — R39.15 URINARY URGENCY: ICD-10-CM

## 2022-11-02 DIAGNOSIS — Z87.442 HISTORY OF KIDNEY STONES: Primary | ICD-10-CM

## 2022-11-02 DIAGNOSIS — N13.8 BPH WITH OBSTRUCTION/LOWER URINARY TRACT SYMPTOMS: ICD-10-CM

## 2022-11-02 PROCEDURE — 1125F PR PAIN SEVERITY QUANTIFIED, PAIN PRESENT: ICD-10-PCS | Mod: CPTII,S$GLB,, | Performed by: NURSE PRACTITIONER

## 2022-11-02 PROCEDURE — 1160F PR REVIEW ALL MEDS BY PRESCRIBER/CLIN PHARMACIST DOCUMENTED: ICD-10-PCS | Mod: CPTII,S$GLB,, | Performed by: NURSE PRACTITIONER

## 2022-11-02 PROCEDURE — 3044F HG A1C LEVEL LT 7.0%: CPT | Mod: CPTII,S$GLB,, | Performed by: NURSE PRACTITIONER

## 2022-11-02 PROCEDURE — 99213 OFFICE O/P EST LOW 20 MIN: CPT | Mod: S$GLB,,, | Performed by: NURSE PRACTITIONER

## 2022-11-02 PROCEDURE — 3077F PR MOST RECENT SYSTOLIC BLOOD PRESSURE >= 140 MM HG: ICD-10-PCS | Mod: CPTII,S$GLB,, | Performed by: NURSE PRACTITIONER

## 2022-11-02 PROCEDURE — 4010F PR ACE/ARB THEARPY RXD/TAKEN: ICD-10-PCS | Mod: CPTII,S$GLB,, | Performed by: NURSE PRACTITIONER

## 2022-11-02 PROCEDURE — 1159F MED LIST DOCD IN RCRD: CPT | Mod: CPTII,S$GLB,, | Performed by: NURSE PRACTITIONER

## 2022-11-02 PROCEDURE — 99213 PR OFFICE/OUTPT VISIT, EST, LEVL III, 20-29 MIN: ICD-10-PCS | Mod: S$GLB,,, | Performed by: NURSE PRACTITIONER

## 2022-11-02 PROCEDURE — 1159F PR MEDICATION LIST DOCUMENTED IN MEDICAL RECORD: ICD-10-PCS | Mod: CPTII,S$GLB,, | Performed by: NURSE PRACTITIONER

## 2022-11-02 PROCEDURE — 3044F PR MOST RECENT HEMOGLOBIN A1C LEVEL <7.0%: ICD-10-PCS | Mod: CPTII,S$GLB,, | Performed by: NURSE PRACTITIONER

## 2022-11-02 PROCEDURE — 1160F RVW MEDS BY RX/DR IN RCRD: CPT | Mod: CPTII,S$GLB,, | Performed by: NURSE PRACTITIONER

## 2022-11-02 PROCEDURE — 3079F PR MOST RECENT DIASTOLIC BLOOD PRESSURE 80-89 MM HG: ICD-10-PCS | Mod: CPTII,S$GLB,, | Performed by: NURSE PRACTITIONER

## 2022-11-02 PROCEDURE — 4010F ACE/ARB THERAPY RXD/TAKEN: CPT | Mod: CPTII,S$GLB,, | Performed by: NURSE PRACTITIONER

## 2022-11-02 PROCEDURE — 3077F SYST BP >= 140 MM HG: CPT | Mod: CPTII,S$GLB,, | Performed by: NURSE PRACTITIONER

## 2022-11-02 PROCEDURE — 1125F AMNT PAIN NOTED PAIN PRSNT: CPT | Mod: CPTII,S$GLB,, | Performed by: NURSE PRACTITIONER

## 2022-11-02 PROCEDURE — 3079F DIAST BP 80-89 MM HG: CPT | Mod: CPTII,S$GLB,, | Performed by: NURSE PRACTITIONER

## 2022-11-07 ENCOUNTER — OFFICE VISIT (OUTPATIENT)
Dept: UROLOGY | Facility: CLINIC | Age: 72
End: 2022-11-07
Payer: COMMERCIAL

## 2022-11-07 VITALS — DIASTOLIC BLOOD PRESSURE: 70 MMHG | SYSTOLIC BLOOD PRESSURE: 142 MMHG | HEART RATE: 46 BPM

## 2022-11-07 DIAGNOSIS — N13.8 BPH WITH OBSTRUCTION/LOWER URINARY TRACT SYMPTOMS: ICD-10-CM

## 2022-11-07 DIAGNOSIS — N40.1 BPH WITH OBSTRUCTION/LOWER URINARY TRACT SYMPTOMS: ICD-10-CM

## 2022-11-07 DIAGNOSIS — R31.0 GROSS HEMATURIA: Primary | ICD-10-CM

## 2022-11-07 DIAGNOSIS — Z87.442 HISTORY OF KIDNEY STONES: ICD-10-CM

## 2022-11-07 DIAGNOSIS — N28.1 COMPLEX RENAL CYST: ICD-10-CM

## 2022-11-07 PROCEDURE — 1160F RVW MEDS BY RX/DR IN RCRD: CPT | Mod: CPTII,S$GLB,, | Performed by: NURSE PRACTITIONER

## 2022-11-07 PROCEDURE — 99214 OFFICE O/P EST MOD 30 MIN: CPT | Mod: S$GLB,,, | Performed by: NURSE PRACTITIONER

## 2022-11-07 PROCEDURE — 1160F PR REVIEW ALL MEDS BY PRESCRIBER/CLIN PHARMACIST DOCUMENTED: ICD-10-PCS | Mod: CPTII,S$GLB,, | Performed by: NURSE PRACTITIONER

## 2022-11-07 PROCEDURE — 87086 URINE CULTURE/COLONY COUNT: CPT | Performed by: NURSE PRACTITIONER

## 2022-11-07 PROCEDURE — 99214 PR OFFICE/OUTPT VISIT, EST, LEVL IV, 30-39 MIN: ICD-10-PCS | Mod: S$GLB,,, | Performed by: NURSE PRACTITIONER

## 2022-11-07 PROCEDURE — 4010F ACE/ARB THERAPY RXD/TAKEN: CPT | Mod: CPTII,S$GLB,, | Performed by: NURSE PRACTITIONER

## 2022-11-07 PROCEDURE — 3044F PR MOST RECENT HEMOGLOBIN A1C LEVEL <7.0%: ICD-10-PCS | Mod: CPTII,S$GLB,, | Performed by: NURSE PRACTITIONER

## 2022-11-07 PROCEDURE — 3077F SYST BP >= 140 MM HG: CPT | Mod: CPTII,S$GLB,, | Performed by: NURSE PRACTITIONER

## 2022-11-07 PROCEDURE — 3077F PR MOST RECENT SYSTOLIC BLOOD PRESSURE >= 140 MM HG: ICD-10-PCS | Mod: CPTII,S$GLB,, | Performed by: NURSE PRACTITIONER

## 2022-11-07 PROCEDURE — 3078F DIAST BP <80 MM HG: CPT | Mod: CPTII,S$GLB,, | Performed by: NURSE PRACTITIONER

## 2022-11-07 PROCEDURE — 3044F HG A1C LEVEL LT 7.0%: CPT | Mod: CPTII,S$GLB,, | Performed by: NURSE PRACTITIONER

## 2022-11-07 PROCEDURE — 1159F PR MEDICATION LIST DOCUMENTED IN MEDICAL RECORD: ICD-10-PCS | Mod: CPTII,S$GLB,, | Performed by: NURSE PRACTITIONER

## 2022-11-07 PROCEDURE — 1126F PR PAIN SEVERITY QUANTIFIED, NO PAIN PRESENT: ICD-10-PCS | Mod: CPTII,S$GLB,, | Performed by: NURSE PRACTITIONER

## 2022-11-07 PROCEDURE — 1126F AMNT PAIN NOTED NONE PRSNT: CPT | Mod: CPTII,S$GLB,, | Performed by: NURSE PRACTITIONER

## 2022-11-07 PROCEDURE — 1159F MED LIST DOCD IN RCRD: CPT | Mod: CPTII,S$GLB,, | Performed by: NURSE PRACTITIONER

## 2022-11-07 PROCEDURE — 4010F PR ACE/ARB THEARPY RXD/TAKEN: ICD-10-PCS | Mod: CPTII,S$GLB,, | Performed by: NURSE PRACTITIONER

## 2022-11-07 PROCEDURE — 3078F PR MOST RECENT DIASTOLIC BLOOD PRESSURE < 80 MM HG: ICD-10-PCS | Mod: CPTII,S$GLB,, | Performed by: NURSE PRACTITIONER

## 2022-11-07 NOTE — PROGRESS NOTES
"Subjective:      Dustin Lauren is a 72 y.o. male who returns today regarding his gross hematuria.    Established patient of Dr. Kent's looking to transition his care to [facility].      On flomax and finasteride of his LUTS for years. Symptoms are fairly well controlled- still with urgency and mild urge incontinence at times.      Also with a history of uric acid/calcium oxalate stones that have passed without intervention (25-30 years ago). Drinks mostly water now.     CT abdomen/pelvis with contrast (8/4/22)- "Both kidneys are normal in size. No hydronephrosis nor hydroureter is identified. Multiple bilateral renal cysts are identified. Somewhat complex bilobed right upper renal cyst with thin septation and wall calcification noted measuring approximately 5.7 cm in its superior aspect and 6.5 cm in its lower portion (previously 4.2 and 5.0 cm respectively. Continued ultrasound follow-up is recommended in 6 months for surveillance. Other bilateral renal cysts are again identified. Mild focal scarring noted in the mid left kidney. No definite ureteral calculus is identified. "     + family history of RCC (aunt and older brother).     He presents today reporting 2 episodes of painless gross hematuria (at the beginning of the urinary stream) on 11/2 and again 11/6. Now resolved. Denies changes in urination. Denies dysuria, flank pain and fever/chills. Denies a history of recurrent UTIs. On anticoagulants. Non smoker.     Hematuria  Patient complains of gross hematuria. Onset of hematuria was 6 days ago and was sudden in onset. There is a history of nephrolithiasis. There is not a history of urologic trauma. Other urologic symptoms include no changes. Patient admits to history of no risk factors for cancer. Patient denies history of Agent Orange exposure, chronic Renteria catheter,  surgeries, occupational exposure, sexually transmitted diseases, tobacco use, trauma, and urolithiasis. Prior workup has been UA'S, " CT.    Component      Latest Ref Rng & Units 1/7/2022   PSA Diagnostic      0.00 - 4.00 ng/mL 1.4     The following portions of the patient's history were reviewed and updated as appropriate: allergies, current medications, past family history, past medical history, past social history, past surgical history and problem list.    Review of Systems  Constitutional: no fever or chills  ENT: no nasal congestion or sore throat  Respiratory: no cough or shortness of breath  Cardiovascular: no chest pain or palpitations  Gastrointestinal: no nausea or vomiting, tolerating diet  Genitourinary: as per HPI  Hematologic/Lymphatic: no easy bruising or lymphadenopathy  Musculoskeletal: no arthralgias or myalgias  Neurological: no seizures or tremors  Behavioral/Psych: no auditory or visual hallucinations     Objective:   Vitals:   Vitals:    11/07/22 1147   BP: (!) 142/70   Pulse: (!) 46     Physical Exam   General: alert and oriented, no acute distress  Head: normocephalic, atraumatic  Neck: supple, normal ROM  Respiratory: Symmetric expansion, non-labored breathing  Cardiovascular: regular rate and rhythm  Abdomen: soft, non tender, non distended  Genitourinary: deferred  Skin: normal coloration and turgor, no rashes, no suspicious skin lesions noted  Neuro: alert and oriented x3, no gross deficits  Psych: normal judgment and insight, normal mood/affect, and non-anxious    Lab Review   Urinalysis demonstrates positive for red blood cells (trace)  Lab Results   Component Value Date    WBC 3.34 (L) 01/07/2022    HGB 13.4 (L) 01/07/2022    HCT 41.2 01/07/2022    MCV 91 01/07/2022     01/07/2022     Lab Results   Component Value Date    CREATININE 1.2 01/07/2022    BUN 24 (H) 01/07/2022     Lab Results   Component Value Date    PSA 1.2 01/06/2021     Imaging   None    Assessment:     1. Gross hematuria    2. Complex renal cyst    3. History of kidney stones    4. BPH with obstruction/lower urinary tract symptoms      Plan:    Diagnoses and all orders for this visit:    Gross hematuria  -     Urine culture  -     Cystoscopy; Future  -     CT Urogram Abd Pelvis W WO; Future  -     Basic Metabolic Panel; Future    Complex renal cyst    History of kidney stones    BPH with obstruction/lower urinary tract symptoms    Plan:  -- Discussed differential diagnosis for hematuria, including infection, nephrolithiasis, benign prostatic bleeding, and neoplasms of kidney, ureter, or bladder.  -- Recommended proceeding with full hematuria workup, to include CT urogram and cystoscopy  -- Will send urine for culture  -- CT urogram next available  -- Cystoscopy in clinic after CT with Dr. Otto

## 2022-11-07 NOTE — Clinical Note
Please schedule CT urogram followed by cysto with Dr. Otto. Please do not schedule the CT scan before Wednesday to ensure we have urine culture results back. Thanks

## 2022-11-08 LAB — BACTERIA UR CULT: NO GROWTH

## 2022-11-10 ENCOUNTER — PATIENT MESSAGE (OUTPATIENT)
Dept: UROLOGY | Facility: CLINIC | Age: 72
End: 2022-11-10
Payer: COMMERCIAL

## 2022-11-21 ENCOUNTER — HOSPITAL ENCOUNTER (OUTPATIENT)
Dept: RADIOLOGY | Facility: OTHER | Age: 72
Discharge: HOME | End: 2022-11-21
Attending: NURSE PRACTITIONER
Payer: COMMERCIAL

## 2022-11-21 DIAGNOSIS — R31.0 GROSS HEMATURIA: ICD-10-CM

## 2022-11-21 DIAGNOSIS — D18.09 SPINAL HEMANGIOMA: Primary | ICD-10-CM

## 2022-11-21 PROCEDURE — 74178 CT UROGRAM ABD PELVIS W WO: ICD-10-PCS | Mod: 26,,, | Performed by: RADIOLOGY

## 2022-11-21 PROCEDURE — 74178 CT ABD&PLV WO CNTR FLWD CNTR: CPT | Mod: 26,,, | Performed by: RADIOLOGY

## 2022-11-21 PROCEDURE — 25500020 PHARM REV CODE 255: Performed by: NURSE PRACTITIONER

## 2022-11-21 PROCEDURE — 74178 CT ABD&PLV WO CNTR FLWD CNTR: CPT | Mod: TC

## 2022-11-21 RX ADMIN — IOHEXOL 125 ML: 350 INJECTION, SOLUTION INTRAVENOUS at 12:11

## 2022-11-28 DIAGNOSIS — M51.34 DDD (DEGENERATIVE DISC DISEASE), THORACIC: Primary | ICD-10-CM

## 2022-11-28 NOTE — PROGRESS NOTES
DATE: 12/1/2022  PATIENT: Dustin Lauren    Supervising Physician: Frankie Perez M.D.    CHIEF COMPLAINT: T11 hemangioma    HISTORY:  Dustin Lauren is a 72 y.o. male here for initial evaluation of low back pain (Back - 0). The patient had a Urogram on 11/7/22 which revealed a T11 hemangioma and was referred to me for follow up. He states the hemangioma was found in 2005. The patient dose complain of intermittent low back and right posterior leg pain after lifting boxes while at work. He states the pain subsides with rest and tylenol. He currently attends a cardio rehab at  3 times a week which also helps with his low back pain. There is no associated numbness and tingling. There is no subjective weakness. Prior treatments have included tylenol, advil and PT, but no DAWSON or surgery.   The patient denies myelopathic symptoms such as handwriting changes or difficulty with buttons/coins/keys. Denies perineal paresthesias, bowel/bladder dysfunction.    PAST MEDICAL/SURGICAL HISTORY:  Past Medical History:   Diagnosis Date    A-fib     s/p cardioversion    Carpal tunnel syndrome     bilaterally    Cataract     Chronic LBP 11/8/2012    Chronic neck pain 11/8/2012    Coronary artery disease 03/24/2016    s/p 2 stents in RCA and ostium    Diverticulosis of colon     DJD (degenerative joint disease) of cervical spine 11/8/2012    DJD (degenerative joint disease) of lumbar spine 11/8/2012    DJD (degenerative joint disease) of thoracic spine 11/8/2012    GERD (gastroesophageal reflux disease)     Hyperlipidemia     hypertriglyceridemia    Hypertension     Hypothyroidism     s/p surgery    Nephrolithiasis, uric acid     stone evaluation showed uric acid and calcium oxalate    PONV (postoperative nausea and vomiting)     Rosacea     Thyroid disease     Ulcer     Unspecified disorder of kidney and ureter     Vitamin D deficiency disease      Past Surgical History:   Procedure Laterality Date    ABLATION OF  ARRHYTHMOGENIC FOCUS FOR ATRIAL FIBRILLATION N/A 9/16/2021    Procedure: Ablation atrial fibrillation;  Surgeon: Dhaval Rodríguez MD;  Location: [facility] EP LAB;  Service: Cardiology;  Laterality: N/A;  AF, HIEU (Cx if compliant and in SR), PVI, RFA, DAWSON, Gen, SK, 3 Prep    CARDIOVERSION      for AFib    CHOLECYSTECTOMY      COLONOSCOPY W/ BIOPSIES AND POLYPECTOMY  10/2013    CORONARY ANGIOPLASTY WITH STENT PLACEMENT      Stent in RCA and another stent in ostium    KNEE ARTHROSCOPY  10-13-14    right TKA    KNEE ARTHROSCOPY Left 10-7-15    ascope    KNEE ARTHROSCOPY W/ MENISCAL REPAIR Left 10/7/15    THYROIDECTOMY      TREATMENT OF CARDIAC ARRHYTHMIA  9/16/2021    Procedure: Cardioversion or Defibrillation;  Surgeon: Dhaval Rodríguez MD;  Location: [facility] EP LAB;  Service: Cardiology;;       Current Medications:   Current Outpatient Medications:     amlodipine-valsartan (EXFORGE) 5-160 mg per tablet, Take 1 tablet by mouth once daily., Disp: 90 tablet, Rfl: 3    apixaban (ELIQUIS) 5 mg Tab, Take 1 tablet (5 mg total) by mouth 2 (two) times daily., Disp: 30 tablet, Rfl: 11    atorvastatin (LIPITOR) 10 MG tablet, Take 10 mg by mouth once daily. Every other day, Disp: , Rfl:     cholecalciferol, vitamin D3, 25 mcg (1,000 unit) Chew, Take 2,000 Int'l Units by mouth., Disp: , Rfl:     fenofibrate micronized (LOFIBRA) 134 MG Cap, TAKE 1 CAPSULE (134 MG TOTAL) BY MOUTH DAILY WITH BREAKFAST. ., Disp: 90 capsule, Rfl: 1    finasteride (PROSCAR) 5 mg tablet, Take 5 mg by mouth once daily., Disp: , Rfl: 11    fluticasone propionate (FLONASE) 50 mcg/actuation nasal spray, 2 sprays by Each Nostril route once daily., Disp: , Rfl:     levothyroxine (SYNTHROID) 137 MCG Tab tablet, TAKE 1 TABLET BY MOUTH MON-SAT AND TAKE 1.5 TABLET BY MOUTH ON SUNDAY ONLY, Disp: 32 tablet, Rfl: 11    multivit-min/FA/lycopen/lutein (CENTRUM SILVER MEN ORAL), Take by mouth., Disp: , Rfl:     naftifine 2 % Crea, Apply 1 application topically daily as needed., Disp:  , Rfl:     nitroGLYCERIN (NITROSTAT) 0.3 MG SL tablet, DISSOLVE 1 TABLET UNDER THE TONGUE AS NEEDED FOR CHEST PAIN, Disp: , Rfl:     omega-3 fatty acids 1,000 mg Cap, Take 2,000 mg by mouth., Disp: , Rfl:     omeprazole (PRILOSEC) 40 MG capsule, Take 40 mg by mouth once daily., Disp: , Rfl: 2    prasugreL (EFFIENT) 10 mg Tab, Take 10 mg by mouth once daily., Disp: , Rfl:     tamsulosin (FLOMAX) 0.4 mg Cap, , Disp: , Rfl:     tiZANidine (ZANAFLEX) 4 MG tablet, Take 1 tablet (4 mg total) by mouth every 8 (eight) hours as needed (muscle spasms)., Disp: 50 tablet, Rfl: 2    Social History:   Social History     Socioeconomic History    Marital status:    Tobacco Use    Smoking status: Never    Smokeless tobacco: Never   Substance and Sexual Activity    Alcohol use: Yes     Alcohol/week: 0.0 standard drinks     Comment: rarely    Drug use: No     Social Determinants of Health     Financial Resource Strain: Unknown    Difficulty of Paying Living Expenses: Patient refused   Food Insecurity: Unknown    Worried About Running Out of Food in the Last Year: Patient refused    Ran Out of Food in the Last Year: Patient refused   Transportation Needs: Unknown    Lack of Transportation (Medical): Patient refused    Lack of Transportation (Non-Medical): Patient refused   Physical Activity: Sufficiently Active    Days of Exercise per Week: 3 days    Minutes of Exercise per Session: 70 min   Stress: No Stress Concern Present    Feeling of Stress : Only a little   Social Connections: Unknown    Frequency of Communication with Friends and Family: Patient refused    Frequency of Social Gatherings with Friends and Family: Patient refused    Active Member of Clubs or Organizations: Patient refused    Attends Club or Organization Meetings: Patient refused    Marital Status:    Housing Stability: Unknown    Unable to Pay for Housing in the Last Year: Patient refused    Unstable Housing in the Last Year: Patient refused  "      REVIEW OF SYSTEMS:  Constitution: Negative. Negative for chills, fever and night sweats.   Cardiovascular: Negative for chest pain and syncope.   Respiratory: Negative for cough and shortness of breath.   Gastrointestinal: See HPI. Negative for nausea/vomiting. Negative for abdominal pain.  Genitourinary: See HPI. Negative for discoloration or dysuria.  Skin: Negative for dry skin, itching and rash.   Hematologic/Lymphatic: Negative for bleeding problem. Does not bruise/bleed easily.   Musculoskeletal: Negative for falls and muscle weakness.   Neurological: See HPI. No seizures.   Endocrine: Negative for polydipsia, polyphagia and polyuria.   Allergic/Immunologic: Negative for hives and persistent infections.    PHYSICAL EXAMINATION:    Ht 5' 10" (1.778 m)   Wt 94.3 kg (208 lb 0.1 oz)   BMI 29.85 kg/m²     General: The patient is a very pleasant 72 y.o. male in no apparent distress, the patient is oriented to person, place and time.   Psych: Normal mood and affect  HEENT: Vision grossly intact, hearing intact to the spoken word.  Lungs: Respirations unlabored.  Gait: Normal station and gait, no difficulty with toe or heel walk.   Skin: Dorsal lumbar skin negative for rashes, lesions, hairy patches and surgical scars.  Range of motion: Lumbar range of motion is acceptable. There is mild lumbar tenderness to palpation.  Spinal Balance: Global saggital and coronal spinal balance acceptable, no significant for scoliosis and kyphosis.  Musculoskeletal: No pain with the range of motion of the bilateral hips. No trochanteric tenderness to palpation.  Vascular: Bilateral lower extremities warm and well perfused, Dorsalis pedis pulses 2+ bilaterally.  Neurological: Normal strength and tone in all major motor groups in the bilateral lower extremities. Normal sensation to light touch in the L2-S1 dermatomes bilaterally.  Deep tendon reflexes symmetric 2+ in the bilateral lower extremities.  Negative Babinski " bilaterally.  Straight leg raise negative bilaterally.     IMAGING:   Today I personally reviewed AP, Lat and Flex/Ex upright L-spine films that demonstrate moderate DDD at L5/S1 with mild DDD throughout.      CT urogram abd pelvis shows T11 hemangioma.    Body mass index is 29.85 kg/m².    Hemoglobin A1C   Date Value Ref Range Status   08/11/2022 5.7 (H) 4.0 - 5.6 % Final     Comment:     ADA Screening Guidelines:  5.7-6.4%  Consistent with prediabetes  >or=6.5%  Consistent with diabetes    High levels of fetal hemoglobin interfere with the HbA1C  assay. Heterozygous hemoglobin variants (HbS, HgC, etc)do  not significantly interfere with this assay.   However, presence of multiple variants may affect accuracy.     01/07/2022 5.7 (H) 4.0 - 5.6 % Final     Comment:     ADA Screening Guidelines:  5.7-6.4%  Consistent with prediabetes  >or=6.5%  Consistent with diabetes    High levels of fetal hemoglobin interfere with the HbA1C  assay. Heterozygous hemoglobin variants (HbS, HgC, etc)do  not significantly interfere with this assay.   However, presence of multiple variants may affect accuracy.     05/25/2021 5.7 (H) 4.0 - 5.6 % Final     Comment:     ADA Screening Guidelines:  5.7-6.4%  Consistent with prediabetes  >or=6.5%  Consistent with diabetes    High levels of fetal hemoglobin interfere with the HbA1C  assay. Heterozygous hemoglobin variants (HbS, HgC, etc)do  not significantly interfere with this assay.   However, presence of multiple variants may affect accuracy.           ASSESSMENT/PLAN:    Dustin was seen today for mid-back pain.    Diagnoses and all orders for this visit:    Chronic bilateral low back pain without sciatica  -     tiZANidine (ZANAFLEX) 4 MG tablet; Take 1 tablet (4 mg total) by mouth every 8 (eight) hours as needed (muscle spasms).    Spinal hemangioma  -     Ambulatory referral[facility]consult to [facility]    Today we discussed at length all of the different treatment options including  anti-inflammatories, acetaminophen, rest, ice, heat, physical therapy including strengthening and stretching exercises, home exercises, ROM, aerobic conditioning, aqua therapy, other modalities including ultrasound, massage, and dry needling, epidural steroid injections and finally surgical intervention.    Zanaflex sent to the pharmacy. The patient may follow up as needed if his back pain persists. I have scheduled the patient in the clinic with Dr. Gibbs for bilateral knee pain.

## 2022-12-01 ENCOUNTER — HOSPITAL ENCOUNTER (OUTPATIENT)
Dept: RADIOLOGY | Facility: HOSPITAL | Age: 72
Discharge: HOME | End: 2022-12-01
Attending: REGISTERED NURSE
Payer: COMMERCIAL

## 2022-12-01 ENCOUNTER — OFFICE VISIT (OUTPATIENT)
Dept: ORTHOPEDICS | Facility: CLINIC | Age: 72
End: 2022-12-01
Payer: COMMERCIAL

## 2022-12-01 VITALS — HEIGHT: 70 IN | BODY MASS INDEX: 29.78 KG/M2 | WEIGHT: 208 LBS

## 2022-12-01 DIAGNOSIS — M54.50 CHRONIC BILATERAL LOW BACK PAIN WITHOUT SCIATICA: Primary | ICD-10-CM

## 2022-12-01 DIAGNOSIS — D18.09 SPINAL HEMANGIOMA: ICD-10-CM

## 2022-12-01 DIAGNOSIS — G89.29 CHRONIC BILATERAL LOW BACK PAIN WITHOUT SCIATICA: Primary | ICD-10-CM

## 2022-12-01 DIAGNOSIS — M51.34 DDD (DEGENERATIVE DISC DISEASE), THORACIC: ICD-10-CM

## 2022-12-01 PROCEDURE — 4010F PR ACE/ARB THEARPY RXD/TAKEN: ICD-10-PCS | Mod: CPTII,S$GLB,, | Performed by: REGISTERED NURSE

## 2022-12-01 PROCEDURE — 3008F BODY MASS INDEX DOCD: CPT | Mod: CPTII,S$GLB,, | Performed by: REGISTERED NURSE

## 2022-12-01 PROCEDURE — 99214 OFFICE O/P EST MOD 30 MIN: CPT | Mod: S$GLB,,, | Performed by: REGISTERED NURSE

## 2022-12-01 PROCEDURE — 72080 XR THORACOLUMBAR SPINE AP LATERAL: ICD-10-PCS | Mod: 26,,, | Performed by: RADIOLOGY

## 2022-12-01 PROCEDURE — 1159F MED LIST DOCD IN RCRD: CPT | Mod: CPTII,S$GLB,, | Performed by: REGISTERED NURSE

## 2022-12-01 PROCEDURE — 99214 PR OFFICE/OUTPT VISIT, EST, LEVL IV, 30-39 MIN: ICD-10-PCS | Mod: S$GLB,,, | Performed by: REGISTERED NURSE

## 2022-12-01 PROCEDURE — 1125F AMNT PAIN NOTED PAIN PRSNT: CPT | Mod: CPTII,S$GLB,, | Performed by: REGISTERED NURSE

## 2022-12-01 PROCEDURE — 4010F ACE/ARB THERAPY RXD/TAKEN: CPT | Mod: CPTII,S$GLB,, | Performed by: REGISTERED NURSE

## 2022-12-01 PROCEDURE — 72080 X-RAY EXAM THORACOLMB 2/> VW: CPT | Mod: TC

## 2022-12-01 PROCEDURE — 3044F PR MOST RECENT HEMOGLOBIN A1C LEVEL <7.0%: ICD-10-PCS | Mod: CPTII,S$GLB,, | Performed by: REGISTERED NURSE

## 2022-12-01 PROCEDURE — 72080 X-RAY EXAM THORACOLMB 2/> VW: CPT | Mod: 26,,, | Performed by: RADIOLOGY

## 2022-12-01 PROCEDURE — 99999 PR PBB SHADOW E&M-EST. PATIENT-LVL IV: ICD-10-PCS | Mod: PBBFAC,,, | Performed by: REGISTERED NURSE

## 2022-12-01 PROCEDURE — 1159F PR MEDICATION LIST DOCUMENTED IN MEDICAL RECORD: ICD-10-PCS | Mod: CPTII,S$GLB,, | Performed by: REGISTERED NURSE

## 2022-12-01 PROCEDURE — 3044F HG A1C LEVEL LT 7.0%: CPT | Mod: CPTII,S$GLB,, | Performed by: REGISTERED NURSE

## 2022-12-01 PROCEDURE — 1125F PR PAIN SEVERITY QUANTIFIED, PAIN PRESENT: ICD-10-PCS | Mod: CPTII,S$GLB,, | Performed by: REGISTERED NURSE

## 2022-12-01 PROCEDURE — 99999 PR PBB SHADOW E&M-EST. PATIENT-LVL IV: CPT | Mod: PBBFAC,,, | Performed by: REGISTERED NURSE

## 2022-12-01 PROCEDURE — 3008F PR BODY MASS INDEX (BMI) DOCUMENTED: ICD-10-PCS | Mod: CPTII,S$GLB,, | Performed by: REGISTERED NURSE

## 2022-12-01 RX ORDER — TIZANIDINE 4 MG/1
4 TABLET ORAL EVERY 8 HOURS PRN
Qty: 50 TABLET | Refills: 2 | Status: SHIPPED | OUTPATIENT
Start: 2022-12-01

## 2022-12-09 ENCOUNTER — PROCEDURE VISIT (OUTPATIENT)
Dept: UROLOGY | Facility: CLINIC | Age: 72
End: 2022-12-09
Payer: COMMERCIAL

## 2022-12-09 VITALS — SYSTOLIC BLOOD PRESSURE: 142 MMHG | HEART RATE: 53 BPM | DIASTOLIC BLOOD PRESSURE: 66 MMHG

## 2022-12-09 DIAGNOSIS — R31.0 GROSS HEMATURIA: ICD-10-CM

## 2022-12-09 LAB
BILIRUB SERPL-MCNC: NEGATIVE MG/DL
BLOOD URINE, POC: 50
CLARITY, POC UA: CLEAR
COLOR, POC UA: YELLOW
GLUCOSE UR QL STRIP: NORMAL
KETONES UR QL STRIP: NEGATIVE
LEUKOCYTE ESTERASE URINE, POC: NEGATIVE
NITRITE, POC UA: NEGATIVE
PH, POC UA: 8
PROTEIN, POC: NEGATIVE
SPECIFIC GRAVITY, POC UA: 1
UROBILINOGEN, POC UA: NORMAL

## 2022-12-09 PROCEDURE — 81002 URINALYSIS NONAUTO W/O SCOPE: CPT | Mod: S$GLB,,, | Performed by: UROLOGY

## 2022-12-09 PROCEDURE — 52000 CYSTOURETHROSCOPY: CPT | Mod: S$GLB,,, | Performed by: UROLOGY

## 2022-12-09 PROCEDURE — 81002 POCT URINE DIPSTICK WITHOUT MICROSCOPE: ICD-10-PCS | Mod: S$GLB,,, | Performed by: UROLOGY

## 2022-12-09 PROCEDURE — 52000 CYSTOSCOPY: ICD-10-PCS | Mod: S$GLB,,, | Performed by: UROLOGY

## 2022-12-09 RX ORDER — LIDOCAINE HYDROCHLORIDE 20 MG/ML
JELLY TOPICAL
Status: COMPLETED | OUTPATIENT
Start: 2022-12-09 | End: 2022-12-09

## 2022-12-09 RX ORDER — CIPROFLOXACIN 250 MG/1
500 TABLET, FILM COATED ORAL
Status: COMPLETED | OUTPATIENT
Start: 2022-12-09 | End: 2022-12-09

## 2022-12-09 RX ADMIN — CIPROFLOXACIN 500 MG: 250 TABLET, FILM COATED ORAL at 03:12

## 2022-12-09 RX ADMIN — LIDOCAINE HYDROCHLORIDE 1 ML: 20 JELLY TOPICAL at 03:12

## 2022-12-09 NOTE — PROCEDURES
Cystoscopy    Date/Time: 12/9/2022 2:08 PM  Performed by: Isak Otto MD  Authorized by: Katlyn Brody NP     Consent Done?:  Yes (Written)  Timeout: prior to procedure the correct patient, procedure, and site was verified    Prep: patient was prepped and draped in usual sterile fashion    Local anesthesia used?: Yes    Anesthesia:  Lidocaine jelly  Indications: hematuria    Position:  Supine  Anesthesia:  Lidocaine jelly  Patient sedated?: No    Preparation: Patient was prepped and draped in usual sterile fashion    Scope type:  Flexible cystoscope  External exam normal: Yes    Urethra normal: Yes    Prostate normal: No     Hyperplasia   Positive Varices  Bladder normal: Yes     patient tolerated the procedure well with no immediate complications  Comments:      Still with intermittent small amount of initial hematuria; most of the urinary stream is clear  No clots  On 2 anticoagulaant    Ct urogram neg upper tracts    Hematuria likely related to prostatic varices and anticoagulation    Cont Proscar which will minimize neovascularity in the prostate    Pt is seeing his cardiologist, Dr Rodríguez, to discuss his anticoagulation.  He says Dr Rodríguez had mentioned getting him off anticoagulation previously    Stool softeners  Avoid heavy lifting  If hematuria recurs, we can consier TURP.   Hopefully the hematuria will resolve spontaneously if he is able to stop anticoagulation.

## 2023-01-02 ENCOUNTER — PATIENT MESSAGE (OUTPATIENT)
Dept: UROLOGY | Facility: CLINIC | Age: 73
End: 2023-01-02
Payer: COMMERCIAL

## 2023-01-03 ENCOUNTER — LAB VISIT (OUTPATIENT)
Dept: LAB | Facility: HOSPITAL | Age: 73
End: 2023-01-03
Payer: COMMERCIAL

## 2023-01-03 DIAGNOSIS — I48.19 PERSISTENT ATRIAL FIBRILLATION: ICD-10-CM

## 2023-01-03 DIAGNOSIS — R73.02 IMPAIRED GLUCOSE TOLERANCE: ICD-10-CM

## 2023-01-03 DIAGNOSIS — R35.0 BENIGN PROSTATIC HYPERPLASIA WITH URINARY FREQUENCY: ICD-10-CM

## 2023-01-03 DIAGNOSIS — N40.1 BENIGN PROSTATIC HYPERPLASIA WITH URINARY FREQUENCY: ICD-10-CM

## 2023-01-03 DIAGNOSIS — I25.10 CORONARY ARTERY DISEASE, UNSPECIFIED VESSEL OR LESION TYPE, UNSPECIFIED WHETHER ANGINA PRESENT, UNSPECIFIED WHETHER NATIVE OR TRANSPLANTED HEART: ICD-10-CM

## 2023-01-03 DIAGNOSIS — Z98.890 HISTORY OF RADIOFREQUENCY ABLATION (RFA) PROCEDURE FOR CARDIAC ARRHYTHMIA: ICD-10-CM

## 2023-01-03 DIAGNOSIS — I10 PRIMARY HYPERTENSION: ICD-10-CM

## 2023-01-03 DIAGNOSIS — Z00.00 PREVENTATIVE HEALTH CARE: ICD-10-CM

## 2023-01-03 DIAGNOSIS — R25.3 EYELID TWITCH: ICD-10-CM

## 2023-01-03 DIAGNOSIS — E89.0 POSTSURGICAL HYPOTHYROIDISM: ICD-10-CM

## 2023-01-03 DIAGNOSIS — E78.5 HYPERLIPIDEMIA, UNSPECIFIED HYPERLIPIDEMIA TYPE: ICD-10-CM

## 2023-01-03 DIAGNOSIS — I49.5 TACHY-BRADY SYNDROME: ICD-10-CM

## 2023-01-03 DIAGNOSIS — Z95.5 STENTED CORONARY ARTERY: ICD-10-CM

## 2023-01-03 LAB
25(OH)D3+25(OH)D2 SERPL-MCNC: 30 NG/ML (ref 30–96)
ALBUMIN SERPL BCP-MCNC: 4.1 G/DL (ref 3.5–5.2)
ALBUMIN SERPL BCP-MCNC: 4.1 G/DL (ref 3.5–5.2)
ALP SERPL-CCNC: 36 U/L (ref 55–135)
ALP SERPL-CCNC: 36 U/L (ref 55–135)
ALT SERPL W/O P-5'-P-CCNC: 16 U/L (ref 10–44)
ALT SERPL W/O P-5'-P-CCNC: 16 U/L (ref 10–44)
ANION GAP SERPL CALC-SCNC: 8 MMOL/L (ref 8–16)
ANION GAP SERPL CALC-SCNC: 8 MMOL/L (ref 8–16)
AST SERPL-CCNC: 21 U/L (ref 10–40)
AST SERPL-CCNC: 21 U/L (ref 10–40)
BASOPHILS # BLD AUTO: 0.05 K/UL (ref 0–0.2)
BASOPHILS NFR BLD: 1.3 % (ref 0–1.9)
BILIRUB SERPL-MCNC: 0.5 MG/DL (ref 0.1–1)
BILIRUB SERPL-MCNC: 0.5 MG/DL (ref 0.1–1)
BUN SERPL-MCNC: 19 MG/DL (ref 8–23)
BUN SERPL-MCNC: 19 MG/DL (ref 8–23)
CALCIUM SERPL-MCNC: 9.9 MG/DL (ref 8.7–10.5)
CALCIUM SERPL-MCNC: 9.9 MG/DL (ref 8.7–10.5)
CHLORIDE SERPL-SCNC: 105 MMOL/L (ref 95–110)
CHLORIDE SERPL-SCNC: 105 MMOL/L (ref 95–110)
CHOLEST SERPL-MCNC: 127 MG/DL (ref 120–199)
CHOLEST/HDLC SERPL: 3 {RATIO} (ref 2–5)
CO2 SERPL-SCNC: 26 MMOL/L (ref 23–29)
CO2 SERPL-SCNC: 26 MMOL/L (ref 23–29)
COMPLEXED PSA SERPL-MCNC: 1.5 NG/ML (ref 0–4)
CREAT SERPL-MCNC: 1.1 MG/DL (ref 0.5–1.4)
CREAT SERPL-MCNC: 1.1 MG/DL (ref 0.5–1.4)
DIFFERENTIAL METHOD: ABNORMAL
EOSINOPHIL # BLD AUTO: 0.2 K/UL (ref 0–0.5)
EOSINOPHIL NFR BLD: 4.5 % (ref 0–8)
ERYTHROCYTE [DISTWIDTH] IN BLOOD BY AUTOMATED COUNT: 13.6 % (ref 11.5–14.5)
EST. GFR  (NO RACE VARIABLE): >60 ML/MIN/1.73 M^2
EST. GFR  (NO RACE VARIABLE): >60 ML/MIN/1.73 M^2
ESTIMATED AVG GLUCOSE: 117 MG/DL (ref 68–131)
GLUCOSE SERPL-MCNC: 97 MG/DL (ref 70–110)
GLUCOSE SERPL-MCNC: 97 MG/DL (ref 70–110)
HBA1C MFR BLD: 5.7 % (ref 4–5.6)
HCT VFR BLD AUTO: 41.6 % (ref 40–54)
HDLC SERPL-MCNC: 43 MG/DL (ref 40–75)
HDLC SERPL: 33.9 % (ref 20–50)
HGB BLD-MCNC: 13.4 G/DL (ref 14–18)
IMM GRANULOCYTES # BLD AUTO: 0.01 K/UL (ref 0–0.04)
IMM GRANULOCYTES NFR BLD AUTO: 0.3 % (ref 0–0.5)
LDLC SERPL CALC-MCNC: 71.4 MG/DL (ref 63–159)
LYMPHOCYTES # BLD AUTO: 1.8 K/UL (ref 1–4.8)
LYMPHOCYTES NFR BLD: 46.3 % (ref 18–48)
MCH RBC QN AUTO: 29.2 PG (ref 27–31)
MCHC RBC AUTO-ENTMCNC: 32.2 G/DL (ref 32–36)
MCV RBC AUTO: 91 FL (ref 82–98)
MONOCYTES # BLD AUTO: 0.4 K/UL (ref 0.3–1)
MONOCYTES NFR BLD: 9.3 % (ref 4–15)
NEUTROPHILS # BLD AUTO: 1.5 K/UL (ref 1.8–7.7)
NEUTROPHILS NFR BLD: 38.3 % (ref 38–73)
NONHDLC SERPL-MCNC: 84 MG/DL
NRBC BLD-RTO: 0 /100 WBC
PLATELET # BLD AUTO: 237 K/UL (ref 150–450)
PMV BLD AUTO: 10.7 FL (ref 9.2–12.9)
POTASSIUM SERPL-SCNC: 4.1 MMOL/L (ref 3.5–5.1)
POTASSIUM SERPL-SCNC: 4.1 MMOL/L (ref 3.5–5.1)
PROT SERPL-MCNC: 7.2 G/DL (ref 6–8.4)
PROT SERPL-MCNC: 7.2 G/DL (ref 6–8.4)
RBC # BLD AUTO: 4.59 M/UL (ref 4.6–6.2)
SODIUM SERPL-SCNC: 139 MMOL/L (ref 136–145)
SODIUM SERPL-SCNC: 139 MMOL/L (ref 136–145)
T4 FREE SERPL-MCNC: 1.02 NG/DL (ref 0.71–1.51)
TRIGL SERPL-MCNC: 63 MG/DL (ref 30–150)
TSH SERPL DL<=0.005 MIU/L-ACNC: 5.23 UIU/ML (ref 0.4–4)
WBC # BLD AUTO: 3.78 K/UL (ref 3.9–12.7)

## 2023-01-03 PROCEDURE — 84439 ASSAY OF FREE THYROXINE: CPT | Performed by: FAMILY MEDICINE

## 2023-01-03 PROCEDURE — 83036 HEMOGLOBIN GLYCOSYLATED A1C: CPT | Performed by: FAMILY MEDICINE

## 2023-01-03 PROCEDURE — 80053 COMPREHEN METABOLIC PANEL: CPT | Performed by: INTERNAL MEDICINE

## 2023-01-03 PROCEDURE — 80061 LIPID PANEL: CPT | Performed by: FAMILY MEDICINE

## 2023-01-03 PROCEDURE — 85025 COMPLETE CBC W/AUTO DIFF WBC: CPT | Performed by: FAMILY MEDICINE

## 2023-01-03 PROCEDURE — 84443 ASSAY THYROID STIM HORMONE: CPT | Performed by: FAMILY MEDICINE

## 2023-01-03 PROCEDURE — 82306 VITAMIN D 25 HYDROXY: CPT | Performed by: FAMILY MEDICINE

## 2023-01-03 PROCEDURE — 36415 COLL VENOUS BLD VENIPUNCTURE: CPT | Mod: PO | Performed by: FAMILY MEDICINE

## 2023-01-03 PROCEDURE — 84153 ASSAY OF PSA TOTAL: CPT | Performed by: FAMILY MEDICINE

## 2023-01-09 ENCOUNTER — OFFICE VISIT (OUTPATIENT)
Dept: INTERNAL MEDICINE | Facility: CLINIC | Age: 73
End: 2023-01-09
Payer: COMMERCIAL

## 2023-01-09 VITALS
SYSTOLIC BLOOD PRESSURE: 116 MMHG | OXYGEN SATURATION: 99 % | DIASTOLIC BLOOD PRESSURE: 60 MMHG | WEIGHT: 210.75 LBS | RESPIRATION RATE: 16 BRPM | HEART RATE: 50 BPM | TEMPERATURE: 98 F | HEIGHT: 70 IN | BODY MASS INDEX: 30.17 KG/M2

## 2023-01-09 DIAGNOSIS — R73.02 IMPAIRED GLUCOSE TOLERANCE: ICD-10-CM

## 2023-01-09 DIAGNOSIS — E78.5 HYPERLIPIDEMIA, UNSPECIFIED HYPERLIPIDEMIA TYPE: ICD-10-CM

## 2023-01-09 DIAGNOSIS — I49.5 TACHY-BRADY SYNDROME: ICD-10-CM

## 2023-01-09 DIAGNOSIS — I48.19 PERSISTENT ATRIAL FIBRILLATION: ICD-10-CM

## 2023-01-09 DIAGNOSIS — R35.0 BENIGN PROSTATIC HYPERPLASIA WITH URINARY FREQUENCY: ICD-10-CM

## 2023-01-09 DIAGNOSIS — Z95.5 STENTED CORONARY ARTERY: ICD-10-CM

## 2023-01-09 DIAGNOSIS — Z00.01 ENCOUNTER FOR GENERAL ADULT MEDICAL EXAMINATION WITH ABNORMAL FINDINGS: Primary | ICD-10-CM

## 2023-01-09 DIAGNOSIS — E89.0 POSTSURGICAL HYPOTHYROIDISM: ICD-10-CM

## 2023-01-09 DIAGNOSIS — I25.10 CORONARY ARTERY DISEASE, UNSPECIFIED VESSEL OR LESION TYPE, UNSPECIFIED WHETHER ANGINA PRESENT, UNSPECIFIED WHETHER NATIVE OR TRANSPLANTED HEART: ICD-10-CM

## 2023-01-09 DIAGNOSIS — N40.1 BENIGN PROSTATIC HYPERPLASIA WITH URINARY FREQUENCY: ICD-10-CM

## 2023-01-09 DIAGNOSIS — Z98.890 HISTORY OF RADIOFREQUENCY ABLATION (RFA) PROCEDURE FOR CARDIAC ARRHYTHMIA: ICD-10-CM

## 2023-01-09 DIAGNOSIS — I10 PRIMARY HYPERTENSION: ICD-10-CM

## 2023-01-09 PROBLEM — Z86.16 HISTORY OF COVID-19: Status: ACTIVE | Noted: 2022-07-14

## 2023-01-09 PROCEDURE — 3074F PR MOST RECENT SYSTOLIC BLOOD PRESSURE < 130 MM HG: ICD-10-PCS | Mod: CPTII,S$GLB,, | Performed by: FAMILY MEDICINE

## 2023-01-09 PROCEDURE — 3078F DIAST BP <80 MM HG: CPT | Mod: CPTII,S$GLB,, | Performed by: FAMILY MEDICINE

## 2023-01-09 PROCEDURE — 3008F BODY MASS INDEX DOCD: CPT | Mod: CPTII,S$GLB,, | Performed by: FAMILY MEDICINE

## 2023-01-09 PROCEDURE — 3074F SYST BP LT 130 MM HG: CPT | Mod: CPTII,S$GLB,, | Performed by: FAMILY MEDICINE

## 2023-01-09 PROCEDURE — 1159F PR MEDICATION LIST DOCUMENTED IN MEDICAL RECORD: ICD-10-PCS | Mod: CPTII,S$GLB,, | Performed by: FAMILY MEDICINE

## 2023-01-09 PROCEDURE — 3044F PR MOST RECENT HEMOGLOBIN A1C LEVEL <7.0%: ICD-10-PCS | Mod: CPTII,S$GLB,, | Performed by: FAMILY MEDICINE

## 2023-01-09 PROCEDURE — 1160F RVW MEDS BY RX/DR IN RCRD: CPT | Mod: CPTII,S$GLB,, | Performed by: FAMILY MEDICINE

## 2023-01-09 PROCEDURE — 99999 PR PBB SHADOW E&M-EST. PATIENT-LVL V: CPT | Mod: PBBFAC,,, | Performed by: FAMILY MEDICINE

## 2023-01-09 PROCEDURE — 1126F AMNT PAIN NOTED NONE PRSNT: CPT | Mod: CPTII,S$GLB,, | Performed by: FAMILY MEDICINE

## 2023-01-09 PROCEDURE — 1126F PR PAIN SEVERITY QUANTIFIED, NO PAIN PRESENT: ICD-10-PCS | Mod: CPTII,S$GLB,, | Performed by: FAMILY MEDICINE

## 2023-01-09 PROCEDURE — 99397 PER PM REEVAL EST PAT 65+ YR: CPT | Mod: S$GLB,,, | Performed by: FAMILY MEDICINE

## 2023-01-09 PROCEDURE — 99999 PR PBB SHADOW E&M-EST. PATIENT-LVL V: ICD-10-PCS | Mod: PBBFAC,,, | Performed by: FAMILY MEDICINE

## 2023-01-09 PROCEDURE — 99397 PR PREVENTIVE VISIT,EST,65 & OVER: ICD-10-PCS | Mod: S$GLB,,, | Performed by: FAMILY MEDICINE

## 2023-01-09 PROCEDURE — 3078F PR MOST RECENT DIASTOLIC BLOOD PRESSURE < 80 MM HG: ICD-10-PCS | Mod: CPTII,S$GLB,, | Performed by: FAMILY MEDICINE

## 2023-01-09 PROCEDURE — 3008F PR BODY MASS INDEX (BMI) DOCUMENTED: ICD-10-PCS | Mod: CPTII,S$GLB,, | Performed by: FAMILY MEDICINE

## 2023-01-09 PROCEDURE — 1159F MED LIST DOCD IN RCRD: CPT | Mod: CPTII,S$GLB,, | Performed by: FAMILY MEDICINE

## 2023-01-09 PROCEDURE — 1160F PR REVIEW ALL MEDS BY PRESCRIBER/CLIN PHARMACIST DOCUMENTED: ICD-10-PCS | Mod: CPTII,S$GLB,, | Performed by: FAMILY MEDICINE

## 2023-01-09 PROCEDURE — 3044F HG A1C LEVEL LT 7.0%: CPT | Mod: CPTII,S$GLB,, | Performed by: FAMILY MEDICINE

## 2023-01-09 RX ORDER — AMLODIPINE AND VALSARTAN 5; 160 MG/1; MG/1
1 TABLET ORAL DAILY
Qty: 90 TABLET | Refills: 3 | Status: SHIPPED | OUTPATIENT
Start: 2023-01-09 | End: 2024-01-12 | Stop reason: SDUPTHER

## 2023-01-09 NOTE — PROGRESS NOTES
Subjective:       Patient ID: Dustin Lauren is a 72 y.o. male.    Chief Complaint: Annual Exam    HPI 72-year-old white male presents to clinic today for annual physical exam.  He has been followed by Dr. Vo, Cardiology secondary to coronary artery disease, atrial fibrillation, and hypertension.  His cardiologist has retired and he is scheduled to see a new cardiologist.  He is status post atrial ablation and remains stable at this time.  He continues to take Eliquis 5 mg b.i.d., Effient 10 mg daily, and Exforge 5/160 mg daily.  Hyperlipidemia is well controlled on Lipitor 10 mg daily and fenofibrate 134 mg daily.  He continues to be followed by urology for treatment of BPH which remains stable on Flomax 0.4 mg daily and finasteride 5 mg daily.  He has recently been evaluated by urology secondary to hematuria which remains stable.  He continues to be followed by Endocrinology for treatment of postsurgical hypothyroidism.  Current TSH returned elevated.  Impaired fasting glucose remains stable with A1c of 5.7.  He has a past surgical history of cholecystectomy, thyroidectomy, left knee arthroscopic meniscus repair, right knee replacement, coronary angioplasty with stent placement, and atrial ablation.  He has a strong family history of arthritis, cancer, heart disease, and thalassemia.  He is up-to-date with all screening exams and vaccinations.  Review of Systems   Constitutional:  Negative for activity change, appetite change, chills, fatigue, fever and unexpected weight change.   HENT:  Negative for nasal congestion, ear pain, hearing loss, postnasal drip, rhinorrhea, sinus pressure/congestion, sore throat, tinnitus and trouble swallowing.    Eyes:  Negative for discharge, redness, itching and visual disturbance.   Respiratory:  Negative for cough, chest tightness, shortness of breath and wheezing.    Cardiovascular:  Negative for chest pain and palpitations.   Gastrointestinal:  Negative for abdominal  pain, blood in stool, constipation, diarrhea, nausea and vomiting.   Endocrine: Negative for polydipsia and polyuria.   Genitourinary:  Positive for hematuria and urgency. Negative for decreased urine volume, difficulty urinating, dysuria and frequency.   Musculoskeletal:  Positive for arthralgias and joint swelling. Negative for back pain, myalgias, neck pain and neck stiffness.   Integumentary:  Negative for rash.   Neurological:  Negative for dizziness, weakness, light-headedness and headaches.   Psychiatric/Behavioral: Negative.  Negative for confusion and dysphoric mood.        Objective:      Physical Exam  Vitals and nursing note reviewed.   Constitutional:       General: He is not in acute distress.     Appearance: Normal appearance. He is well-developed. He is not diaphoretic.   HENT:      Head: Normocephalic and atraumatic.      Right Ear: External ear normal.      Left Ear: External ear normal.      Nose: Nose normal.      Mouth/Throat:      Pharynx: No oropharyngeal exudate.   Eyes:      General: No scleral icterus.        Right eye: No discharge.         Left eye: No discharge.      Conjunctiva/sclera: Conjunctivae normal.      Pupils: Pupils are equal, round, and reactive to light.   Neck:      Thyroid: No thyromegaly.      Vascular: No JVD.      Trachea: No tracheal deviation.   Cardiovascular:      Rate and Rhythm: Normal rate and regular rhythm.      Heart sounds: Normal heart sounds. No murmur heard.    No friction rub. No gallop.   Pulmonary:      Effort: Pulmonary effort is normal. No respiratory distress.      Breath sounds: Normal breath sounds. No stridor. No wheezing, rhonchi or rales.   Chest:      Chest wall: No tenderness.   Abdominal:      General: Bowel sounds are normal. There is no distension.      Palpations: Abdomen is soft. There is no mass.      Tenderness: There is no abdominal tenderness. There is no guarding or rebound.   Musculoskeletal:         General: No tenderness. Normal  range of motion.      Cervical back: Normal range of motion and neck supple.   Lymphadenopathy:      Cervical: No cervical adenopathy.   Skin:     General: Skin is warm and dry.      Coloration: Skin is not pale.      Findings: No erythema or rash.   Neurological:      Mental Status: He is alert and oriented to person, place, and time.   Psychiatric:         Mood and Affect: Mood normal.         Behavior: Behavior normal.         Thought Content: Thought content normal.         Judgment: Judgment normal.       Assessment:       Problem List Items Addressed This Visit       Benign prostatic hyperplasia with urinary frequency    Coronary artery disease    History of radiofrequency ablation (RFA) procedure for cardiac arrhythmia    Hyperlipidemia    Hypertension    Impaired glucose tolerance    Persistent atrial fibrillation    Postsurgical hypothyroidism    Stented coronary artery    Tachy-darek syndrome     Other Visit Diagnoses       Encounter for general adult medical examination with abnormal findings    -  Primary              Plan:         1. Labs have been reviewed and are overall within normal limits except for an elevated TSH level.    2. Continue Effient 10 mg daily, Eliquis 5 mg b.i.d., and Exforge 5/160 mg daily.  Coronary artery disease, atrial fibrillation, tachy-darek syndrome, and hypertension remains stable.  Follow-up with Cardiology as scheduled.  3. Continue Lipitor 10 mg daily and fenofibrate 134 mg daily.  Hyperlipidemia is well controlled.    4. Continue levothyroxine as prescribed and follow-up with endocrinology as scheduled for further recommendations of dosing of levothyroxine secondary to recent TSH level returning elevated.    5. Continue diet and exercise impaired fasting glucose remained stable with A1c of 5.7.    6. Continue finasteride and Flomax as prescribed.  BPH remains stable.  Continue follow-up with urology as scheduled.    7. Return to clinic as needed or in 1 year for annual  physical exam.

## 2023-01-12 DIAGNOSIS — M25.561 PAIN IN BOTH KNEES, UNSPECIFIED CHRONICITY: Primary | ICD-10-CM

## 2023-01-12 DIAGNOSIS — M25.562 PAIN IN BOTH KNEES, UNSPECIFIED CHRONICITY: Primary | ICD-10-CM

## 2023-01-23 ENCOUNTER — OFFICE VISIT (OUTPATIENT)
Dept: ORTHOPEDICS | Facility: CLINIC | Age: 73
End: 2023-01-23
Payer: COMMERCIAL

## 2023-01-23 ENCOUNTER — HOSPITAL ENCOUNTER (OUTPATIENT)
Dept: RADIOLOGY | Facility: HOSPITAL | Age: 73
Discharge: HOME | End: 2023-01-23
Attending: ORTHOPAEDIC SURGERY
Payer: COMMERCIAL

## 2023-01-23 VITALS — WEIGHT: 209.88 LBS | BODY MASS INDEX: 30.05 KG/M2 | HEIGHT: 70 IN

## 2023-01-23 DIAGNOSIS — M25.562 PAIN IN BOTH KNEES, UNSPECIFIED CHRONICITY: ICD-10-CM

## 2023-01-23 DIAGNOSIS — M17.10 ARTHRITIS OF KNEE: Primary | ICD-10-CM

## 2023-01-23 DIAGNOSIS — M25.561 PAIN IN BOTH KNEES, UNSPECIFIED CHRONICITY: ICD-10-CM

## 2023-01-23 PROCEDURE — 3288F PR FALLS RISK ASSESSMENT DOCUMENTED: ICD-10-PCS | Mod: CPTII,S$GLB,, | Performed by: ORTHOPAEDIC SURGERY

## 2023-01-23 PROCEDURE — 1126F PR PAIN SEVERITY QUANTIFIED, NO PAIN PRESENT: ICD-10-PCS | Mod: CPTII,S$GLB,, | Performed by: ORTHOPAEDIC SURGERY

## 2023-01-23 PROCEDURE — 3044F PR MOST RECENT HEMOGLOBIN A1C LEVEL <7.0%: ICD-10-PCS | Mod: CPTII,S$GLB,, | Performed by: ORTHOPAEDIC SURGERY

## 2023-01-23 PROCEDURE — 4010F PR ACE/ARB THEARPY RXD/TAKEN: ICD-10-PCS | Mod: CPTII,S$GLB,, | Performed by: ORTHOPAEDIC SURGERY

## 2023-01-23 PROCEDURE — 3008F BODY MASS INDEX DOCD: CPT | Mod: CPTII,S$GLB,, | Performed by: ORTHOPAEDIC SURGERY

## 2023-01-23 PROCEDURE — 73562 X-RAY EXAM OF KNEE 3: CPT | Mod: 26,50,, | Performed by: RADIOLOGY

## 2023-01-23 PROCEDURE — 4010F ACE/ARB THERAPY RXD/TAKEN: CPT | Mod: CPTII,S$GLB,, | Performed by: ORTHOPAEDIC SURGERY

## 2023-01-23 PROCEDURE — 99999 PR PBB SHADOW E&M-EST. PATIENT-LVL III: CPT | Mod: PBBFAC,,, | Performed by: ORTHOPAEDIC SURGERY

## 2023-01-23 PROCEDURE — 3288F FALL RISK ASSESSMENT DOCD: CPT | Mod: CPTII,S$GLB,, | Performed by: ORTHOPAEDIC SURGERY

## 2023-01-23 PROCEDURE — 99999 PR PBB SHADOW E&M-EST. PATIENT-LVL III: ICD-10-PCS | Mod: PBBFAC,,, | Performed by: ORTHOPAEDIC SURGERY

## 2023-01-23 PROCEDURE — 1101F PT FALLS ASSESS-DOCD LE1/YR: CPT | Mod: CPTII,S$GLB,, | Performed by: ORTHOPAEDIC SURGERY

## 2023-01-23 PROCEDURE — 99213 PR OFFICE/OUTPT VISIT, EST, LEVL III, 20-29 MIN: ICD-10-PCS | Mod: S$GLB,,, | Performed by: ORTHOPAEDIC SURGERY

## 2023-01-23 PROCEDURE — 73562 XR KNEE ORTHO BILAT: ICD-10-PCS | Mod: 26,50,, | Performed by: RADIOLOGY

## 2023-01-23 PROCEDURE — 1101F PR PT FALLS ASSESS DOC 0-1 FALLS W/OUT INJ PAST YR: ICD-10-PCS | Mod: CPTII,S$GLB,, | Performed by: ORTHOPAEDIC SURGERY

## 2023-01-23 PROCEDURE — 1159F PR MEDICATION LIST DOCUMENTED IN MEDICAL RECORD: ICD-10-PCS | Mod: CPTII,S$GLB,, | Performed by: ORTHOPAEDIC SURGERY

## 2023-01-23 PROCEDURE — 73562 X-RAY EXAM OF KNEE 3: CPT | Mod: TC,50

## 2023-01-23 PROCEDURE — 3044F HG A1C LEVEL LT 7.0%: CPT | Mod: CPTII,S$GLB,, | Performed by: ORTHOPAEDIC SURGERY

## 2023-01-23 PROCEDURE — 1126F AMNT PAIN NOTED NONE PRSNT: CPT | Mod: CPTII,S$GLB,, | Performed by: ORTHOPAEDIC SURGERY

## 2023-01-23 PROCEDURE — 3008F PR BODY MASS INDEX (BMI) DOCUMENTED: ICD-10-PCS | Mod: CPTII,S$GLB,, | Performed by: ORTHOPAEDIC SURGERY

## 2023-01-23 PROCEDURE — 1159F MED LIST DOCD IN RCRD: CPT | Mod: CPTII,S$GLB,, | Performed by: ORTHOPAEDIC SURGERY

## 2023-01-23 PROCEDURE — 99213 OFFICE O/P EST LOW 20 MIN: CPT | Mod: S$GLB,,, | Performed by: ORTHOPAEDIC SURGERY

## 2023-01-23 NOTE — PROGRESS NOTES
Subjective:      Patient ID: Dustin Lauren is a 72 y.o. male.    Chief Complaint: Pain of the Left Knee and Pain of the Right Knee    HPI  Dustin Lauren is a 72 year old male here with a several year history of bilateral knee pain. Was severe several months ago, but this improved.  Bilateral scopes by Dr. Ochsner in 2014 and 2015. I reviewed the pictures. The patient is a business owner. There was not a history of trauma.  The pain is mild right now. The pain is located in the medial aspect of the knee. There is not radiation to the legs  There is occasional catching or locking on the left.   The pain is described as achy. It is aggravated by trying to sleep and going from sitting to standing.  It is alleviated by rest. There is not numbness or tingling of the lower extremity.  There is back pain.  He  has tried topical medications but now recent injections. They have helped.  He does not have difficulty getting in or out of a car, getting dressed, or going up or down stairs.  The patient does not use an assistive device.    Past Medical History:   Diagnosis Date    A-fib     s/p cardioversion    Carpal tunnel syndrome     bilaterally    Cataract     Chronic LBP 11/8/2012    Chronic neck pain 11/8/2012    Coronary artery disease 03/24/2016    s/p 2 stents in RCA and ostium    Diverticulosis of colon     DJD (degenerative joint disease) of cervical spine 11/8/2012    DJD (degenerative joint disease) of lumbar spine 11/8/2012    DJD (degenerative joint disease) of thoracic spine 11/8/2012    GERD (gastroesophageal reflux disease)     Hyperlipidemia     hypertriglyceridemia    Hypertension     Hypothyroidism     s/p surgery    Nephrolithiasis, uric acid     stone evaluation showed uric acid and calcium oxalate    PONV (postoperative nausea and vomiting)     Rosacea     Thyroid disease     Ulcer     Unspecified disorder of kidney and ureter     Vitamin D deficiency disease      Past Surgical History:   Procedure  Laterality Date    ABLATION OF ARRHYTHMOGENIC FOCUS FOR ATRIAL FIBRILLATION N/A 9/16/2021    Procedure: Ablation atrial fibrillation;  Surgeon: Dhaval Rodríguez MD;  Location: [facility] EP LAB;  Service: Cardiology;  Laterality: N/A;  AF, HIEU (Cx if compliant and in SR), PVI, RFA, DAWSON, Gen, SK, 3 Prep    CARDIOVERSION      for AFib    CHOLECYSTECTOMY      COLONOSCOPY W/ BIOPSIES AND POLYPECTOMY  10/2013    CORONARY ANGIOPLASTY WITH STENT PLACEMENT      Stent in RCA and another stent in ostium    KNEE ARTHROSCOPY  10-13-14    right TKA    KNEE ARTHROSCOPY Left 10-7-15    ascope    KNEE ARTHROSCOPY W/ MENISCAL REPAIR Left 10/7/15    THYROIDECTOMY      TREATMENT OF CARDIAC ARRHYTHMIA  9/16/2021    Procedure: Cardioversion or Defibrillation;  Surgeon: Dhaval Rodríguez MD;  Location: [facility] EP LAB;  Service: Cardiology;;     Family History   Problem Relation Age of Onset    Hypertension Mother     Stroke Mother     Cancer Father         pancreas    Heart disease Father         MI    Thyroid disease Sister     Hyperlipidemia Sister     Thalassemia Sister         alpha Thalassemia anemia    Kidney disease Sister     Seizures Brother         s/p meningioma resection    Hypertension Brother     Cancer Brother         lung     Coronary artery disease Brother         stent in place    Thyroid disease Sister     Arthritis Sister         RA    Thalassemia Sister         alpha Thalassemia anemia    Arthritis Brother     Hypertension Brother     Arthritis Brother      Social History     Socioeconomic History    Marital status:    Tobacco Use    Smoking status: Never    Smokeless tobacco: Never   Substance and Sexual Activity    Alcohol use: Yes     Alcohol/week: 0.0 standard drinks     Comment: rarely    Drug use: No     Social Determinants of Health     Financial Resource Strain: Unknown    Difficulty of Paying Living Expenses: Patient refused   Food Insecurity: Unknown    Worried About Running Out of Food in the Last Year: Patient  refused    Ran Out of Food in the Last Year: Patient refused   Transportation Needs: Unknown    Lack of Transportation (Medical): Patient refused    Lack of Transportation (Non-Medical): Patient refused   Physical Activity: Sufficiently Active    Days of Exercise per Week: 3 days    Minutes of Exercise per Session: 60 min   Stress: No Stress Concern Present    Feeling of Stress : Not at all   Social Connections: Unknown    Frequency of Communication with Friends and Family: Patient refused    Frequency of Social Gatherings with Friends and Family: Patient refused    Active Member of Clubs or Organizations: Patient refused    Attends Club or Organization Meetings: Patient refused    Marital Status:    Housing Stability: Unknown    Unable to Pay for Housing in the Last Year: Patient refused    Unstable Housing in the Last Year: Patient refused     Current Outpatient Medications on File Prior to Visit   Medication Sig Dispense Refill    amlodipine-valsartan (EXFORGE) 5-160 mg per tablet Take 1 tablet by mouth once daily. 90 tablet 3    apixaban (ELIQUIS) 5 mg Tab Take 1 tablet (5 mg total) by mouth 2 (two) times daily. 30 tablet 11    atorvastatin (LIPITOR) 10 MG tablet Take 10 mg by mouth once daily. Every other day      cholecalciferol, vitamin D3, 25 mcg (1,000 unit) Chew Take 2,000 Int'l Units by mouth.      fenofibrate micronized (LOFIBRA) 134 MG Cap TAKE 1 CAPSULE (134 MG TOTAL) BY MOUTH DAILY WITH BREAKFAST. . 90 capsule 1    finasteride (PROSCAR) 5 mg tablet Take 5 mg by mouth once daily.  11    fluticasone propionate (FLONASE) 50 mcg/actuation nasal spray 2 sprays by Each Nostril route once daily.      levothyroxine (SYNTHROID) 137 MCG Tab tablet TAKE 1 TABLET BY MOUTH MON-SAT AND TAKE 1.5 TABLET BY MOUTH ON SUNDAY ONLY 32 tablet 11    multivit-min/FA/lycopen/lutein (CENTRUM SILVER MEN ORAL) Take by mouth.      naftifine 2 % Crea Apply 1 application topically daily as needed.      nitroGLYCERIN  (NITROSTAT) 0.3 MG SL tablet DISSOLVE 1 TABLET UNDER THE TONGUE AS NEEDED FOR CHEST PAIN      omega-3 fatty acids 1,000 mg Cap Take 2,000 mg by mouth.      omeprazole (PRILOSEC) 40 MG capsule Take 40 mg by mouth once daily.  2    prasugreL (EFFIENT) 10 mg Tab Take 10 mg by mouth once daily.      tamsulosin (FLOMAX) 0.4 mg Cap Take 0.4 mg by mouth once daily.      tiZANidine (ZANAFLEX) 4 MG tablet Take 1 tablet (4 mg total) by mouth every 8 (eight) hours as needed (muscle spasms). 50 tablet 2     No current facility-administered medications on file prior to visit.     Review of patient's allergies indicates:   Allergen Reactions    Codeine Nausea Only    Crestor [rosuvastatin] Palpitations    Doxycycline Hives    Keflex [cephalexin] Rash    Celebrex [celecoxib] Other (See Comments)     Upset stomach    Mobic [meloxicam] Other (See Comments)     Stomach pain and nauseous    Niacin Diarrhea    Niacin preparations Diarrhea    Ampicillin Rash    Pcn [penicillins] Rash       Review of Systems   Constitutional: Negative for chills, fever and night sweats.   HENT:  Negative for hearing loss.    Eyes:  Negative for blurred vision and double vision.   Cardiovascular:  Negative for chest pain, claudication and leg swelling.   Respiratory:  Negative for shortness of breath.    Endocrine: Negative for polydipsia, polyphagia and polyuria.   Hematologic/Lymphatic: Negative for adenopathy and bleeding problem. Does not bruise/bleed easily.   Skin:  Negative for poor wound healing.   Gastrointestinal:  Negative for diarrhea and heartburn.   Genitourinary:  Negative for bladder incontinence.   Neurological:  Negative for focal weakness, headaches, numbness, paresthesias and sensory change.   Psychiatric/Behavioral:  The patient is not nervous/anxious.    Allergic/Immunologic: Negative for persistent infections.       Objective:      Body mass index is 30.11 kg/m².  Vitals:    01/23/23 1417   Weight: 95.2 kg (209 lb 14.1 oz)   Height:  "5' 10" (1.778 m)         General    Constitutional: He is oriented to person, place, and time. He appears well-developed and well-nourished.   HENT:   Head: Normocephalic and atraumatic.   Eyes: EOM are normal.   Cardiovascular:  Normal rate.            Pulmonary/Chest: Effort normal.   Neurological: He is alert and oriented to person, place, and time.   Psychiatric: He has a normal mood and affect. His behavior is normal.     General Musculoskeletal Exam   Gait: normal       Right Knee Exam     Inspection   Erythema: absent  Scars: absent  Swelling: absent  Effusion: absent  Deformity: present (mild varus)  Bruising: absent    Tenderness   The patient is tender to palpation of the medial joint line.    Crepitus   The patient has crepitus of the patella.    Range of Motion   Extension:  0   Flexion:  130     Tests   Ligament Examination   Lachman: normal (-1 to 2mm)   MCL - Valgus: normal (0 to 2mm)  LCL - Varus: normal  Patella   Passive Patellar Tilt: neutral    Other   Sensation: normal    Left Knee Exam     Inspection   Erythema: absent  Scars: absent  Swelling: absent  Effusion: absent  Deformity: present (mild varus)  Bruising: absent    Tenderness   The patient tender to palpation of the medial joint line.    Crepitus   The patient has crepitus of the patella.    Range of Motion   Extension:  0   Flexion:  130     Tests   Stability   Lachman: normal (-1 to 2mm)   MCL - Valgus: normal (0 to 2mm)  LCL - Varus: normal (0 to 2mm)  Patella   Passive Patellar Tilt: neutral    Other   Sensation: normal    Muscle Strength   Right Lower Extremity   Hip Abduction: 5/5   Quadriceps:  5/5   Hamstrin/5   Left Lower Extremity   Hip Abduction: 5/5   Quadriceps:  5/5   Hamstrin/5     Reflexes     Left Side  Quadriceps:  2+    Right Side   Quadriceps:  2+    Vascular Exam     Right Pulses  Dorsalis Pedis:      2+          Left Pulses  Dorsalis Pedis:      2+          Edema  Right Lower Leg: absent  Left Lower Leg: " absent    Radiographs taken today and reviewed by me demonstrate moderate arthritic change of the bilateral KNEE(s). Left is worse than right. There  is not bone destruction.  There is not a fracture. The medial compartment is most involved.  There is a varus deformity.  The changes are tricompartmental.          Assessment:       Encounter Diagnosis   Name Primary?    Arthritis of knee Yes          Plan:       Dustin was seen today for pain and pain.    Diagnoses and all orders for this visit:    Arthritis of knee    Options discussed.  If sx worsen he will get in touch and we will set up injections.  Otherwise f/u in one year.

## 2023-02-09 ENCOUNTER — HOSPITAL ENCOUNTER (OUTPATIENT)
Dept: RADIOLOGY | Facility: HOSPITAL | Age: 73
Discharge: HOME | End: 2023-02-09
Attending: NURSE PRACTITIONER
Payer: COMMERCIAL

## 2023-02-09 DIAGNOSIS — N28.1 COMPLEX RENAL CYST: ICD-10-CM

## 2023-02-09 PROCEDURE — 76770 US EXAM ABDO BACK WALL COMP: CPT | Mod: TC

## 2023-02-09 PROCEDURE — 76770 US EXAM ABDO BACK WALL COMP: CPT | Mod: 26,,, | Performed by: RADIOLOGY

## 2023-02-09 PROCEDURE — 76770 US RETROPERITONEAL COMPLETE: ICD-10-PCS | Mod: 26,,, | Performed by: RADIOLOGY

## 2023-02-16 ENCOUNTER — OFFICE VISIT (OUTPATIENT)
Dept: CARDIOLOGY | Facility: CLINIC | Age: 73
End: 2023-02-16
Payer: COMMERCIAL

## 2023-02-16 ENCOUNTER — OFFICE VISIT (OUTPATIENT)
Dept: ENDOCRINOLOGY | Facility: CLINIC | Age: 73
End: 2023-02-16
Payer: COMMERCIAL

## 2023-02-16 ENCOUNTER — LAB VISIT (OUTPATIENT)
Dept: LAB | Facility: HOSPITAL | Age: 73
End: 2023-02-16
Attending: INTERNAL MEDICINE
Payer: COMMERCIAL

## 2023-02-16 VITALS
RESPIRATION RATE: 18 BRPM | HEIGHT: 70 IN | WEIGHT: 211.63 LBS | HEART RATE: 44 BPM | BODY MASS INDEX: 30.3 KG/M2 | DIASTOLIC BLOOD PRESSURE: 76 MMHG | OXYGEN SATURATION: 98 % | SYSTOLIC BLOOD PRESSURE: 114 MMHG

## 2023-02-16 VITALS
SYSTOLIC BLOOD PRESSURE: 122 MMHG | WEIGHT: 212.31 LBS | HEIGHT: 70 IN | BODY MASS INDEX: 30.39 KG/M2 | DIASTOLIC BLOOD PRESSURE: 76 MMHG | HEART RATE: 52 BPM

## 2023-02-16 DIAGNOSIS — E78.2 MIXED HYPERLIPIDEMIA: ICD-10-CM

## 2023-02-16 DIAGNOSIS — E89.0 POSTSURGICAL HYPOTHYROIDISM: Primary | ICD-10-CM

## 2023-02-16 DIAGNOSIS — Z79.01 CHRONIC ANTICOAGULATION: ICD-10-CM

## 2023-02-16 DIAGNOSIS — I10 PRIMARY HYPERTENSION: ICD-10-CM

## 2023-02-16 DIAGNOSIS — I25.10 CORONARY ARTERY DISEASE INVOLVING NATIVE CORONARY ARTERY OF NATIVE HEART WITHOUT ANGINA PECTORIS: Primary | ICD-10-CM

## 2023-02-16 DIAGNOSIS — E78.5 HYPERLIPIDEMIA, UNSPECIFIED HYPERLIPIDEMIA TYPE: ICD-10-CM

## 2023-02-16 DIAGNOSIS — R73.02 IMPAIRED GLUCOSE TOLERANCE: ICD-10-CM

## 2023-02-16 DIAGNOSIS — Z95.5 S/P CORONARY ARTERY STENT PLACEMENT: ICD-10-CM

## 2023-02-16 DIAGNOSIS — E89.0 POSTSURGICAL HYPOTHYROIDISM: ICD-10-CM

## 2023-02-16 DIAGNOSIS — I48.0 PAROXYSMAL ATRIAL FIBRILLATION: ICD-10-CM

## 2023-02-16 LAB — TSH SERPL DL<=0.005 MIU/L-ACNC: 3.61 UIU/ML (ref 0.4–4)

## 2023-02-16 PROCEDURE — 3008F BODY MASS INDEX DOCD: CPT | Mod: CPTII,S$GLB,, | Performed by: INTERNAL MEDICINE

## 2023-02-16 PROCEDURE — 1159F MED LIST DOCD IN RCRD: CPT | Mod: CPTII,S$GLB,, | Performed by: INTERNAL MEDICINE

## 2023-02-16 PROCEDURE — 3288F PR FALLS RISK ASSESSMENT DOCUMENTED: ICD-10-PCS | Mod: CPTII,S$GLB,, | Performed by: INTERNAL MEDICINE

## 2023-02-16 PROCEDURE — 99999 PR PBB SHADOW E&M-EST. PATIENT-LVL V: ICD-10-PCS | Mod: PBBFAC,,, | Performed by: INTERNAL MEDICINE

## 2023-02-16 PROCEDURE — 1159F PR MEDICATION LIST DOCUMENTED IN MEDICAL RECORD: ICD-10-PCS | Mod: CPTII,S$GLB,, | Performed by: INTERNAL MEDICINE

## 2023-02-16 PROCEDURE — 4010F PR ACE/ARB THEARPY RXD/TAKEN: ICD-10-PCS | Mod: CPTII,S$GLB,, | Performed by: INTERNAL MEDICINE

## 2023-02-16 PROCEDURE — 1101F PR PT FALLS ASSESS DOC 0-1 FALLS W/OUT INJ PAST YR: ICD-10-PCS | Mod: CPTII,S$GLB,, | Performed by: INTERNAL MEDICINE

## 2023-02-16 PROCEDURE — 1126F PR PAIN SEVERITY QUANTIFIED, NO PAIN PRESENT: ICD-10-PCS | Mod: CPTII,S$GLB,, | Performed by: INTERNAL MEDICINE

## 2023-02-16 PROCEDURE — 1101F PT FALLS ASSESS-DOCD LE1/YR: CPT | Mod: CPTII,S$GLB,, | Performed by: INTERNAL MEDICINE

## 2023-02-16 PROCEDURE — 3008F PR BODY MASS INDEX (BMI) DOCUMENTED: ICD-10-PCS | Mod: CPTII,S$GLB,, | Performed by: INTERNAL MEDICINE

## 2023-02-16 PROCEDURE — 4010F ACE/ARB THERAPY RXD/TAKEN: CPT | Mod: CPTII,S$GLB,, | Performed by: INTERNAL MEDICINE

## 2023-02-16 PROCEDURE — 3074F PR MOST RECENT SYSTOLIC BLOOD PRESSURE < 130 MM HG: ICD-10-PCS | Mod: CPTII,S$GLB,, | Performed by: INTERNAL MEDICINE

## 2023-02-16 PROCEDURE — 84443 ASSAY THYROID STIM HORMONE: CPT | Performed by: INTERNAL MEDICINE

## 2023-02-16 PROCEDURE — 1126F AMNT PAIN NOTED NONE PRSNT: CPT | Mod: CPTII,S$GLB,, | Performed by: INTERNAL MEDICINE

## 2023-02-16 PROCEDURE — 3078F PR MOST RECENT DIASTOLIC BLOOD PRESSURE < 80 MM HG: ICD-10-PCS | Mod: CPTII,S$GLB,, | Performed by: INTERNAL MEDICINE

## 2023-02-16 PROCEDURE — 99214 PR OFFICE/OUTPT VISIT, EST, LEVL IV, 30-39 MIN: ICD-10-PCS | Mod: S$GLB,,, | Performed by: INTERNAL MEDICINE

## 2023-02-16 PROCEDURE — 99999 PR PBB SHADOW E&M-EST. PATIENT-LVL V: CPT | Mod: PBBFAC,,, | Performed by: INTERNAL MEDICINE

## 2023-02-16 PROCEDURE — 99214 OFFICE O/P EST MOD 30 MIN: CPT | Mod: S$GLB,,, | Performed by: INTERNAL MEDICINE

## 2023-02-16 PROCEDURE — 36415 COLL VENOUS BLD VENIPUNCTURE: CPT | Mod: PO | Performed by: INTERNAL MEDICINE

## 2023-02-16 PROCEDURE — 3074F SYST BP LT 130 MM HG: CPT | Mod: CPTII,S$GLB,, | Performed by: INTERNAL MEDICINE

## 2023-02-16 PROCEDURE — 3078F DIAST BP <80 MM HG: CPT | Mod: CPTII,S$GLB,, | Performed by: INTERNAL MEDICINE

## 2023-02-16 PROCEDURE — 3288F FALL RISK ASSESSMENT DOCD: CPT | Mod: CPTII,S$GLB,, | Performed by: INTERNAL MEDICINE

## 2023-02-16 PROCEDURE — 3044F PR MOST RECENT HEMOGLOBIN A1C LEVEL <7.0%: ICD-10-PCS | Mod: CPTII,S$GLB,, | Performed by: INTERNAL MEDICINE

## 2023-02-16 PROCEDURE — 1160F RVW MEDS BY RX/DR IN RCRD: CPT | Mod: CPTII,S$GLB,, | Performed by: INTERNAL MEDICINE

## 2023-02-16 PROCEDURE — 3044F HG A1C LEVEL LT 7.0%: CPT | Mod: CPTII,S$GLB,, | Performed by: INTERNAL MEDICINE

## 2023-02-16 PROCEDURE — 99999 PR PBB SHADOW E&M-EST. PATIENT-LVL IV: ICD-10-PCS | Mod: PBBFAC,,, | Performed by: INTERNAL MEDICINE

## 2023-02-16 PROCEDURE — 1160F PR REVIEW ALL MEDS BY PRESCRIBER/CLIN PHARMACIST DOCUMENTED: ICD-10-PCS | Mod: CPTII,S$GLB,, | Performed by: INTERNAL MEDICINE

## 2023-02-16 PROCEDURE — 99999 PR PBB SHADOW E&M-EST. PATIENT-LVL IV: CPT | Mod: PBBFAC,,, | Performed by: INTERNAL MEDICINE

## 2023-02-16 NOTE — PROGRESS NOTES
Subjective:     Problem List:  CAD  - ostial and proximal RCA BIANKA 2016  Atrial fib  - PVI 2021  Hypertension      HPI:   Dustin Lauren is a 73 y.o. male referred by Dr. Dhaval Rodríguez to establish w a general cardiologist for Coronary Artery Disease.      He was followed by Dr. Vo at  for coronary artery disease. A CACS was elevated and a nuclear stress test was abnormal. PCI RCA with insertion of a 3.5 x 28 mm Synergy BIANKA in the proximal RCA and a 4.0 x 12 mm synergy BIANKA in the ostial RCA in 2016.  Dr. Reyna wanted him to continue Effient. He does not take aspirin.   Hyperlipidemia is treated w atorvastatin 10 mg every other day and fenofibrate 134 mg daily. LDL has been 65-74 mg/dl. Triglycerides are <100 mg/dl. Also takes fish oil.    He underwent PVI by Dr. Rodríguez in 9/2021. He continues to take Eliquis 5 mg b.i.d., Effient 10 mg daily for PCI.    On Exforge 5/160 mg for hypertension.      Review of patient's allergies indicates:   Allergen Reactions    Codeine Nausea Only    Crestor [rosuvastatin] Palpitations    Doxycycline Hives    Keflex [cephalexin] Rash    Celebrex [celecoxib] Other (See Comments)     Upset stomach    Mobic [meloxicam] Other (See Comments)     Stomach pain and nauseous    Niacin Diarrhea    Niacin preparations Diarrhea    Ampicillin Rash    Pcn [penicillins] Rash        Current Outpatient Medications   Medication Sig    amlodipine-valsartan (EXFORGE) 5-160 mg per tablet Take 1 tablet by mouth once daily.    apixaban (ELIQUIS) 5 mg Tab Take 1 tablet (5 mg total) by mouth 2 (two) times daily.    atorvastatin (LIPITOR) 10 MG tablet Take 10 mg by mouth once daily. Every other day    cholecalciferol, vitamin D3, 25 mcg (1,000 unit) Chew Take 2,000 Int'l Units by mouth.    fenofibrate micronized (LOFIBRA) 134 MG Cap TAKE 1 CAPSULE (134 MG TOTAL) BY MOUTH DAILY WITH BREAKFAST. .    finasteride (PROSCAR) 5 mg tablet Take 5 mg by mouth once daily.    fluticasone propionate (FLONASE) 50  mcg/actuation nasal spray 2 sprays by Each Nostril route once daily.    levothyroxine (SYNTHROID) 137 MCG Tab tablet TAKE 1 TABLET BY MOUTH MON-SAT AND TAKE 1.5 TABLET BY MOUTH ON SUNDAY ONLY    multivit-min/FA/lycopen/lutein (CENTRUM SILVER MEN ORAL) Take by mouth.    naftifine 2 % Crea Apply 1 application topically daily as needed.    nitroGLYCERIN (NITROSTAT) 0.3 MG SL tablet DISSOLVE 1 TABLET UNDER THE TONGUE AS NEEDED FOR CHEST PAIN    omega-3 fatty acids 1,000 mg Cap Take 2,000 mg by mouth.    omeprazole (PRILOSEC) 40 MG capsule Take 40 mg by mouth once daily.    prasugreL (EFFIENT) 10 mg Tab Take 10 mg by mouth once daily.    tamsulosin (FLOMAX) 0.4 mg Cap Take 0.4 mg by mouth once daily.    tiZANidine (ZANAFLEX) 4 MG tablet Take 1 tablet (4 mg total) by mouth every 8 (eight) hours as needed (muscle spasms).     No current facility-administered medications for this visit.         Past Medical and Surgical history:  Dustin Lauren  has a past medical history of A-fib, Carpal tunnel syndrome, Cataract, Chronic LBP (11/8/2012), Chronic neck pain (11/8/2012), Coronary artery disease (03/24/2016), Diverticulosis of colon, DJD (degenerative joint disease) of cervical spine (11/8/2012), DJD (degenerative joint disease) of lumbar spine (11/8/2012), DJD (degenerative joint disease) of thoracic spine (11/8/2012), GERD (gastroesophageal reflux disease), Hyperlipidemia, Hypertension, Hypothyroidism, Nephrolithiasis, uric acid, PONV (postoperative nausea and vomiting), Rosacea, Thyroid disease, Ulcer, Unspecified disorder of kidney and ureter, and Vitamin D deficiency disease.   Past Surgical History:   Procedure Laterality Date    ABLATION OF ARRHYTHMOGENIC FOCUS FOR ATRIAL FIBRILLATION N/A 9/16/2021    Procedure: Ablation atrial fibrillation;  Surgeon: Dhaval Rodríguez MD;  Location: [facility] EP LAB;  Service: Cardiology;  Laterality: N/A;  AF, HIEU (Cx if compliant and in SR), PVI, RFA, DAWSON, Gen, SK, 3 Prep    CARDIOVERSION   "    for AFib    CHOLECYSTECTOMY      COLONOSCOPY W/ BIOPSIES AND POLYPECTOMY  10/2013    CORONARY ANGIOPLASTY WITH STENT PLACEMENT      Stent in RCA and another stent in ostium    KNEE ARTHROSCOPY  10-13-14    right TKA    KNEE ARTHROSCOPY Left 10-7-15    ascope    KNEE ARTHROSCOPY W/ MENISCAL REPAIR Left 10/7/15    THYROIDECTOMY      TREATMENT OF CARDIAC ARRHYTHMIA  9/16/2021    Procedure: Cardioversion or Defibrillation;  Surgeon: Dhaval Rodríguez MD;  Location: [facility];  Service: Cardiology;;       Social history:  Dustin Lauren  reports that he has never smoked. He has never used smokeless tobacco. He reports current alcohol use. He reports that he does not use drugs.    Family history:  Family History   Problem Relation Age of Onset    Hypertension Mother     Stroke Mother     Cancer Father         pancreas    Heart disease Father         MI    Thyroid disease Sister     Hyperlipidemia Sister     Thalassemia Sister         alpha Thalassemia anemia    Kidney disease Sister     Seizures Brother         s/p meningioma resection    Hypertension Brother     Cancer Brother         lung     Coronary artery disease Brother         stent in place    Thyroid disease Sister     Arthritis Sister         RA    Thalassemia Sister         alpha Thalassemia anemia    Arthritis Brother     Hypertension Brother     Arthritis Brother           Objective:     Physical Exam  Constitutional:       Appearance: He is well-developed.      Comments: /76   Pulse (!) 52   Ht 5' 10" (1.778 m)   Wt 96.3 kg (212 lb 4.9 oz)   BMI 30.46 kg/m²      HENT:      Head: Normocephalic and atraumatic.   Neck:      Vascular: No carotid bruit or JVD.   Cardiovascular:      Rate and Rhythm: Normal rate and regular rhythm.      Pulses:           Radial pulses are 2+ on the right side and 2+ on the left side.        Posterior tibial pulses are 2+ on the right side and 2+ on the left side.      Heart sounds: S1 normal and S2 normal. No murmur " heard.    No gallop.   Pulmonary:      Effort: Pulmonary effort is normal.      Breath sounds: No wheezing or rales.   Chest:      Chest wall: There is no dullness to percussion.   Abdominal:      Palpations: Abdomen is soft. There is no hepatomegaly or splenomegaly.      Tenderness: There is no abdominal tenderness.   Musculoskeletal:      Right lower leg: No edema.      Left lower leg: No edema.   Skin:     General: Skin is warm and dry.      Findings: No bruising.      Nails: There is no clubbing.   Neurological:      Mental Status: He is alert and oriented to person, place, and time.      Gait: Gait normal.   Psychiatric:         Speech: Speech normal.         Behavior: Behavior normal.         Thought Content: Thought content normal.         Judgment: Judgment normal.     Component      Latest Ref Rng & Units 1/3/2023 1/7/2022 1/6/2021 1/3/2020 1/4/2019   Cholesterol      120 - 199 mg/dL 127 116 (L) 119 (L) 117 (L) 127   Triglycerides      30 - 150 mg/dL 63 41 65 62 60   HDL      40 - 75 mg/dL 43 45 40 39 (L) 41   LDL Cholesterol External      63.0 - 159.0 mg/dL 71.4 62.8 (L) 66.0 65.6 74.0   HDL/Cholesterol Ratio      20.0 - 50.0 % 33.9 38.8 33.6 33.3 32.3   Total Cholesterol/HDL Ratio      2.0 - 5.0 3.0 2.6 3.0 3.0 3.1       Component      Latest Ref Rng & Units 1/3/2023 11/7/2022 1/7/2022 9/17/2021           7:28 AM      Sodium      136 - 145 mmol/L 139 141 138 133 (L)   Potassium      3.5 - 5.1 mmol/L 4.1 4.0 4.2 3.6   Chloride      95 - 110 mmol/L 105 106 103 101   CO2      23 - 29 mmol/L 26 28 27 21 (L)   Glucose      70 - 110 mg/dL 97 93 95 127 (H)   BUN      8 - 23 mg/dL 19 24 (H) 24 (H) 23   Creatinine      0.5 - 1.4 mg/dL 1.1 1.2 1.2 1.2   Calcium      8.7 - 10.5 mg/dL 9.9 9.5 9.9 8.3 (L)   PROTEIN TOTAL      6.0 - 8.4 g/dL 7.2  7.2    Albumin      3.5 - 5.2 g/dL 4.1  4.1    BILIRUBIN TOTAL      0.1 - 1.0 mg/dL 0.5  0.5    Alkaline Phosphatase      55 - 135 U/L 36 (L)  34 (L)    AST      10 - 40 U/L  21  19    ALT      10 - 44 U/L 16  12        Assessment and Plan:       ICD-10-CM ICD-9-CM   1. Coronary artery disease involving native coronary artery of native heart without angina pectoris  I25.10 414.01   2. S/P coronary artery stent placement 2016  Z95.5 V45.82   3. Paroxysmal atrial fibrillation  I48.0 427.31   4. Chronic anticoagulation  Z79.01 V58.61   5. Mixed hyperlipidemia  E78.2 272.2        CAD stable. No angina. Continue same meds.  LDL goal < 55.  He should take atorvastatin every day.  Consider taking aspirin briefly when holding prasugrel (Effient) for procedures.  Atrial fibrillation stable. Continue anticoagulation. Avoid NSAIDs. Discussed risk of stroke without anticoagulation, risk of bleeding with anticoagulation, duration of cessation prior to elective surgery and option of SHINE occlusion for those who cannot tolerate anticoagulation.   LDL has been 65-74 mg/dl on a very low dose of atorvastatin. Also on fenofibrate. I recommend discontinuing fenofibrate and taking a higher dose of atorvastatin or another statin to lower LDL to <55 mg/dl.    Orders entered during this encounter:    Coronary artery disease involving native coronary artery of native heart without angina pectoris  -     Ambulatory referral/consult to Cardiology  -     Lipid Panel; Future; Expected date: 10/05/2023    S/P coronary artery stent placement 2016    Paroxysmal atrial fibrillation  -     Comprehensive Metabolic Panel; Future; Expected date: 10/05/2023    Chronic anticoagulation  -     CBC Without Differential; Future; Expected date: 10/05/2023    Mixed hyperlipidemia  -     Lipid Panel; Future; Expected date: 10/05/2023         Follow up in about 8 months (around 10/16/2023).

## 2023-02-16 NOTE — PROGRESS NOTES
Subjective:      Patient ID: Dustin Lauren is a 72 y.o. male.    Chief Complaint:  Hypothyroidism      History of Present Illness  Mr. Lauren presents today for follow up of hypothyroidism. Last visit in 8/2022.     Interval HPI:   With Hypothyroidism s/p surgery for a benign thyroid nodule on 1/27/10.     Had been on 150 mcg of thyroid hormone for quite some time, but then developed A Fib in 9/2016. Had cardiac ablation in 9/2021 for atrial fibrillation. No further episodes of A Fib since ablation.      Currently taking Synthroid 137 mcg Mon-Sat with 1.5 tablets on Sunday only.  Denies missing any doses. Waits at least one hour before taking other meds or food.   No tremor or increased anxiousness. He has noticed a few more PVC's recently.   More fatigue recently. He has gained 8-10 lbs in the past 6 months.       Also with HTN on amlodipine and valsartan.       Latest Reference Range & Units 01/03/23 07:28   Hemoglobin A1C External 4.0 - 5.6 % 5.7 (H)   TSH 0.400 - 4.000 uIU/mL 5.232 (H)       Review of Systems   Constitutional:  Negative for chills and fever.   Gastrointestinal:  Negative for nausea and vomiting.     Objective:   Physical Exam  Vitals and nursing note reviewed.   No thyroid tissue palpated  No hand tremor      BP Readings from Last 3 Encounters:   02/16/23 114/76   01/09/23 116/60   12/09/22 (!) 142/66     Wt Readings from Last 1 Encounters:   02/16/23 0740 96 kg (211 lb 10.3 oz)       Body mass index is 30.37 kg/m².    Lab Review:   Lab Results   Component Value Date    HGBA1C 5.7 (H) 01/03/2023     Lab Results   Component Value Date    CHOL 127 01/03/2023    HDL 43 01/03/2023    LDLCALC 71.4 01/03/2023    TRIG 63 01/03/2023    CHOLHDL 33.9 01/03/2023     Lab Results   Component Value Date     01/03/2023     01/03/2023    K 4.1 01/03/2023    K 4.1 01/03/2023     01/03/2023     01/03/2023    CO2 26 01/03/2023    CO2 26 01/03/2023    GLU 97 01/03/2023    GLU 97  01/03/2023    BUN 19 01/03/2023    BUN 19 01/03/2023    CREATININE 1.1 01/03/2023    CREATININE 1.1 01/03/2023    CALCIUM 9.9 01/03/2023    CALCIUM 9.9 01/03/2023    PROT 7.2 01/03/2023    PROT 7.2 01/03/2023    ALBUMIN 4.1 01/03/2023    ALBUMIN 4.1 01/03/2023    BILITOT 0.5 01/03/2023    BILITOT 0.5 01/03/2023    ALKPHOS 36 (L) 01/03/2023    ALKPHOS 36 (L) 01/03/2023    AST 21 01/03/2023    AST 21 01/03/2023    ALT 16 01/03/2023    ALT 16 01/03/2023    ANIONGAP 8 01/03/2023    ANIONGAP 8 01/03/2023    ESTGFRAFRICA >60.0 01/07/2022    EGFRNONAA >60.0 01/07/2022    TSH 5.232 (H) 01/03/2023         Assessment and Plan     Postsurgical hypothyroidism  --Patient with postsurgical hypothyroidism  --Recent TSH elevated  --Will repeat TFT's now and if TSH remains elevated will likely increase Synthroid to 150 mcg once daily  --Will continue Synthroid 137 mcg PO Mon-Sat with 1.5 tablets on Sunday only pending repeat TSH  --Will aim to keep TSH mid to high normal given age and A fib history    Hyperlipidemia  --On fibrate and statin    Hypertension  --Bp at goal  --Continue current regimen    Impaired glucose tolerance  --Recent A1c 5.7%  --Continued diet and exercise  --Check A1c periodically      TSH today, follow up in 6 months with labs prior      Malick Malik M.D. Staff Endocrinology

## 2023-02-16 NOTE — ASSESSMENT & PLAN NOTE
--Patient with postsurgical hypothyroidism  --Recent TSH elevated  --Will repeat TFT's now and if TSH remains elevated will likely increase Synthroid to 150 mcg once daily  --Will continue Synthroid 137 mcg PO Mon-Sat with 1.5 tablets on Sunday only pending repeat TSH  --Will aim to keep TSH mid to high normal given age and A fib history

## 2023-02-16 NOTE — PATIENT INSTRUCTIONS
Ideal Reference Range   Cholesterol      Variable   Triglycerides      < 150 mg/dl   HDL (good type)      > 45 mg/dl   LDL (bad type)      < 55 mg/dl    HDL/Cholesterol       > 30%        Cholesterol - total  LDL - bad type - lower is better.   HDL good type - higher is better  Your LDL should be less than 70. Do not look at the reference range in [facility]'s labs. Those do not apply to you!    LDL - bad type - improves with diet and medications: typically statins; most other medications that lower LDL have not been proven to prevent heart attacks.  May not improve significantly with exercise alone.  Should be less than 70 mg/dl, but the lower the better. Statins (cholesterol lowering meds) lower LDL and also reduce inflammation within blood vessels.    HDL - good type - improves with exercise.  Ideally greater than 50 mg/dl. The proportion of HDL to the total cholesterol is more important than the absolute HDL.  This means a HDL of 45 out of a total cholesterol of 130 mg'dl is pretty good, but the same HDL out of a total of  200 mg/dl is not quite as good. A level of 30% or higher is ideal.    TGs (triglycerides) - also bad - can change very quickly and considerably with certain foods. Improve with diet, exercise and high dose fish oil.  In some cases a low carbohydrate diet will lower TGs better than a low fat diet.  Ideal range  mg/dl.         Low carb, Mediterranean style diet:     A sensible diet that limits the intake of sugars, saturated (bad) fats and trans fats while increasing the intake of unsaturated (good) fats and plant proteins is the basis of the current dietary recommendations.     We now recommend drastically reducing the intake of sugar and sugary drinks. There is less emphasis on excluding all fat and more emphasis on the types of different fats. We continue to recommend eating more vegetables.    Cholesterol in our food is generally present in relatively small amounts. New dietary guidelines  "are less obsessed with the amount of cholesterol. However please do not confuse this with the role of cholesterol in our blood and arteries. The liver converts certain foods into cholesterol.  It is this cholesterol and other fats that clog up our arteries.      Most foods that are high in cholesterol are also high in saturated fat. But there is much more saturated fat than cholesterol in these foods. The saturated fat content matters more than cholesterol. On the other hand, there are a handful of foods that are high in cholesterol but do not contain much saturated fat: eggs, shrimp, crab legs and crawfish are OK to eat in small quantities as long as you do not deep marie them. So a few (2-3) eggs a week are fine (both the white and the yolk), but you can eat as many egg whites as you want. Also, some of these same foods irritate the inner lining of blood vessels by inducing inflammation (see below).  This occurs even if your blood cholesterol levels are "normal". So you should avoid foods that are high in saturated fat and sugar even if your blood cholesterol levels are normal.      Saturated fat is the bad fat - you should limit your intake of this. Deep fried foods, meats and other animal fats are high in saturated fat. Cookies, donuts and most dessert and cakes are usually high in both saturated fat and sugar.       Unsaturated fat is the good fat. It contains the same number of calories as saturated fat but these fats do not get deposited in our arteries. The Mediterranean style diet encourages the intake of unsaturated fat - olive oil, avocado and unsalted nuts. So instead of baking a piece of fish, consider pan-frying it using olive oil.     You should eat a few servings of vegetables (and fruit as long as you are not diabetic) everyday. Substitute some plant proteins in place of meat: beans, lentils, quinoa and oatmeal. They are lean proteins.     Vegetables (particularly green vegetables such as broccoli, " kale and spinach) have anti-inflammatory properties. Some fruits (certain berries) have anti-oxidant properties. However I think foods that reduce vascular inflammation are much  more important than the anti-oxidant effect of fruits. Eat more of the vegetables with anti-oxidant properties.     I recommend using plant based butter either olive oil butter or avocado butter made by Alfonso Canela. Do not use stick butter or stick margarine.       Trans fats should definitely be avoided. Most foods that are labelled as containing 0 gms of trans fat may still contain several hundred milligrams of trans fat: creamer, margarine, dough, deep fried foods, ready made frosting, potato, corn and torilla chips, cakes, cookies, pie crusts and crackers containing shortening made with hydrogenated vegetable oil.    ==========    You should avoid NSAIDs because you take a blood thinner. NSAIDs are : Ibuprofen, Advil, Motrin, Aleeve, Naproxen, Meloxicam, Mobic, Diclofenac and Voltaren.  Do not take Celebrex either.  You can take Tylenol for pain. It is not a NSAID. Limit Tylenol to 4 tabs (500 mg each) in a 24 hr period.   You can use topical NSAIDs (such as Voltaren gel, now available without a prescription) and lidocaine gel.

## 2023-03-02 ENCOUNTER — PATIENT MESSAGE (OUTPATIENT)
Dept: CARDIOLOGY | Facility: CLINIC | Age: 73
End: 2023-03-02
Payer: COMMERCIAL

## 2023-03-02 ENCOUNTER — PATIENT MESSAGE (OUTPATIENT)
Dept: UROLOGY | Facility: CLINIC | Age: 73
End: 2023-03-02
Payer: COMMERCIAL

## 2023-03-03 RX ORDER — TAMSULOSIN HYDROCHLORIDE 0.4 MG/1
0.4 CAPSULE ORAL DAILY
Qty: 90 CAPSULE | Refills: 3 | Status: SHIPPED | OUTPATIENT
Start: 2023-03-03 | End: 2024-03-06

## 2023-03-03 RX ORDER — FINASTERIDE 5 MG/1
5 TABLET, FILM COATED ORAL DAILY
Qty: 90 TABLET | Refills: 3 | Status: SHIPPED | OUTPATIENT
Start: 2023-03-03 | End: 2024-02-29

## 2023-03-05 RX ORDER — ATORVASTATIN CALCIUM 10 MG/1
10 TABLET, FILM COATED ORAL EVERY OTHER DAY
Qty: 90 TABLET | Refills: 3 | Status: SHIPPED | OUTPATIENT
Start: 2023-03-05 | End: 2023-09-25 | Stop reason: DRUGHIGH

## 2023-03-05 RX ORDER — PRASUGREL 10 MG/1
10 TABLET, FILM COATED ORAL DAILY
Qty: 90 TABLET | Refills: 3 | Status: SHIPPED | OUTPATIENT
Start: 2023-03-05 | End: 2024-02-29

## 2023-04-18 ENCOUNTER — PATIENT MESSAGE (OUTPATIENT)
Dept: ENDOCRINOLOGY | Facility: CLINIC | Age: 73
End: 2023-04-18
Payer: COMMERCIAL

## 2023-06-07 NOTE — PROGRESS NOTES
Subjective:      Dustin Lauren is a 72 y.o. male who returns today regarding his     No recent gross hematuria    He is still on both eliquis and effient  Seeing Dr Rodríguez, EP, in Sept    Sees GI, Dr Phan    Also urgency and occasional leakage    The following portions of the patient's history were reviewed and updated as appropriate: allergies, current medications, past family history, past medical history, past social history, past surgical history and problem list.    Review of Systems  Pertinent items are noted in HPI.  A comprehensive multipoint review of systems was negative except as otherwise stated in the HPI.    Past Medical History:   Diagnosis Date    A-fib     s/p cardioversion    Carpal tunnel syndrome     bilaterally    Cataract     Chronic LBP 11/8/2012    Chronic neck pain 11/8/2012    Coronary artery disease 03/24/2016    s/p 2 stents in RCA and ostium    Diverticulosis of colon     DJD (degenerative joint disease) of cervical spine 11/8/2012    DJD (degenerative joint disease) of lumbar spine 11/8/2012    DJD (degenerative joint disease) of thoracic spine 11/8/2012    GERD (gastroesophageal reflux disease)     Hyperlipidemia     hypertriglyceridemia    Hypertension     Hypothyroidism     s/p surgery    Nephrolithiasis, uric acid     stone evaluation showed uric acid and calcium oxalate    PONV (postoperative nausea and vomiting)     Rosacea     Thyroid disease     Ulcer     Unspecified disorder of kidney and ureter     Vitamin D deficiency disease      Past Surgical History:   Procedure Laterality Date    ABLATION OF ARRHYTHMOGENIC FOCUS FOR ATRIAL FIBRILLATION N/A 9/16/2021    Procedure: Ablation atrial fibrillation;  Surgeon: Dhaval Rodríguez MD;  Location: [facility] EP LAB;  Service: Cardiology;  Laterality: N/A;  AF, HIEU (Cx if compliant and in SR), PVI, RFA, DAWSON, Gen, SK, 3 Prep    CARDIOVERSION      for AFib    CHOLECYSTECTOMY      COLONOSCOPY W/ BIOPSIES AND POLYPECTOMY  10/2013    CORONARY  ANGIOPLASTY WITH STENT PLACEMENT      Stent in RCA and another stent in ostium    KNEE ARTHROSCOPY  10-13-14    right TKA    KNEE ARTHROSCOPY Left 10-7-15    ascope    KNEE ARTHROSCOPY W/ MENISCAL REPAIR Left 10/7/15    THYROIDECTOMY      TREATMENT OF CARDIAC ARRHYTHMIA  9/16/2021    Procedure: Cardioversion or Defibrillation;  Surgeon: Dhaval Rodríguez MD;  Location: [facility];  Service: Cardiology;;       Review of patient's allergies indicates:   Allergen Reactions    Codeine Nausea Only    Crestor [rosuvastatin] Palpitations    Doxycycline Hives    Keflex [cephalexin] Rash    Celebrex [celecoxib] Other (See Comments)     Upset stomach    Mobic [meloxicam] Other (See Comments)     Stomach pain and nauseous    Niacin Diarrhea    Niacin preparations Diarrhea    Ampicillin Rash    Pcn [penicillins] Rash          Objective:   Vitals: There were no vitals taken for this visit.    Physical Exam   General: alert and oriented, no acute distress  Respiratory: Symmetric expansion, non-labored breathing  Cardiovascular: no peripheral edema  Abdomen: soft, non distended  Skin: normal coloration and turgor, no rashes, no suspicious skin lesions noted  Neuro: no gross deficits  Psych: normal judgment and insight, normal mood/affect, and non-anxious    Physical Exam    Lab Review   Urinalysis demonstrates no specimen    Lab Results   Component Value Date    WBC 3.78 (L) 01/03/2023    HGB 13.4 (L) 01/03/2023    HCT 41.6 01/03/2023    MCV 91 01/03/2023     01/03/2023     Lab Results   Component Value Date    CREATININE 1.1 01/03/2023    CREATININE 1.1 01/03/2023    BUN 19 01/03/2023    BUN 19 01/03/2023     Lab Results   Component Value Date    PSA 1.2 01/06/2021    PSA 2.2 01/03/2020    PSA 1.9 01/04/2019    PSADIAG 1.5 01/03/2023    PSADIAG 1.4 01/07/2022         Imaging  CT urogram 11/2022  Neg upper tracts  Complex renal cyst with calcification    Assessment and Plan:   Complex renal cyst    History of kidney  stones    Enlarged with obstruction/lower urinary tract symptoms    Gross hematuria due to prostatic varices  He will discuss anticoagulants with cards, Dr Rodríguez  Stool softeners  Avoid heavy lifting    OAB wet  UA and reflex cs  Myrbetriq trial  PVR at next sivit  Avoid pm fluids  Avoid caffeine and alcohol  Timed voiding      RTC 6 months with renal US to see NP  ARTIE, UA and PVR at next visit

## 2023-06-09 ENCOUNTER — OFFICE VISIT (OUTPATIENT)
Dept: UROLOGY | Facility: CLINIC | Age: 73
End: 2023-06-09
Payer: COMMERCIAL

## 2023-06-09 VITALS
BODY MASS INDEX: 30.11 KG/M2 | HEART RATE: 50 BPM | WEIGHT: 210.31 LBS | SYSTOLIC BLOOD PRESSURE: 140 MMHG | HEIGHT: 70 IN | DIASTOLIC BLOOD PRESSURE: 68 MMHG

## 2023-06-09 DIAGNOSIS — Z87.442 HISTORY OF KIDNEY STONES: ICD-10-CM

## 2023-06-09 DIAGNOSIS — N40.1 BPH WITH OBSTRUCTION/LOWER URINARY TRACT SYMPTOMS: ICD-10-CM

## 2023-06-09 DIAGNOSIS — N28.1 COMPLEX RENAL CYST: Primary | ICD-10-CM

## 2023-06-09 DIAGNOSIS — N32.81 OAB (OVERACTIVE BLADDER): ICD-10-CM

## 2023-06-09 DIAGNOSIS — N13.8 BPH WITH OBSTRUCTION/LOWER URINARY TRACT SYMPTOMS: ICD-10-CM

## 2023-06-09 LAB
BILIRUB UR QL STRIP: NEGATIVE
CLARITY UR REFRACT.AUTO: CLEAR
COLOR UR AUTO: COLORLESS
GLUCOSE UR QL STRIP: NEGATIVE
HGB UR QL STRIP: NEGATIVE
KETONES UR QL STRIP: NEGATIVE
LEUKOCYTE ESTERASE UR QL STRIP: NEGATIVE
NITRITE UR QL STRIP: NEGATIVE
PH UR STRIP: 7 [PH] (ref 5–8)
PROT UR QL STRIP: NEGATIVE
SP GR UR STRIP: 1 (ref 1–1.03)
URN SPEC COLLECT METH UR: ABNORMAL

## 2023-06-09 PROCEDURE — 81003 URINALYSIS AUTO W/O SCOPE: CPT | Performed by: UROLOGY

## 2023-06-09 PROCEDURE — 99214 OFFICE O/P EST MOD 30 MIN: CPT | Mod: S$GLB,,, | Performed by: UROLOGY

## 2023-06-09 PROCEDURE — 99214 PR OFFICE/OUTPT VISIT, EST, LEVL IV, 30-39 MIN: ICD-10-PCS | Mod: S$GLB,,, | Performed by: UROLOGY

## 2023-06-24 DIAGNOSIS — E78.1 HYPERTRIGLYCERIDEMIA: Primary | ICD-10-CM

## 2023-06-26 RX ORDER — FENOFIBRATE 134 MG/1
134 CAPSULE ORAL
Qty: 90 CAPSULE | Refills: 1 | Status: SHIPPED | OUTPATIENT
Start: 2023-06-26 | End: 2023-07-11 | Stop reason: ALTCHOICE

## 2023-07-06 ENCOUNTER — TELEPHONE (OUTPATIENT)
Dept: CARDIOLOGY | Facility: CLINIC | Age: 73
End: 2023-07-06
Payer: COMMERCIAL

## 2023-07-06 RX ORDER — EZETIMIBE 10 MG/1
10 TABLET ORAL DAILY
COMMUNITY
End: 2023-07-06 | Stop reason: SDUPTHER

## 2023-07-06 RX ORDER — EZETIMIBE 10 MG/1
10 TABLET ORAL DAILY
Qty: 30 TABLET | Refills: 11 | Status: SHIPPED | OUTPATIENT
Start: 2023-07-06

## 2023-07-06 NOTE — TELEPHONE ENCOUNTER
Pt notified, states he wishes to increase Atorvastatin to 20mg daily. Pt states he has a large supply of Atorvastatin 10mg tablets on hand. Pt will take 2 tablets daily and will contact our office when ready for a prescription of Atorvastatin 20mg. Appointments scheduled with pt.

## 2023-07-06 NOTE — TELEPHONE ENCOUNTER
----- Message from Osmar Elder MD sent at 7/5/2023  7:54 PM CDT -----  Pl tell pt that I recommend that he stop fenofibrate and increase atorvastatin 20 mg daily w the addition of Zetia 10 mg or rosuvastatin 10 mg along w Zetia 10 mg  Pl schedule pt for a virtual visit w me in Oct w labs

## 2023-07-11 ENCOUNTER — PATIENT MESSAGE (OUTPATIENT)
Dept: CARDIOLOGY | Facility: CLINIC | Age: 73
End: 2023-07-11
Payer: COMMERCIAL

## 2023-07-11 ENCOUNTER — DOCUMENTATION ONLY (OUTPATIENT)
Dept: CARDIOLOGY | Facility: CLINIC | Age: 73
End: 2023-07-11
Payer: COMMERCIAL

## 2023-07-12 DIAGNOSIS — I48.19 PERSISTENT ATRIAL FIBRILLATION: Primary | ICD-10-CM

## 2023-07-12 RX ORDER — APIXABAN 5 MG/1
TABLET, FILM COATED ORAL
Qty: 60 TABLET | Refills: 5 | Status: SHIPPED | OUTPATIENT
Start: 2023-07-12 | End: 2023-08-28 | Stop reason: SDUPTHER

## 2023-07-14 NOTE — TELEPHONE ENCOUNTER
His little sister that is handicapped.  She had trouble breathing and his brother brought her to  er.   Tested positive for covid19.  Suspects she got from healthcare workers.  Brother also needs testing as he was coughing/wearing mask.. .     Mr washington had mask on and gloves on when saw them, only saw them friday.       No symptoms yet for him or wife, venancio,  checking temp regularly and staying home .     I told him continue to self quarantine and  to push fluids, check temp twice daily. Increase fruits/vegies to boost immune system and be sure getting rest. Call if need further advice.         ANATOLY MERRITT  MRN-[id number]    Patient is a 74y old  Female who presents with a chief complaint of Respiratory distress (13 Jul 2023 20:00)    Review of System    Pt is unable to participate in ros.    Current Meds  MEDICATIONS  (STANDING):  albuterol/ipratropium for Nebulization 3 milliLiter(s) Nebulizer every 6 hours  atorvastatin 80 milliGRAM(s) Oral at bedtime  calcitonin Injectable 290 International Unit(s) IntraMuscular every 12 hours  dextrose 5% + sodium chloride 0.45%. 1000 milliLiter(s) (50 mL/Hr) IV Continuous <Continuous>  dextrose 5%. 1000 milliLiter(s) (50 mL/Hr) IV Continuous <Continuous>  dextrose 5%. 1000 milliLiter(s) (100 mL/Hr) IV Continuous <Continuous>  dextrose 50% Injectable 12.5 Gram(s) IV Push once  dextrose 50% Injectable 25 Gram(s) IV Push once  dextrose 50% Injectable 25 Gram(s) IV Push once  glucagon  Injectable 1 milliGRAM(s) IntraMuscular once  insulin lispro (ADMELOG) corrective regimen sliding scale   SubCutaneous at bedtime  insulin lispro (ADMELOG) corrective regimen sliding scale   SubCutaneous three times a day before meals  levothyroxine Injectable 20 MICROGram(s) IV Push at bedtime  metoprolol tartrate Injectable 2.5 milliGRAM(s) IV Push every 6 hours  pantoprazole  Injectable 40 milliGRAM(s) IV Push daily  piperacillin/tazobactam IVPB.. 3.375 Gram(s) IV Intermittent every 8 hours  polyethylene glycol 3350 17 Gram(s) Oral daily  potassium phosphate IVPB 30 milliMole(s) IV Intermittent once  sacubitril 24 mG/valsartan 26 mG 1 Tablet(s) Oral two times a day  sodium chloride 3%  Inhalation 4 milliLiter(s) Inhalation every 6 hours    MEDICATIONS  (PRN):  acetaminophen     Tablet .. 650 milliGRAM(s) Oral every 6 hours PRN Temp greater or equal to 38C (100.4F), Mild Pain (1 - 3)  dextrose Oral Gel 15 Gram(s) Oral once PRN Blood Glucose LESS THAN 70 milliGRAM(s)/deciliter  HYDROmorphone  Injectable 0.2 milliGRAM(s) IV Push every 4 hours PRN Severe Pain (7 - 10)    Vital Signs Last 24 Hrs  T(C): 37 (14 Jul 2023 09:43), Max: 37 (14 Jul 2023 09:43)  T(F): 98.6 (14 Jul 2023 09:43), Max: 98.6 (14 Jul 2023 09:43)  HR: 92 (14 Jul 2023 10:18) (88 - 108)  BP: 150/67 (14 Jul 2023 09:43) (141/86 - 160/70)  BP(mean): 86 (13 Jul 2023 19:26) (86 - 86)  RR: 18 (14 Jul 2023 09:43) (16 - 19)  SpO2: 98% (14 Jul 2023 09:43) (96% - 100%)    Parameters below as of 14 Jul 2023 09:43  Patient On (Oxygen Delivery Method): nasal cannula  O2 Flow (L/min): 6    Physical exam:    Constitutional: NAD    Eyes: PERRLA EOMI, anicteric sclera    Heent :No oral sores, no pharyngeal injection. moist mucosa.    Neck: supple, no jvd, no LAD    Respiratory: CTA b/l     Cardiovascular: s1s2, no m/g/r    Gastrointestinal: soft, nt, nd, + BS    Extremities: no c/c/e    Skin: no rash on exposed skin    Lymph Nodes: no lymphadenopathy.      CBC Full  -  ( 14 Jul 2023 06:18 )  WBC Count : 26.59 K/uL  RBC Count : 3.66 M/uL  Hemoglobin : 9.2 g/dL  Hematocrit : 28.7 %  Platelet Count - Automated : 401 K/uL  Mean Cell Volume : 78.4 fL  Mean Cell Hemoglobin : 25.1 pg  Mean Cell Hemoglobin Concentration : 32.1 gm/dL  Auto Neutrophil # : 24.97 K/uL  Auto Lymphocyte # : 0.93 K/uL  Auto Monocyte # : 0.69 K/uL  Auto Eosinophil # : 0.00 K/uL  Auto Basophil # : 0.00 K/uL  Auto Neutrophil % : 93.9 %  Auto Lymphocyte % : 3.5 %  Auto Monocyte % : 2.6 %  Auto Eosinophil % : 0.0 %  Auto Basophil % : 0.0 %    07-14    149<H>  |  107  |  18  ----------------------------<  186<H>  3.0<L>   |  20<L>  |  0.69    Ca    12.6<H>      14 Jul 2023 06:18  Phos  1.8     07-14  Mg     2.10     07-14    TPro  5.8<L>  /  Alb  2.5<L>  /  TBili  2.0<H>  /  DBili  x   /  AST  70<H>  /  ALT  29  /  AlkPhos  340<H>  07-14    PT/INR - ( 14 Jul 2023 06:18 )   PT: 22.1 sec;   INR: 1.89 ratio         PTT - ( 14 Jul 2023 06:18 )  PTT:35.7 sec    Rad:    Assessment/Plan

## 2023-07-24 ENCOUNTER — OFFICE VISIT (OUTPATIENT)
Dept: URGENT CARE | Facility: CLINIC | Age: 73
End: 2023-07-24
Payer: COMMERCIAL

## 2023-07-24 VITALS
OXYGEN SATURATION: 97 % | RESPIRATION RATE: 18 BRPM | HEART RATE: 46 BPM | WEIGHT: 210 LBS | BODY MASS INDEX: 30.06 KG/M2 | TEMPERATURE: 98 F | HEIGHT: 70 IN | SYSTOLIC BLOOD PRESSURE: 131 MMHG | DIASTOLIC BLOOD PRESSURE: 76 MMHG

## 2023-07-24 DIAGNOSIS — M79.89 SWELLING OF LEFT HAND: Primary | ICD-10-CM

## 2023-07-24 PROCEDURE — 36415 COLL VENOUS BLD VENIPUNCTURE: CPT | Performed by: NURSE PRACTITIONER

## 2023-07-24 PROCEDURE — 99213 OFFICE O/P EST LOW 20 MIN: CPT | Mod: S$GLB,,, | Performed by: NURSE PRACTITIONER

## 2023-07-24 PROCEDURE — 99213 PR OFFICE/OUTPT VISIT, EST, LEVL III, 20-29 MIN: ICD-10-PCS | Mod: S$GLB,,, | Performed by: NURSE PRACTITIONER

## 2023-07-24 PROCEDURE — 84550 ASSAY OF BLOOD/URIC ACID: CPT | Performed by: NURSE PRACTITIONER

## 2023-07-25 ENCOUNTER — TELEPHONE (OUTPATIENT)
Dept: CARDIOLOGY | Facility: CLINIC | Age: 73
End: 2023-07-25
Payer: COMMERCIAL

## 2023-07-25 ENCOUNTER — TELEPHONE (OUTPATIENT)
Dept: URGENT CARE | Facility: CLINIC | Age: 73
End: 2023-07-25
Payer: COMMERCIAL

## 2023-07-25 DIAGNOSIS — M10.9 ACUTE GOUT OF LEFT HAND, UNSPECIFIED CAUSE: Primary | ICD-10-CM

## 2023-07-25 LAB — URATE SERPL-MCNC: 8.6 MG/DL (ref 3.4–7)

## 2023-07-25 RX ORDER — PREDNISONE 5 MG/1
TABLET ORAL
Qty: 30 TABLET | Refills: 0 | Status: SHIPPED | OUTPATIENT
Start: 2023-07-25 | End: 2023-08-01

## 2023-07-25 NOTE — TELEPHONE ENCOUNTER
Pt calling, states he started Zetia 2 weeks ago and since yesterday noticed swelling in his left hand and forearm. Pt asking if symptoms caused by Zetia.  notified, advised unlikely to be from Zetia, recommended pt continue medication. Pt notified, verbalized understanding.

## 2023-07-25 NOTE — PROGRESS NOTES
"Subjective:      Patient ID: Dustin Lauren is a 73 y.o. male.    Vitals:  height is 5' 10" (1.778 m) and weight is 95.3 kg (210 lb). His oral temperature is 98 °F (36.7 °C). His blood pressure is 131/76 and his pulse is 46 (abnormal). His respiration is 18 and oxygen saturation is 97%.     Chief Complaint: hand swollen    This is a 73 y.o. male who presents today with a chief complaint of left hand and forearm swollen today.    Edema  This is a new problem. The current episode started today. The problem has been unchanged. Associated symptoms include joint swelling. Pertinent negatives include no abdominal pain, anorexia, arthralgias, change in bowel habit, chest pain, chills, congestion, coughing, diaphoresis, fatigue, fever, headaches, myalgias, nausea, neck pain, numbness, rash, sore throat, swollen glands, urinary symptoms, vertigo, visual change, vomiting or weakness. Nothing aggravates the symptoms. He has tried nothing for the symptoms. The treatment provided no relief.     Constitution: Negative for chills, sweating, fatigue and fever.   HENT:  Negative for congestion and sore throat.    Neck: Negative for neck pain.   Cardiovascular:  Negative for chest pain.   Respiratory:  Negative for cough.    Gastrointestinal:  Negative for abdominal pain, nausea and vomiting.   Musculoskeletal:  Positive for joint swelling and arthritis. Negative for pain, trauma, joint pain, abnormal ROM of joint and muscle ache.   Skin:  Negative for rash and erythema.   Neurological:  Negative for history of vertigo, headaches and numbness.    Objective:     Physical Exam   Constitutional: He is oriented to person, place, and time. He appears well-developed.  Non-toxic appearance. He does not appear ill. No distress.   HENT:   Head: Normocephalic and atraumatic. Head is without abrasion, without contusion and without laceration.   Ears:   Right Ear: External ear normal.   Left Ear: External ear normal.   Nose: Nose normal. "   Mouth/Throat: Oropharynx is clear and moist and mucous membranes are normal.   Eyes: Conjunctivae, EOM and lids are normal. Pupils are equal, round, and reactive to light.   Neck: Trachea normal and phonation normal. Neck supple.   Cardiovascular: Normal rate, regular rhythm, normal heart sounds and normal pulses.   Pulmonary/Chest: Effort normal and breath sounds normal. No stridor. No respiratory distress.   Musculoskeletal: Normal range of motion.         General: Normal range of motion.      Left wrist: He exhibits swelling. He exhibits normal range of motion, no tenderness, no bony tenderness, no effusion, no crepitus, no deformity and no laceration.      Left forearm: He exhibits swelling. He exhibits no tenderness, no bony tenderness, no edema, no deformity and no laceration.      Left hand: He exhibits swelling. He exhibits normal range of motion, no tenderness, no bony tenderness, normal two-point discrimination, normal capillary refill, no deformity and no laceration. Normal sensation noted. Normal strength noted.   Neurological: He is alert and oriented to person, place, and time.   Skin: Skin is warm, dry, intact, not diaphoretic and no rash. Capillary refill takes less than 2 seconds. No abrasion, No burn, No bruising, No erythema and No ecchymosis   Psychiatric: His speech is normal and behavior is normal. Judgment and thought content normal.   Nursing note and vitals reviewed.    Assessment:     1. Swelling of left hand        Plan:     Patient presents with concern of waking up this morning and having swelling in his left hand that he now has noticed has extended into his left mid forearm.  Noticed when he went to remove his watch.  Denies pain.  Denies redness or warmth.  There was no trauma or injury.  No rash, fever, or chills.  He has no joint pain.  He was no numbness or tingling.  Denies bite or itching.  Notes that he ate 3 crabs on Friday night and had some shrimp creole on Saturday.   Denies a history of Gout but notes that he has had elevated uric acid in the past.  Will send off uric acid level.  Trace swelling to hand and forearm with no tenderness, redness, or warmth.  No pitting edema.  Wrapped area with ace wrap.  Will call tomorrow to check on patient and re assess.  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  He is right hand dominant.  Denies any recent over use. Here w/ his wife in clinic who is a historian as well.  Swelling of left hand  -     URIC ACID      Patient Instructions   Uric acid level pending, we will call you with this result.  Rest.  Elevation.  Follow up with PCP if symptoms persist.  Avoid shellfish and organ meat.  Return here or go to the ER as needed for rapidly worsening condition.                 DM2 (diabetes mellitus, type II)  Monitor blood glucose ACHS with ISS  Check HgA1c  Diabetic education  Consistent carbohydrate diet  Continue statin therapy

## 2023-07-25 NOTE — TELEPHONE ENCOUNTER
I spoke w/ the patient and reviewed his uric acid level.  Advised to continue avoiding organ meats and shellfish.  He is not having pain.  Swelling has decreased some.  Hx of arthritis.  Will call in steroid pack because he is on a blood thinner and limited to medications he can take for this.  Advised to use medication as needed and hold off for now unless rapidly painful or swollen.  He understands this.  He is going to f/u closely with his PCP as well.

## 2023-07-25 NOTE — PATIENT INSTRUCTIONS
Uric acid level pending, we will call you with this result.  Rest.  Elevation.  Follow up with PCP if symptoms persist.  Avoid shellfish and organ meat.  Return here or go to the ER as needed for rapidly worsening condition.

## 2023-07-28 ENCOUNTER — TELEPHONE (OUTPATIENT)
Dept: ELECTROPHYSIOLOGY | Facility: CLINIC | Age: 73
End: 2023-07-28
Payer: COMMERCIAL

## 2023-07-28 NOTE — TELEPHONE ENCOUNTER
----- Message from Shell Childress MA sent at 7/28/2023 11:55 AM CDT -----    ----- Message -----  From: Cassie Hinojosa MA  Sent: 7/28/2023  11:30 AM CDT  To: [facility] Staff    Please call Ángela Samano from [employer] she need  to talk to  you about the patient medication Eliquis 5 mg please call [phone]. Thank you.

## 2023-08-07 ENCOUNTER — PATIENT MESSAGE (OUTPATIENT)
Dept: INTERNAL MEDICINE | Facility: CLINIC | Age: 73
End: 2023-08-07
Payer: COMMERCIAL

## 2023-08-08 ENCOUNTER — LAB VISIT (OUTPATIENT)
Dept: LAB | Facility: HOSPITAL | Age: 73
End: 2023-08-08
Attending: INTERNAL MEDICINE
Payer: COMMERCIAL

## 2023-08-08 DIAGNOSIS — R73.02 IMPAIRED GLUCOSE TOLERANCE: ICD-10-CM

## 2023-08-08 DIAGNOSIS — E89.0 POSTSURGICAL HYPOTHYROIDISM: ICD-10-CM

## 2023-08-08 DIAGNOSIS — E78.5 HYPERLIPIDEMIA, UNSPECIFIED HYPERLIPIDEMIA TYPE: ICD-10-CM

## 2023-08-08 LAB
ALBUMIN SERPL BCP-MCNC: 3.9 G/DL (ref 3.5–5.2)
ALP SERPL-CCNC: 48 U/L (ref 55–135)
ALT SERPL W/O P-5'-P-CCNC: 33 U/L (ref 10–44)
ANION GAP SERPL CALC-SCNC: 10 MMOL/L (ref 8–16)
AST SERPL-CCNC: 25 U/L (ref 10–40)
BILIRUB SERPL-MCNC: 0.6 MG/DL (ref 0.1–1)
BUN SERPL-MCNC: 19 MG/DL (ref 8–23)
CALCIUM SERPL-MCNC: 9 MG/DL (ref 8.7–10.5)
CHLORIDE SERPL-SCNC: 103 MMOL/L (ref 95–110)
CO2 SERPL-SCNC: 25 MMOL/L (ref 23–29)
CREAT SERPL-MCNC: 0.9 MG/DL (ref 0.5–1.4)
EST. GFR  (NO RACE VARIABLE): >60 ML/MIN/1.73 M^2
ESTIMATED AVG GLUCOSE: 114 MG/DL (ref 68–131)
GLUCOSE SERPL-MCNC: 95 MG/DL (ref 70–110)
HBA1C MFR BLD: 5.6 % (ref 4–5.6)
POTASSIUM SERPL-SCNC: 4.2 MMOL/L (ref 3.5–5.1)
PROT SERPL-MCNC: 6.9 G/DL (ref 6–8.4)
SODIUM SERPL-SCNC: 138 MMOL/L (ref 136–145)
T4 FREE SERPL-MCNC: 1 NG/DL (ref 0.71–1.51)
TSH SERPL DL<=0.005 MIU/L-ACNC: 5.03 UIU/ML (ref 0.4–4)

## 2023-08-08 PROCEDURE — 84443 ASSAY THYROID STIM HORMONE: CPT | Performed by: INTERNAL MEDICINE

## 2023-08-08 PROCEDURE — 80053 COMPREHEN METABOLIC PANEL: CPT | Performed by: INTERNAL MEDICINE

## 2023-08-08 PROCEDURE — 83036 HEMOGLOBIN GLYCOSYLATED A1C: CPT | Performed by: INTERNAL MEDICINE

## 2023-08-08 PROCEDURE — 84439 ASSAY OF FREE THYROXINE: CPT | Performed by: INTERNAL MEDICINE

## 2023-08-08 PROCEDURE — 36415 COLL VENOUS BLD VENIPUNCTURE: CPT | Mod: PO | Performed by: INTERNAL MEDICINE

## 2023-08-15 ENCOUNTER — OFFICE VISIT (OUTPATIENT)
Dept: ENDOCRINOLOGY | Facility: CLINIC | Age: 73
End: 2023-08-15
Payer: COMMERCIAL

## 2023-08-15 VITALS
SYSTOLIC BLOOD PRESSURE: 130 MMHG | HEIGHT: 70 IN | DIASTOLIC BLOOD PRESSURE: 78 MMHG | BODY MASS INDEX: 30.14 KG/M2 | WEIGHT: 210.56 LBS

## 2023-08-15 DIAGNOSIS — I10 PRIMARY HYPERTENSION: ICD-10-CM

## 2023-08-15 DIAGNOSIS — R73.02 IMPAIRED GLUCOSE TOLERANCE: ICD-10-CM

## 2023-08-15 DIAGNOSIS — E89.0 POSTSURGICAL HYPOTHYROIDISM: Primary | ICD-10-CM

## 2023-08-15 DIAGNOSIS — E78.2 MIXED HYPERLIPIDEMIA: ICD-10-CM

## 2023-08-15 PROCEDURE — 99999 PR PBB SHADOW E&M-EST. PATIENT-LVL IV: CPT | Mod: PBBFAC,,, | Performed by: INTERNAL MEDICINE

## 2023-08-15 PROCEDURE — 99214 PR OFFICE/OUTPT VISIT, EST, LEVL IV, 30-39 MIN: ICD-10-PCS | Mod: S$GLB,,, | Performed by: INTERNAL MEDICINE

## 2023-08-15 PROCEDURE — 99214 OFFICE O/P EST MOD 30 MIN: CPT | Mod: S$GLB,,, | Performed by: INTERNAL MEDICINE

## 2023-08-15 PROCEDURE — 99999 PR PBB SHADOW E&M-EST. PATIENT-LVL IV: ICD-10-PCS | Mod: PBBFAC,,, | Performed by: INTERNAL MEDICINE

## 2023-08-15 RX ORDER — LEVOTHYROXINE SODIUM 137 UG/1
TABLET ORAL
Qty: 32 TABLET | Refills: 11 | Status: SHIPPED | OUTPATIENT
Start: 2023-08-15 | End: 2024-04-02 | Stop reason: SDUPTHER

## 2023-08-15 NOTE — PROGRESS NOTES
Subjective:      Patient ID: Dustin Lauren is a 73 y.o. male.    Chief Complaint:  Hypothyroidism      History of Present Illness  Mr. Lauren presents today for follow up of hypothyroidism. Last visit in 2/2023.     Interval HPI:   With Hypothyroidism s/p surgery for a benign thyroid nodule on 1/27/10.     Had been on 150 mcg of thyroid hormone for quite some time, but then developed A Fib in 9/2016. Had cardiac ablation in 9/2021 for atrial fibrillation. No further episodes of A Fib since ablation.      Currently taking Synthroid 137 mcg Mon-Sat with 1.5 tablets on Sunday only.  Denies missing any doses. Waits at least one hour before taking other meds or food.   No tremor or increased anxiousness. He has noticed a few more PVC's recently.   No overt fatigue.      Also with HTN on amlodipine and valsartan.      Latest Reference Range & Units 08/08/23 08:26   Hemoglobin A1C External 4.0 - 5.6 % 5.6   TSH 0.400 - 4.000 uIU/mL 5.034 (H)     Since last visit he is now taking atorvastatin 20 mg daily, ezetimibe 10 mg daily, and stopped fenofibrate.     Review of Systems   Constitutional:  Negative for chills and fever.   Gastrointestinal:  Negative for nausea and vomiting.       Objective:   Physical Exam  Vitals and nursing note reviewed.     No thyroid tissue palpated  No hand tremor  No tachycardia    BP Readings from Last 3 Encounters:   08/15/23 130/78   07/24/23 131/76   06/09/23 (!) 140/68     Wt Readings from Last 1 Encounters:   08/15/23 0841 95.5 kg (210 lb 8.6 oz)       Body mass index is 30.21 kg/m².    Lab Review:   Lab Results   Component Value Date    HGBA1C 5.6 08/08/2023     Lab Results   Component Value Date    CHOL 127 01/03/2023    HDL 43 01/03/2023    LDLCALC 71.4 01/03/2023    TRIG 63 01/03/2023    CHOLHDL 33.9 01/03/2023     Lab Results   Component Value Date     08/08/2023    K 4.2 08/08/2023     08/08/2023    CO2 25 08/08/2023    GLU 95 08/08/2023    BUN 19 08/08/2023     CREATININE 0.9 08/08/2023    CALCIUM 9.0 08/08/2023    PROT 6.9 08/08/2023    ALBUMIN 3.9 08/08/2023    BILITOT 0.6 08/08/2023    ALKPHOS 48 (L) 08/08/2023    AST 25 08/08/2023    ALT 33 08/08/2023    ANIONGAP 10 08/08/2023    ESTGFRAFRICA >60.0 01/07/2022    EGFRNONAA >60.0 01/07/2022    TSH 5.034 (H) 08/08/2023         Assessment and Plan     Postsurgical hypothyroidism  --Patient with postsurgical hypothyroidism  --Recent TSH slightly elevated   --Will repeat TSH in 6-8 weeks  --Will continue Synthroid 137 mcg PO Mon-Sat with 1.5 tablets on Sunday only pending repeat TSH  --Will aim to keep TSH mid to high normal given age and A fib history    Hyperlipidemia  --On atorvastatin and ezetimibe  --Managed by cardiology    Hypertension  --Bp at goal  --Continue current regimen    Impaired glucose tolerance  --Recent A1c 5.6%  --Continued diet and exercise  --Check A1c periodically      TSH added to labs in 9/2023, follow up in 6 months with TSH and A1c prior to appt       Malick Malik M.D. Staff Endocrinology

## 2023-08-15 NOTE — ASSESSMENT & PLAN NOTE
--Patient with postsurgical hypothyroidism  --Recent TSH slightly elevated   --Will repeat TSH in 6-8 weeks  --Will continue Synthroid 137 mcg PO Mon-Sat with 1.5 tablets on Sunday only pending repeat TSH  --Will aim to keep TSH mid to high normal given age and A fib history

## 2023-08-17 ENCOUNTER — OFFICE VISIT (OUTPATIENT)
Dept: INTERNAL MEDICINE | Facility: CLINIC | Age: 73
End: 2023-08-17
Payer: COMMERCIAL

## 2023-08-17 ENCOUNTER — LAB VISIT (OUTPATIENT)
Dept: LAB | Facility: HOSPITAL | Age: 73
End: 2023-08-17
Attending: FAMILY MEDICINE
Payer: COMMERCIAL

## 2023-08-17 VITALS
HEIGHT: 70 IN | WEIGHT: 212.94 LBS | BODY MASS INDEX: 30.49 KG/M2 | SYSTOLIC BLOOD PRESSURE: 118 MMHG | OXYGEN SATURATION: 97 % | TEMPERATURE: 98 F | HEART RATE: 50 BPM | DIASTOLIC BLOOD PRESSURE: 80 MMHG | RESPIRATION RATE: 16 BRPM

## 2023-08-17 DIAGNOSIS — M25.50 ARTHRALGIA, UNSPECIFIED JOINT: ICD-10-CM

## 2023-08-17 DIAGNOSIS — M10.042 ACUTE IDIOPATHIC GOUT OF LEFT HAND: ICD-10-CM

## 2023-08-17 DIAGNOSIS — M10.042 ACUTE IDIOPATHIC GOUT OF LEFT HAND: Primary | ICD-10-CM

## 2023-08-17 LAB
CCP AB SER IA-ACNC: <0.5 U/ML
CRP SERPL-MCNC: 1.1 MG/L (ref 0–8.2)
ERYTHROCYTE [SEDIMENTATION RATE] IN BLOOD BY PHOTOMETRIC METHOD: 6 MM/HR (ref 0–23)
URATE SERPL-MCNC: 6.9 MG/DL (ref 3.4–7)

## 2023-08-17 PROCEDURE — 99214 OFFICE O/P EST MOD 30 MIN: CPT | Mod: S$GLB,,, | Performed by: FAMILY MEDICINE

## 2023-08-17 PROCEDURE — 85652 RBC SED RATE AUTOMATED: CPT | Performed by: FAMILY MEDICINE

## 2023-08-17 PROCEDURE — 36415 COLL VENOUS BLD VENIPUNCTURE: CPT | Mod: PO | Performed by: FAMILY MEDICINE

## 2023-08-17 PROCEDURE — 99214 PR OFFICE/OUTPT VISIT, EST, LEVL IV, 30-39 MIN: ICD-10-PCS | Mod: S$GLB,,, | Performed by: FAMILY MEDICINE

## 2023-08-17 PROCEDURE — 84550 ASSAY OF BLOOD/URIC ACID: CPT | Performed by: FAMILY MEDICINE

## 2023-08-17 PROCEDURE — 99999 PR PBB SHADOW E&M-EST. PATIENT-LVL V: ICD-10-PCS | Mod: PBBFAC,,, | Performed by: FAMILY MEDICINE

## 2023-08-17 PROCEDURE — 99999 PR PBB SHADOW E&M-EST. PATIENT-LVL V: CPT | Mod: PBBFAC,,, | Performed by: FAMILY MEDICINE

## 2023-08-17 PROCEDURE — 86038 ANTINUCLEAR ANTIBODIES: CPT | Performed by: FAMILY MEDICINE

## 2023-08-17 PROCEDURE — 86200 CCP ANTIBODY: CPT | Performed by: FAMILY MEDICINE

## 2023-08-17 PROCEDURE — 86140 C-REACTIVE PROTEIN: CPT | Performed by: FAMILY MEDICINE

## 2023-08-17 PROCEDURE — 86431 RHEUMATOID FACTOR QUANT: CPT | Performed by: FAMILY MEDICINE

## 2023-08-17 RX ORDER — NAPROXEN 500 MG/1
500 TABLET ORAL 2 TIMES DAILY WITH MEALS
Qty: 14 TABLET | Refills: 0 | Status: SHIPPED | OUTPATIENT
Start: 2023-08-17 | End: 2023-09-11

## 2023-08-17 NOTE — PROGRESS NOTES
Subjective     Patient ID: Dustin Lauren is a 73 y.o. male.    Chief Complaint: Hand Pain (Swelling./Left Hand) and Follow-up  73-year-old white male presents to clinic today accompanied by his wife secondary to concerns of left hand swelling.  The patient 1st noted swelling approximately 3 weeks ago.  He notes that he had recently had a dose adjustment to his cholesterol medications and was started on an increased dose of Lipitor to 20 mg daily and Zetia 10 mg daily.  Also around the same time he was noted to have evenings some crab and shrimp.  He developed the left hand pain without any known injury and was seen in urgent care on July 24th.  Workup revealed elevated uric acid level and the patient was prescribed prednisone for treatment; however, he did not take the medication secondary to fear of exacerbating atrial fibrillation as his 1st episode of atrial fibrillation occurred after being placed on prednisone.  At this time he does note some improvement of the swelling but it does persist and he has been noting increased trigger finger.  Secondarily, he does note a very strong family history of rheumatoid arthritis.  Hand Pain   Pertinent negatives include no chest pain.   Follow-up  Associated symptoms include arthralgias (left hand) and joint swelling (left hand). Pertinent negatives include no abdominal pain, chest pain, chills, congestion, coughing, fatigue, fever, headaches, myalgias, nausea, neck pain, rash, sore throat or vomiting.     Review of Systems   Constitutional:  Negative for appetite change, chills, fatigue and fever.   HENT:  Negative for nasal congestion, ear pain, hearing loss, postnasal drip, rhinorrhea, sinus pressure/congestion, sore throat and tinnitus.    Eyes:  Negative for redness, itching and visual disturbance.   Respiratory:  Negative for cough, chest tightness and shortness of breath.    Cardiovascular:  Negative for chest pain and palpitations.   Gastrointestinal:  Negative  for abdominal pain, constipation, diarrhea, nausea and vomiting.   Genitourinary:  Negative for decreased urine volume, difficulty urinating, dysuria, frequency, hematuria and urgency.   Musculoskeletal:  Positive for arthralgias (left hand) and joint swelling (left hand). Negative for back pain, myalgias, neck pain and neck stiffness.   Integumentary:  Negative for rash.   Neurological:  Negative for dizziness, light-headedness and headaches.   Psychiatric/Behavioral: Negative.            Objective     Physical Exam  Constitutional:       Appearance: Normal appearance.   HENT:      Head: Normocephalic and atraumatic.   Pulmonary:      Effort: Pulmonary effort is normal.   Musculoskeletal:      Left hand: Swelling present.   Neurological:      Mental Status: He is alert and oriented to person, place, and time.   Psychiatric:         Mood and Affect: Mood normal.         Behavior: Behavior normal.         Thought Content: Thought content normal.         Judgment: Judgment normal.            Assessment and Plan     1. Acute idiopathic gout of left hand  -     naproxen (NAPROSYN) 500 MG tablet; Take 1 tablet (500 mg total) by mouth 2 (two) times daily with meals. for 7 days  Dispense: 14 tablet; Refill: 0  -     Uric Acid; Future; Expected date: 08/17/2023    2. Arthralgia, unspecified joint  -     EDWARD; Future; Expected date: 08/17/2023  -     Sedimentation rate; Future; Expected date: 08/17/2023  -     C-REACTIVE PROTEIN; Future; Expected date: 08/17/2023  -     CYCLIC CITRUL PEPTIDE ANTIBODY, IGG; Future; Expected date: 08/17/2023  -     RHEUMATOID FACTOR; Future; Expected date: 08/17/2023        1. I recommend that we start treatment for gout as the patient's uric acid levels were elevated and he is having symptoms; therefore, I recommend starting naproxen 500 mg b.i.d. for 7 days.  If the patient's pain returns after completion of the naproxen there is a possible potential of the Lipitor and Zetia causing  arthralgias at which time we will discuss alternative treatment with Cardiology.    2. Secondary to the patient's family history of rheumatoid arthritis I recommend a further rheumatoid workup with EDWARD, ESR, CRP, CCP, and rheumatoid factor.  3. Return to clinic as needed if symptoms persist or worsen.               Follow up if symptoms worsen or fail to improve.

## 2023-08-18 LAB
ANA SER QL IF: NORMAL
RHEUMATOID FACT SERPL-ACNC: <13 IU/ML (ref 0–15)

## 2023-08-23 ENCOUNTER — TELEPHONE (OUTPATIENT)
Dept: CARDIOLOGY | Facility: CLINIC | Age: 73
End: 2023-08-23
Payer: COMMERCIAL

## 2023-08-23 NOTE — TELEPHONE ENCOUNTER
----- Message from Bernadette Almonte sent at 8/23/2023  9:02 AM CDT -----  Regarding: Refill  Ángela Samano [phone] , trying to get an authorization on his ELIQUIS 5 mg Tab and get it refill at [facility]/pharmacy #[id number] - [place] [address]   Phone:  [phone]  Fax:  [phone]      [phone]. Thank you Bernadette

## 2023-08-23 NOTE — TELEPHONE ENCOUNTER
Spoke with Ángela Samano @ [phone] in reference to trying to get an authorization for ELIQUIS 5 mg Tab at [facility]/pharmacy #[id number] - [place], [place] - [address]. She suggested to complete in Cover My Med. Was unable to complete in Cover My Med.    Contacted Sarah to request fax to be sent. Spoke with Nyla and she faxed over the paperwork that needs to be completed. Paperwork should be faxed back to [phone] ([facility]) processing takes about 3 days.     Authorization #[id number]  Pt ID# [id number]

## 2023-08-25 ENCOUNTER — TELEPHONE (OUTPATIENT)
Dept: ENDOCRINOLOGY | Facility: CLINIC | Age: 73
End: 2023-08-25
Payer: COMMERCIAL

## 2023-08-25 NOTE — TELEPHONE ENCOUNTER
----- Message from Josey Mcnally MA sent at 8/23/2023  4:58 PM CDT -----    ----- Message -----  From: Isabela Stacy  Sent: 8/23/2023  12:26 PM CDT  To: Helena Teresa Staff    Ángela moreno/ [employer] calling regarding Pharmacy Pre- Authorization for the levothyroxine (SYNTHROID) 137 MCG Tab tablet to [facility]ANI call in the request to [phone] or fax to [phone] or electronically through COVER MY MED'S...she stated that the doctor needs to specify exactly why the medication is needed for the PT..Also, Ángela is asking for a call back to inform when this pre-auth is done so she can be aware  [phone]

## 2023-08-28 ENCOUNTER — PATIENT MESSAGE (OUTPATIENT)
Dept: INTERNAL MEDICINE | Facility: CLINIC | Age: 73
End: 2023-08-28
Payer: COMMERCIAL

## 2023-08-28 ENCOUNTER — PATIENT MESSAGE (OUTPATIENT)
Dept: ELECTROPHYSIOLOGY | Facility: CLINIC | Age: 73
End: 2023-08-28
Payer: COMMERCIAL

## 2023-08-28 DIAGNOSIS — I48.19 PERSISTENT ATRIAL FIBRILLATION: ICD-10-CM

## 2023-09-06 DIAGNOSIS — I48.19 PERSISTENT ATRIAL FIBRILLATION: Primary | ICD-10-CM

## 2023-09-08 ENCOUNTER — PATIENT MESSAGE (OUTPATIENT)
Dept: ENDOCRINOLOGY | Facility: CLINIC | Age: 73
End: 2023-09-08
Payer: COMMERCIAL

## 2023-09-08 PROBLEM — Z79.01 ON ANTICOAGULANT THERAPY: Status: ACTIVE | Noted: 2023-09-08

## 2023-09-08 NOTE — PROGRESS NOTES
Mr. Lauren is a patient of Dr. Rodríguez and was last seen in clinic 9/19/2022.      Subjective:   Patient ID:  Dustin Lauren is a 73 y.o. male who presents for follow up of Atrial Fibrillation  .     HPI:    Mr. Lauren is a 73 y.o. male with atrial fibrillation (PVI 9/2021), Htn, CAD s/p PCI, BPH, hypothyroidism (s/o thyroidectomy), low back pain, tachy-darek syndrome here for follow up.     Background:    Primary cardiologist is Dr. Vo.  Primary EP has been Dr. Burnett. Presents for second opinion.  He is the uncle of Libra Littlejohn  He owns [employer]  Initially developed symptomatic paroxysmal AF 9/16. Progressed to persistent AF. Underwent DCCV in 2016.  Ultimately developed recurrence.  DCCV 12/18/18.  48 hr holter 6/20 nsr  Developed recurrence. DCCV 2/2/21 4/21 had an event lasting a couple of hours.  Is symptomatic with the AF, noting palpitations, on occasion associated with lightheadedness.  Echo 7/30/20 normal biventricular structure and function normal left atrial size, trace MR  ECG reveals sinus bradycardia at 49 bpm.  There has been inability to add medications due to his bradycardia.  Has eliminated caffeine and alcohol.  Atrial fibrillation, symptomatic. Paroxysmal to persistent.  Has bradycardia limiting his medications. Recommend PVI.    9/16/2021: Successful pulmonary vein RF ablation.    12/16/2021: He is 3 mo s/p PVI.  Doing well since procedure, with no sustained palpitations during blanking period. Brief palps only. Pericarditis symptoms resolved with course of NSAIDs.  HIEU 9/2021 showed normal LVEF. Chronic bradycardia - not on AV blockers. CHADSVASc 3 and it is recommended he remain on OAC. He is on eliquis for CVA prophylaxis.  He exercises regularly.     3/12/2022: He is 6 months s/p PVI. He is doing well from a rhythm standpoint, with no documented or symptomatic recurrence of arrhythmia since procedure. No sustained palpitations; brief rare palps likely ectopy.    Bradycardic at baseline. Not on AVN blockers. CHADSVASc 3 on eliquis for CVA prophylaxis. He feels well and remains active.    9/19/2022: One year s/p PVI. Continues to do well from a rhythm standpoint, with no documented or symptomatic recurrence of arrhythmia since procedure. Bradycardic at baseline, asymptomatic.  CHADSVASc 3 on eliquis. No AAD or AVN blockers. Normally symptomatic with AF. He will notify clinic if he develops new symptoms. He is also considering switching general cardiology care to [facility]. Referral provided.    Update (09/11/2023):    Today he has been doing well. Only complaint is left hand swelling - he is following up with hand specialist soon. No cardiac complaints. Mr. Lauren reports no chest pain with exertion or at rest, palpitations, SOB, RILEY, dizziness, or syncope.  Exercises regularly: elliptical, stairmaster, bicycle, etc 3 days a week. HRs increase to 120 with exercise. Resting HRs slow but he is asymptomatic.    He is currently taking eliquis 5mg BID for stroke prophylaxis and denies significant bleeding episodes. Kidney function is stable, with a creatinine of 0.9 on 8/8/2023.    I have personally reviewed the patient's EKG today, which shows sinus bradycardia at 54bpm. SD interval is 182. QRS is 88. QT is 440.    Relevant Cardiac Test Results:    HIEU (9/16/2021):  Atrial fibrillation observed.  HIEU to evaluate for SHINE prior PVI for atrial fibrillation.  The left ventricle is normal in size with normal systolic function. The estimated ejection fraction is 55%.  Moderate right ventricular enlargement with low normal right ventricular systolic function.  Normal appearing left atrial appendage. No thrombus is present in the appendage. Normal appendage velocities 0.64 m/s.  Biatrial enlargement.  Moderate tricuspid regurgitation. There is prolapse of the septal TV leaflet.  Grade 3 plaque present in the descending aorta.  The ascending aorta is mildly dilated measured at 4  cm.    Current Outpatient Medications   Medication Sig    amlodipine-valsartan (EXFORGE) 5-160 mg per tablet Take 1 tablet by mouth once daily.    apixaban (ELIQUIS) 5 mg Tab Take 1 tablet (5 mg total) by mouth 2 (two) times daily.    atorvastatin (LIPITOR) 10 MG tablet Take 1 tablet (10 mg total) by mouth every other day. (Patient taking differently: Take 20 mg by mouth once daily.)    cholecalciferol, vitamin D3, 25 mcg (1,000 unit) Chew Take 2,000 Int'l Units by mouth once daily.    ezetimibe (ZETIA) 10 mg tablet Take 1 tablet (10 mg total) by mouth once daily.    finasteride (PROSCAR) 5 mg tablet Take 1 tablet (5 mg total) by mouth once daily.    fluticasone propionate (FLONASE) 50 mcg/actuation nasal spray 2 sprays by Each Nostril route once daily.    levothyroxine (SYNTHROID) 137 MCG Tab tablet TAKE 1 TABLET BY MOUTH MON-SAT AND TAKE 1.5 TABLET BY MOUTH ON SUNDAY ONLY    mirabegron (MYRBETRIQ) 25 mg Tb24 ER tablet Take 1 tablet (25 mg total) by mouth once daily. (Patient not taking: Reported on 8/15/2023)    multivit-min/FA/lycopen/lutein (CENTRUM SILVER MEN ORAL) Take 1 tablet by mouth once daily.    naftifine 2 % Crea Apply 1 application topically daily as needed.    nitroGLYCERIN (NITROSTAT) 0.3 MG SL tablet DISSOLVE 1 TABLET UNDER THE TONGUE AS NEEDED FOR CHEST PAIN    omega-3 fatty acids 1,000 mg Cap Take 2,000 mg by mouth.    omeprazole (PRILOSEC) 40 MG capsule Take 40 mg by mouth once daily.    prasugreL (EFFIENT) 10 mg Tab Take 1 tablet (10 mg total) by mouth once daily.    tamsulosin (FLOMAX) 0.4 mg Cap Take 1 capsule (0.4 mg total) by mouth once daily.    tiZANidine (ZANAFLEX) 4 MG tablet Take 1 tablet (4 mg total) by mouth every 8 (eight) hours as needed (muscle spasms).     No current facility-administered medications for this visit.       Review of Systems   Constitutional: Negative for malaise/fatigue.   Cardiovascular:  Negative for chest pain, dyspnea on exertion, irregular heartbeat, leg  "swelling and palpitations.   Respiratory:  Negative for shortness of breath.    Hematologic/Lymphatic: Negative for bleeding problem.   Skin:  Negative for rash.   Musculoskeletal:  Positive for joint swelling. Negative for myalgias.   Gastrointestinal:  Negative for hematemesis, hematochezia and nausea.   Genitourinary:  Negative for hematuria.   Neurological:  Negative for light-headedness.   Psychiatric/Behavioral:  Negative for altered mental status.    Allergic/Immunologic: Negative for persistent infections.       Objective:          /60   Pulse (!) 54   Ht 5' 10" (1.778 m)   Wt 95.5 kg (210 lb 8.6 oz)   BMI 30.21 kg/m²     Physical Exam  Vitals and nursing note reviewed.   Constitutional:       Appearance: Normal appearance. He is well-developed.   HENT:      Head: Normocephalic.      Nose: Nose normal.   Eyes:      Pupils: Pupils are equal, round, and reactive to light.   Cardiovascular:      Rate and Rhythm: Regular rhythm. Bradycardia present.   Pulmonary:      Effort: No respiratory distress.      Breath sounds: Normal breath sounds.   Musculoskeletal:         General: Normal range of motion.   Skin:     General: Skin is warm and dry.      Findings: No erythema.   Neurological:      Mental Status: He is alert and oriented to person, place, and time.   Psychiatric:         Speech: Speech normal.         Behavior: Behavior normal.       Lab Results   Component Value Date     08/08/2023    K 4.2 08/08/2023    BUN 19 08/08/2023    CREATININE 0.9 08/08/2023    ALT 33 08/08/2023    AST 25 08/08/2023    HGB 13.4 (L) 01/03/2023    HCT 41.6 01/03/2023    TSH 5.034 (H) 08/08/2023    LDLCALC 71.4 01/03/2023       Recent Labs   Lab 09/13/21  0853   INR 1.1       Assessment:     1. Persistent atrial fibrillation    2. Primary hypertension    3. Tachy-darek syndrome    4. On anticoagulant therapy      Plan:     In summary, Mr. Lauren is a 73 y.o. male with atrial fibrillation (PVI 9/2021), Htn, CAD s/p " PCI, BPH, hypothyroidism (s/o thyroidectomy), low back pain, tachy-darek syndrome here for follow up.   He is 2 years s/p PVI. He continues to do well from a rhythm standpoint, with no documented or symptomatic recurrence of arrhythmia since procedure. HIEU 9/2021 EF 55%.  CHADSVASc 3 on eliquis.  Not on AAD or any AVN RFA. Pt is active and exercises regularly. Follows with Dr. Elder in general cardiology.    Continue current regimen  RTC 1 yr, sooner if needed    *A copy of this note has been sent to Dr. Rodríguez*    Follow up in about 1 year (around 9/11/2024).    ------------------------------------------------------------------    CASSANDRA Arenas, NP-C  Cardiac Electrophysiology

## 2023-09-11 ENCOUNTER — OFFICE VISIT (OUTPATIENT)
Dept: ELECTROPHYSIOLOGY | Facility: CLINIC | Age: 73
End: 2023-09-11
Payer: COMMERCIAL

## 2023-09-11 ENCOUNTER — PATIENT MESSAGE (OUTPATIENT)
Dept: CARDIOLOGY | Facility: CLINIC | Age: 73
End: 2023-09-11
Payer: COMMERCIAL

## 2023-09-11 VITALS
BODY MASS INDEX: 30.14 KG/M2 | HEIGHT: 70 IN | SYSTOLIC BLOOD PRESSURE: 120 MMHG | HEART RATE: 54 BPM | DIASTOLIC BLOOD PRESSURE: 60 MMHG | WEIGHT: 210.56 LBS

## 2023-09-11 DIAGNOSIS — I49.5 TACHY-BRADY SYNDROME: ICD-10-CM

## 2023-09-11 DIAGNOSIS — Z79.01 ON ANTICOAGULANT THERAPY: ICD-10-CM

## 2023-09-11 DIAGNOSIS — I48.19 PERSISTENT ATRIAL FIBRILLATION: Primary | ICD-10-CM

## 2023-09-11 DIAGNOSIS — I10 PRIMARY HYPERTENSION: ICD-10-CM

## 2023-09-11 PROCEDURE — 93010 RHYTHM STRIP: ICD-10-PCS | Mod: S$GLB,,, | Performed by: INTERNAL MEDICINE

## 2023-09-11 PROCEDURE — 99999 PR PBB SHADOW E&M-EST. PATIENT-LVL IV: CPT | Mod: PBBFAC,,, | Performed by: NURSE PRACTITIONER

## 2023-09-11 PROCEDURE — 93005 RHYTHM STRIP: ICD-10-PCS | Mod: S$GLB,,, | Performed by: INTERNAL MEDICINE

## 2023-09-11 PROCEDURE — 99999 PR PBB SHADOW E&M-EST. PATIENT-LVL IV: ICD-10-PCS | Mod: PBBFAC,,, | Performed by: NURSE PRACTITIONER

## 2023-09-11 PROCEDURE — 99214 OFFICE O/P EST MOD 30 MIN: CPT | Mod: S$GLB,,, | Performed by: NURSE PRACTITIONER

## 2023-09-11 PROCEDURE — 93010 ELECTROCARDIOGRAM REPORT: CPT | Mod: S$GLB,,, | Performed by: INTERNAL MEDICINE

## 2023-09-11 PROCEDURE — 99214 PR OFFICE/OUTPT VISIT, EST, LEVL IV, 30-39 MIN: ICD-10-PCS | Mod: S$GLB,,, | Performed by: NURSE PRACTITIONER

## 2023-09-11 PROCEDURE — 93005 ELECTROCARDIOGRAM TRACING: CPT | Mod: S$GLB,,, | Performed by: INTERNAL MEDICINE

## 2023-09-12 RX ORDER — NITROGLYCERIN 0.3 MG/1
0.3 TABLET SUBLINGUAL EVERY 5 MIN PRN
Qty: 100 TABLET | Refills: 1 | Status: SHIPPED | OUTPATIENT
Start: 2023-09-12

## 2023-09-18 ENCOUNTER — IMMUNIZATION (OUTPATIENT)
Dept: INTERNAL MEDICINE | Facility: CLINIC | Age: 73
End: 2023-09-18
Payer: COMMERCIAL

## 2023-09-18 PROCEDURE — 90471 IMMUNIZATION ADMIN: CPT | Mod: S$GLB,,, | Performed by: INTERNAL MEDICINE

## 2023-09-18 PROCEDURE — 90471 FLU VACCINE - QUADRIVALENT - ADJUVANTED: ICD-10-PCS | Mod: S$GLB,,, | Performed by: INTERNAL MEDICINE

## 2023-09-18 PROCEDURE — 90694 FLU VACCINE - QUADRIVALENT - ADJUVANTED: ICD-10-PCS | Mod: S$GLB,,, | Performed by: INTERNAL MEDICINE

## 2023-09-18 PROCEDURE — 90694 VACC AIIV4 NO PRSRV 0.5ML IM: CPT | Mod: S$GLB,,, | Performed by: INTERNAL MEDICINE

## 2023-09-24 ENCOUNTER — PATIENT MESSAGE (OUTPATIENT)
Dept: CARDIOLOGY | Facility: CLINIC | Age: 73
End: 2023-09-24
Payer: COMMERCIAL

## 2023-09-25 RX ORDER — ATORVASTATIN CALCIUM 20 MG/1
20 TABLET, FILM COATED ORAL DAILY
COMMUNITY
End: 2023-09-25 | Stop reason: SDUPTHER

## 2023-09-26 ENCOUNTER — LAB VISIT (OUTPATIENT)
Dept: LAB | Facility: HOSPITAL | Age: 73
End: 2023-09-26
Attending: INTERNAL MEDICINE
Payer: COMMERCIAL

## 2023-09-26 DIAGNOSIS — E89.0 POSTSURGICAL HYPOTHYROIDISM: ICD-10-CM

## 2023-09-26 DIAGNOSIS — I25.10 CORONARY ARTERY DISEASE INVOLVING NATIVE CORONARY ARTERY OF NATIVE HEART WITHOUT ANGINA PECTORIS: ICD-10-CM

## 2023-09-26 DIAGNOSIS — E78.2 MIXED HYPERLIPIDEMIA: ICD-10-CM

## 2023-09-26 DIAGNOSIS — I48.0 PAROXYSMAL ATRIAL FIBRILLATION: ICD-10-CM

## 2023-09-26 DIAGNOSIS — Z79.01 CHRONIC ANTICOAGULATION: ICD-10-CM

## 2023-09-26 LAB
ALBUMIN SERPL BCP-MCNC: 4 G/DL (ref 3.5–5.2)
ALP SERPL-CCNC: 52 U/L (ref 55–135)
ALT SERPL W/O P-5'-P-CCNC: 29 U/L (ref 10–44)
ANION GAP SERPL CALC-SCNC: 8 MMOL/L (ref 8–16)
AST SERPL-CCNC: 22 U/L (ref 10–40)
BILIRUB SERPL-MCNC: 0.8 MG/DL (ref 0.1–1)
BUN SERPL-MCNC: 18 MG/DL (ref 8–23)
CALCIUM SERPL-MCNC: 9.3 MG/DL (ref 8.7–10.5)
CHLORIDE SERPL-SCNC: 103 MMOL/L (ref 95–110)
CHOLEST SERPL-MCNC: 99 MG/DL (ref 120–199)
CHOLEST/HDLC SERPL: 2.7 {RATIO} (ref 2–5)
CO2 SERPL-SCNC: 28 MMOL/L (ref 23–29)
CREAT SERPL-MCNC: 0.9 MG/DL (ref 0.5–1.4)
ERYTHROCYTE [DISTWIDTH] IN BLOOD BY AUTOMATED COUNT: 13.2 % (ref 11.5–14.5)
EST. GFR  (NO RACE VARIABLE): >60 ML/MIN/1.73 M^2
GLUCOSE SERPL-MCNC: 94 MG/DL (ref 70–110)
HCT VFR BLD AUTO: 43.3 % (ref 40–54)
HDLC SERPL-MCNC: 37 MG/DL (ref 40–75)
HDLC SERPL: 37.4 % (ref 20–50)
HGB BLD-MCNC: 13.8 G/DL (ref 14–18)
LDLC SERPL CALC-MCNC: 51.8 MG/DL (ref 63–159)
MCH RBC QN AUTO: 29.1 PG (ref 27–31)
MCHC RBC AUTO-ENTMCNC: 31.9 G/DL (ref 32–36)
MCV RBC AUTO: 91 FL (ref 82–98)
NONHDLC SERPL-MCNC: 62 MG/DL
PLATELET # BLD AUTO: 228 K/UL (ref 150–450)
PMV BLD AUTO: 11.4 FL (ref 9.2–12.9)
POTASSIUM SERPL-SCNC: 4 MMOL/L (ref 3.5–5.1)
PROT SERPL-MCNC: 7.3 G/DL (ref 6–8.4)
RBC # BLD AUTO: 4.75 M/UL (ref 4.6–6.2)
SODIUM SERPL-SCNC: 139 MMOL/L (ref 136–145)
TRIGL SERPL-MCNC: 51 MG/DL (ref 30–150)
TSH SERPL DL<=0.005 MIU/L-ACNC: 2.66 UIU/ML (ref 0.4–4)
WBC # BLD AUTO: 4 K/UL (ref 3.9–12.7)

## 2023-09-26 PROCEDURE — 36415 COLL VENOUS BLD VENIPUNCTURE: CPT | Mod: PO | Performed by: INTERNAL MEDICINE

## 2023-09-26 PROCEDURE — 84443 ASSAY THYROID STIM HORMONE: CPT | Performed by: INTERNAL MEDICINE

## 2023-09-26 PROCEDURE — 80053 COMPREHEN METABOLIC PANEL: CPT | Performed by: INTERNAL MEDICINE

## 2023-09-26 PROCEDURE — 85027 COMPLETE CBC AUTOMATED: CPT | Performed by: INTERNAL MEDICINE

## 2023-09-26 PROCEDURE — 80061 LIPID PANEL: CPT | Performed by: INTERNAL MEDICINE

## 2023-09-26 RX ORDER — ATORVASTATIN CALCIUM 20 MG/1
20 TABLET, FILM COATED ORAL DAILY
Qty: 90 TABLET | Refills: 3 | Status: SHIPPED | OUTPATIENT
Start: 2023-09-26

## 2023-10-03 ENCOUNTER — OFFICE VISIT (OUTPATIENT)
Dept: CARDIOLOGY | Facility: CLINIC | Age: 73
End: 2023-10-03
Payer: COMMERCIAL

## 2023-10-03 VITALS
HEIGHT: 70 IN | HEART RATE: 47 BPM | BODY MASS INDEX: 30.06 KG/M2 | SYSTOLIC BLOOD PRESSURE: 140 MMHG | WEIGHT: 210 LBS | DIASTOLIC BLOOD PRESSURE: 78 MMHG

## 2023-10-03 DIAGNOSIS — I10 PRIMARY HYPERTENSION: ICD-10-CM

## 2023-10-03 DIAGNOSIS — Z79.01 CHRONIC ANTICOAGULATION: ICD-10-CM

## 2023-10-03 DIAGNOSIS — Z95.5 S/P CORONARY ARTERY STENT PLACEMENT: ICD-10-CM

## 2023-10-03 DIAGNOSIS — E78.5 DYSLIPIDEMIA: ICD-10-CM

## 2023-10-03 DIAGNOSIS — I48.0 PAROXYSMAL ATRIAL FIBRILLATION: ICD-10-CM

## 2023-10-03 DIAGNOSIS — I25.10 CORONARY ARTERY DISEASE INVOLVING NATIVE CORONARY ARTERY OF NATIVE HEART WITHOUT ANGINA PECTORIS: Primary | ICD-10-CM

## 2023-10-03 DIAGNOSIS — Z71.89 EDUCATED ABOUT COVID-19 VIRUS INFECTION: ICD-10-CM

## 2023-10-03 PROCEDURE — 99214 PR OFFICE/OUTPT VISIT, EST, LEVL IV, 30-39 MIN: ICD-10-PCS | Mod: 95,,, | Performed by: INTERNAL MEDICINE

## 2023-10-03 PROCEDURE — 99214 OFFICE O/P EST MOD 30 MIN: CPT | Mod: 95,,, | Performed by: INTERNAL MEDICINE

## 2023-10-03 NOTE — PROGRESS NOTES
The patient location is: Home, in [place].  The chief complaint leading to consultation is: Coronary Artery Disease    Visit type:TELE AUDIOVISUAL  Total time spent with patient: 31m 17s minutes.  Each patient to whom he or she provides medical services by telemedicine is:  (1) informed of the relationship between the physician and patient and the respective role of any other health care provider with respect to management of the patient; and (2) notified that he or she may decline to receive medical services by telemedicine and may withdraw from such care at any time.   Subjective:   Chief Complaint:  Dustin Lauren is a 73 y.o. male who presents for follow-up of Coronary Artery Disease      Problem List and HPI:   CAD  - ostial and proximal RCA BIANKA 2016  Atrial fib  - PVI 2021  Hypertension  GERD    He was followed by Dr. Vo at  for coronary artery disease. A CACS was elevated and a nuclear stress test was abnormal. PCI RCA with insertion of a 3.5 x 28 mm Synergy BIANKA in the proximal RCA and a 4.0 x 12 mm synergy BIANKA in the ostial RCA in 2016.  In 2/2023 I recommend discontinuing fenofibrate and taking a higher dose of atorvastatin or another statin to lower LDL to <55 mg/dl. In 7/2023 ezetimibe was added. He usually has 2-3 stools a day. Has more loose stools with ezetimibe.  He reports an occasional flutter. He remains on apixaban for stroke prevention. Recent visit w EP.  On prasugrel w/o aspirin for CAD and stent. He does not report angina or shortness of breath with exertion.     Planning on going to [place] soon.      Review of patient's allergies indicates:   Allergen Reactions    Codeine Nausea Only    Crestor [rosuvastatin] Palpitations    Doxycycline Hives    Keflex [cephalexin] Rash    Celebrex [celecoxib] Other (See Comments)     Upset stomach    Mobic [meloxicam] Other (See Comments)     Stomach pain and nauseous    Niacin Diarrhea    Niacin preparations Diarrhea    Ampicillin Rash     Pcn [penicillins] Rash        Current Outpatient Medications   Medication Sig    amlodipine-valsartan (EXFORGE) 5-160 mg per tablet Take 1 tablet by mouth once daily.    apixaban (ELIQUIS) 5 mg Tab Take 1 tablet (5 mg total) by mouth 2 (two) times daily.    atorvastatin (LIPITOR) 20 MG tablet Take 1 tablet (20 mg total) by mouth once daily.    cholecalciferol, vitamin D3, 25 mcg (1,000 unit) Chew Take 2,000 Int'l Units by mouth once daily.    ezetimibe (ZETIA) 10 mg tablet Take 1 tablet (10 mg total) by mouth once daily.    finasteride (PROSCAR) 5 mg tablet Take 1 tablet (5 mg total) by mouth once daily.    fluticasone propionate (FLONASE) 50 mcg/actuation nasal spray 2 sprays by Each Nostril route once daily.    levothyroxine (SYNTHROID) 137 MCG Tab tablet TAKE 1 TABLET BY MOUTH MON-SAT AND TAKE 1.5 TABLET BY MOUTH ON SUNDAY ONLY    multivit-min/FA/lycopen/lutein (CENTRUM SILVER MEN ORAL) Take 1 tablet by mouth once daily.    naftifine 2 % Crea Apply 1 application topically daily as needed.    nitroGLYCERIN (NITROSTAT) 0.3 MG SL tablet Place 1 tablet (0.3 mg total) under the tongue every 5 (five) minutes as needed for Chest pain.    omega-3 fatty acids 1,000 mg Cap Take 2,000 mg by mouth.    omeprazole (PRILOSEC) 40 MG capsule Take 40 mg by mouth once daily.    prasugreL (EFFIENT) 10 mg Tab Take 1 tablet (10 mg total) by mouth once daily.    tamsulosin (FLOMAX) 0.4 mg Cap Take 1 capsule (0.4 mg total) by mouth once daily.    tiZANidine (ZANAFLEX) 4 MG tablet Take 1 tablet (4 mg total) by mouth every 8 (eight) hours as needed (muscle spasms).    mirabegron (MYRBETRIQ) 25 mg Tb24 ER tablet Take 1 tablet (25 mg total) by mouth once daily. (Patient not taking: Reported on 10/3/2023)         Social history:  Dustin Lauren  reports that he has never smoked. He has never used smokeless tobacco. He reports current alcohol use. He reports that he does not use drugs.      Objective:   BP (!) 140/78   Pulse (!) 47    "Ht 5' 10" (1.778 m)   Wt 95.3 kg (210 lb)   BMI 30.13 kg/m²    Physical Exam    Lipids:  Recent Labs   Lab 09/26/23  0737   LDL Cholesterol 51.8 L   HDL 37 L   Cholesterol 99 L      Renal:  Recent Labs   Lab 09/26/23  0737   Creatinine 0.9   Potassium 4.0   CO2 28   BUN 18     Liver:  Recent Labs   Lab 09/26/23  0737   AST 22   ALT 29     CBC:  Lab Results   Component Value Date    WBC 4.00 09/26/2023    HGB 13.8 (L) 09/26/2023    HCT 43.3 09/26/2023    MCV 91 09/26/2023     09/26/2023         Assessment and Plan:       ICD-10-CM ICD-9-CM   1. Coronary artery disease involving native coronary artery of native heart without angina pectoris  I25.10 414.01   2. S/P coronary artery stent placement 2016  Z95.5 V45.82   3. Dyslipidemia  E78.5 272.4   4. Paroxysmal atrial fibrillation  I48.0 427.31   5. Chronic anticoagulation  Z79.01 V58.61   6. Primary hypertension  I10 401.9   7. Educated about COVID-19 virus infection  Z71.89 V65.49        CAD stable. No angina. Continue same meds.   LDL <55 on a combination of atorvastatin and ezetimibe. Triglycerides remain within normal limits after discontinuation of fenofibrate.  Atrial fibrillation stable. Continue anticoagulation. Avoid NSAIDs. Discussed risk of stroke without anticoagulation, risk of bleeding with anticoagulation and duration of cessation prior to elective surgery.   Continue same meds for hypertension.  Ok to take Paxlovid if needed. See patient instructions.    Orders placed during this encounter:     Coronary artery disease involving native coronary artery of native heart without angina pectoris  -     EKG 12-lead; Future; Expected date: 07/03/2024    S/P coronary artery stent placement 2016  -     EKG 12-lead; Future; Expected date: 07/03/2024    Dyslipidemia  -     Lipid Panel; Future; Expected date: 07/03/2024    Paroxysmal atrial fibrillation    Chronic anticoagulation  -     CBC Without Differential; Future; Expected date: 07/03/2024    Primary " hypertension  -     Comprehensive Metabolic Panel; Future; Expected date: 07/03/2024    Educated about COVID-19 virus infection         Follow up in about 9 months (around 7/3/2024).

## 2023-10-03 NOTE — PATIENT INSTRUCTIONS
WelChol is colesevelam 1875 mg bid. This would treat diarrhea and also take the place of Zetia (ezetimibe). It should however not be taken at the same time as other meds.    Paxlovid is a medication used to treat COVID. If prescribed Paxlovid for 5 days you should   1) stop atorvastatin (Lipitor) and  2) decrease apixaban (Eliquis) to 1/2 a tab twice a day      Do not take NSAIDs because you take a blood thinner. NSAIDs are : Ibuprofen, Advil, Motrin, Aleeve, Naproxen, Meloxicam, Mobic, Diclofenac and Voltaren.  Do not take Celebrex either.  You can take Tylenol for pain. It is not a NSAID. Limit Tylenol to 4 tabs (500 mg each) in a 24 hr period.   You can use topical NSAIDs (such as Voltaren gel, now available without a prescription) and lidocaine gel.

## 2023-10-05 ENCOUNTER — PATIENT OUTREACH (OUTPATIENT)
Dept: ADMINISTRATIVE | Facility: HOSPITAL | Age: 73
End: 2023-10-05
Payer: COMMERCIAL

## 2023-10-05 ENCOUNTER — PATIENT MESSAGE (OUTPATIENT)
Dept: ADMINISTRATIVE | Facility: HOSPITAL | Age: 73
End: 2023-10-05
Payer: COMMERCIAL

## 2023-10-10 ENCOUNTER — TELEPHONE (OUTPATIENT)
Dept: INTERNAL MEDICINE | Facility: CLINIC | Age: 73
End: 2023-10-10
Payer: COMMERCIAL

## 2023-10-10 VITALS — SYSTOLIC BLOOD PRESSURE: 112 MMHG | DIASTOLIC BLOOD PRESSURE: 74 MMHG

## 2023-11-27 ENCOUNTER — HOSPITAL ENCOUNTER (OUTPATIENT)
Dept: RADIOLOGY | Facility: HOSPITAL | Age: 73
Discharge: HOME | End: 2023-11-27
Attending: UROLOGY
Payer: COMMERCIAL

## 2023-11-27 DIAGNOSIS — N40.1 BPH WITH OBSTRUCTION/LOWER URINARY TRACT SYMPTOMS: ICD-10-CM

## 2023-11-27 DIAGNOSIS — N28.1 COMPLEX RENAL CYST: ICD-10-CM

## 2023-11-27 DIAGNOSIS — Z87.442 HISTORY OF KIDNEY STONES: ICD-10-CM

## 2023-11-27 DIAGNOSIS — N13.8 BPH WITH OBSTRUCTION/LOWER URINARY TRACT SYMPTOMS: ICD-10-CM

## 2023-11-27 PROCEDURE — 76770 US EXAM ABDO BACK WALL COMP: CPT | Mod: TC

## 2023-11-27 PROCEDURE — 76770 US EXAM ABDO BACK WALL COMP: CPT | Mod: 26,,, | Performed by: STUDENT IN AN ORGANIZED HEALTH CARE EDUCATION/TRAINING PROGRAM

## 2023-11-27 PROCEDURE — 76770 US KIDNEY: ICD-10-PCS | Mod: 26,,, | Performed by: STUDENT IN AN ORGANIZED HEALTH CARE EDUCATION/TRAINING PROGRAM

## 2023-12-04 ENCOUNTER — OFFICE VISIT (OUTPATIENT)
Dept: UROLOGY | Facility: CLINIC | Age: 73
End: 2023-12-04
Payer: COMMERCIAL

## 2023-12-04 VITALS
DIASTOLIC BLOOD PRESSURE: 70 MMHG | SYSTOLIC BLOOD PRESSURE: 141 MMHG | HEART RATE: 54 BPM | OXYGEN SATURATION: 100 % | HEIGHT: 70 IN | WEIGHT: 211.63 LBS | BODY MASS INDEX: 30.3 KG/M2

## 2023-12-04 DIAGNOSIS — N13.8 BPH WITH OBSTRUCTION/LOWER URINARY TRACT SYMPTOMS: ICD-10-CM

## 2023-12-04 DIAGNOSIS — N28.1 COMPLEX RENAL CYST: Primary | ICD-10-CM

## 2023-12-04 DIAGNOSIS — N40.1 BPH WITH OBSTRUCTION/LOWER URINARY TRACT SYMPTOMS: ICD-10-CM

## 2023-12-04 PROCEDURE — 99213 OFFICE O/P EST LOW 20 MIN: CPT | Mod: S$GLB,,, | Performed by: NURSE PRACTITIONER

## 2023-12-04 PROCEDURE — 99213 PR OFFICE/OUTPT VISIT, EST, LEVL III, 20-29 MIN: ICD-10-PCS | Mod: S$GLB,,, | Performed by: NURSE PRACTITIONER

## 2023-12-04 NOTE — PROGRESS NOTES
"Subjective:      Dustin Lauren is a 73 y.o. male who returns today regarding his complex renal cyst and urinary symptoms.     On flomax and finasteride of his LUTS for years. Symptoms are fairly well controlled- still with urgency and mild urge incontinence at times. He was prescribed myrbetriq at the last visit but never began the medication. Symptoms resolved.      Also with a history of uric acid/calcium oxalate stones that have passed without intervention (25-30 years ago). Drinks mostly water now.     + family history of RCC (aunt and older brother).     Gross hematuria workup completed in 2022:  CT urogram- "Cause of the patient's hematuria is not evident to me on today's study.  Multiple bilateral renal cysts, including a thinly septated renal cyst on the left. Stable scarring along the anterolateral margin lower pole left kidney."  Cystoscope per Dr. Otto- "Hyperplasia. Positive Varices"    Returns today with ADELE.     No further hematuria. Remains on anticoagulation.   Denies bothersome urinary symptoms.  Intermittent R flank discomfort/pain- none currently.     The following portions of the patient's history were reviewed and updated as appropriate: allergies, current medications, past family history, past medical history, past social history, past surgical history and problem list.    Review of Systems  Constitutional: no fever or chills  ENT: no nasal congestion or sore throat  Respiratory: no cough or shortness of breath  Cardiovascular: no chest pain or palpitations  Gastrointestinal: no nausea or vomiting, tolerating diet  Genitourinary: as per HPI  Hematologic/Lymphatic: no easy bruising or lymphadenopathy  Musculoskeletal: no arthralgias or myalgias  Neurological: no seizures or tremors  Behavioral/Psych: no auditory or visual hallucinations     Objective:   Vitals: BP (!) 141/70 (BP Location: Left arm, Patient Position: Sitting, BP Method: Large (Automatic))   Pulse (!) 54   Ht 5' 10" (1.778 " "m)   Wt 96 kg (211 lb 10.3 oz)   SpO2 100%   BMI 30.37 kg/m²     Physical Exam   General: alert and oriented, no acute distress  Head: normocephalic, atraumatic  Neck: supple, normal ROM  Respiratory: Symmetric expansion, non-labored breathing  Cardiovascular: regular rate and rhythm  Abdomen: soft, non tender, non distended  Genitourinary:   Prostate: non tender, no specific nodules, size: > 50 gm; seminal vesicles not palpated  Rectum: normal rectal tone, no rectal mass, normal perineum  Skin: normal coloration and turgor, no rashes, no suspicious skin lesions noted  Neuro: alert and oriented x3, no gross deficits  Psych: normal judgment and insight, normal mood/affect, and non-anxious    Lab Review   Urinalysis demonstrates negative for all components  Lab Results   Component Value Date    WBC 4.00 09/26/2023    HGB 13.8 (L) 09/26/2023    HCT 43.3 09/26/2023    MCV 91 09/26/2023     09/26/2023     Lab Results   Component Value Date    CREATININE 0.9 09/26/2023    BUN 18 09/26/2023     Lab Results   Component Value Date    PSA 1.2 01/06/2021     Imaging   (all images personally reviewed; agree with report below)  ADELE- "Complex cysts of the left kidney as detailed above.  Overall, appearance of complex cysts is similar from comparison ultrasound 02/09/2023.  Multiple simple right renal cysts.  Mild interval increase in size of right mid renal simple cyst."    Assessment:     1. Complex renal cyst    2. BPH with obstruction/lower urinary tract symptoms      Plan:   Dustin was seen today for follow-up.    Diagnoses and all orders for this visit:    Complex renal cyst  -     US Retroperitoneal Complete; Future    BPH with obstruction/lower urinary tract symptoms    Plan:  --Stable complex renal cyst  --Continue flomax and finasteride  --Hold myrbetriq  --Discussed common bladder irritants such as caffeine, alcohol, citrus, and acidic and spicy foods. Encouraged to minimize in diet to reduce symptoms.  --ADELE " in 6 months

## 2023-12-07 ENCOUNTER — TELEPHONE (OUTPATIENT)
Dept: CARDIOLOGY | Facility: CLINIC | Age: 73
End: 2023-12-07
Payer: COMMERCIAL

## 2023-12-07 NOTE — TELEPHONE ENCOUNTER
Pt scheduled to have EGD and colonoscopy by Dr. Phan with [facility].  asking for clearance to hold Eliquis 2 days prior and Prasugrel 3 days prior. Message reviewed by .  advised pt hold Eliquis 2 days prior, hold Prasugrel 5 days prior.  recommended pt resume both medications evening of procedure if no polyps removed, resume 24hours after procedure if polyps removed. Pt notified, verbalized understanding.Clearance faxed to  at [phone].

## 2024-01-02 ENCOUNTER — PATIENT MESSAGE (OUTPATIENT)
Dept: INTERNAL MEDICINE | Facility: CLINIC | Age: 74
End: 2024-01-02
Payer: COMMERCIAL

## 2024-01-02 DIAGNOSIS — I48.19 PERSISTENT ATRIAL FIBRILLATION: ICD-10-CM

## 2024-01-02 DIAGNOSIS — E66.9 OBESITY, UNSPECIFIED CLASSIFICATION, UNSPECIFIED OBESITY TYPE, UNSPECIFIED WHETHER SERIOUS COMORBIDITY PRESENT: ICD-10-CM

## 2024-01-02 DIAGNOSIS — R35.0 BENIGN PROSTATIC HYPERPLASIA WITH URINARY FREQUENCY: ICD-10-CM

## 2024-01-02 DIAGNOSIS — Z95.5 STENTED CORONARY ARTERY: ICD-10-CM

## 2024-01-02 DIAGNOSIS — R73.02 IMPAIRED GLUCOSE TOLERANCE: ICD-10-CM

## 2024-01-02 DIAGNOSIS — Z00.00 PREVENTATIVE HEALTH CARE: Primary | ICD-10-CM

## 2024-01-02 DIAGNOSIS — R25.3 EYELID TWITCH: ICD-10-CM

## 2024-01-02 DIAGNOSIS — I10 PRIMARY HYPERTENSION: ICD-10-CM

## 2024-01-02 DIAGNOSIS — N40.1 BENIGN PROSTATIC HYPERPLASIA WITH URINARY FREQUENCY: ICD-10-CM

## 2024-01-02 DIAGNOSIS — I25.10 CORONARY ARTERY DISEASE INVOLVING NATIVE CORONARY ARTERY OF NATIVE HEART WITHOUT ANGINA PECTORIS: ICD-10-CM

## 2024-01-02 DIAGNOSIS — I49.5 TACHY-BRADY SYNDROME: ICD-10-CM

## 2024-01-02 DIAGNOSIS — E89.0 POSTSURGICAL HYPOTHYROIDISM: ICD-10-CM

## 2024-01-04 ENCOUNTER — PATIENT MESSAGE (OUTPATIENT)
Dept: CARDIOLOGY | Facility: CLINIC | Age: 74
End: 2024-01-04
Payer: COMMERCIAL

## 2024-01-05 ENCOUNTER — LAB VISIT (OUTPATIENT)
Dept: LAB | Facility: HOSPITAL | Age: 74
End: 2024-01-05
Attending: FAMILY MEDICINE
Payer: COMMERCIAL

## 2024-01-05 DIAGNOSIS — M25.562 PAIN IN BOTH KNEES, UNSPECIFIED CHRONICITY: Primary | ICD-10-CM

## 2024-01-05 DIAGNOSIS — I48.19 PERSISTENT ATRIAL FIBRILLATION: ICD-10-CM

## 2024-01-05 DIAGNOSIS — R73.02 IMPAIRED GLUCOSE TOLERANCE: ICD-10-CM

## 2024-01-05 DIAGNOSIS — R25.3 EYELID TWITCH: ICD-10-CM

## 2024-01-05 DIAGNOSIS — N40.1 BENIGN PROSTATIC HYPERPLASIA WITH URINARY FREQUENCY: ICD-10-CM

## 2024-01-05 DIAGNOSIS — I10 PRIMARY HYPERTENSION: ICD-10-CM

## 2024-01-05 DIAGNOSIS — Z95.5 STENTED CORONARY ARTERY: ICD-10-CM

## 2024-01-05 DIAGNOSIS — I25.10 CORONARY ARTERY DISEASE INVOLVING NATIVE CORONARY ARTERY OF NATIVE HEART WITHOUT ANGINA PECTORIS: ICD-10-CM

## 2024-01-05 DIAGNOSIS — I49.5 TACHY-BRADY SYNDROME: ICD-10-CM

## 2024-01-05 DIAGNOSIS — M25.561 PAIN IN BOTH KNEES, UNSPECIFIED CHRONICITY: Primary | ICD-10-CM

## 2024-01-05 DIAGNOSIS — E89.0 POSTSURGICAL HYPOTHYROIDISM: ICD-10-CM

## 2024-01-05 DIAGNOSIS — R35.0 BENIGN PROSTATIC HYPERPLASIA WITH URINARY FREQUENCY: ICD-10-CM

## 2024-01-05 DIAGNOSIS — Z00.00 PREVENTATIVE HEALTH CARE: ICD-10-CM

## 2024-01-05 DIAGNOSIS — E66.9 OBESITY, UNSPECIFIED CLASSIFICATION, UNSPECIFIED OBESITY TYPE, UNSPECIFIED WHETHER SERIOUS COMORBIDITY PRESENT: ICD-10-CM

## 2024-01-05 LAB
ALBUMIN SERPL BCP-MCNC: 4 G/DL (ref 3.5–5.2)
ALP SERPL-CCNC: 59 U/L (ref 55–135)
ALT SERPL W/O P-5'-P-CCNC: 47 U/L (ref 10–44)
ANION GAP SERPL CALC-SCNC: 7 MMOL/L (ref 8–16)
AST SERPL-CCNC: 33 U/L (ref 10–40)
BASOPHILS # BLD AUTO: 0.05 K/UL (ref 0–0.2)
BASOPHILS NFR BLD: 1.5 % (ref 0–1.9)
BILIRUB SERPL-MCNC: 0.9 MG/DL (ref 0.1–1)
BUN SERPL-MCNC: 13 MG/DL (ref 8–23)
CALCIUM SERPL-MCNC: 9.1 MG/DL (ref 8.7–10.5)
CHLORIDE SERPL-SCNC: 105 MMOL/L (ref 95–110)
CHOLEST SERPL-MCNC: 104 MG/DL (ref 120–199)
CHOLEST/HDLC SERPL: 2.7 {RATIO} (ref 2–5)
CO2 SERPL-SCNC: 28 MMOL/L (ref 23–29)
COMPLEXED PSA SERPL-MCNC: 0.98 NG/ML (ref 0–4)
CREAT SERPL-MCNC: 1 MG/DL (ref 0.5–1.4)
DIFFERENTIAL METHOD BLD: ABNORMAL
EOSINOPHIL # BLD AUTO: 0.1 K/UL (ref 0–0.5)
EOSINOPHIL NFR BLD: 2.3 % (ref 0–8)
ERYTHROCYTE [DISTWIDTH] IN BLOOD BY AUTOMATED COUNT: 14.3 % (ref 11.5–14.5)
EST. GFR  (NO RACE VARIABLE): >60 ML/MIN/1.73 M^2
ESTIMATED AVG GLUCOSE: 123 MG/DL (ref 68–131)
GLUCOSE SERPL-MCNC: 102 MG/DL (ref 70–110)
HBA1C MFR BLD: 5.9 % (ref 4–5.6)
HCT VFR BLD AUTO: 42.6 % (ref 40–54)
HDLC SERPL-MCNC: 39 MG/DL (ref 40–75)
HDLC SERPL: 37.5 % (ref 20–50)
HGB BLD-MCNC: 14.1 G/DL (ref 14–18)
IMM GRANULOCYTES # BLD AUTO: 0 K/UL (ref 0–0.04)
IMM GRANULOCYTES NFR BLD AUTO: 0 % (ref 0–0.5)
LDLC SERPL CALC-MCNC: 54.6 MG/DL (ref 63–159)
LYMPHOCYTES # BLD AUTO: 1.3 K/UL (ref 1–4.8)
LYMPHOCYTES NFR BLD: 38.8 % (ref 18–48)
MCH RBC QN AUTO: 29.2 PG (ref 27–31)
MCHC RBC AUTO-ENTMCNC: 33.1 G/DL (ref 32–36)
MCV RBC AUTO: 88 FL (ref 82–98)
MONOCYTES # BLD AUTO: 0.4 K/UL (ref 0.3–1)
MONOCYTES NFR BLD: 10.5 % (ref 4–15)
NEUTROPHILS # BLD AUTO: 1.6 K/UL (ref 1.8–7.7)
NEUTROPHILS NFR BLD: 46.9 % (ref 38–73)
NONHDLC SERPL-MCNC: 65 MG/DL
NRBC BLD-RTO: 0 /100 WBC
PLATELET # BLD AUTO: 211 K/UL (ref 150–450)
PMV BLD AUTO: 10.7 FL (ref 9.2–12.9)
POTASSIUM SERPL-SCNC: 4.2 MMOL/L (ref 3.5–5.1)
PROT SERPL-MCNC: 7.2 G/DL (ref 6–8.4)
RBC # BLD AUTO: 4.83 M/UL (ref 4.6–6.2)
SODIUM SERPL-SCNC: 140 MMOL/L (ref 136–145)
T4 FREE SERPL-MCNC: 1.1 NG/DL (ref 0.71–1.51)
TRIGL SERPL-MCNC: 52 MG/DL (ref 30–150)
TSH SERPL DL<=0.005 MIU/L-ACNC: 1.88 UIU/ML (ref 0.4–4)
WBC # BLD AUTO: 3.43 K/UL (ref 3.9–12.7)

## 2024-01-05 PROCEDURE — 84443 ASSAY THYROID STIM HORMONE: CPT | Performed by: FAMILY MEDICINE

## 2024-01-05 PROCEDURE — 36415 COLL VENOUS BLD VENIPUNCTURE: CPT | Mod: PO | Performed by: FAMILY MEDICINE

## 2024-01-05 PROCEDURE — 80061 LIPID PANEL: CPT | Performed by: FAMILY MEDICINE

## 2024-01-05 PROCEDURE — 80053 COMPREHEN METABOLIC PANEL: CPT | Performed by: FAMILY MEDICINE

## 2024-01-05 PROCEDURE — 85025 COMPLETE CBC W/AUTO DIFF WBC: CPT | Performed by: FAMILY MEDICINE

## 2024-01-05 PROCEDURE — 83036 HEMOGLOBIN GLYCOSYLATED A1C: CPT | Performed by: FAMILY MEDICINE

## 2024-01-05 PROCEDURE — 84153 ASSAY OF PSA TOTAL: CPT | Performed by: FAMILY MEDICINE

## 2024-01-05 PROCEDURE — 84439 ASSAY OF FREE THYROXINE: CPT | Performed by: FAMILY MEDICINE

## 2024-01-08 ENCOUNTER — TELEPHONE (OUTPATIENT)
Dept: ORTHOPEDICS | Facility: CLINIC | Age: 74
End: 2024-01-08
Payer: COMMERCIAL

## 2024-01-08 ENCOUNTER — OFFICE VISIT (OUTPATIENT)
Dept: ORTHOPEDICS | Facility: CLINIC | Age: 74
End: 2024-01-08
Payer: COMMERCIAL

## 2024-01-08 ENCOUNTER — HOSPITAL ENCOUNTER (OUTPATIENT)
Dept: RADIOLOGY | Facility: HOSPITAL | Age: 74
Discharge: HOME | End: 2024-01-08
Attending: ORTHOPAEDIC SURGERY
Payer: COMMERCIAL

## 2024-01-08 DIAGNOSIS — M17.0 PRIMARY OSTEOARTHRITIS OF BOTH KNEES: Primary | ICD-10-CM

## 2024-01-08 DIAGNOSIS — M25.561 PAIN IN BOTH KNEES, UNSPECIFIED CHRONICITY: ICD-10-CM

## 2024-01-08 DIAGNOSIS — M25.562 PAIN IN BOTH KNEES, UNSPECIFIED CHRONICITY: ICD-10-CM

## 2024-01-08 PROCEDURE — 99999 PR PBB SHADOW E&M-EST. PATIENT-LVL III: CPT | Mod: PBBFAC,,, | Performed by: ORTHOPAEDIC SURGERY

## 2024-01-08 PROCEDURE — 73562 X-RAY EXAM OF KNEE 3: CPT | Mod: 26,50,, | Performed by: RADIOLOGY

## 2024-01-08 PROCEDURE — 99213 OFFICE O/P EST LOW 20 MIN: CPT | Mod: S$GLB,,, | Performed by: ORTHOPAEDIC SURGERY

## 2024-01-08 PROCEDURE — 73562 X-RAY EXAM OF KNEE 3: CPT | Mod: TC,50

## 2024-01-08 NOTE — PROGRESS NOTES
Patient is a 83y old  Male who presents with a chief complaint of UTI, Severe Sepsis (29 May 2023 23:39)      INTERVAL History of Present Illness/OVERNIGHT EVENTS: wife at bedside.  not dizzy  no abd pain  feels well    MEDICATIONS  (STANDING):  atorvastatin 20 milliGRAM(s) Oral at bedtime  cefTRIAXone   IVPB 1000 milliGRAM(s) IV Intermittent every 24 hours  chlorhexidine 2% Cloths 1 Application(s) Topical <User Schedule>  heparin   Injectable 5000 Unit(s) SubCutaneous every 8 hours    MEDICATIONS  (PRN):      Allergies    No Known Allergies    Intolerances        REVIEW OF SYSTEMS:  Other systems currently negative unless otherwise specified above.    Vital Signs Last 24 Hrs  T(C): 36.7 (30 May 2023 09:30), Max: 40.4 (29 May 2023 12:04)  T(F): 98.1 (30 May 2023 09:30), Max: 104.8 (29 May 2023 12:04)  HR: 68 (30 May 2023 09:30) (67 - 99)  BP: 93/62 (30 May 2023 09:30) (77/60 - 108/64)  BP(mean): 77 (29 May 2023 22:53) (56 - 77)  RR: 16 (30 May 2023 09:30) (15 - 25)  SpO2: 98% (30 May 2023 09:30) (94% - 99%)    Parameters below as of 30 May 2023 09:30  Patient On (Oxygen Delivery Method): room air          Weight (kg): 79.832 (05-29 @ 11:58)    PHYSICAL EXAM:  GENERAL: No apparent distress, appears stated age  EYES: Conjunctiva and sclera clear, no discharge  ENMT: Moist mucous membranes, no nasal discharge  CHEST/LUNG: Clear to auscultation bilaterally, no wheeze or rales  HEART: Regular rhythm, no rubs or gallops  ABDOMEN: Soft, Nontender, Nondistended  EXTREMITIES:  No clubbing, cyanosis or edema  SKIN: No rash, no new discoloration      LABS:                        12.3   17.94 )-----------( 196      ( 30 May 2023 07:10 )             37.7     30 May 2023 07:10    141    |  106    |  15     ----------------------------<  87     3.6     |  27     |  1.19     Ca    8.4        30 May 2023 07:10  Phos  3.1       30 May 2023 07:10  Mg     1.7       30 May 2023 07:10    TPro  5.9    /  Alb  2.7    /  TBili  0.6    /  DBili  x      /  AST  28     /  ALT  29     /  AlkPhos  41     30 May 2023 07:10    PT/INR - ( 29 May 2023 11:56 )   PT: 12.9 sec;   INR: 1.11 ratio         PTT - ( 29 May 2023 11:56 )  PTT:25.7 sec  Urinalysis Basic - ( 29 May 2023 13:00 )    Color: Yellow / Appearance: Cloudy / S.045 / pH: x  Gluc: x / Ketone: Trace mg/dL  / Bili: Negative / Urobili: 0.2 mg/dL   Blood: x / Protein: 100 mg/dL / Nitrite: Negative   Leuk Esterase: Large / RBC: 4 /HPF / WBC 16 /HPF   Sq Epi: x / Non Sq Epi: x / Bacteria: Occasional /HPF      CAPILLARY BLOOD GLUCOSE          Weight (kg): 79.832 ( @ 11:58)    RADIOLOGY & ADDITIONAL TESTS:      Images reviewed personally    Consultant Notes Reviewed and Care Discussed with relevant Consultants. Subjective:      Patient ID: Dustin Lauren is a 73 y.o. male.    Chief Complaint: Pain of the Left Knee and Pain of the Right Knee    HPI  Dustin Lauren has bilateral knee pain.  Knee is worse than the right.  The pain is overall unchanged.  He does have flare-ups.  Did have some increased pain when he walked great deal and [place] but he is back to baseline now.  No trauma.      Objective:      There is no height or weight on file to calculate BMI.  There were no vitals filed for this visit.        General    Constitutional: He is oriented to person, place, and time. He appears well-developed and well-nourished.   HENT:   Head: Normocephalic and atraumatic.   Eyes: EOM are normal.   Cardiovascular:  Normal rate.            Pulmonary/Chest: Effort normal.   Neurological: He is alert and oriented to person, place, and time.   Psychiatric: He has a normal mood and affect. His behavior is normal.     General Musculoskeletal Exam   Gait: normal       Right Knee Exam     Inspection   Erythema: absent  Scars: absent  Swelling: present  Effusion: absent  Deformity: absent  Bruising: absent    Tenderness   The patient is tender to palpation of the medial joint line.    Range of Motion   Extension:  0   Flexion:  120     Tests   Ligament Examination   Lachman: normal (-1 to 2mm)   MCL - Valgus: normal (0 to 2mm)  LCL - Varus: normal  Patella   Passive Patellar Tilt: neutral    Other   Sensation: normal    Left Knee Exam     Inspection   Erythema: absent  Scars: absent  Swelling: present  Effusion: absent  Deformity: absent  Bruising: absent    Tenderness   The patient tender to palpation of the medial joint line.    Range of Motion   Extension:  0   Flexion:  120     Tests   Stability   Lachman: normal (-1 to 2mm)   MCL - Valgus: normal (0 to 2mm)  LCL - Varus: normal (0 to 2mm)  Patella   Passive Patellar Tilt: neutral    Other   Popliteal (Baker's) Cyst: present  Sensation: normal    Muscle Strength   Right Lower  Extremity   Hip Abduction: 5/5   Quadriceps:  5/5   Hamstrin/5   Left Lower Extremity   Hip Abduction: 5/5   Quadriceps:  5/5   Hamstrin/5     Reflexes     Left Side  Quadriceps:  2+    Right Side   Quadriceps:  2+    Vascular Exam     Right Pulses  Dorsalis Pedis:      2+          Left Pulses  Dorsalis Pedis:      2+          Edema  Right Lower Leg: absent  Left Lower Leg: absent      Radiographs obtained today and reviewed by me demonstrate moderate arthritis of both knees.  The left is slightly worse than the right.  Varus deformity.  Kellgren Lopez 3.         Assessment:       Encounter Diagnosis   Name Primary?    Primary osteoarthritis of both knees Yes          Plan:       Dustin was seen today for pain and pain.    Diagnoses and all orders for this visit:    Primary osteoarthritis of both knees        There has been minimal progression over the course of a year.  We will see him back in 1 year with new x-rays.  If the symptoms worsen he will call and we will see him sooner.

## 2024-01-11 LAB — CRC RECOMMENDATION EXT: NORMAL

## 2024-01-12 ENCOUNTER — OFFICE VISIT (OUTPATIENT)
Dept: INTERNAL MEDICINE | Facility: CLINIC | Age: 74
End: 2024-01-12
Payer: COMMERCIAL

## 2024-01-12 ENCOUNTER — PATIENT OUTREACH (OUTPATIENT)
Dept: ADMINISTRATIVE | Facility: HOSPITAL | Age: 74
End: 2024-01-12
Payer: COMMERCIAL

## 2024-01-12 VITALS
BODY MASS INDEX: 29.45 KG/M2 | HEIGHT: 70 IN | TEMPERATURE: 97 F | OXYGEN SATURATION: 98 % | WEIGHT: 205.69 LBS | RESPIRATION RATE: 18 BRPM | HEART RATE: 51 BPM | SYSTOLIC BLOOD PRESSURE: 122 MMHG | DIASTOLIC BLOOD PRESSURE: 76 MMHG

## 2024-01-12 DIAGNOSIS — Z95.5 STENTED CORONARY ARTERY: ICD-10-CM

## 2024-01-12 DIAGNOSIS — Z98.890 HISTORY OF RADIOFREQUENCY ABLATION (RFA) PROCEDURE FOR CARDIAC ARRHYTHMIA: ICD-10-CM

## 2024-01-12 DIAGNOSIS — Z00.00 PREVENTATIVE HEALTH CARE: Primary | ICD-10-CM

## 2024-01-12 DIAGNOSIS — E78.5 HYPERLIPIDEMIA, UNSPECIFIED HYPERLIPIDEMIA TYPE: ICD-10-CM

## 2024-01-12 DIAGNOSIS — E89.0 POSTSURGICAL HYPOTHYROIDISM: ICD-10-CM

## 2024-01-12 DIAGNOSIS — I49.5 TACHY-BRADY SYNDROME: ICD-10-CM

## 2024-01-12 DIAGNOSIS — I10 PRIMARY HYPERTENSION: ICD-10-CM

## 2024-01-12 DIAGNOSIS — I25.10 CORONARY ARTERY DISEASE INVOLVING NATIVE CORONARY ARTERY OF NATIVE HEART WITHOUT ANGINA PECTORIS: ICD-10-CM

## 2024-01-12 DIAGNOSIS — I48.19 PERSISTENT ATRIAL FIBRILLATION: ICD-10-CM

## 2024-01-12 PROCEDURE — 99397 PER PM REEVAL EST PAT 65+ YR: CPT | Mod: S$GLB,,, | Performed by: FAMILY MEDICINE

## 2024-01-12 PROCEDURE — 99999 PR PBB SHADOW E&M-EST. PATIENT-LVL V: CPT | Mod: PBBFAC,,, | Performed by: FAMILY MEDICINE

## 2024-01-12 RX ORDER — AMLODIPINE AND VALSARTAN 5; 160 MG/1; MG/1
1 TABLET ORAL DAILY
Qty: 90 TABLET | Refills: 3 | Status: SHIPPED | OUTPATIENT
Start: 2024-01-12

## 2024-01-12 RX ORDER — FAMOTIDINE 40 MG/1
TABLET, FILM COATED ORAL
COMMUNITY
Start: 2024-01-11

## 2024-01-12 NOTE — PROGRESS NOTES

## 2024-01-12 NOTE — PROGRESS NOTES
Subjective     Patient ID: Dustin Lauren is a 73 y.o. male.    Chief Complaint: Annual Exam    HPI 73-year-old white male presents to clinic today for annual physical exam.  He continues to be followed by Cardiology secondary to coronary artery disease, atrial fibrillation, and hypertension.  He remains stable on Eliquis 5 mg b.i.d., Effient 10 mg daily, and Exforge 5/160 mg daily.  Hyperlipidemia remains well controlled on Lipitor 20 mg daily and Zetia 10 mg daily.  Continues to be followed by urology for treatment of BPH which remains stable on Flomax 0.4 mg daily, finasteride 5 mg daily, and Myrbetriq 25 mg daily.  He continues to be followed by Endocrinology for treatment of postsurgical hypothyroidism which remains stable on levothyroxine 137 mcg daily.  He is recently status post colonoscopy and EGD and remains stable.  It has been recommended that he discontinue omeprazole and has been started on Pepcid 40 mg daily for further treatment.  He reports a past surgical history of cholecystectomy, thyroidectomy, left knee arthroscopic meniscus repair, right knee replacement, coronary angioplasty with stent placement, and atrial ablation.  He reports a strong family history of arthritis, cancer, heart disease, and thalassemia.  He is up-to-date with all screening exams and vaccinations.  Review of Systems   Constitutional:  Negative for appetite change, chills, fatigue and fever.   HENT:  Negative for nasal congestion, ear pain, hearing loss, postnasal drip, rhinorrhea, sinus pressure/congestion, sore throat and tinnitus.    Eyes:  Negative for redness, itching and visual disturbance.   Respiratory:  Negative for cough, chest tightness and shortness of breath.    Cardiovascular:  Negative for chest pain and palpitations.   Gastrointestinal:  Negative for abdominal pain, constipation, diarrhea, nausea and vomiting.   Genitourinary:  Negative for decreased urine volume, difficulty urinating, dysuria, frequency,  hematuria and urgency.   Musculoskeletal:  Negative for back pain, myalgias, neck pain and neck stiffness.   Integumentary:  Negative for rash.   Neurological:  Negative for dizziness, light-headedness and headaches.   Psychiatric/Behavioral: Negative.            Objective     Physical Exam  Vitals and nursing note reviewed.   Constitutional:       General: He is not in acute distress.     Appearance: Normal appearance. He is well-developed. He is not diaphoretic.   HENT:      Head: Normocephalic and atraumatic.      Right Ear: External ear normal.      Left Ear: External ear normal.      Nose: Nose normal.      Mouth/Throat:      Pharynx: No oropharyngeal exudate.   Eyes:      General: No scleral icterus.        Right eye: No discharge.         Left eye: No discharge.      Conjunctiva/sclera: Conjunctivae normal.      Pupils: Pupils are equal, round, and reactive to light.   Neck:      Thyroid: No thyromegaly.      Vascular: No JVD.      Trachea: No tracheal deviation.   Cardiovascular:      Rate and Rhythm: Normal rate and regular rhythm.      Heart sounds: Normal heart sounds. No murmur heard.     No friction rub. No gallop.   Pulmonary:      Effort: Pulmonary effort is normal. No respiratory distress.      Breath sounds: Normal breath sounds. No stridor. No wheezing, rhonchi or rales.   Chest:      Chest wall: No tenderness.   Abdominal:      General: Bowel sounds are normal. There is no distension.      Palpations: Abdomen is soft. There is no mass.      Tenderness: There is no abdominal tenderness. There is no guarding or rebound.   Musculoskeletal:         General: No tenderness. Normal range of motion.      Cervical back: Normal range of motion and neck supple.   Lymphadenopathy:      Cervical: No cervical adenopathy.   Skin:     General: Skin is warm and dry.      Coloration: Skin is not pale.      Findings: No erythema or rash.   Neurological:      Mental Status: He is alert and oriented to person, place,  and time.   Psychiatric:         Mood and Affect: Mood normal.         Behavior: Behavior normal.         Thought Content: Thought content normal.         Judgment: Judgment normal.            Assessment and Plan     1. Preventative health care    2. Coronary artery disease involving native coronary artery of native heart without angina pectoris    3. Stented coronary artery    4. Persistent atrial fibrillation  Overview:  s/p cardioversion      5. Tachy-darek syndrome    6. History of radiofrequency ablation (RFA) procedure for cardiac arrhythmia    7. Primary hypertension  -     amlodipine-valsartan (EXFORGE) 5-160 mg per tablet; Take 1 tablet by mouth once daily.  Dispense: 90 tablet; Refill: 3    8. Hyperlipidemia, unspecified hyperlipidemia type    9. Postsurgical hypothyroidism        1. Labs have been reviewed and are overall within normal limits.    2. Continue Eliquis 5 mg b.i.d., Effient 10 mg daily, and Exforge 5/160 mg daily.  Coronary artery disease status post stent placement, atrial fibrillation, tachy-darek syndrome, and hypertension remained stable.  Continue follow-up with cardiology as scheduled.    3. Continue Lipitor 20 mg daily and Zetia 10 mg daily.  Hyperlipidemia is well controlled.  4. Continue levothyroxine 137 mcg daily.  Postsurgical hypothyroidism is stable.  5. Return to clinic as needed or in 1 year for annual physical exam.               Follow up in about 1 year (around 1/12/2025), or if symptoms worsen or fail to improve, for Annual exam.

## 2024-01-16 RX ORDER — METOPROLOL TARTRATE 25 MG/1
25 TABLET, FILM COATED ORAL
Qty: 30 TABLET | Refills: 1 | Status: SHIPPED | OUTPATIENT
Start: 2024-01-16 | End: 2025-01-15

## 2024-01-16 NOTE — PROGRESS NOTES
Episode of atrial fibrillation that lasted about 4 hours and resolved on its own.  Patient had a colonoscopy last week.  Will advise metoprolol 25 mg p.o. p.r.n. if atrial fib recurs and lasts more than 15 minutes

## 2024-01-31 ENCOUNTER — TELEPHONE (OUTPATIENT)
Dept: VASCULAR SURGERY | Facility: CLINIC | Age: 74
End: 2024-01-31
Payer: COMMERCIAL

## 2024-01-31 NOTE — TELEPHONE ENCOUNTER
Attempted to contact pt in reference to appt with Dr. Suarez for varicose veins. Voice messages left for pt explaining that appt must be cancelled as Dr. Suaerz does not see patients for this problem and that he must have referral prior to appt being scheduled. Return call requested for questions or concerns. Appt cancelled.

## 2024-02-14 ENCOUNTER — PATIENT MESSAGE (OUTPATIENT)
Dept: INTERNAL MEDICINE | Facility: CLINIC | Age: 74
End: 2024-02-14
Payer: COMMERCIAL

## 2024-02-19 ENCOUNTER — TELEPHONE (OUTPATIENT)
Dept: INTERNAL MEDICINE | Facility: CLINIC | Age: 74
End: 2024-02-19
Payer: COMMERCIAL

## 2024-02-19 NOTE — TELEPHONE ENCOUNTER
Called Pt.    Regarding Ongoing coughing and Phlegm sickness, 1x week now.  (See Previous Note, 2/14/2024)    home COVID test this morning, 2/19.   Negative Results.

## 2024-02-19 NOTE — TELEPHONE ENCOUNTER
Spoke with the pt, ask him to complete another Covid test to ruled out early testing error, because his symptoms has changed with fatigued, more yellow mucus after taking mucinex ,cough and overall feeling bad.

## 2024-02-19 NOTE — TELEPHONE ENCOUNTER
----- Message from Angela Kern sent at 2/19/2024  1:16 PM CST -----  Contact: [phone]  1MEDICALADVICE     Patient is calling for Medical Advice regarding: Negative covid test. Requesting a call from the staff.     How long has patient had these symptoms: Last Monday     Pharmacy name and phone#:      [id number] - [place] - [address]  [address]  [place] [address]  Phone: [phone] Fax: [phone]       Would like response via cisimplehart:  Phone     Comments:

## 2024-02-19 NOTE — TELEPHONE ENCOUNTER
----- Message from Paula De La Cruz sent at 2/19/2024  8:18 AM CST -----  Contact: Pt  [phone]  1MEDICALADVICE     Patient is calling for Medical Advice regarding: upper respiratory infection not getting any better    How long has patient had these symptoms: 1 week    Pharmacy name and phone#:   [id number] - [place] - [address]  [place] [address]  Phone: [phone] Fax: [phone]    Would like response via [employer]circ[employer]:  Call back    Comments: Patient is taking mucinex  only    Please call and advise.    Thank You

## 2024-02-20 RX ORDER — BENZONATATE 200 MG/1
200 CAPSULE ORAL 3 TIMES DAILY PRN
Qty: 30 CAPSULE | Refills: 0 | Status: SHIPPED | OUTPATIENT
Start: 2024-02-20 | End: 2024-03-01

## 2024-02-20 RX ORDER — AZITHROMYCIN 250 MG/1
TABLET, FILM COATED ORAL
Qty: 6 TABLET | Refills: 0 | Status: SHIPPED | OUTPATIENT
Start: 2024-02-20 | End: 2024-04-02

## 2024-02-20 NOTE — TELEPHONE ENCOUNTER
I have prescribed a Z-Martin and Tessalon Perles for further treatment.  Please inform the patient.  Thank you.

## 2024-02-29 RX ORDER — FINASTERIDE 5 MG/1
5 TABLET, FILM COATED ORAL
Qty: 90 TABLET | Refills: 3 | Status: SHIPPED | OUTPATIENT
Start: 2024-02-29

## 2024-02-29 RX ORDER — PRASUGREL 10 MG/1
10 TABLET, FILM COATED ORAL
Qty: 90 TABLET | Refills: 3 | Status: SHIPPED | OUTPATIENT
Start: 2024-02-29

## 2024-03-06 RX ORDER — TAMSULOSIN HYDROCHLORIDE 0.4 MG/1
1 CAPSULE ORAL
Qty: 90 CAPSULE | Refills: 3 | Status: SHIPPED | OUTPATIENT
Start: 2024-03-06

## 2024-03-26 ENCOUNTER — LAB VISIT (OUTPATIENT)
Dept: LAB | Facility: HOSPITAL | Age: 74
End: 2024-03-26
Attending: INTERNAL MEDICINE
Payer: COMMERCIAL

## 2024-03-26 DIAGNOSIS — E89.0 POSTSURGICAL HYPOTHYROIDISM: ICD-10-CM

## 2024-03-26 DIAGNOSIS — R73.02 IMPAIRED GLUCOSE TOLERANCE: ICD-10-CM

## 2024-03-26 LAB
ESTIMATED AVG GLUCOSE: 117 MG/DL (ref 68–131)
HBA1C MFR BLD: 5.7 % (ref 4–5.6)
TSH SERPL DL<=0.005 MIU/L-ACNC: 2.22 UIU/ML (ref 0.4–4)

## 2024-03-26 PROCEDURE — 83036 HEMOGLOBIN GLYCOSYLATED A1C: CPT | Performed by: INTERNAL MEDICINE

## 2024-03-26 PROCEDURE — 36415 COLL VENOUS BLD VENIPUNCTURE: CPT | Mod: PO | Performed by: INTERNAL MEDICINE

## 2024-03-26 PROCEDURE — 84443 ASSAY THYROID STIM HORMONE: CPT | Performed by: INTERNAL MEDICINE

## 2024-04-02 ENCOUNTER — OFFICE VISIT (OUTPATIENT)
Dept: ENDOCRINOLOGY | Facility: CLINIC | Age: 74
End: 2024-04-02
Payer: COMMERCIAL

## 2024-04-02 VITALS
WEIGHT: 203.69 LBS | DIASTOLIC BLOOD PRESSURE: 78 MMHG | OXYGEN SATURATION: 99 % | SYSTOLIC BLOOD PRESSURE: 122 MMHG | BODY MASS INDEX: 29.16 KG/M2 | HEART RATE: 43 BPM | HEIGHT: 70 IN

## 2024-04-02 DIAGNOSIS — R73.02 IMPAIRED GLUCOSE TOLERANCE: ICD-10-CM

## 2024-04-02 DIAGNOSIS — E78.2 MIXED HYPERLIPIDEMIA: ICD-10-CM

## 2024-04-02 DIAGNOSIS — E89.0 POSTSURGICAL HYPOTHYROIDISM: Primary | ICD-10-CM

## 2024-04-02 DIAGNOSIS — I10 PRIMARY HYPERTENSION: ICD-10-CM

## 2024-04-02 PROCEDURE — 99999 PR PBB SHADOW E&M-EST. PATIENT-LVL IV: CPT | Mod: PBBFAC,,, | Performed by: INTERNAL MEDICINE

## 2024-04-02 PROCEDURE — 99214 OFFICE O/P EST MOD 30 MIN: CPT | Mod: S$GLB,,, | Performed by: INTERNAL MEDICINE

## 2024-04-02 RX ORDER — LEVOTHYROXINE SODIUM 137 UG/1
TABLET ORAL
Qty: 32 TABLET | Refills: 11 | Status: SHIPPED | OUTPATIENT
Start: 2024-04-02

## 2024-04-02 NOTE — ASSESSMENT & PLAN NOTE
--Patient with postsurgical hypothyroidism  --Clinically and biochemically euthyroid  --Will continue Synthroid 137 mcg PO Mon-Sat with 1.5 tablets on Sunday only   --Will aim to keep TSH mid to high normal given age and A fib history  --TSH now at goal

## 2024-04-02 NOTE — ASSESSMENT & PLAN NOTE
--Recent A1c 5.7%  --Continued diet and exercise  --Check A1c periodically  --7-8 lb weight loss since last visit with attention to diet

## 2024-04-02 NOTE — PROGRESS NOTES
Subjective:      Patient ID: Dustin Lauren is a 73 y.o. male.    Chief Complaint:  Hypothyroidism      History of Present Illness  Mr. Lauren presents today for follow up of hypothyroidism. Last visit in 8/2023.     With Hypothyroidism s/p surgery for a benign thyroid nodule on 1/27/10.     Had been on 150 mcg of thyroid hormone for quite some time, but then developed A Fib in 9/2016. Had cardiac ablation in 9/2021 for atrial fibrillation. He had a brief episode of A fib that lasted a few hours after a bowel prep for c-scope.      Currently taking Synthroid 137 mcg Mon-Sat with 1.5 tablets on Sunday only.  Denies missing any doses. Waits at least one hour before taking other meds or food.   No tremor or increased anxiousness. He has noticed a few more PVC's recently.   No overt fatigue.      Also with HTN on amlodipine and valsartan.       Latest Reference Range & Units 03/26/24 08:05   Hemoglobin A1C External 4.0 - 5.6 % 5.7 (H)   TSH 0.400 - 4.000 uIU/mL 2.222       Since last visit he is now taking atorvastatin 20 mg daily, and ezetimibe 10 mg daily.       He has lost 7-8 lbs in the past few months with attention to diet.     Review of Systems   Constitutional:  Negative for chills and fever.   Gastrointestinal:  Negative for nausea and vomiting.       Objective:   Physical Exam  Vitals and nursing note reviewed.       BP Readings from Last 3 Encounters:   04/02/24 122/78   01/12/24 122/76   12/04/23 (!) 141/70     Wt Readings from Last 1 Encounters:   04/02/24 0832 92.4 kg (203 lb 11.3 oz)       Body mass index is 29.23 kg/m².    Lab Review:   Lab Results   Component Value Date    HGBA1C 5.7 (H) 03/26/2024     Lab Results   Component Value Date    CHOL 104 (L) 01/05/2024    HDL 39 (L) 01/05/2024    LDLCALC 54.6 (L) 01/05/2024    TRIG 52 01/05/2024    CHOLHDL 37.5 01/05/2024     Lab Results   Component Value Date     01/05/2024    K 4.2 01/05/2024     01/05/2024    CO2 28 01/05/2024      01/05/2024    BUN 13 01/05/2024    CREATININE 1.0 01/05/2024    CALCIUM 9.1 01/05/2024    PROT 7.2 01/05/2024    ALBUMIN 4.0 01/05/2024    BILITOT 0.9 01/05/2024    ALKPHOS 59 01/05/2024    AST 33 01/05/2024    ALT 47 (H) 01/05/2024    ANIONGAP 7 (L) 01/05/2024    ESTGFRAFRICA >60.0 01/07/2022    EGFRNONAA >60.0 01/07/2022    TSH 2.222 03/26/2024         Assessment and Plan     Postsurgical hypothyroidism  --Patient with postsurgical hypothyroidism  --Clinically and biochemically euthyroid  --Will continue Synthroid 137 mcg PO Mon-Sat with 1.5 tablets on Sunday only   --Will aim to keep TSH mid to high normal given age and A fib history  --TSH now at goal    Hyperlipidemia  --On atorvastatin and ezetimibe  --Managed by cardiology    Hypertension  --Bp at goal  --Continue current regimen    Impaired glucose tolerance  --Recent A1c 5.7%  --Continued diet and exercise  --Check A1c periodically  --7-8 lb weight loss since last visit with attention to diet      Follow up in 6 months with TSH and A1c prior to appt      Malick Malik M.D. Staff Endocrinology

## 2024-04-29 ENCOUNTER — PATIENT MESSAGE (OUTPATIENT)
Dept: INTERNAL MEDICINE | Facility: CLINIC | Age: 74
End: 2024-04-29
Payer: COMMERCIAL

## 2024-04-29 DIAGNOSIS — M25.512 ACUTE PAIN OF LEFT SHOULDER: Primary | ICD-10-CM

## 2024-04-30 ENCOUNTER — HOSPITAL ENCOUNTER (OUTPATIENT)
Dept: RADIOLOGY | Facility: HOSPITAL | Age: 74
Discharge: HOME | End: 2024-04-30
Attending: PHYSICIAN ASSISTANT
Payer: COMMERCIAL

## 2024-04-30 ENCOUNTER — OFFICE VISIT (OUTPATIENT)
Dept: SPORTS MEDICINE | Facility: CLINIC | Age: 74
End: 2024-04-30
Payer: COMMERCIAL

## 2024-04-30 VITALS
WEIGHT: 204.06 LBS | DIASTOLIC BLOOD PRESSURE: 69 MMHG | HEIGHT: 70 IN | HEART RATE: 62 BPM | BODY MASS INDEX: 29.21 KG/M2 | SYSTOLIC BLOOD PRESSURE: 109 MMHG

## 2024-04-30 DIAGNOSIS — M75.22 BICEPS TENDINITIS OF LEFT UPPER EXTREMITY: ICD-10-CM

## 2024-04-30 DIAGNOSIS — M25.512 ACUTE PAIN OF LEFT SHOULDER: ICD-10-CM

## 2024-04-30 DIAGNOSIS — M75.102 ROTATOR CUFF SYNDROME, LEFT: Primary | ICD-10-CM

## 2024-04-30 PROCEDURE — 73030 X-RAY EXAM OF SHOULDER: CPT | Mod: TC,LT

## 2024-04-30 PROCEDURE — 20610 DRAIN/INJ JOINT/BURSA W/O US: CPT | Mod: LT,S$GLB,, | Performed by: PHYSICIAN ASSISTANT

## 2024-04-30 PROCEDURE — 99203 OFFICE O/P NEW LOW 30 MIN: CPT | Mod: 25,S$GLB,, | Performed by: PHYSICIAN ASSISTANT

## 2024-04-30 PROCEDURE — 99999 PR PBB SHADOW E&M-EST. PATIENT-LVL V: CPT | Mod: PBBFAC,,, | Performed by: PHYSICIAN ASSISTANT

## 2024-04-30 PROCEDURE — 73030 X-RAY EXAM OF SHOULDER: CPT | Mod: 26,LT,, | Performed by: RADIOLOGY

## 2024-04-30 RX ORDER — TRIAMCINOLONE ACETONIDE 40 MG/ML
40 INJECTION, SUSPENSION INTRA-ARTICULAR; INTRAMUSCULAR
Status: COMPLETED | OUTPATIENT
Start: 2024-04-30 | End: 2024-04-30

## 2024-04-30 RX ADMIN — TRIAMCINOLONE ACETONIDE 40 MG: 40 INJECTION, SUSPENSION INTRA-ARTICULAR; INTRAMUSCULAR at 03:04

## 2024-04-30 NOTE — PROGRESS NOTES
Subjective:     Chief Complaint: Dustin Lauren is a 73 y.o. male who had concerns including Pain of the Left Shoulder.    Dustin Lauren is a right handed [employer].The gradual pain started 1 weeks ago when he pulled a gallon of water off the shelf at the grocery, then worsen when lift lid to hatchback compartment in his car and is becoming progressively worse last few weeks. Pain is located over (points to) anterior arm radiating down bicep muscle. He reports that the pain is a 3 /10 aching pain today.  He has only tried Tylenol. Pain not responding adequately to conservative measures which have included activity modifications, rest, and oral medication. Is affecting ADLs and limiting desired level of activity. Denies numbness, tingling, radiation, and neck pain or radicular symptoms.  Pain is 7 /10 at its worst    Mechanical symptoms: none  Subjective instability: -  Worse with overhead activities and reaching behind  Better with rest.   Nocturnal symptoms: +    No previous surgeries or trauma on shoulder          Review of Systems   Constitutional: Negative for chills and fever.   HENT:  Negative for congestion and sore throat.    Eyes:  Negative for discharge and double vision.   Cardiovascular:  Negative for chest pain, palpitations and syncope.   Respiratory:  Negative for cough and shortness of breath.    Endocrine: Negative for cold intolerance and heat intolerance.   Skin:  Negative for dry skin and rash.   Musculoskeletal:  Positive for joint pain.   Gastrointestinal:  Negative for abdominal pain, nausea and vomiting.   Neurological:  Negative for focal weakness, numbness and paresthesias.       Pain Related Questions  Over the past 3 days, what was your average pain during activity? (I.e. running, jogging, walking, climbing stairs, getting dressed, ect.): 7  Over the past 3 days, what was your highest pain level?: 7  Over the past 3 days, what was your lowest pain level? : 3    Other  How many  nights a week are you awakened by your affected body part?: 2  Was the patient's HEIGHT measured or patient reported?: Patient Reported  Was the patient's WEIGHT measured or patient reported?: Measured    Objective:     General: Dustin is well-developed, well-nourished, appears stated age, in no acute distress, alert and oriented to time, place and person.     General    Nursing note and vitals reviewed.  Constitutional: He is oriented to person, place, and time. He appears well-developed and well-nourished. No distress.   HENT:   Head: Normocephalic and atraumatic.   Nose: Nose normal.   Eyes: Conjunctivae and EOM are normal. Pupils are equal, round, and reactive to light.   Cardiovascular:  Normal rate, regular rhythm and intact distal pulses.            Pulmonary/Chest: Effort normal and breath sounds normal.   Abdominal: Soft. Bowel sounds are normal. He exhibits no distension.   Neurological: He is alert and oriented to person, place, and time. He has normal reflexes.   Psychiatric: He has a normal mood and affect. His behavior is normal. Judgment and thought content normal.         Right Shoulder Exam     Inspection/Observation   Swelling: absent  Bruising: absent  Scars: absent  Deformity: absent  Scapular Winging: absent  Scapular Dyskinesia: negative  Atrophy: absent    Range of Motion   Active abduction:  110   Passive abduction:  130   Extension:  50   Forward Flexion:  130   Forward Elevation: 140  Adduction: 30   External Rotation 0 degrees:  20 abnormal   Internal rotation 0 degrees:  Lumbar   Internal rotation 90 degrees:  60     Tests & Signs   Apprehension: negative  Cross arm: negative  Drop arm: negative  Barnett test: negative  Impingement: negative  Lift Off Sign: negative  Belly Press: negative  Active Compression Test (Cactus's Sign): negative  Yergason's Test: negative  Speed's Test: negative  Relocation 90 degrees: negative  Jerk Test: negative    Other   Sensation: normal    Left Shoulder  Exam     Inspection/Observation   Swelling: absent  Bruising: absent  Scars: absent  Deformity: absent  Scapular Winging: absent  Scapular Dyskinesia: negative  Atrophy: absent    Tenderness   The patient is tender to palpation of the acromioclavicular joint.    Range of Motion   Active abduction:  110   Passive abduction:  130   Extension:  50   Forward Flexion:  140   Forward Elevation: 150  Adduction: 30   External Rotation 0 degrees:  60   Internal rotation 0 degrees:  Lumbar   Internal rotation 90 degrees:  70     Tests & Signs   Apprehension: negative  Cross arm: positive  Drop arm: negative  Barnett test: positive  Impingement: positive  Rotator Cuff Painful Arc/Range: mild  Lift Off Sign: negative  Belly Press: positive  Active Compression test (Cairo's Sign): negative  Yergasons's Test: negative  Speed's Test: positive  Relocation 90 degrees: negative  Jerk Test: negative    Other   Sensation: normal       Muscle Strength   Right Upper Extremity   Shoulder Abduction: 5/5   Shoulder Internal Rotation: 5/5   Shoulder External Rotation: 5/5   Supraspinatus: 5/5   Subscapularis: 5/5   Biceps: 5/5   Left Upper Extremity  Shoulder Abduction: 5/5   Shoulder Internal Rotation: 5/5   Shoulder External Rotation: 5/5   Supraspinatus: 5/5   Subscapularis: 5/5   Biceps: 5/5     Vascular Exam     Right Pulses      Radial:                    2+      Left Pulses      Radial:                    2+      Capillary Refill  Right Hand: normal capillary refill  Left Hand: normal capillary refill        Radiographic findings today:    Glenohumeral joint space is normal.  Bony structures are intact and no soft tissue calcification is seen.     Xrays of the left shoulder were ordered and reviewed by me today. These findings were discussed and reviewed with the patient.      Assessment:     Encounter Diagnoses   Name Primary?    Acute pain of left shoulder     Rotator cuff syndrome, left Yes    Biceps tendinitis of left upper  extremity         Plan:     We have discussed a variety of treatment options including medications, injections, physical therapy and other alternative treatments. I also explained the indications, risks and benefits of surgery. Patient chooses to proceed with CSI and physical therapy at this time.    I made the decision to obtain old records of the patient including previous notes and imaging. I independently reviewed and interpreted lab results today as well as prior imaging.     1. Injection Procedure  A time out was performed, including verification of patient ID, procedure, site and side, availability of information and equipment, review of safety issues, and agreement with consent, the procedure site was marked.    After time out was performed, the patient was prepped aseptically with chloraprep swabstick. A diagnostic and therapeutic injection of 1:4cc Kenalog/Marcaine was given under sterile technique using a 22g x 1.5 needle from the Posterior  aspect of the left Subacromial in the sitting position.      Dustin Lauren had no adverse reactions to the medication. Pain decreased. He was instructed to apply ice to the joint for 20 minutes and avoid strenuous activities for 24-36 hours following the injection. He was warned of possible blood sugar and/or blood pressure changes during that time. Following that time, he can resume regular activities.    He was reminded to call the clinic immediately for any adverse side effects as explained in clinic today.    2. OTC Tylenol as needed. NSAIDs contraindicated.   3. Ice compress to the affected area 2-3x a day for 15-20 minutes as needed for pain management.  4. Ambulatory referral to physical therapy for periscapular/rotator cuff strengthening.   5. If symptoms worsen consider MRI.  6. RTC to see Milton Carrera PA-C as needed in 8 weeks for follow-up.    All of the patient's questions were answered and the patient will contact us if they have any questions or  concerns in the interim.      Patient questionnaires may have been collected.

## 2024-06-03 ENCOUNTER — HOSPITAL ENCOUNTER (OUTPATIENT)
Dept: RADIOLOGY | Facility: OTHER | Age: 74
Discharge: HOME | End: 2024-06-03
Attending: NURSE PRACTITIONER
Payer: COMMERCIAL

## 2024-06-03 DIAGNOSIS — N28.1 COMPLEX RENAL CYST: ICD-10-CM

## 2024-06-03 PROCEDURE — 76770 US EXAM ABDO BACK WALL COMP: CPT | Mod: TC

## 2024-06-03 PROCEDURE — 76770 US EXAM ABDO BACK WALL COMP: CPT | Mod: 26,,, | Performed by: RADIOLOGY

## 2024-06-20 RX ORDER — EZETIMIBE 10 MG/1
10 TABLET ORAL
Qty: 90 TABLET | Refills: 3 | Status: SHIPPED | OUTPATIENT
Start: 2024-06-20

## 2024-07-17 DIAGNOSIS — I48.19 PERSISTENT ATRIAL FIBRILLATION: Primary | ICD-10-CM

## 2024-07-23 ENCOUNTER — HOSPITAL ENCOUNTER (OUTPATIENT)
Dept: RADIOLOGY | Facility: HOSPITAL | Age: 74
Discharge: HOME | End: 2024-07-23
Attending: FAMILY MEDICINE
Payer: COMMERCIAL

## 2024-07-23 ENCOUNTER — OFFICE VISIT (OUTPATIENT)
Dept: INTERNAL MEDICINE | Facility: CLINIC | Age: 74
End: 2024-07-23
Payer: COMMERCIAL

## 2024-07-23 VITALS
SYSTOLIC BLOOD PRESSURE: 132 MMHG | BODY MASS INDEX: 29.57 KG/M2 | TEMPERATURE: 98 F | DIASTOLIC BLOOD PRESSURE: 74 MMHG | WEIGHT: 206.56 LBS | OXYGEN SATURATION: 98 % | RESPIRATION RATE: 18 BRPM | HEART RATE: 51 BPM | HEIGHT: 70 IN

## 2024-07-23 DIAGNOSIS — S76.212A STRAIN OF LEFT GROIN: ICD-10-CM

## 2024-07-23 DIAGNOSIS — S76.212A STRAIN OF LEFT GROIN: Primary | ICD-10-CM

## 2024-07-23 PROCEDURE — 99999 PR PBB SHADOW E&M-EST. PATIENT-LVL V: CPT | Mod: PBBFAC,,, | Performed by: FAMILY MEDICINE

## 2024-07-23 PROCEDURE — 73502 X-RAY EXAM HIP UNI 2-3 VIEWS: CPT | Mod: TC,PO,LT

## 2024-07-23 PROCEDURE — 73502 X-RAY EXAM HIP UNI 2-3 VIEWS: CPT | Mod: 26,LT,, | Performed by: RADIOLOGY

## 2024-07-23 PROCEDURE — 99214 OFFICE O/P EST MOD 30 MIN: CPT | Mod: S$GLB,,, | Performed by: FAMILY MEDICINE

## 2024-07-23 NOTE — PROGRESS NOTES
Subjective     Patient ID: Dustin Lauren is a 74 y.o. male.    Chief Complaint: Groin Pain (Going down left thigh x 3-4 weeks)  74-year-old white male presents to clinic today secondary to a complaint of left groin pain with radiation into the left inner thigh for the past 3-4 weeks.  He reports that the pain occurs intermittently and predominantly occurs more frequently with prolonged rest for instance with sitting or laying.  He notices the pain when he changes position out of a sitting or laying position.  He reports the pain is improved with walking and has not hindered his day-to-day physical activity.  He has used Tylenol on rare occasion secondary to concerns of elevated liver enzymes.  He also notes occasional sciatic pain but states that the groin pain is slightly different secondary to starting in the left groin and traveling down the inner thigh.  Groin Pain  Pertinent negatives include no abdominal pain, chest pain, chills, constipation, coughing, diarrhea, dysuria, fever, frequency, headaches, nausea, rash, shortness of breath, sore throat, urgency or vomiting.     Review of Systems   Constitutional:  Negative for activity change, appetite change, chills, fatigue, fever and unexpected weight change.   HENT:  Negative for nasal congestion, ear pain, hearing loss, postnasal drip, rhinorrhea, sinus pressure/congestion, sore throat, tinnitus and trouble swallowing.    Eyes:  Negative for discharge, redness, itching and visual disturbance.   Respiratory:  Negative for cough, chest tightness, shortness of breath and wheezing.    Cardiovascular:  Negative for chest pain and palpitations.   Gastrointestinal:  Negative for abdominal pain, blood in stool, constipation, diarrhea, nausea and vomiting.   Endocrine: Negative for polydipsia and polyuria.   Genitourinary:  Negative for decreased urine volume, difficulty urinating, dysuria, frequency, hematuria and urgency.   Musculoskeletal:  Positive for  arthralgias (left hip/groin) and neck pain. Negative for back pain, joint swelling, myalgias and neck stiffness.   Integumentary:  Negative for rash.   Neurological:  Negative for dizziness, weakness, light-headedness and headaches.   Psychiatric/Behavioral: Negative.  Negative for confusion and dysphoric mood.           Objective     Physical Exam  Constitutional:       Appearance: Normal appearance.   HENT:      Head: Normocephalic and atraumatic.   Pulmonary:      Effort: Pulmonary effort is normal.   Musculoskeletal:      Left hip: Tenderness present. Decreased range of motion (Positive Odette with increased tone).   Neurological:      Mental Status: He is alert and oriented to person, place, and time.   Psychiatric:         Mood and Affect: Mood normal.         Behavior: Behavior normal.         Thought Content: Thought content normal.         Judgment: Judgment normal.            Assessment and Plan     1. Strain of left groin  -     X-Ray Hip 2 or 3 views Left with Pelvis when performed; Future; Expected date: 07/23/2024        1. Left hip x-ray now.    2. Hip and lower body stretches have been discussed and handout given.    3. Okay to continue use of low doses of Tylenol such as 500 mg per dose Tylenol with maximum dose of 2 g per day.  4. Recommend use of Voltaren gel as needed for pain.    5. Return to clinic as needed if symptoms persist or worsen.               Follow up if symptoms worsen or fail to improve.

## 2024-08-07 DIAGNOSIS — E89.0 POSTSURGICAL HYPOTHYROIDISM: ICD-10-CM

## 2024-08-08 RX ORDER — LEVOTHYROXINE SODIUM 137 UG/1
TABLET ORAL
Qty: 96 TABLET | Refills: 3 | Status: SHIPPED | OUTPATIENT
Start: 2024-08-08

## 2024-08-13 ENCOUNTER — OFFICE VISIT (OUTPATIENT)
Dept: OPTOMETRY | Facility: CLINIC | Age: 74
End: 2024-08-13
Payer: COMMERCIAL

## 2024-08-13 DIAGNOSIS — H52.03 HYPEROPIA WITH PRESBYOPIA OF BOTH EYES: ICD-10-CM

## 2024-08-13 DIAGNOSIS — H52.4 HYPEROPIA WITH PRESBYOPIA OF BOTH EYES: ICD-10-CM

## 2024-08-13 DIAGNOSIS — H25.13 NUCLEAR SCLEROSIS, BILATERAL: Primary | ICD-10-CM

## 2024-08-13 DIAGNOSIS — Z13.5 GLAUCOMA SCREENING: ICD-10-CM

## 2024-08-13 PROCEDURE — 92014 COMPRE OPH EXAM EST PT 1/>: CPT | Mod: S$GLB,,, | Performed by: OPTOMETRIST

## 2024-08-13 PROCEDURE — 92015 DETERMINE REFRACTIVE STATE: CPT | Mod: S$GLB,,, | Performed by: OPTOMETRIST

## 2024-08-13 PROCEDURE — 99999 PR PBB SHADOW E&M-EST. PATIENT-LVL III: CPT | Mod: PBBFAC,,, | Performed by: OPTOMETRIST

## 2024-08-13 RX ORDER — ATORVASTATIN CALCIUM 20 MG/1
20 TABLET, FILM COATED ORAL
Qty: 90 TABLET | Refills: 3 | Status: SHIPPED | OUTPATIENT
Start: 2024-08-13

## 2024-08-13 NOTE — PROGRESS NOTES
HPI    75 Y/o male is here for routine eye exam with no C/o about ocular health   Pt denies pain and  discomfort   Rare floaters     Eye med: Systane OU PRN   Last edited by Lluvia Montes MA on 8/13/2024  7:41 AM.            Assessment /Plan     For exam results, see Encounter Report.    Nuclear sclerosis, bilateral    Glaucoma screening    Hyperopia with presbyopia of both eyes        1. Cat OU--pt reports inc glare issues.  Discussed surgery, but pt wishes to wait.  pt happy w Rx      PLAN:    rtc 1 yr

## 2024-08-18 ENCOUNTER — PATIENT MESSAGE (OUTPATIENT)
Dept: CARDIOLOGY | Facility: CLINIC | Age: 74
End: 2024-08-18
Payer: COMMERCIAL

## 2024-09-05 NOTE — PROGRESS NOTES
Mr. Lauren is a patient of Dr. Rodríguez and was last seen in clinic 9/11/2023.      Subjective:   Patient ID:  Dustin Lauren is a 74 y.o. male who presents for follow up of Atrial Fibrillation  .     HPI:    Mr. Lauren is a 74 y.o. male with atrial fibrillation (PVI 9/2021), Htn, CAD s/p PCI, BPH, hypothyroidism (s/o thyroidectomy), low back pain, tachy-darek syndrome here for follow up.     Background:    Primary cardiologist is Dr. Vo.  Primary EP has been Dr. Burnett. Presents for second opinion.  He is the uncle of Libra Littlejohn  He owns [employer]  Initially developed symptomatic paroxysmal AF 9/16. Progressed to persistent AF. Underwent DCCV in 2016.  Ultimately developed recurrence.  DCCV 12/18/18.  48 hr holter 6/20 nsr  Developed recurrence. DCCV 2/2/21 4/21 had an event lasting a couple of hours.  Is symptomatic with the AF, noting palpitations, on occasion associated with lightheadedness.  Echo 7/30/20 normal biventricular structure and function normal left atrial size, trace MR  ECG reveals sinus bradycardia at 49 bpm.  There has been inability to add medications due to his bradycardia.  Has eliminated caffeine and alcohol.  Atrial fibrillation, symptomatic. Paroxysmal to persistent.  Has bradycardia limiting his medications. Recommend PVI.    9/16/2021: Successful pulmonary vein RF ablation.    12/16/2021: He is 3 mo s/p PVI.  Doing well since procedure, with no sustained palpitations during blanking period. Brief palps only. Pericarditis symptoms resolved with course of NSAIDs.  HIEU 9/2021 showed normal LVEF. Chronic bradycardia - not on AV blockers. CHADSVASc 3 and it is recommended he remain on OAC. He is on eliquis for CVA prophylaxis.  He exercises regularly.     3/12/2022: He is 6 months s/p PVI. He is doing well from a rhythm standpoint, with no documented or symptomatic recurrence of arrhythmia since procedure. No sustained palpitations; brief rare palps likely ectopy.    Bradycardic at baseline. Not on AVN blockers. CHADSVASc 3 on eliquis for CVA prophylaxis. He feels well and remains active.    9/19/2022: One year s/p PVI. Continues to do well from a rhythm standpoint, with no documented or symptomatic recurrence of arrhythmia since procedure. Bradycardic at baseline, asymptomatic.  CHADSVASc 3 on eliquis. No AAD or AVN blockers. Normally symptomatic with AF. He will notify clinic if he develops new symptoms. He is also considering switching general cardiology care to [facility]. Referral provided.    9/11/2023: He is 2 years s/p PVI. He continues to do well from a rhythm standpoint, with no documented or symptomatic recurrence of arrhythmia since procedure. HIEU 9/2021 EF 55%. CHADSVASc 3 on eliquis.  Not on AAD or any AVN RFA. Pt is active and exercises regularly. Follows with Dr. Elder in general cardiology.    Update (09/09/2024):    Today he reports having one 5 hour episode of AF a few days after colonoscopy. No subsequent episodes. Was prescribed low dose lopressor PRN for palps but has not taken it.  He also reports some left shoulder/arm pain at rest but no CP when he is in cardiac rehab (2-3 days per week). No worsening RILEY, LH, syncope.   Arthritis in hands.    He is currently taking eliquis 5mg BID for stroke prophylaxis and denies significant bleeding episodes. Kidney function is stable, with a creatinine of 1 on 1/5/2024.    I have personally reviewed the patient's EKG today, which shows sinus darek at 50bpm. NV interval is 166. QRS is 86. QT is 436.    Relevant Cardiac Test Results:    HIEU (9/16/2021):  Atrial fibrillation observed.  HIEU to evaluate for SHINE prior PVI for atrial fibrillation.  The left ventricle is normal in size with normal systolic function. The estimated ejection fraction is 55%.  Moderate right ventricular enlargement with low normal right ventricular systolic function.  Normal appearing left atrial appendage. No thrombus is present in the  appendage. Normal appendage velocities 0.64 m/s.  Biatrial enlargement.  Moderate tricuspid regurgitation. There is prolapse of the septal TV leaflet.  Grade 3 plaque present in the descending aorta.  The ascending aorta is mildly dilated measured at 4 cm.    Current Outpatient Medications   Medication Sig    amlodipine-valsartan (EXFORGE) 5-160 mg per tablet Take 1 tablet by mouth once daily.    apixaban (ELIQUIS) 5 mg Tab Take 1 tablet (5 mg total) by mouth 2 (two) times daily.    atorvastatin (LIPITOR) 20 MG tablet TAKE 1 TABLET BY MOUTH EVERY DAY    cholecalciferol, vitamin D3, 25 mcg (1,000 unit) Chew Take 2,000 Int'l Units by mouth once daily.    ezetimibe (ZETIA) 10 mg tablet TAKE 1 TABLET BY MOUTH EVERY DAY    famotidine (PEPCID) 40 MG tablet Take 40 mg by mouth 2 (two) times daily.    finasteride (PROSCAR) 5 mg tablet TAKE 1 TABLET BY MOUTH EVERY DAY    fluticasone propionate (FLONASE) 50 mcg/actuation nasal spray 2 sprays by Each Nostril route once daily.    metoprolol tartrate (LOPRESSOR) 25 MG tablet Take 1 tablet (25 mg total) by mouth as needed (1-2 tab for palpitations).    mirabegron (MYRBETRIQ) 25 mg Tb24 ER tablet Take 1 tablet (25 mg total) by mouth once daily. (Patient not taking: Reported on 4/30/2024)    multivit-min/FA/lycopen/lutein (CENTRUM SILVER MEN ORAL) Take 1 tablet by mouth once daily.    naftifine 2 % Crea Apply 1 application topically daily as needed.    nitroGLYCERIN (NITROSTAT) 0.3 MG SL tablet Place 1 tablet (0.3 mg total) under the tongue every 5 (five) minutes as needed for Chest pain.    omega-3 fatty acids 1,000 mg Cap Take 2,000 mg by mouth.    prasugreL (EFFIENT) 10 mg Tab TAKE 1 TABLET BY MOUTH EVERY DAY    SYNTHROID 137 mcg Tab tablet TAKE 1 TABLET BY MOUTH MON-SAT AND TAKE 1.5 TABLET BY MOUTH ON SUNDAY ONLY    tamsulosin (FLOMAX) 0.4 mg Cap TAKE 1 CAPSULE BY MOUTH EVERY DAY    tiZANidine (ZANAFLEX) 4 MG tablet Take 1 tablet (4 mg total) by mouth every 8 (eight) hours as  "needed (muscle spasms).     No current facility-administered medications for this visit.       Review of Systems   Constitutional: Negative for malaise/fatigue.   Cardiovascular:  Positive for palpitations. Negative for chest pain, dyspnea on exertion, irregular heartbeat and leg swelling.   Respiratory:  Negative for shortness of breath.    Hematologic/Lymphatic: Negative for bleeding problem.   Skin:  Negative for rash.   Musculoskeletal:  Positive for arthritis and joint pain. Negative for myalgias.   Gastrointestinal:  Negative for hematemesis, hematochezia and nausea.   Genitourinary:  Negative for hematuria.   Neurological:  Negative for light-headedness.   Psychiatric/Behavioral:  Negative for altered mental status.    Allergic/Immunologic: Negative for persistent infections.       Objective:          /65   Pulse (!) 50   Ht 5' 10" (1.778 m)   Wt 93.6 kg (206 lb 5.6 oz)   BMI 29.61 kg/m²     Physical Exam  Vitals and nursing note reviewed.   Constitutional:       Appearance: Normal appearance. He is well-developed.   HENT:      Head: Normocephalic.      Nose: Nose normal.   Eyes:      Pupils: Pupils are equal, round, and reactive to light.   Cardiovascular:      Rate and Rhythm: Regular rhythm. Bradycardia present.   Pulmonary:      Effort: No respiratory distress.   Musculoskeletal:         General: Normal range of motion.   Skin:     General: Skin is warm and dry.      Findings: No erythema.   Neurological:      Mental Status: He is alert and oriented to person, place, and time.   Psychiatric:         Speech: Speech normal.         Behavior: Behavior normal.           Lab Results   Component Value Date     01/05/2024    K 4.2 01/05/2024    BUN 13 01/05/2024    CREATININE 1.0 01/05/2024    ALT 47 (H) 01/05/2024    AST 33 01/05/2024    HGB 14.1 01/05/2024    HCT 42.6 01/05/2024    TSH 2.222 03/26/2024    LDLCALC 54.6 (L) 01/05/2024       Recent Labs   Lab 09/13/21  0853   INR 1.1     "   Assessment:     1. Persistent atrial fibrillation    2. Primary hypertension    3. Tachy-darek syndrome    4. History of radiofrequency ablation (RFA) procedure for cardiac arrhythmia    5. On anticoagulant therapy        Plan:     In summary, Mr. Lauren is a 74 y.o. male with atrial fibrillation (PVI 9/2021), Htn, CAD s/p PCI, BPH, hypothyroidism (s/o thyroidectomy), low back pain, tachy-darek syndrome here for follow up.   Pt is now 3 years s/p PVI. Overall continues to do well, but did have one 5 hour episode of AF back in Jan after colonoscopy. Suspect provoked episode. OK to take PRN lopressor but darek would limit AAD use if he has more frequent AF breakthrough and plan at that time would likely be for redo PVI. CHADSVASc 3 on eliquis.  Shoulder pain does not sound cardiac in nature. Follows with Dr. Elder in general cardiology and has an upcoming appt.    Continue current regimen  Notify clinic if you have more AF  RTC 1 yr, sooner if needed    *A copy of this note has been sent to Dr. Rodríguez*    Follow up in about 1 year (around 9/9/2025).    ------------------------------------------------------------------    CASSANDRA Arenas, NP-C  Cardiac Electrophysiology

## 2024-09-09 ENCOUNTER — OFFICE VISIT (OUTPATIENT)
Dept: ELECTROPHYSIOLOGY | Facility: CLINIC | Age: 74
End: 2024-09-09
Payer: COMMERCIAL

## 2024-09-09 ENCOUNTER — HOSPITAL ENCOUNTER (OUTPATIENT)
Dept: CARDIOLOGY | Facility: CLINIC | Age: 74
Discharge: HOME | End: 2024-09-09
Payer: COMMERCIAL

## 2024-09-09 VITALS
WEIGHT: 206.38 LBS | SYSTOLIC BLOOD PRESSURE: 123 MMHG | DIASTOLIC BLOOD PRESSURE: 65 MMHG | BODY MASS INDEX: 29.54 KG/M2 | HEIGHT: 70 IN | HEART RATE: 50 BPM

## 2024-09-09 DIAGNOSIS — I48.19 PERSISTENT ATRIAL FIBRILLATION: Primary | ICD-10-CM

## 2024-09-09 DIAGNOSIS — I10 PRIMARY HYPERTENSION: ICD-10-CM

## 2024-09-09 DIAGNOSIS — Z98.890 HISTORY OF RADIOFREQUENCY ABLATION (RFA) PROCEDURE FOR CARDIAC ARRHYTHMIA: ICD-10-CM

## 2024-09-09 DIAGNOSIS — Z79.01 ON ANTICOAGULANT THERAPY: ICD-10-CM

## 2024-09-09 DIAGNOSIS — I48.19 PERSISTENT ATRIAL FIBRILLATION: ICD-10-CM

## 2024-09-09 DIAGNOSIS — I49.5 TACHY-BRADY SYNDROME: ICD-10-CM

## 2024-09-09 LAB
OHS QRS DURATION: 86 MS
OHS QTC CALCULATION: 397 MS

## 2024-09-09 PROCEDURE — 99999 PR PBB SHADOW E&M-EST. PATIENT-LVL IV: CPT | Mod: PBBFAC,,, | Performed by: NURSE PRACTITIONER

## 2024-09-09 PROCEDURE — 93005 ELECTROCARDIOGRAM TRACING: CPT | Mod: S$GLB,,, | Performed by: INTERNAL MEDICINE

## 2024-09-09 PROCEDURE — 99214 OFFICE O/P EST MOD 30 MIN: CPT | Mod: S$GLB,,, | Performed by: NURSE PRACTITIONER

## 2024-09-09 PROCEDURE — 93010 ELECTROCARDIOGRAM REPORT: CPT | Mod: S$GLB,,, | Performed by: STUDENT IN AN ORGANIZED HEALTH CARE EDUCATION/TRAINING PROGRAM

## 2024-09-10 ENCOUNTER — LAB VISIT (OUTPATIENT)
Dept: LAB | Facility: HOSPITAL | Age: 74
End: 2024-09-10
Payer: COMMERCIAL

## 2024-09-10 DIAGNOSIS — E78.5 DYSLIPIDEMIA: ICD-10-CM

## 2024-09-10 DIAGNOSIS — Z79.01 CHRONIC ANTICOAGULATION: ICD-10-CM

## 2024-09-10 DIAGNOSIS — I10 PRIMARY HYPERTENSION: ICD-10-CM

## 2024-09-10 LAB
ALBUMIN SERPL BCP-MCNC: 3.9 G/DL (ref 3.5–5.2)
ALP SERPL-CCNC: 57 U/L (ref 55–135)
ALT SERPL W/O P-5'-P-CCNC: 26 U/L (ref 10–44)
ANION GAP SERPL CALC-SCNC: 7 MMOL/L (ref 8–16)
AST SERPL-CCNC: 24 U/L (ref 10–40)
BILIRUB SERPL-MCNC: 0.6 MG/DL (ref 0.1–1)
BUN SERPL-MCNC: 19 MG/DL (ref 8–23)
CALCIUM SERPL-MCNC: 9.4 MG/DL (ref 8.7–10.5)
CHLORIDE SERPL-SCNC: 103 MMOL/L (ref 95–110)
CHOLEST SERPL-MCNC: 102 MG/DL (ref 120–199)
CHOLEST/HDLC SERPL: 2.3 {RATIO} (ref 2–5)
CO2 SERPL-SCNC: 29 MMOL/L (ref 23–29)
CREAT SERPL-MCNC: 1 MG/DL (ref 0.5–1.4)
ERYTHROCYTE [DISTWIDTH] IN BLOOD BY AUTOMATED COUNT: 13.5 % (ref 11.5–14.5)
EST. GFR  (NO RACE VARIABLE): >60 ML/MIN/1.73 M^2
GLUCOSE SERPL-MCNC: 97 MG/DL (ref 70–110)
HCT VFR BLD AUTO: 42.1 % (ref 40–54)
HDLC SERPL-MCNC: 44 MG/DL (ref 40–75)
HDLC SERPL: 43.1 % (ref 20–50)
HGB BLD-MCNC: 13.8 G/DL (ref 14–18)
LDLC SERPL CALC-MCNC: 48.6 MG/DL (ref 63–159)
MCH RBC QN AUTO: 29.7 PG (ref 27–31)
MCHC RBC AUTO-ENTMCNC: 32.8 G/DL (ref 32–36)
MCV RBC AUTO: 91 FL (ref 82–98)
NONHDLC SERPL-MCNC: 58 MG/DL
PLATELET # BLD AUTO: 179 K/UL (ref 150–450)
PMV BLD AUTO: 11.2 FL (ref 9.2–12.9)
POTASSIUM SERPL-SCNC: 4.1 MMOL/L (ref 3.5–5.1)
PROT SERPL-MCNC: 7 G/DL (ref 6–8.4)
RBC # BLD AUTO: 4.65 M/UL (ref 4.6–6.2)
SODIUM SERPL-SCNC: 139 MMOL/L (ref 136–145)
TRIGL SERPL-MCNC: 47 MG/DL (ref 30–150)
WBC # BLD AUTO: 4.02 K/UL (ref 3.9–12.7)

## 2024-09-10 PROCEDURE — 85027 COMPLETE CBC AUTOMATED: CPT | Performed by: INTERNAL MEDICINE

## 2024-09-10 PROCEDURE — 36415 COLL VENOUS BLD VENIPUNCTURE: CPT | Mod: PO | Performed by: INTERNAL MEDICINE

## 2024-09-10 PROCEDURE — 80053 COMPREHEN METABOLIC PANEL: CPT | Performed by: INTERNAL MEDICINE

## 2024-09-10 PROCEDURE — 80061 LIPID PANEL: CPT | Performed by: INTERNAL MEDICINE

## 2024-09-17 ENCOUNTER — OFFICE VISIT (OUTPATIENT)
Dept: CARDIOLOGY | Facility: CLINIC | Age: 74
End: 2024-09-17
Payer: COMMERCIAL

## 2024-09-17 VITALS
SYSTOLIC BLOOD PRESSURE: 116 MMHG | WEIGHT: 207.56 LBS | HEIGHT: 70 IN | BODY MASS INDEX: 29.72 KG/M2 | DIASTOLIC BLOOD PRESSURE: 64 MMHG | HEART RATE: 52 BPM

## 2024-09-17 DIAGNOSIS — E78.2 MIXED HYPERLIPIDEMIA: ICD-10-CM

## 2024-09-17 DIAGNOSIS — I25.10 CORONARY ARTERY DISEASE INVOLVING NATIVE CORONARY ARTERY OF NATIVE HEART WITHOUT ANGINA PECTORIS: Primary | ICD-10-CM

## 2024-09-17 DIAGNOSIS — I48.19 PERSISTENT ATRIAL FIBRILLATION: ICD-10-CM

## 2024-09-17 DIAGNOSIS — Z79.01 CHRONIC ANTICOAGULATION: ICD-10-CM

## 2024-09-17 DIAGNOSIS — Z95.5 S/P CORONARY ARTERY STENT PLACEMENT: ICD-10-CM

## 2024-09-17 DIAGNOSIS — R00.1 SINUS BRADYCARDIA: ICD-10-CM

## 2024-09-17 DIAGNOSIS — Z79.02 LONG TERM (CURRENT) USE OF ANTITHROMBOTICS/ANTIPLATELETS: ICD-10-CM

## 2024-09-17 PROCEDURE — 99214 OFFICE O/P EST MOD 30 MIN: CPT | Mod: S$GLB,,, | Performed by: INTERNAL MEDICINE

## 2024-09-17 PROCEDURE — 99999 PR PBB SHADOW E&M-EST. PATIENT-LVL IV: CPT | Mod: PBBFAC,,, | Performed by: INTERNAL MEDICINE

## 2024-09-17 RX ORDER — NITROGLYCERIN 0.3 MG/1
0.3 TABLET SUBLINGUAL EVERY 5 MIN PRN
Qty: 100 TABLET | Refills: 3 | Status: SHIPPED | OUTPATIENT
Start: 2024-09-17

## 2024-09-17 RX ORDER — ICOSAPENT ETHYL 1 G/1
1 CAPSULE ORAL 2 TIMES DAILY
Qty: 60 CAPSULE | Refills: 11 | Status: SHIPPED | OUTPATIENT
Start: 2024-09-17

## 2024-09-17 NOTE — PATIENT INSTRUCTIONS
Fish oil in the form of a purified EPA known as icosa pent ethyl or a combination of high dose Omega 3 containing EPA and DHA have been shown to be beneficial. If prescription Vascepa, Lovaza or their generic equivalent are not covered by your insurance company then look for an over the counter fish oil that has about 1000 mg of EPA+DHA. Take 1000 mg twice a day.        Giovany's Triple Strength Omega 3  Each capsule contains:  Fish Oil 1400 mg  900 mg of Omega 3 =  mg +  mg   Take 1 cap twice a day or 2 caps once a day  Stop for 5 days before any procedure or surgery.            Neal Maximum Omega 2000    Each capsule contains    mg +  mg  Total fish oil 1300 mg  Take 2 capsules a day                                                                    Neal's Liquid Fish oil    1 Teaspoon (5ml) contains:  Calories 40  Fat grams 4.5    Omega-3 Fatty Acids 1,600mg  ---EPA (Eicosapentaenoic Acid) 800mg   ---DHA (Docosahexaenoic Acid) 500mg          EPA+DHA totals to 1300 mg    3 tsp a day will get you to about 4 gm of EPA +DHA          ============= ==================== ====================    Avoid a fish oil that states 1200 mg of fish oil but contains only 90 mg of EPA and 110 mg of DHA.   It has a lot of other Omega fatty acids that have not been shown to be beneficial.   Or, the one that states 1000 mg of fish oil but only 300 mg of Omega 3                 Also look at the serving size. In this example you need 2 capsules to get 500 mg of EPA and DHA       Blood clots form during atrial fib in a small pouch inside the heart called the left atrial appendage (SHINE-red arrow).   In patients who cannot take a blood thinner, there is considerable risk of stroke because blood clots can form inside the SHINE, then dislodge and cause a stroke.  In some individuals with atrial fibrillation, the risk of bleeding while on a blood thinner may exceed the risk of a stroke without a blood thinner.     There is an option that allows one to reduce the dose or discontinue the blood thinner after placing a plug to seal off the SHINE.  There are 2 plugs currently available. Watchman and Enzo. You can get more information and view the video at  [email or web address]

## 2024-09-17 NOTE — PROGRESS NOTES
Subjective:   Chief Complaint:  Dustin Lauren is a 74 y.o. male who presents for follow-up of Coronary Artery Disease and Atrial Fibrillation      Problem List and HPI:     CAD  - ostial and proximal RCA BIANKA 2016 (Dr. Reyna)  Atrial fib  - PVI 2021  Hypertension  Mixed hyperlipidemia  PVCs  SInus bradycardia  GERD    He has occasional episodes of nonradiating chest discomfort lasting only a few seconds described as sticking.  This a.m. he had discomfort in the upper epigastric region lasting < a min. does not report chest discomfort with exertion  He recalls having elevated triglycerides and was treated w fenofibrate and niacin. Currently on atorvastatin 20 mg, ezetimibe 10 mg and OTC fish oil triglycerides are within normal limits and LDL is <55 mg.  Occasional brief palpitations.  His Apple watch alerts him to periods of bradycardia <40 bpm while at rest. He remains on prasugrel and apixaban.  He does not report bleeding or excessive bruising.      Review of patient's allergies indicates:   Allergen Reactions    Codeine Nausea Only    Crestor [rosuvastatin] Palpitations    Doxycycline Hives    Keflex [cephalexin] Rash    Celebrex [celecoxib] Other (See Comments)     Upset stomach    Mobic [meloxicam] Other (See Comments)     Stomach pain and nauseous    Niacin Diarrhea    Niacin preparations Diarrhea    Ampicillin Rash    Pcn [penicillins] Rash        Current Outpatient Medications   Medication Sig    amlodipine-valsartan (EXFORGE) 5-160 mg per tablet Take 1 tablet by mouth once daily.    apixaban (ELIQUIS) 5 mg Tab Take 1 tablet (5 mg total) by mouth 2 (two) times daily.    atorvastatin (LIPITOR) 20 MG tablet TAKE 1 TABLET BY MOUTH EVERY DAY    cholecalciferol, vitamin D3, 25 mcg (1,000 unit) Chew Take 2,000 Int'l Units by mouth once daily.    ezetimibe (ZETIA) 10 mg tablet TAKE 1 TABLET BY MOUTH EVERY DAY    famotidine (PEPCID) 40 MG tablet Take 40 mg by mouth 2 (two) times daily.    finasteride (PROSCAR) 5  "mg tablet TAKE 1 TABLET BY MOUTH EVERY DAY    fluticasone propionate (FLONASE) 50 mcg/actuation nasal spray 2 sprays by Each Nostril route as needed.    metoprolol tartrate (LOPRESSOR) 25 MG tablet Take 1 tablet (25 mg total) by mouth as needed (1-2 tab for palpitations).    mirabegron (MYRBETRIQ) 25 mg Tb24 ER tablet Take 1 tablet (25 mg total) by mouth once daily. (Patient taking differently: Take 25 mg by mouth as needed.)    multivit-min/FA/lycopen/lutein (CENTRUM SILVER MEN ORAL) Take 1 tablet by mouth once daily.    naftifine 2 % Crea Apply 1 application topically daily as needed.    nitroGLYCERIN (NITROSTAT) 0.3 MG SL tablet Place 1 tablet (0.3 mg total) under the tongue every 5 (five) minutes as needed for Chest pain.    omega-3 fatty acids 1,000 mg Cap Take 2,000 mg by mouth 2 (two) times a day.    prasugreL (EFFIENT) 10 mg Tab TAKE 1 TABLET BY MOUTH EVERY DAY    SYNTHROID 137 mcg Tab tablet TAKE 1 TABLET BY MOUTH MON-SAT AND TAKE 1.5 TABLET BY MOUTH ON SUNDAY ONLY    tamsulosin (FLOMAX) 0.4 mg Cap TAKE 1 CAPSULE BY MOUTH EVERY DAY    tiZANidine (ZANAFLEX) 4 MG tablet Take 1 tablet (4 mg total) by mouth every 8 (eight) hours as needed (muscle spasms).         Social history:  Dustin Lauren  reports that he has never smoked. He has never used smokeless tobacco. He reports current alcohol use. He reports that he does not use drugs.      Objective:   /64   Pulse (!) 52   Ht 5' 10" (1.778 m)   Wt 94.2 kg (207 lb 9 oz)   BMI 29.78 kg/m²    Physical Exam  Constitutional:       Appearance: He is well-developed.      Comments:      HENT:      Head: Normocephalic and atraumatic.   Neck:      Vascular: No carotid bruit or JVD.   Cardiovascular:      Rate and Rhythm: Normal rate and regular rhythm.      Pulses:           Radial pulses are 2+ on the right side and 2+ on the left side.        Posterior tibial pulses are 2+ on the right side and 2+ on the left side.      Heart sounds: S1 normal and S2 " normal. No murmur heard.     No gallop.   Pulmonary:      Effort: Pulmonary effort is normal.      Breath sounds: No wheezing or rales.   Chest:      Chest wall: There is no dullness to percussion.   Abdominal:      Palpations: Abdomen is soft. There is no hepatomegaly or splenomegaly.      Tenderness: There is no abdominal tenderness.   Musculoskeletal:      Right lower leg: No edema.      Left lower leg: No edema.   Skin:     General: Skin is warm and dry.      Findings: No bruising.      Nails: There is no clubbing.   Neurological:      Mental Status: He is alert and oriented to person, place, and time.      Gait: Gait normal.   Psychiatric:         Speech: Speech normal.         Behavior: Behavior normal.         Thought Content: Thought content normal.         Judgment: Judgment normal.         Lipids:  Recent Labs   Lab 09/10/24  0811   LDL Cholesterol 48.6 L   HDL 44   Cholesterol 102 L      Renal:  Recent Labs   Lab 09/10/24  0811   Creatinine 1.0   Potassium 4.1   CO2 29   BUN 19     Liver:  Recent Labs   Lab 09/10/24  0811   AST 24   ALT 26     CBC:  Lab Results   Component Value Date    WBC 4.02 09/10/2024    HGB 13.8 (L) 09/10/2024    HCT 42.1 09/10/2024    MCV 91 09/10/2024     09/10/2024             Assessment and Plan:       ICD-10-CM ICD-9-CM   1. Coronary artery disease involving native coronary artery of native heart without angina pectoris  I25.10 414.01   2. S/P coronary artery stent placement 2016  Z95.5 V45.82   3. Mixed hyperlipidemia  E78.2 272.2   4. Persistent atrial fibrillation s/p ablation  I48.19 427.31   5. Chronic anticoagulation  Z79.01 V58.61        CAD stable.  Chest discomfort non anginal.  Continue apixaban and prasugrel.  No aspirin.  Discussed using clopidogrel in place of prasugrel.  Will obtain [id number] test to see if he is a clopidogrel nonresponder.  Discussed left atrial appendage occlusion individuals at increased risk for bleeding.  LDL at goal.  Triglycerides are  normal.  Switch over-the-counter fish oil to Vascepa 2 gm a day.  He plans to undergo hands/wrists surgery early next year.  Recommend holding prasugrel for of 5-7 days and apixaban for 2-3 days with resumption 1-2 days later.  He should also hold fish oil for 5-7 days prior to surgery.  Asymptomatic sinus bradycardia    Orders placed during this encounter:     Coronary artery disease involving native coronary artery of native heart without angina pectoris  -     EKG 12-lead    S/P coronary artery stent placement 2016  -     EKG 12-lead    Mixed hyperlipidemia  -     icosapent ethyL (VASCEPA) 1 gram Cap; Take 1 capsule (1,000 mg total) by mouth 2 (two) times a day.  Dispense: 60 capsule; Refill: 11    Persistent atrial fibrillation s/p ablation    Chronic anticoagulation    Other orders  -     nitroGLYCERIN (NITROSTAT) 0.3 MG SL tablet; Place 1 tablet (0.3 mg total) under the tongue every 5 (five) minutes as needed for Chest pain.  Dispense: 100 tablet; Refill: 3         No follow-ups on file.

## 2024-09-22 ENCOUNTER — PATIENT MESSAGE (OUTPATIENT)
Dept: INTERNAL MEDICINE | Facility: CLINIC | Age: 74
End: 2024-09-22
Payer: COMMERCIAL

## 2024-09-23 ENCOUNTER — OFFICE VISIT (OUTPATIENT)
Dept: URGENT CARE | Facility: CLINIC | Age: 74
End: 2024-09-23
Payer: COMMERCIAL

## 2024-09-23 ENCOUNTER — TELEPHONE (OUTPATIENT)
Dept: UROLOGY | Facility: CLINIC | Age: 74
End: 2024-09-23
Payer: COMMERCIAL

## 2024-09-23 VITALS
SYSTOLIC BLOOD PRESSURE: 152 MMHG | DIASTOLIC BLOOD PRESSURE: 74 MMHG | TEMPERATURE: 101 F | HEIGHT: 70 IN | WEIGHT: 208.75 LBS | RESPIRATION RATE: 14 BRPM | BODY MASS INDEX: 29.89 KG/M2 | OXYGEN SATURATION: 98 % | HEART RATE: 72 BPM

## 2024-09-23 DIAGNOSIS — R30.0 DYSURIA: ICD-10-CM

## 2024-09-23 DIAGNOSIS — N10 ACUTE PYELONEPHRITIS: Primary | ICD-10-CM

## 2024-09-23 DIAGNOSIS — R50.9 FEVER, UNSPECIFIED FEVER CAUSE: ICD-10-CM

## 2024-09-23 LAB
BILIRUBIN, UA POC OHS: NEGATIVE
BLOOD, UA POC OHS: ABNORMAL
CLARITY, UA POC OHS: CLEAR
COLOR, UA POC OHS: YELLOW
CTP QC/QA: YES
CTP QC/QA: YES
GLUCOSE, UA POC OHS: NEGATIVE
KETONES, UA POC OHS: NEGATIVE
LEUKOCYTES, UA POC OHS: NEGATIVE
NITRITE, UA POC OHS: NEGATIVE
PH, UA POC OHS: 5
POC MOLECULAR INFLUENZA A AGN: NEGATIVE
POC MOLECULAR INFLUENZA B AGN: NEGATIVE
PROTEIN, UA POC OHS: NEGATIVE
SARS-COV-2 AG RESP QL IA.RAPID: NEGATIVE
SPECIFIC GRAVITY, UA POC OHS: 1.01
UROBILINOGEN, UA POC OHS: 0.2

## 2024-09-23 PROCEDURE — 87811 SARS-COV-2 COVID19 W/OPTIC: CPT | Mod: QW,S$GLB,, | Performed by: NURSE PRACTITIONER

## 2024-09-23 PROCEDURE — 87086 URINE CULTURE/COLONY COUNT: CPT | Performed by: NURSE PRACTITIONER

## 2024-09-23 PROCEDURE — 87502 INFLUENZA DNA AMP PROBE: CPT | Mod: QW,S$GLB,, | Performed by: NURSE PRACTITIONER

## 2024-09-23 PROCEDURE — 99214 OFFICE O/P EST MOD 30 MIN: CPT | Mod: S$GLB,,, | Performed by: NURSE PRACTITIONER

## 2024-09-23 PROCEDURE — 81003 URINALYSIS AUTO W/O SCOPE: CPT | Mod: QW,S$GLB,, | Performed by: NURSE PRACTITIONER

## 2024-09-23 RX ORDER — CIPROFLOXACIN 500 MG/1
500 TABLET ORAL 2 TIMES DAILY
Qty: 14 TABLET | Refills: 0 | Status: SHIPPED | OUTPATIENT
Start: 2024-09-23 | End: 2024-09-30

## 2024-09-23 RX ORDER — ACETAMINOPHEN 500 MG
1000 TABLET ORAL
Status: COMPLETED | OUTPATIENT
Start: 2024-09-23 | End: 2024-09-23

## 2024-09-23 RX ADMIN — Medication 1000 MG: at 03:09

## 2024-09-23 NOTE — TELEPHONE ENCOUNTER
"Attempted to leave message- "call cannot be completed at this time".    ----- Message from Marlen Lam sent at 9/23/2024 10:00 AM CDT -----  Regarding: call back  Type: Patient Call Back    Who called: pt      What is the request in detail: requesting call to discuss back pain and discomfort during urination he began experiencing over the weekend    Can the clinic reply by [facility]?no    Would the patient rather a call back or a response via [facility]? call    Best call back number: [phone]     Additional Information:  "

## 2024-09-23 NOTE — PROGRESS NOTES
"Subjective:      Patient ID: Dustin Lauren is a 74 y.o. male.    Vitals:  height is 5' 10" (1.778 m) and weight is 94.7 kg (208 lb 12.4 oz). His temperature is 100.5 °F (38.1 °C) (abnormal). His blood pressure is 152/74 (abnormal) and his pulse is 72. His respiration is 14 and oxygen saturation is 98%.     Chief Complaint: Fever    Pt reports he has not taken any medicine today for fever. Pt states that he isnt having any congestion , cough, or runny nose.       Provider note begins here:  Patient is a 74 year old male with complaints of lower back pain that started yesterday. He noted one time there was a little discomfort after he finished voiding. He has been having urinary frequency and urgency. He sees a urologist for enlarged prostate.     Back Pain  This is a new problem. The current episode started yesterday. The pain is at a severity of 9/10. The pain is moderate. The pain is Worse during the day. The symptoms are aggravated by urinating. Associated symptoms include a fever (99.5 yesterday) and headaches. Pertinent negatives include no abdominal pain, bladder incontinence, bowel incontinence, chest pain, dysuria, leg pain, numbness, paresis, paresthesias, pelvic pain, perianal numbness, tingling, weakness or weight loss. (Urinary frequency   Dysuria   ) Treatments tried: Tizadine, tylenol last night.       Constitution: Positive for fever (99.5 yesterday).   Cardiovascular:  Negative for chest pain.   Gastrointestinal:  Negative for abdominal pain and bowel incontinence.   Genitourinary:  Negative for dysuria, bladder incontinence and pelvic pain.   Musculoskeletal:  Positive for back pain.   Neurological:  Positive for headaches. Negative for numbness.      Objective:     Physical Exam   Constitutional: He is oriented to person, place, and time. He appears well-developed. He is cooperative. No distress.   HENT:   Head: Normocephalic and atraumatic.   Nose: Nose normal.   Mouth/Throat: Oropharynx is " clear and moist and mucous membranes are normal.   Eyes: Conjunctivae and lids are normal.   Neck: Trachea normal and phonation normal. Neck supple.   Cardiovascular: Normal rate, regular rhythm, normal heart sounds and normal pulses.   Pulmonary/Chest: Effort normal and breath sounds normal.   Abdominal: Normal appearance and bowel sounds are normal. He exhibits no distension and no mass. Soft. There is no left CVA tenderness and no right CVA tenderness.   Musculoskeletal:         General: No deformity.      Lumbar back: He exhibits no tenderness, no swelling, no laceration and no spasm.        Back:    Neurological: He is alert and oriented to person, place, and time. He has normal strength and normal reflexes. No sensory deficit.   Skin: Skin is warm, dry, intact and not diaphoretic.   Psychiatric: His speech is normal and behavior is normal. Judgment and thought content normal.   Nursing note and vitals reviewed.      Assessment:     1. Acute pyelonephritis    2. Dysuria    3. Fever, unspecified fever cause        Plan:       Acute pyelonephritis  -     ciprofloxacin HCl (CIPRO) 500 MG tablet; Take 1 tablet (500 mg total) by mouth 2 (two) times daily. for 7 days  Dispense: 14 tablet; Refill: 0  -     CULTURE, URINE    Dysuria  -     POCT Urinalysis(Instrument)    Fever, unspecified fever cause  -     acetaminophen tablet 1,000 mg  -     SARS Coronavirus 2 Antigen, POCT Manual Read  -     POCT Influenza A/B MOLECULAR      Treating for presumptive pyelonephritis. Pt allergic to PCN and keflex- reports rash from head to toe. Will have him take cipro twice a day for 7 days. Warning for interactions with tizanidine. Instructed pt to not take tizanidine while taking cipro.     Instructed to follow up with urology in 1-2 days. I will send a copy of this chart to them    Given strict ED precautions: You notice more blood in your urine. Your signs get worse or do not improve within 24 hours of starting treatment. You are  not able to urinate for more than 8 hours. Your signs come back after treatment has stopped.          Patient Instructions     Urine culture was ordered. You will get a phone call within 3-5 days regarding urine culture results.  If you were prescribed antibiotics, please take them to completion.  If you were prescribed a fluoroquinolone antibiotic such as levofloxacin or ciprofloxacin, avoid heavy lifting for 1-2 weeks after completion of the antibiotic as these antibiotics have a rare complication of tendon rupture. Avoidance of heavy lifting or strenuous exercise reduces this risk.  If you were prescribed a narcotic medication, do not drive or operate heavy equipment or machinery while taking these medications.  Please drink plenty of fluids.  Please get plenty of rest.  If you  smoke, please stop smoking.  If not allergic, take Tylenol (Acetaminophen) 650 mg to  1 g every 6 hours as needed for fever as needed for fever as directed for control of pain and/or fever      Please remember that you have received care at an urgent care today. Urgent cares are not emergency rooms and are not equipped to handle life threatening emergencies and cannot rule in or out certain medical conditions and you may be released before all of your medical problems are known or treated. Please arrange follow up with your primary care physician or speciality clinic  within 2-5 days if your signs and symptoms have not resolved or worsen. Patient can call our Referral Hotline at [phone] to make an appointment.    Please return here or go to the Emergency Department for any concerns or worsening of condition.Patient was educated on signs/symptoms that would warrant emergent medical attention.   Signs come back after treatment ends  You notice more blood in your urine.  Your signs get worse or do not improve within 24 hours of starting treatment.  You are not able to urinate for more than 8 hours.  Your signs come back after treatment  has stopped.

## 2024-09-23 NOTE — PATIENT INSTRUCTIONS
Urine culture was ordered. You will get a phone call within 3-5 days regarding urine culture results.  If you were prescribed antibiotics, please take them to completion.  If you were prescribed a fluoroquinolone antibiotic such as levofloxacin or ciprofloxacin, avoid heavy lifting for 1-2 weeks after completion of the antibiotic as these antibiotics have a rare complication of tendon rupture. Avoidance of heavy lifting or strenuous exercise reduces this risk.  If you were prescribed a narcotic medication, do not drive or operate heavy equipment or machinery while taking these medications.  Please drink plenty of fluids.  Please get plenty of rest.  If you  smoke, please stop smoking.  If not allergic, take Tylenol (Acetaminophen) 650 mg to  1 g every 6 hours as needed for fever as needed for fever as directed for control of pain and/or fever      Please remember that you have received care at an urgent care today. Urgent cares are not emergency rooms and are not equipped to handle life threatening emergencies and cannot rule in or out certain medical conditions and you may be released before all of your medical problems are known or treated. Please arrange follow up with your primary care physician or speciality clinic  within 2-5 days if your signs and symptoms have not resolved or worsen. Patient can call our Referral Hotline at [phone] to make an appointment.    Please return here or go to the Emergency Department for any concerns or worsening of condition.Patient was educated on signs/symptoms that would warrant emergent medical attention.   Signs come back after treatment ends  You notice more blood in your urine.  Your signs get worse or do not improve within 24 hours of starting treatment.  You are not able to urinate for more than 8 hours.  Your signs come back after treatment has stopped.

## 2024-09-23 NOTE — Clinical Note
Starting him on cipro tonight for presumptive pyelonephritis. Unable to give rocephin due to his allergy to keflex and pcn. His urine had moderate amount of blood. He had 103 fever in clinic with lower back pain. Flu and covid are negative. I sent off urine culture.

## 2024-09-24 ENCOUNTER — TELEPHONE (OUTPATIENT)
Dept: UROLOGY | Facility: CLINIC | Age: 74
End: 2024-09-24
Payer: COMMERCIAL

## 2024-09-24 NOTE — TELEPHONE ENCOUNTER
Patient reports temp of 99.5-101, 7/10 sharp/achey bilateral flank/middle back pain, and urinary frequency q20 minutes. Symptoms began Sunday. He went to urgent care yesterday, and has taken his second dose of Cipro. He reports his temperature and urinary frequency is better, but he still has mid-back pain. Denies hematuria, N/V, or dysuria.  Urine culture from urgent care pending.     ----- Message from Evette Cooper CMA sent at 9/24/2024 10:32 AM CDT -----  Regarding: FW: missed call    ----- Message -----  From: Shy Carver  Sent: 9/24/2024  10:21 AM CDT  To: Ronn Potts Staff  Subject: missed call                                      Type:  Patient Returning Call    Who Called:pt  Who Left Message for Patient:Geraldine  Does the patient know what this is regarding?:yes  Would the patient rather a call back or a response via [facility]? call  Best Call Back Number:[phone]  Additional Information:

## 2024-09-25 ENCOUNTER — OFFICE VISIT (OUTPATIENT)
Dept: INTERNAL MEDICINE | Facility: CLINIC | Age: 74
End: 2024-09-25
Payer: COMMERCIAL

## 2024-09-25 ENCOUNTER — LAB VISIT (OUTPATIENT)
Dept: LAB | Facility: HOSPITAL | Age: 74
End: 2024-09-25
Attending: INTERNAL MEDICINE
Payer: COMMERCIAL

## 2024-09-25 VITALS
HEART RATE: 70 BPM | SYSTOLIC BLOOD PRESSURE: 120 MMHG | DIASTOLIC BLOOD PRESSURE: 62 MMHG | BODY MASS INDEX: 29.26 KG/M2 | WEIGHT: 204.38 LBS | RESPIRATION RATE: 18 BRPM | OXYGEN SATURATION: 96 % | TEMPERATURE: 98 F | HEIGHT: 70 IN

## 2024-09-25 DIAGNOSIS — R10.9 ABDOMINAL PAIN, UNSPECIFIED ABDOMINAL LOCATION: ICD-10-CM

## 2024-09-25 DIAGNOSIS — R50.9 FEVER, UNSPECIFIED FEVER CAUSE: ICD-10-CM

## 2024-09-25 DIAGNOSIS — M54.50 ACUTE BILATERAL LOW BACK PAIN WITHOUT SCIATICA: Primary | ICD-10-CM

## 2024-09-25 DIAGNOSIS — M54.50 ACUTE BILATERAL LOW BACK PAIN WITHOUT SCIATICA: ICD-10-CM

## 2024-09-25 LAB — BACTERIA UR CULT: NO GROWTH

## 2024-09-25 PROCEDURE — 81001 URINALYSIS AUTO W/SCOPE: CPT | Performed by: INTERNAL MEDICINE

## 2024-09-25 PROCEDURE — 99999 PR PBB SHADOW E&M-EST. PATIENT-LVL V: CPT | Mod: PBBFAC,,, | Performed by: INTERNAL MEDICINE

## 2024-09-25 PROCEDURE — 99214 OFFICE O/P EST MOD 30 MIN: CPT | Mod: S$GLB,,, | Performed by: INTERNAL MEDICINE

## 2024-09-25 NOTE — PROGRESS NOTES
History of present illness:   74-year-old gentleman with hypertension, CAD, hypothyroidism, dyslipidemia renal cysts, AFib, chronically anticoagulated is in today with fever and lower back pain.  This started three days ago with lower back pain.  The pain is bilateral and increases with certain positions and movements.  There is no history of trauma.  He states for a few days prior to the onset of this pain he had some slight terminal dysuria but that has resolved.  Denies any change in the color character of his urine.  No respiratory symptoms of any kind.  No GI symptoms.  No rashes.  No recent travel.  He went to urgent care two days ago tested negative for COVID and influenza.  Urinalysis normal.  Urine culture with submitted in that has returned with no growth.  He was started on ciprofloxacin 500 mg b.i.d. at urgent care 09/23/2024.  He reports he is still having fluctuating fever.    Current medications:   Medication list noted and reviewed in our electronic medical record medication list.      Review of systems:   Constitutional:  Positive fever no severe body aches but feeling of chills at time with fever.  HEENT: No URI symptoms.  No sore throat, no sinus congestion, no earache.  Respiratory: No cough shortness of breath.  No pleuritic chest pain and no hemoptysis.    Cardiovascular: No chest pain palpitations or syncope.  Denies any claudication symptoms.    GI: Denies any abdominal pain nausea vomiting or changes in bowel habits.  No melena no hematochezia.    :  See HPI.    Skin: No rashes.    Physical examination:   General: Pleasant alert appropriately groomed gentleman no acute distress.  Vital signs all noted and reviewed as normal.  Pulse ox 96%.    Eyes: Sclerae white and nonicteric.  HEENT: Mouth and pharynx normal.  Neck supple no masses.  No cervical adenopathy.    Lungs: Clear to auscultation.    Cardiovascular: Regular rate rhythm.  No significant murmur.  No JVD.  No ischemic changes of  lower extremities.  2+ pedal pulses.  No abdominal bruit.  Abdomen: Soft benign nondistended.  No tenderness no organomegaly apparent.  Rectal examination normal on inspection and on digital.    : Scrotum testicles penis normal.  Prostate not grossly enlarged or tender on digital.  Mental status: Alert oriented affect mood all appropriate.    Musculoskeletal:  There is mild tenderness palpation of the mid upper lumbar paraspinous musculature.  There is no discoloration.  There is no crepitus.  Negative straight leg raising.    Data:   Reviewed recent rapid antigen COVID and influenza testing is negative.  Reviewed the normal urinalysis and urine culture results.      Impression:   Fever etiology uncertain at this time, could be related to  infection such as prostatitis or other.  Back pain appears to be musculoskeletal in nature on examination.    Plan:   CBC, chemistry profile and repeat urinalysis.    CT abdomen pelvis without contrast.  Continue Cipro.    Continue observation advise if any worsening symptoms to proceed to the ED. otherwise give us an update in several days regarding status.

## 2024-09-26 ENCOUNTER — HOSPITAL ENCOUNTER (OUTPATIENT)
Dept: RADIOLOGY | Facility: HOSPITAL | Age: 74
Discharge: HOME | End: 2024-09-26
Attending: INTERNAL MEDICINE
Payer: COMMERCIAL

## 2024-09-26 ENCOUNTER — PATIENT MESSAGE (OUTPATIENT)
Dept: INTERNAL MEDICINE | Facility: CLINIC | Age: 74
End: 2024-09-26
Payer: COMMERCIAL

## 2024-09-26 DIAGNOSIS — R50.9 FEVER, UNSPECIFIED FEVER CAUSE: ICD-10-CM

## 2024-09-26 DIAGNOSIS — M54.50 ACUTE BILATERAL LOW BACK PAIN WITHOUT SCIATICA: ICD-10-CM

## 2024-09-26 DIAGNOSIS — R10.9 ABDOMINAL PAIN, UNSPECIFIED ABDOMINAL LOCATION: ICD-10-CM

## 2024-09-26 LAB
BACTERIA #/AREA URNS AUTO: NORMAL /HPF
BILIRUB UR QL STRIP: NEGATIVE
CLARITY UR REFRACT.AUTO: CLEAR
COLOR UR AUTO: YELLOW
GLUCOSE UR QL STRIP: NEGATIVE
HGB UR QL STRIP: ABNORMAL
KETONES UR QL STRIP: NEGATIVE
LEUKOCYTE ESTERASE UR QL STRIP: NEGATIVE
MICROSCOPIC COMMENT: NORMAL
NITRITE UR QL STRIP: NEGATIVE
PH UR STRIP: 6 [PH] (ref 5–8)
PROT UR QL STRIP: NEGATIVE
RBC #/AREA URNS AUTO: 2 /HPF (ref 0–4)
SP GR UR STRIP: 1.01 (ref 1–1.03)
SQUAMOUS #/AREA URNS AUTO: 0 /HPF
URN SPEC COLLECT METH UR: ABNORMAL
WBC #/AREA URNS AUTO: 2 /HPF (ref 0–5)

## 2024-09-26 PROCEDURE — 74176 CT ABD & PELVIS W/O CONTRAST: CPT | Mod: TC

## 2024-09-26 PROCEDURE — 74176 CT ABD & PELVIS W/O CONTRAST: CPT | Mod: 26,,, | Performed by: STUDENT IN AN ORGANIZED HEALTH CARE EDUCATION/TRAINING PROGRAM

## 2024-11-26 ENCOUNTER — TELEPHONE (OUTPATIENT)
Dept: ELECTROPHYSIOLOGY | Facility: CLINIC | Age: 74
End: 2024-11-26
Payer: COMMERCIAL

## 2024-11-26 ENCOUNTER — HOSPITAL ENCOUNTER (OUTPATIENT)
Dept: CARDIOLOGY | Facility: CLINIC | Age: 74
Discharge: HOME | End: 2024-11-26
Payer: COMMERCIAL

## 2024-11-26 DIAGNOSIS — I48.19 PERSISTENT ATRIAL FIBRILLATION: ICD-10-CM

## 2024-11-26 DIAGNOSIS — I48.19 PERSISTENT ATRIAL FIBRILLATION: Primary | ICD-10-CM

## 2024-11-26 LAB
OHS QRS DURATION: 82 MS
OHS QTC CALCULATION: 398 MS

## 2024-11-26 PROCEDURE — 93005 ELECTROCARDIOGRAM TRACING: CPT | Mod: S$GLB,,, | Performed by: INTERNAL MEDICINE

## 2024-11-26 PROCEDURE — 93010 ELECTROCARDIOGRAM REPORT: CPT | Mod: S$GLB,,, | Performed by: INTERNAL MEDICINE

## 2024-11-26 NOTE — TELEPHONE ENCOUNTER
----- Message from Med Assistant Telles sent at 11/26/2024  9:34 AM CST -----  Regarding: FW: Patient Advice-Pt. thinks he's in A-Fibrillation    ----- Message -----  From: Zoya Hernadez LPN  Sent: 11/26/2024   9:14 AM CST  To: Hamilton Cedeño Staff  Subject: FW: Patient Advice-Pt. thinks he's in A-Fibr#      ----- Message -----  From: Bina Lazaro  Sent: 11/26/2024   8:43 AM CST  To: Jerrod JARA Staff  Subject: Patient Advice-Pt. thinks he's in A-Fibrilla#    11/26/2024       Hello,     I received a call from GAVI LAUREN [[id number]] regarding A-Fibrillation Status. Mr. Lauren stated he thinks he's in A-Fibrillation and does not know how to send the information to you. Please give him a call. He can be reached at [phone].      Thank you,   Bina ALEJANDRO.   Access Navigator

## 2024-11-26 NOTE — TELEPHONE ENCOUNTER
EKG showed NSR with HR 60's. Spoke with patient and reviewed that he can take 12.5mg of Lopressor when he goes into AF in the 140's (he has rx). He verbalized understanding and will continue to monitor and keep us posted for any change in pattern or intensity.

## 2024-11-26 NOTE — TELEPHONE ENCOUNTER
Spoke with patient. States he feels like he woke up in AF today with HR as high as 140's, feels ok but can tell he's out of rhythm. Of note, he did have his hand injected 2 weeks ago with steroid injections. His [employer] ling has been inconsistent. Will get 12 lead EKG at 10:45am today and will follow up once I discuss with Dr Rodríguez.

## 2024-11-29 ENCOUNTER — LAB VISIT (OUTPATIENT)
Dept: LAB | Facility: HOSPITAL | Age: 74
End: 2024-11-29
Attending: INTERNAL MEDICINE
Payer: COMMERCIAL

## 2024-11-29 DIAGNOSIS — E89.0 POSTSURGICAL HYPOTHYROIDISM: ICD-10-CM

## 2024-11-29 DIAGNOSIS — R73.02 IMPAIRED GLUCOSE TOLERANCE: ICD-10-CM

## 2024-11-29 LAB
ESTIMATED AVG GLUCOSE: 114 MG/DL (ref 68–131)
HBA1C MFR BLD: 5.6 % (ref 4–5.6)
TSH SERPL DL<=0.005 MIU/L-ACNC: 2.64 UIU/ML (ref 0.4–4)

## 2024-11-29 PROCEDURE — 84443 ASSAY THYROID STIM HORMONE: CPT | Performed by: INTERNAL MEDICINE

## 2024-11-29 PROCEDURE — 83036 HEMOGLOBIN GLYCOSYLATED A1C: CPT | Performed by: INTERNAL MEDICINE

## 2024-11-29 PROCEDURE — 36415 COLL VENOUS BLD VENIPUNCTURE: CPT | Performed by: INTERNAL MEDICINE

## 2024-12-01 NOTE — PROGRESS NOTES
Subjective:      Patient ID: Dustin Lauren is a 74 y.o. male.    Chief Complaint:  Hypothyroidism      History of Present Illness  Mr. Lauren presents today for follow up of hypothyroidism. Last visit in 8/2023.     With Hypothyroidism s/p surgery for a benign thyroid nodule on 1/27/10.     Had been on 150 mcg of thyroid hormone for quite some time, but then developed A Fib in 9/2016. Had cardiac ablation in 9/2021 for atrial fibrillation. He had a brief episode of A fib that lasted a few hours after a bowel prep for c-scope 1/2024 and again 11/26/2024.  Patient reports he woke up in A fib during his most recent episode and lasted a few hours converting without intervention.  Patient is on Eliquis.      Currently taking Synthroid 137 mcg Mon-Sat with 1.5 tablets on Sunday only.  Denies missing any doses. Waits at least one hour before taking other meds or food.   Has taken Levothyroxine in the past, but patient reports it does not work as well as the Synthroid  No tremor or increased anxiousness. Had PVCs in the past.  Has not noticed having any episodes recently.   No overt fatigue.      Also with HTN on amlodipine and valsartan.      Lab Results   Component Value Date    TSH 2.642 11/29/2024       Since last visit he is now taking atorvastatin 20 mg daily, and ezetimibe 10 mg daily.       He has lost 7-8 lbs previously this year.  He has maintained the weight loss.  Lab Results   Component Value Date    HGBA1C 5.6 11/29/2024       Review of Systems   Constitutional:  Negative for chills and fever.   Gastrointestinal:  Negative for nausea and vomiting.       Objective:   Physical Exam  Vitals and nursing note reviewed.       BP Readings from Last 3 Encounters:   12/02/24 118/60   12/02/24 136/77   09/25/24 120/62     Wt Readings from Last 1 Encounters:   12/02/24 1358 93.3 kg (205 lb 12.8 oz)       Body mass index is 29.53 kg/m².    Lab Review:   Lab Results   Component Value Date    HGBA1C 5.6 11/29/2024      Lab Results   Component Value Date    CHOL 102 (L) 09/10/2024    HDL 44 09/10/2024    LDLCALC 48.6 (L) 09/10/2024    TRIG 47 09/10/2024    CHOLHDL 43.1 09/10/2024     Lab Results   Component Value Date     09/10/2024    K 4.1 09/10/2024     09/10/2024    CO2 29 09/10/2024    GLU 97 09/10/2024    BUN 19 09/10/2024    CREATININE 1.0 09/10/2024    CALCIUM 9.4 09/10/2024    PROT 7.0 09/10/2024    ALBUMIN 3.9 09/10/2024    BILITOT 0.6 09/10/2024    ALKPHOS 57 09/10/2024    AST 24 09/10/2024    ALT 26 09/10/2024    ANIONGAP 7 (L) 09/10/2024    ESTGFRAFRICA >60.0 01/07/2022    EGFRNONAA >60.0 01/07/2022    TSH 2.642 11/29/2024         Assessment and Plan     Postsurgical hypothyroidism  --Patient with postsurgical hypothyroidism  --Clinically and biochemically euthyroid  --Will continue Synthroid 137 mcg PO Mon-Sat with 1.5 tablets on Sunday only   --Will aim to keep TSH mid to high normal given age and A fib history  --TSH at goal of 2.64    Hyperlipidemia  --Taking atorvastatin and ezetimibe  --Managed by cardiology    Hypertension  --Bp at goal  --Continue current regimen    Impaired glucose tolerance  --Recent A1c 5.6%  --Continued diet and exercise  --Check A1c periodically  --Maintaining recent weight loss.      Follow up in 6 months with TSH and A1c prior to appt      Case discussed with Dr. Malik  Recommendations were discussed with the patient in detail  The patient verbalized understanding and agrees with the plan outlined as above.       I have reviewed and concur with the NP's  history, assessment, and plan.  I have personally interviewed the patient and all questions were answered.       Malick Malik M.D. Staff Endocrinology

## 2024-12-02 ENCOUNTER — OFFICE VISIT (OUTPATIENT)
Dept: ENDOCRINOLOGY | Facility: CLINIC | Age: 74
End: 2024-12-02
Payer: COMMERCIAL

## 2024-12-02 ENCOUNTER — TELEPHONE (OUTPATIENT)
Dept: UROLOGY | Facility: CLINIC | Age: 74
End: 2024-12-02
Payer: COMMERCIAL

## 2024-12-02 ENCOUNTER — OFFICE VISIT (OUTPATIENT)
Dept: UROLOGY | Facility: CLINIC | Age: 74
End: 2024-12-02
Payer: COMMERCIAL

## 2024-12-02 VITALS
WEIGHT: 204.38 LBS | DIASTOLIC BLOOD PRESSURE: 77 MMHG | HEART RATE: 55 BPM | RESPIRATION RATE: 16 BRPM | HEIGHT: 70 IN | BODY MASS INDEX: 29.26 KG/M2 | SYSTOLIC BLOOD PRESSURE: 136 MMHG

## 2024-12-02 VITALS
HEART RATE: 56 BPM | DIASTOLIC BLOOD PRESSURE: 60 MMHG | HEIGHT: 70 IN | SYSTOLIC BLOOD PRESSURE: 118 MMHG | BODY MASS INDEX: 29.46 KG/M2 | WEIGHT: 205.81 LBS

## 2024-12-02 DIAGNOSIS — N40.0 BPH WITHOUT OBSTRUCTION/LOWER URINARY TRACT SYMPTOMS: ICD-10-CM

## 2024-12-02 DIAGNOSIS — E78.2 MIXED HYPERLIPIDEMIA: ICD-10-CM

## 2024-12-02 DIAGNOSIS — I10 PRIMARY HYPERTENSION: ICD-10-CM

## 2024-12-02 DIAGNOSIS — R35.1 NOCTURIA: ICD-10-CM

## 2024-12-02 DIAGNOSIS — R73.02 IMPAIRED GLUCOSE TOLERANCE: ICD-10-CM

## 2024-12-02 DIAGNOSIS — N28.1 COMPLEX RENAL CYST: Primary | ICD-10-CM

## 2024-12-02 DIAGNOSIS — E89.0 POSTSURGICAL HYPOTHYROIDISM: Primary | ICD-10-CM

## 2024-12-02 LAB
BILIRUB SERPL-MCNC: NORMAL MG/DL
BLOOD URINE, POC: NORMAL
CLARITY, POC UA: CLEAR
COLOR, POC UA: YELLOW
GLUCOSE UR QL STRIP: NORMAL
KETONES UR QL STRIP: NORMAL
LEUKOCYTE ESTERASE URINE, POC: NORMAL
NITRITE, POC UA: NORMAL
PH, POC UA: 7
PROTEIN, POC: NORMAL
SPECIFIC GRAVITY, POC UA: 1.01
UROBILINOGEN, POC UA: NORMAL

## 2024-12-02 PROCEDURE — 81002 URINALYSIS NONAUTO W/O SCOPE: CPT | Mod: S$GLB,,, | Performed by: NURSE PRACTITIONER

## 2024-12-02 PROCEDURE — 99214 OFFICE O/P EST MOD 30 MIN: CPT | Mod: S$GLB,,, | Performed by: INTERNAL MEDICINE

## 2024-12-02 PROCEDURE — 99999 PR PBB SHADOW E&M-EST. PATIENT-LVL V: CPT | Mod: PBBFAC,,, | Performed by: INTERNAL MEDICINE

## 2024-12-02 PROCEDURE — 99213 OFFICE O/P EST LOW 20 MIN: CPT | Mod: S$GLB,,, | Performed by: NURSE PRACTITIONER

## 2024-12-02 RX ORDER — LEVOTHYROXINE SODIUM 137 UG/1
TABLET ORAL
Qty: 96 TABLET | Refills: 3 | Status: SHIPPED | OUTPATIENT
Start: 2024-12-02

## 2024-12-02 NOTE — ASSESSMENT & PLAN NOTE
--Recent A1c 5.6%  --Continued diet and exercise  --Check A1c periodically  --Maintaining recent weight loss.

## 2024-12-02 NOTE — PROGRESS NOTES
"Subjective:      Dustin Lauren is a 74 y.o. male who returns today regarding his complex renal cyst.     On flomax and finasteride of his LUTS for years. Symptoms are fairly well controlled- still with urgency and mild urge incontinence at times. He was prescribed myrbetriq at the last visit but never began the medication. Nocturia several times per night- reports leg swelling 2/2 amlodipine.      Also with a history of uric acid/calcium oxalate stones that have passed without intervention (25-30 years ago). Drinks mostly water now.      + family history of RCC (aunt and older brother).      Gross hematuria workup completed in 2022:  CT urogram- "Cause of the patient's hematuria is not evident to me on today's study.  Multiple bilateral renal cysts, including a thinly septated renal cyst on the left. Stable scarring along the anterolateral margin lower pole left kidney."  Cystoscope per Dr. Otto- "Hyperplasia. Positive Varices"     No further hematuria. Remains on anticoagulation.      Prostate Specific Antigen PSA Diagnostic   Latest Ref Rng 0.00 - 4.00 ng/mL 0.00 - 4.00 ng/mL   1/6/2021 1.2     1/7/2022  1.4    1/3/2023  1.5    1/5/2024  0.98      The following portions of the patient's history were reviewed and updated as appropriate: allergies, current medications, past family history, past medical history, past social history, past surgical history and problem list.    Review of Systems  Constitutional: no fever or chills  ENT: no nasal congestion or sore throat  Respiratory: no cough or shortness of breath  Cardiovascular: no chest pain or palpitations  Gastrointestinal: no nausea or vomiting, tolerating diet  Genitourinary: as per HPI  Hematologic/Lymphatic: no easy bruising or lymphadenopathy  Musculoskeletal: no arthralgias or myalgias  Neurological: no seizures or tremors  Behavioral/Psych: no auditory or visual hallucinations     Objective:   Vitals: /77 (BP Location: Left arm, Patient " "Position: Sitting)   Pulse (!) 55   Resp 16   Ht 5' 10" (1.778 m)   Wt 92.7 kg (204 lb 5.9 oz)   BMI 29.32 kg/m²     Physical Exam   General: alert and oriented, no acute distress  Head: normocephalic, atraumatic  Neck: supple, normal ROM  Respiratory: Symmetric expansion, non-labored breathing  Cardiovascular: regular rate and rhythm  Abdomen: soft, non tender, non distended  Genitourinary: deferred  Skin: normal coloration and turgor, no rashes, no suspicious skin lesions noted  Neuro: alert and oriented x3, no gross deficits  Psych: normal judgment and insight, normal mood/affect, and non-anxious    Lab Review   Urinalysis demonstrates negative for all components  Lab Results   Component Value Date    WBC 4.02 09/10/2024    HGB 13.8 (L) 09/10/2024    HCT 42.1 09/10/2024    MCV 91 09/10/2024     09/10/2024     Lab Results   Component Value Date    CREATININE 1.0 09/10/2024    BUN 19 09/10/2024     Lab Results   Component Value Date    PSA 1.2 01/06/2021     Imaging   (all images personally reviewed; agree with report below)  ADELE- "Right kidney: The right kidney measures 12.5 cm. No cortical thinning. No loss of corticomedullary distinction. Resistive index measures 0.6.  Largest simple cyst along the lateral margin of the kidney measures 8 cm (previously 8.2 cm)..  No renal stone. No hydronephrosis.  Left kidney: The left kidney measures 14.7 cm. No cortical thinning. No loss of corticomedullary distinction. Resistive index measures 0.62.  Multiple simple cysts, many located along the renal pelvis, largest measuring 7.5 cm (previously 8.4 cm).  No renal stone. No hydronephrosis.  The bladder is partially distended at the time of scanning and has an unremarkable appearance.  Impression:  No acute process seen.  Multiple bilateral renal cysts, similar compared to prior."    Assessment:     1. Complex renal cyst    2. BPH without obstruction/lower urinary tract symptoms    3. Nocturia      Plan: "   Diagnoses and all orders for this visit:    Complex renal cyst  -     US Retroperitoneal Complete; Future    BPH without obstruction/lower urinary tract symptoms    Nocturia      Plan:  --Continue flomax and finasteride- pt considering switching the flomax to daytime- caution given for potential for orthostatic hypotension  --Suspect the nocturia may be 2/2 edema from amlodipine  --Limit PM fluids 3 hours before bed  --ADELE in January  --PSA in January  --Follow up annually

## 2024-12-02 NOTE — TELEPHONE ENCOUNTER
Scheduled appt  ----- Message from Katlyn Brody NP sent at 12/2/2024  9:51 AM CST -----  January please

## 2024-12-02 NOTE — ASSESSMENT & PLAN NOTE
--Patient with postsurgical hypothyroidism  --Clinically and biochemically euthyroid  --Will continue Synthroid 137 mcg PO Mon-Sat with 1.5 tablets on Sunday only   --Will aim to keep TSH mid to high normal given age and A fib history  --TSH at goal of 2.64

## 2025-01-09 ENCOUNTER — PATIENT MESSAGE (OUTPATIENT)
Dept: UROLOGY | Facility: CLINIC | Age: 75
End: 2025-01-09
Payer: COMMERCIAL

## 2025-01-09 ENCOUNTER — HOSPITAL ENCOUNTER (OUTPATIENT)
Dept: RADIOLOGY | Facility: HOSPITAL | Age: 75
Discharge: HOME | End: 2025-01-09
Attending: NURSE PRACTITIONER
Payer: COMMERCIAL

## 2025-01-09 ENCOUNTER — TELEPHONE (OUTPATIENT)
Dept: UROLOGY | Facility: CLINIC | Age: 75
End: 2025-01-09
Payer: COMMERCIAL

## 2025-01-09 DIAGNOSIS — N28.1 RENAL CYST: Primary | ICD-10-CM

## 2025-01-09 DIAGNOSIS — N28.1 COMPLEX RENAL CYST: ICD-10-CM

## 2025-01-09 DIAGNOSIS — M17.0 PRIMARY OSTEOARTHRITIS OF BOTH KNEES: Primary | ICD-10-CM

## 2025-01-09 PROCEDURE — 76770 US EXAM ABDO BACK WALL COMP: CPT | Mod: TC

## 2025-01-09 PROCEDURE — 76770 US EXAM ABDO BACK WALL COMP: CPT | Mod: 26,,, | Performed by: RADIOLOGY

## 2025-01-09 NOTE — TELEPHONE ENCOUNTER
----- Message from Katlyn Brody NP sent at 1/9/2025 11:38 AM CST -----  Follow up with me in 1 year with ADELE. Thanks!

## 2025-01-13 ENCOUNTER — HOSPITAL ENCOUNTER (OUTPATIENT)
Dept: RADIOLOGY | Facility: HOSPITAL | Age: 75
Discharge: HOME | End: 2025-01-13
Attending: ORTHOPAEDIC SURGERY
Payer: COMMERCIAL

## 2025-01-13 ENCOUNTER — OFFICE VISIT (OUTPATIENT)
Dept: ORTHOPEDICS | Facility: CLINIC | Age: 75
End: 2025-01-13
Payer: COMMERCIAL

## 2025-01-13 VITALS — BODY MASS INDEX: 29.48 KG/M2 | WEIGHT: 205.94 LBS | HEIGHT: 70 IN

## 2025-01-13 DIAGNOSIS — M17.0 PRIMARY OSTEOARTHRITIS OF BOTH KNEES: ICD-10-CM

## 2025-01-13 DIAGNOSIS — M17.0 PRIMARY OSTEOARTHRITIS OF BOTH KNEES: Primary | ICD-10-CM

## 2025-01-13 PROCEDURE — 99213 OFFICE O/P EST LOW 20 MIN: CPT | Mod: S$GLB,,, | Performed by: ORTHOPAEDIC SURGERY

## 2025-01-13 PROCEDURE — 73562 X-RAY EXAM OF KNEE 3: CPT | Mod: TC,50

## 2025-01-13 PROCEDURE — 73562 X-RAY EXAM OF KNEE 3: CPT | Mod: 26,50,, | Performed by: INTERNAL MEDICINE

## 2025-01-13 PROCEDURE — 99999 PR PBB SHADOW E&M-EST. PATIENT-LVL III: CPT | Mod: PBBFAC,,, | Performed by: ORTHOPAEDIC SURGERY

## 2025-01-13 NOTE — PROGRESS NOTES
"Subjective:      Patient ID: Dustin Lauren is a 74 y.o. male.    Chief Complaint: Pain of the Left Knee and Pain of the Right Knee    HPI  Dustin Lauren has left knee pain.  Rare right knee pain. The left knee pain has worsened slightly. The pain is located in the medial aspect of the knee.  There is some posterior pain.   There is associated stiffness.   There is not catching and locking. The pain is described as achy.  His history, medications and problem list were reviewed.    Review of Systems   Constitutional: Negative for chills, fever and night sweats.   HENT:  Negative for hearing loss.    Eyes:  Negative for blurred vision and double vision.   Cardiovascular:  Negative for chest pain, claudication and leg swelling.   Respiratory:  Negative for shortness of breath.    Endocrine: Negative for polydipsia, polyphagia and polyuria.   Hematologic/Lymphatic: Negative for adenopathy and bleeding problem. Does not bruise/bleed easily.   Skin:  Negative for poor wound healing.   Gastrointestinal:  Negative for diarrhea and heartburn.   Genitourinary:  Negative for bladder incontinence.   Neurological:  Negative for focal weakness, headaches, numbness, paresthesias and sensory change.   Psychiatric/Behavioral:  The patient is not nervous/anxious.    Allergic/Immunologic: Negative for persistent infections.         Objective:      Body mass index is 29.54 kg/m².  Vitals:    01/13/25 0944   Weight: 93.4 kg (205 lb 14.6 oz)   Height: 5' 10" (1.778 m)           General    Constitutional: He is oriented to person, place, and time. He appears well-developed and well-nourished.   HENT:   Head: Normocephalic and atraumatic.   Eyes: EOM are normal.   Cardiovascular:  Normal rate.            Pulmonary/Chest: Effort normal.   Neurological: He is alert and oriented to person, place, and time.   Psychiatric: He has a normal mood and affect. His behavior is normal.     General Musculoskeletal Exam   Gait: normal       Right " Knee Exam     Inspection   Erythema: absent  Scars: absent  Swelling: absent  Effusion: absent  Deformity: absent  Bruising: absent    Tenderness   The patient is experiencing no tenderness.     Range of Motion   Extension:  0   Flexion:  130     Tests   Ligament Examination   Lachman: normal (-1 to 2mm)   MCL - Valgus: normal (0 to 2mm)  LCL - Varus: normal  Patella   Passive Patellar Tilt: neutral    Other   Sensation: normal    Left Knee Exam     Inspection   Erythema: absent  Scars: absent  Swelling: absent  Effusion: absent  Deformity: present (varus)  Bruising: absent    Tenderness   The patient tender to palpation of the medial joint line.    Range of Motion   Extension:  0   Flexion:  120     Tests   Stability   Lachman: normal (-1 to 2mm)   MCL - Valgus: normal (0 to 2mm)  LCL - Varus: normal (0 to 2mm)  Patella   Passive Patellar Tilt: neutral    Other   Sensation: normal    Muscle Strength   Right Lower Extremity   Hip Abduction: 5/5   Quadriceps:  5/5   Hamstrin/5   Left Lower Extremity   Hip Abduction: 5/5   Quadriceps:  5/5   Hamstrin/5     Vascular Exam       Edema  Right Lower Leg: absent  Left Lower Leg: absent  Radiographs taken today and reviewed by me demonstrate Kellgren Lopez grade 3-4 arthritic change of the right knee the medial compartment is most involved with the changes are tricompartmental.        Assessment:       Encounter Diagnosis   Name Primary?    Primary osteoarthritis of both knees Yes          Plan:       Dustin was seen today for pain and pain.    Diagnoses and all orders for this visit:    Primary osteoarthritis of both knees        Treatment options as well as risks and benefits have been discussed.  He as decided to consider Euflexxa injections.  Literature was given.  Dustin VAUGHN Lauren will call if  he wishes to proceed.         If he decides not to get the injections we will see him back in 1 year with new x-rays.  Sooner if needed.

## 2025-01-16 ENCOUNTER — OFFICE VISIT (OUTPATIENT)
Dept: INTERNAL MEDICINE | Facility: CLINIC | Age: 75
End: 2025-01-16
Payer: COMMERCIAL

## 2025-01-16 ENCOUNTER — LAB VISIT (OUTPATIENT)
Dept: LAB | Facility: HOSPITAL | Age: 75
End: 2025-01-16
Attending: FAMILY MEDICINE
Payer: COMMERCIAL

## 2025-01-16 VITALS
OXYGEN SATURATION: 98 % | HEART RATE: 48 BPM | BODY MASS INDEX: 29.98 KG/M2 | HEIGHT: 70 IN | RESPIRATION RATE: 16 BRPM | DIASTOLIC BLOOD PRESSURE: 80 MMHG | TEMPERATURE: 97 F | WEIGHT: 209.44 LBS | SYSTOLIC BLOOD PRESSURE: 128 MMHG

## 2025-01-16 DIAGNOSIS — E89.0 POSTSURGICAL HYPOTHYROIDISM: ICD-10-CM

## 2025-01-16 DIAGNOSIS — I49.5 TACHY-BRADY SYNDROME: ICD-10-CM

## 2025-01-16 DIAGNOSIS — E78.2 MIXED HYPERLIPIDEMIA: ICD-10-CM

## 2025-01-16 DIAGNOSIS — N40.1 BENIGN PROSTATIC HYPERPLASIA WITH URINARY FREQUENCY: ICD-10-CM

## 2025-01-16 DIAGNOSIS — Z00.00 PREVENTATIVE HEALTH CARE: Primary | ICD-10-CM

## 2025-01-16 DIAGNOSIS — I25.10 CORONARY ARTERY DISEASE INVOLVING NATIVE CORONARY ARTERY OF NATIVE HEART WITHOUT ANGINA PECTORIS: ICD-10-CM

## 2025-01-16 DIAGNOSIS — I10 PRIMARY HYPERTENSION: ICD-10-CM

## 2025-01-16 DIAGNOSIS — I48.19 PERSISTENT ATRIAL FIBRILLATION: ICD-10-CM

## 2025-01-16 DIAGNOSIS — Z95.5 STENTED CORONARY ARTERY: ICD-10-CM

## 2025-01-16 DIAGNOSIS — R35.0 BENIGN PROSTATIC HYPERPLASIA WITH URINARY FREQUENCY: ICD-10-CM

## 2025-01-16 DIAGNOSIS — Z98.890 HISTORY OF RADIOFREQUENCY ABLATION (RFA) PROCEDURE FOR CARDIAC ARRHYTHMIA: ICD-10-CM

## 2025-01-16 DIAGNOSIS — Z00.00 PREVENTATIVE HEALTH CARE: ICD-10-CM

## 2025-01-16 DIAGNOSIS — I83.12 VARICOSE VEINS OF LEFT LOWER EXTREMITY WITH INFLAMMATION: ICD-10-CM

## 2025-01-16 DIAGNOSIS — M10.042 ACUTE IDIOPATHIC GOUT OF LEFT HAND: ICD-10-CM

## 2025-01-16 LAB
ALBUMIN SERPL BCP-MCNC: 4 G/DL (ref 3.5–5.2)
ALP SERPL-CCNC: 64 U/L (ref 40–150)
ALT SERPL W/O P-5'-P-CCNC: 42 U/L (ref 10–44)
ANION GAP SERPL CALC-SCNC: 7 MMOL/L (ref 8–16)
AST SERPL-CCNC: 30 U/L (ref 10–40)
BASOPHILS # BLD AUTO: 0.06 K/UL (ref 0–0.2)
BASOPHILS NFR BLD: 1.7 % (ref 0–1.9)
BILIRUB SERPL-MCNC: 0.8 MG/DL (ref 0.1–1)
BUN SERPL-MCNC: 17 MG/DL (ref 8–23)
CALCIUM SERPL-MCNC: 9.4 MG/DL (ref 8.7–10.5)
CHLORIDE SERPL-SCNC: 106 MMOL/L (ref 95–110)
CHOLEST SERPL-MCNC: 99 MG/DL (ref 120–199)
CHOLEST/HDLC SERPL: 2.8 {RATIO} (ref 2–5)
CO2 SERPL-SCNC: 28 MMOL/L (ref 23–29)
COMPLEXED PSA SERPL-MCNC: 1 NG/ML (ref 0–4)
CREAT SERPL-MCNC: 0.9 MG/DL (ref 0.5–1.4)
DIFFERENTIAL METHOD BLD: ABNORMAL
EOSINOPHIL # BLD AUTO: 0.1 K/UL (ref 0–0.5)
EOSINOPHIL NFR BLD: 3.4 % (ref 0–8)
ERYTHROCYTE [DISTWIDTH] IN BLOOD BY AUTOMATED COUNT: 14.1 % (ref 11.5–14.5)
EST. GFR  (NO RACE VARIABLE): >60 ML/MIN/1.73 M^2
ESTIMATED AVG GLUCOSE: 114 MG/DL (ref 68–131)
GLUCOSE SERPL-MCNC: 97 MG/DL (ref 70–110)
HBA1C MFR BLD: 5.6 % (ref 4–5.6)
HCT VFR BLD AUTO: 43 % (ref 40–54)
HDLC SERPL-MCNC: 35 MG/DL (ref 40–75)
HDLC SERPL: 35.4 % (ref 20–50)
HGB BLD-MCNC: 14.1 G/DL (ref 14–18)
IMM GRANULOCYTES # BLD AUTO: 0.01 K/UL (ref 0–0.04)
IMM GRANULOCYTES NFR BLD AUTO: 0.3 % (ref 0–0.5)
LDLC SERPL CALC-MCNC: 51.6 MG/DL (ref 63–159)
LYMPHOCYTES # BLD AUTO: 1.4 K/UL (ref 1–4.8)
LYMPHOCYTES NFR BLD: 41.1 % (ref 18–48)
MCH RBC QN AUTO: 29.7 PG (ref 27–31)
MCHC RBC AUTO-ENTMCNC: 32.8 G/DL (ref 32–36)
MCV RBC AUTO: 91 FL (ref 82–98)
MONOCYTES # BLD AUTO: 0.4 K/UL (ref 0.3–1)
MONOCYTES NFR BLD: 10.9 % (ref 4–15)
NEUTROPHILS # BLD AUTO: 1.5 K/UL (ref 1.8–7.7)
NEUTROPHILS NFR BLD: 42.6 % (ref 38–73)
NONHDLC SERPL-MCNC: 64 MG/DL
NRBC BLD-RTO: 0 /100 WBC
PLATELET # BLD AUTO: 210 K/UL (ref 150–450)
PMV BLD AUTO: 11 FL (ref 9.2–12.9)
POTASSIUM SERPL-SCNC: 4.3 MMOL/L (ref 3.5–5.1)
PROT SERPL-MCNC: 7.4 G/DL (ref 6–8.4)
RBC # BLD AUTO: 4.74 M/UL (ref 4.6–6.2)
SODIUM SERPL-SCNC: 141 MMOL/L (ref 136–145)
T4 FREE SERPL-MCNC: 0.94 NG/DL (ref 0.71–1.51)
TRIGL SERPL-MCNC: 62 MG/DL (ref 30–150)
TSH SERPL DL<=0.005 MIU/L-ACNC: 2.51 UIU/ML (ref 0.4–4)
WBC # BLD AUTO: 3.5 K/UL (ref 3.9–12.7)

## 2025-01-16 PROCEDURE — 80053 COMPREHEN METABOLIC PANEL: CPT | Performed by: FAMILY MEDICINE

## 2025-01-16 PROCEDURE — 84439 ASSAY OF FREE THYROXINE: CPT | Performed by: FAMILY MEDICINE

## 2025-01-16 PROCEDURE — 99214 OFFICE O/P EST MOD 30 MIN: CPT | Mod: 25,S$GLB,, | Performed by: FAMILY MEDICINE

## 2025-01-16 PROCEDURE — 36415 COLL VENOUS BLD VENIPUNCTURE: CPT | Mod: PO | Performed by: FAMILY MEDICINE

## 2025-01-16 PROCEDURE — 99999 PR PBB SHADOW E&M-EST. PATIENT-LVL V: CPT | Mod: PBBFAC,,, | Performed by: FAMILY MEDICINE

## 2025-01-16 PROCEDURE — 84443 ASSAY THYROID STIM HORMONE: CPT | Performed by: FAMILY MEDICINE

## 2025-01-16 PROCEDURE — 84153 ASSAY OF PSA TOTAL: CPT | Performed by: FAMILY MEDICINE

## 2025-01-16 PROCEDURE — 83036 HEMOGLOBIN GLYCOSYLATED A1C: CPT | Performed by: FAMILY MEDICINE

## 2025-01-16 PROCEDURE — 99397 PER PM REEVAL EST PAT 65+ YR: CPT | Mod: S$GLB,,, | Performed by: FAMILY MEDICINE

## 2025-01-16 PROCEDURE — 85025 COMPLETE CBC W/AUTO DIFF WBC: CPT | Performed by: FAMILY MEDICINE

## 2025-01-16 PROCEDURE — 80061 LIPID PANEL: CPT | Performed by: FAMILY MEDICINE

## 2025-01-16 RX ORDER — AMLODIPINE AND VALSARTAN 5; 160 MG/1; MG/1
1 TABLET ORAL DAILY
Qty: 90 TABLET | Refills: 3 | Status: SHIPPED | OUTPATIENT
Start: 2025-01-16

## 2025-01-16 RX ORDER — MAGNESIUM GLYCINATE 100 MG
200 CAPSULE ORAL DAILY
COMMUNITY

## 2025-01-16 NOTE — PROGRESS NOTES
Subjective     Patient ID: Dustin Lauren is a 74 y.o. male.    Chief Complaint: Annual Exam and Hand Pain (Right hand/Trigger finger)  74-year-old white male presents to clinic today for annual physical exam.  He continues to be followed by Cardiology secondary to coronary artery disease, atrial fibrillation, and hypertension.  He remains stable on Eliquis 5 mg b.i.d., Effient 10 mg daily, and Exforge 5/160 mg daily.  He does have a prescription of metoprolol 25 mg to use as needed for palpitations but reports that he has not required use.  Hyperlipidemia remains stable on Lipitor 20 mg daily, Zetia 10 mg daily, and Vascepa 1 g b.i.d..  He continues to be followed by urology secondary to treatment of BPH which remains stable on Flomax 0.4 mg daily, finasteride 5 mg daily, and Myrbetriq 25 mg daily.  He continues to be followed by Endocrinology for treatment of postsurgical hypothyroidism which remains stable on levothyroxine 137 mcg Monday through Saturday and 1-1/2 tablets on Sunday.  He reports a past surgical history of cholecystectomy, thyroidectomy, left knee arthroscopic meniscus repair, right knee replacement, coronary angioplasty with stent placement, and atrial ablation.  He reports a strong family history of arthritis, cancer, heart disease, and thalassemia.  He is up-to-date with all screening exams and vaccinations.    This note documents the patient's acute complaint of itchy/painful varicose vein to left thigh.    HPI:  The patient reports having varicose veins to the bilateral lower extremities but recently he has begun to note itchiness and pain to a varicose vein in the left inner thigh.  ROS:  Tender/itchy varicose vein.  All other systems reviewed are negative.  Physical exam:  Physical exam is positive for varicose veins of the lower extremity with tenderness and inflammation to a varicose vein of the left inner thigh.  Assessment/plan:  Varicose vein of left lower extremity with  inflammation--refer to vascular Medicine for further evaluation of varicose vein.    Hand Pain   Pertinent negatives include no chest pain.     Review of Systems   Constitutional:  Negative for activity change, appetite change, chills, fatigue, fever and unexpected weight change.   HENT:  Negative for nasal congestion, ear pain, hearing loss, postnasal drip, rhinorrhea, sinus pressure/congestion, sore throat, tinnitus and trouble swallowing.    Eyes:  Negative for discharge, redness, itching and visual disturbance.   Respiratory:  Negative for cough, chest tightness, shortness of breath and wheezing.    Cardiovascular:  Negative for chest pain and palpitations.   Gastrointestinal:  Negative for abdominal pain, blood in stool, constipation, diarrhea, nausea and vomiting.   Endocrine: Negative for polydipsia and polyuria.   Genitourinary:  Positive for urgency. Negative for decreased urine volume, difficulty urinating, dysuria, frequency and hematuria.   Musculoskeletal:  Positive for arthralgias and joint swelling. Negative for back pain, myalgias, neck pain and neck stiffness.   Integumentary:  Negative for rash.   Neurological:  Negative for dizziness, weakness, light-headedness and headaches.   Psychiatric/Behavioral: Negative.  Negative for confusion and dysphoric mood.           Objective     Physical Exam  Vitals and nursing note reviewed.   Constitutional:       General: He is not in acute distress.     Appearance: Normal appearance. He is well-developed. He is not diaphoretic.   HENT:      Head: Normocephalic and atraumatic.      Right Ear: External ear normal.      Left Ear: External ear normal.      Nose: Nose normal.      Mouth/Throat:      Pharynx: No oropharyngeal exudate.   Eyes:      General: No scleral icterus.        Right eye: No discharge.         Left eye: No discharge.      Conjunctiva/sclera: Conjunctivae normal.      Pupils: Pupils are equal, round, and reactive to light.   Neck:      Thyroid:  No thyromegaly.      Vascular: No JVD.      Trachea: No tracheal deviation.   Cardiovascular:      Rate and Rhythm: Normal rate and regular rhythm.      Heart sounds: Normal heart sounds. No murmur heard.     No friction rub. No gallop.      Comments: Tortuous varicose veins to left lower extremity and left inner thigh.  There is inflammation and tenderness to inner thigh varicosity.  Pulmonary:      Effort: Pulmonary effort is normal. No respiratory distress.      Breath sounds: Normal breath sounds. No stridor. No wheezing, rhonchi or rales.   Chest:      Chest wall: No tenderness.   Abdominal:      General: Bowel sounds are normal. There is no distension.      Palpations: Abdomen is soft. There is no mass.      Tenderness: There is no abdominal tenderness. There is no guarding or rebound.   Musculoskeletal:         General: No tenderness. Normal range of motion.      Cervical back: Normal range of motion and neck supple.   Lymphadenopathy:      Cervical: No cervical adenopathy.   Skin:     General: Skin is warm and dry.      Coloration: Skin is not pale.      Findings: No erythema or rash.   Neurological:      Mental Status: He is alert and oriented to person, place, and time.   Psychiatric:         Mood and Affect: Mood normal.         Behavior: Behavior normal.         Thought Content: Thought content normal.         Judgment: Judgment normal.            Assessment and Plan     1. Preventative health care  -     CBC Auto Differential; Future; Expected date: 01/16/2025  -     Comprehensive Metabolic Panel; Future; Expected date: 01/16/2025  -     Lipid Panel; Future; Expected date: 01/16/2025  -     T4, Free; Future; Expected date: 01/16/2025  -     TSH; Future; Expected date: 01/16/2025  -     Hemoglobin A1C; Future; Expected date: 01/16/2025  -     Prostate Specific Antigen, Diagnostic; Future; Expected date: 01/16/2025    2. Coronary artery disease involving native coronary artery of native heart without  angina pectoris  -     CBC Auto Differential; Future; Expected date: 01/16/2025  -     Comprehensive Metabolic Panel; Future; Expected date: 01/16/2025  -     Lipid Panel; Future; Expected date: 01/16/2025  -     T4, Free; Future; Expected date: 01/16/2025  -     TSH; Future; Expected date: 01/16/2025  -     Hemoglobin A1C; Future; Expected date: 01/16/2025  -     Prostate Specific Antigen, Diagnostic; Future; Expected date: 01/16/2025    3. Stented coronary artery  -     CBC Auto Differential; Future; Expected date: 01/16/2025  -     Comprehensive Metabolic Panel; Future; Expected date: 01/16/2025  -     Lipid Panel; Future; Expected date: 01/16/2025  -     T4, Free; Future; Expected date: 01/16/2025  -     TSH; Future; Expected date: 01/16/2025  -     Hemoglobin A1C; Future; Expected date: 01/16/2025  -     Prostate Specific Antigen, Diagnostic; Future; Expected date: 01/16/2025    4. Persistent atrial fibrillation  Overview:  s/p cardioversion    Orders:  -     CBC Auto Differential; Future; Expected date: 01/16/2025  -     Comprehensive Metabolic Panel; Future; Expected date: 01/16/2025  -     Lipid Panel; Future; Expected date: 01/16/2025  -     T4, Free; Future; Expected date: 01/16/2025  -     TSH; Future; Expected date: 01/16/2025  -     Hemoglobin A1C; Future; Expected date: 01/16/2025  -     Prostate Specific Antigen, Diagnostic; Future; Expected date: 01/16/2025    5. Tachy-darek syndrome  -     CBC Auto Differential; Future; Expected date: 01/16/2025  -     Comprehensive Metabolic Panel; Future; Expected date: 01/16/2025  -     Lipid Panel; Future; Expected date: 01/16/2025  -     T4, Free; Future; Expected date: 01/16/2025  -     TSH; Future; Expected date: 01/16/2025  -     Hemoglobin A1C; Future; Expected date: 01/16/2025  -     Prostate Specific Antigen, Diagnostic; Future; Expected date: 01/16/2025    6. History of radiofrequency ablation (RFA) procedure for cardiac arrhythmia  -     CBC Auto  Differential; Future; Expected date: 01/16/2025  -     Comprehensive Metabolic Panel; Future; Expected date: 01/16/2025  -     Lipid Panel; Future; Expected date: 01/16/2025  -     T4, Free; Future; Expected date: 01/16/2025  -     TSH; Future; Expected date: 01/16/2025  -     Hemoglobin A1C; Future; Expected date: 01/16/2025  -     Prostate Specific Antigen, Diagnostic; Future; Expected date: 01/16/2025    7. Primary hypertension  -     CBC Auto Differential; Future; Expected date: 01/16/2025  -     Comprehensive Metabolic Panel; Future; Expected date: 01/16/2025  -     Lipid Panel; Future; Expected date: 01/16/2025  -     T4, Free; Future; Expected date: 01/16/2025  -     TSH; Future; Expected date: 01/16/2025  -     Hemoglobin A1C; Future; Expected date: 01/16/2025  -     Prostate Specific Antigen, Diagnostic; Future; Expected date: 01/16/2025  -     amlodipine-valsartan (EXFORGE) 5-160 mg per tablet; Take 1 tablet by mouth once daily.  Dispense: 90 tablet; Refill: 3    8. Mixed hyperlipidemia  -     CBC Auto Differential; Future; Expected date: 01/16/2025  -     Comprehensive Metabolic Panel; Future; Expected date: 01/16/2025  -     Lipid Panel; Future; Expected date: 01/16/2025  -     T4, Free; Future; Expected date: 01/16/2025  -     TSH; Future; Expected date: 01/16/2025  -     Hemoglobin A1C; Future; Expected date: 01/16/2025  -     Prostate Specific Antigen, Diagnostic; Future; Expected date: 01/16/2025    9. Postsurgical hypothyroidism  -     CBC Auto Differential; Future; Expected date: 01/16/2025  -     Comprehensive Metabolic Panel; Future; Expected date: 01/16/2025  -     Lipid Panel; Future; Expected date: 01/16/2025  -     T4, Free; Future; Expected date: 01/16/2025  -     TSH; Future; Expected date: 01/16/2025  -     Hemoglobin A1C; Future; Expected date: 01/16/2025  -     Prostate Specific Antigen, Diagnostic; Future; Expected date: 01/16/2025    10. Acute idiopathic gout of left hand  -     CBC  Auto Differential; Future; Expected date: 01/16/2025  -     Comprehensive Metabolic Panel; Future; Expected date: 01/16/2025  -     Lipid Panel; Future; Expected date: 01/16/2025  -     T4, Free; Future; Expected date: 01/16/2025  -     TSH; Future; Expected date: 01/16/2025  -     Hemoglobin A1C; Future; Expected date: 01/16/2025  -     Prostate Specific Antigen, Diagnostic; Future; Expected date: 01/16/2025    11. Benign prostatic hyperplasia with urinary frequency  -     CBC Auto Differential; Future; Expected date: 01/16/2025  -     Comprehensive Metabolic Panel; Future; Expected date: 01/16/2025  -     Lipid Panel; Future; Expected date: 01/16/2025  -     T4, Free; Future; Expected date: 01/16/2025  -     TSH; Future; Expected date: 01/16/2025  -     Hemoglobin A1C; Future; Expected date: 01/16/2025  -     Prostate Specific Antigen, Diagnostic; Future; Expected date: 01/16/2025    12. Varicose veins of left lower extremity with inflammation  -     Ambulatory referral/consult to Cardiology; Future; Expected date: 01/23/2025        1. CBC, CMP, TSH, free T4, fasting lipids, hemoglobin A1c, and PSA.    2. Continue Eliquis 5 mg b.i.d., Effient 10 mg daily, Exforge 5/160 mg daily, and metoprolol as needed.  Coronary artery disease, atrial fibrillation, tachy-darek syndrome, and hypertension are well controlled.  Continue follow-up with cardiology as scheduled.    3. Continue Lipitor 20 mg daily, Zetia 10 mg daily, and Vascepa 1 g b.i.d..  Hyperlipidemia stable.    4. Continue levothyroxine 137 mcg Monday through Friday and 1-1/2 tablets on Sundays.  Hypothyroidism is stable.  Continue follow-up with endocrinology as scheduled.    5. Continue follow-up with Orthopedics as scheduled for continued treatment of gout of hands and trigger finger.  6. Continue follow-up with urology as scheduled.  Continue Flomax, Myrbetriq, and finasteride as prescribed.  BPH is stable.  7. Return to clinic as needed or in 1 year for  annual physical exam.               Follow up in about 1 year (around 1/16/2026), or if symptoms worsen or fail to improve, for Annual exam.

## 2025-01-24 ENCOUNTER — LAB VISIT (OUTPATIENT)
Dept: LAB | Facility: HOSPITAL | Age: 75
End: 2025-01-24
Attending: INTERNAL MEDICINE
Payer: COMMERCIAL

## 2025-01-24 DIAGNOSIS — Z79.02 LONG TERM (CURRENT) USE OF ANTITHROMBOTICS/ANTIPLATELETS: ICD-10-CM

## 2025-01-24 PROCEDURE — 81225 CYP2C19 GENE COM VARIANTS: CPT | Performed by: INTERNAL MEDICINE

## 2025-01-24 PROCEDURE — 36415 COLL VENOUS BLD VENIPUNCTURE: CPT | Mod: PO | Performed by: INTERNAL MEDICINE

## 2025-01-28 LAB
ANNOTATION COMMENT IMP: NORMAL
CYP2C19 ALLELE GENO BLD/T: NORMAL
CYP2C19 GENE PROD MET ACT IMP BLD/T-IMP: NORMAL
GENETICIST REVIEW: NORMAL
LAB TEST METHOD: NORMAL
Lab: NORMAL
PROVIDER SIGNING NAME: NORMAL
TEST PERFORMANCE INFO SPEC: NORMAL

## 2025-01-29 ENCOUNTER — OFFICE VISIT (OUTPATIENT)
Dept: CARDIOLOGY | Facility: CLINIC | Age: 75
End: 2025-01-29
Payer: COMMERCIAL

## 2025-01-29 VITALS
HEIGHT: 70 IN | DIASTOLIC BLOOD PRESSURE: 70 MMHG | WEIGHT: 212.75 LBS | HEART RATE: 52 BPM | SYSTOLIC BLOOD PRESSURE: 116 MMHG | BODY MASS INDEX: 30.46 KG/M2

## 2025-01-29 DIAGNOSIS — I25.10 CORONARY ARTERY DISEASE INVOLVING NATIVE CORONARY ARTERY OF NATIVE HEART WITHOUT ANGINA PECTORIS: ICD-10-CM

## 2025-01-29 DIAGNOSIS — I83.813 VARICOSE VEINS OF BOTH LOWER EXTREMITIES WITH PAIN: Primary | ICD-10-CM

## 2025-01-29 DIAGNOSIS — I48.19 PERSISTENT ATRIAL FIBRILLATION: ICD-10-CM

## 2025-01-29 DIAGNOSIS — I10 PRIMARY HYPERTENSION: ICD-10-CM

## 2025-01-29 DIAGNOSIS — I89.0 LYMPHEDEMA OF BOTH LOWER EXTREMITIES: ICD-10-CM

## 2025-01-29 DIAGNOSIS — I83.12 VARICOSE VEINS OF LEFT LOWER EXTREMITY WITH INFLAMMATION: ICD-10-CM

## 2025-01-29 DIAGNOSIS — Z98.890 HISTORY OF RADIOFREQUENCY ABLATION (RFA) PROCEDURE FOR CARDIAC ARRHYTHMIA: ICD-10-CM

## 2025-01-29 DIAGNOSIS — E78.2 MIXED HYPERLIPIDEMIA: ICD-10-CM

## 2025-01-29 DIAGNOSIS — Z68.55 SEVERE OBESITY DUE TO EXCESS CALORIES WITH SERIOUS COMORBIDITY AND BODY MASS INDEX (BMI) 120% OF 95TH PERCENTILE TO LESS THAN 140% OF 95TH PERCENTILE FOR AGE IN PEDIATRIC PATIENT: ICD-10-CM

## 2025-01-29 DIAGNOSIS — E66.01 SEVERE OBESITY DUE TO EXCESS CALORIES WITH SERIOUS COMORBIDITY AND BODY MASS INDEX (BMI) 120% OF 95TH PERCENTILE TO LESS THAN 140% OF 95TH PERCENTILE FOR AGE IN PEDIATRIC PATIENT: ICD-10-CM

## 2025-01-29 PROCEDURE — 99999 PR PBB SHADOW E&M-EST. PATIENT-LVL V: CPT | Mod: PBBFAC,,, | Performed by: INTERNAL MEDICINE

## 2025-01-29 PROCEDURE — 99203 OFFICE O/P NEW LOW 30 MIN: CPT | Mod: S$GLB,,, | Performed by: INTERNAL MEDICINE

## 2025-01-29 NOTE — PATIENT INSTRUCTIONS
Assessment/Plan:  Dustin Luaren is a 74 y.o. male with  CAD s/p prox RCA BIANKA (2016), Afib s/p PVI (2021), HTN, HLD, GERD, obesity, who presents for an initial appointment.    Varicose veins of left lower extremity with inflammation- Pt with evidence of lymphedema and venous insufficiency. Check BLE venous reflux study and arterial ultrasound. Pt presents with findings consistent with mild lymphedema.  Refer to lymphedema clinic.  Recommend wearing graduated compression hose.  Limit sodium intake to 2000 mg daily.  Limit volume intake to 1.5 L daily.  Elevate legs when resting.    2. CAD- Pt with no angina. Continue GDMT for CAD on current regimen.     3. HTN- Controlled. Continue current medications.    4. HLD- Continue atorvastatin 20 mg daily.     5. Afib s/p PVI- Stable. Continue Eliquis for anticoagulation. Continue metoprolol as needed for rate control.     6. Obesity- Encourage diet, exercise, and weight loss.     Follow up in 4 months

## 2025-01-29 NOTE — PROGRESS NOTES
[facility]      Chief Complaint   Patient presents with    Varicose Veins       Patient ID: Dustin Lauren is a 74 y.o. male with  CAD s/p prox RCA BIANKA (2016), Afib s/p PVI (2021), HTN, HLD, GERD, obesity, who presents for an initial appointment. Pertinent history/events are as follows:     -Pt kindly referred by Dr. Gibbs for evaluation of Varicose veins of left lower extremity with inflammation     HPI:  Mr. Lauren reports varicose veins with pain for several years. He has no claudication or tissue loss.     Past Medical History:   Diagnosis Date    A-fib     s/p cardioversion    Carpal tunnel syndrome     bilaterally    Cataract     Chronic LBP 11/8/2012    Chronic neck pain 11/8/2012    Coronary artery disease 03/24/2016    s/p 2 stents in RCA and ostium    Diverticulosis of colon     DJD (degenerative joint disease) of cervical spine 11/8/2012    DJD (degenerative joint disease) of lumbar spine 11/8/2012    DJD (degenerative joint disease) of thoracic spine 11/8/2012    GERD (gastroesophageal reflux disease)     Hyperlipidemia     hypertriglyceridemia    Hypertension     Hypothyroidism     s/p surgery    Nephrolithiasis, uric acid     stone evaluation showed uric acid and calcium oxalate    PONV (postoperative nausea and vomiting)     Rosacea     Thyroid disease     Ulcer     Unspecified disorder of kidney and ureter     Vitamin D deficiency disease      Past Surgical History:   Procedure Laterality Date    ABLATION OF ARRHYTHMOGENIC FOCUS FOR ATRIAL FIBRILLATION N/A 9/16/2021    Procedure: Ablation atrial fibrillation;  Surgeon: Dhaval Rodríguez MD;  Location: [facility];  Service: Cardiology;  Laterality: N/A;  AF, HIEU (Cx if compliant and in SR), PVI, RFA, DAWSON, Gen, SK, 3 Prep    CARDIOVERSION      for AFib    CHOLECYSTECTOMY      COLONOSCOPY W/ BIOPSIES AND POLYPECTOMY  10/2013    CORONARY ANGIOPLASTY WITH STENT PLACEMENT      Stent in RCA and another stent in ostium    KNEE ARTHROSCOPY   10-13-14    right TKA    KNEE ARTHROSCOPY Left 10-7-15    ascope    KNEE ARTHROSCOPY W/ MENISCAL REPAIR Left 10/7/15    THYROIDECTOMY      TREATMENT OF CARDIAC ARRHYTHMIA  9/16/2021    Procedure: Cardioversion or Defibrillation;  Surgeon: Dhaval Rodríguez MD;  Location: [facility];  Service: Cardiology;;     Social History     Socioeconomic History    Marital status:    Tobacco Use    Smoking status: Never     Passive exposure: Never    Smokeless tobacco: Never   Substance and Sexual Activity    Alcohol use: Yes     Alcohol/week: 0.0 standard drinks of alcohol     Comment: rarely    Drug use: No     Social Drivers of Health     Financial Resource Strain: Patient Declined (1/23/2025)    Overall Financial Resource Strain (CARDIA)     Difficulty of Paying Living Expenses: Patient declined   Food Insecurity: Patient Declined (1/23/2025)    Hunger Vital Sign     Worried About Running Out of Food in the Last Year: Patient declined     Ran Out of Food in the Last Year: Patient declined   Transportation Needs: Patient Declined (1/8/2024)    PRAPARE - Transportation     Lack of Transportation (Medical): Patient declined     Lack of Transportation (Non-Medical): Patient declined   Physical Activity: Sufficiently Active (1/23/2025)    Exercise Vital Sign     Days of Exercise per Week: 3 days     Minutes of Exercise per Session: 80 min   Stress: No Stress Concern Present (1/23/2025)    [employer] - Occupational Stress Questionnaire     Feeling of Stress : Not at all   Housing Stability: Patient Declined (1/8/2024)    Housing Stability Vital Sign     Unable to Pay for Housing in the Last Year: Patient declined     Unstable Housing in the Last Year: Patient declined     Family History   Problem Relation Name Age of Onset    Hypertension Mother      Stroke Mother      Cancer Father          pancreas    Heart disease Father          MI    Thyroid disease Sister      Hyperlipidemia Sister       Thalassemia Sister          alpha Thalassemia anemia    Kidney disease Sister      Thyroid disease Sister      Arthritis Sister          RA    Thalassemia Sister          alpha Thalassemia anemia    Seizures Brother          s/p meningioma resection    Hypertension Brother      Cancer Brother          lung     Coronary artery disease Brother          stent in place    Arthritis Brother      Hypertension Brother      Arthritis Brother         Review of patient's allergies indicates:   Allergen Reactions    Codeine Nausea Only    Crestor [rosuvastatin] Palpitations    Doxycycline Hives    Keflex [cephalexin] Rash    Celebrex [celecoxib] Other (See Comments)     Upset stomach    Mobic [meloxicam] Other (See Comments)     Stomach pain and nauseous    Niacin Diarrhea    Niacin preparations Diarrhea    Ampicillin Rash    Pcn [penicillins] Rash       Medication List with Changes/Refills   Current Medications    AMLODIPINE-VALSARTAN (EXFORGE) 5-160 MG PER TABLET    Take 1 tablet by mouth once daily.    APIXABAN (ELIQUIS) 5 MG TAB    Take 1 tablet (5 mg total) by mouth 2 (two) times daily.    ATORVASTATIN (LIPITOR) 20 MG TABLET    TAKE 1 TABLET BY MOUTH EVERY DAY    CHOLECALCIFEROL, VITAMIN D3, 25 MCG (1,000 UNIT) CHEW    Take 2,000 Int'l Units by mouth once daily.    EZETIMIBE (ZETIA) 10 MG TABLET    TAKE 1 TABLET BY MOUTH EVERY DAY    FAMOTIDINE (PEPCID) 40 MG TABLET    Take 40 mg by mouth 2 (two) times daily.    FINASTERIDE (PROSCAR) 5 MG TABLET    TAKE 1 TABLET BY MOUTH EVERY DAY    FLUTICASONE PROPIONATE (FLONASE) 50 MCG/ACTUATION NASAL SPRAY    2 sprays by Each Nostril route as needed.    ICOSAPENT ETHYL (VASCEPA) 1 GRAM CAP    Take 1 capsule (1,000 mg total) by mouth 2 (two) times a day.    LEVOTHYROXINE (SYNTHROID) 137 MCG TAB TABLET    TAKE 1 TABLET BY MOUTH MON-SAT AND TAKE 1.5 TABLET BY MOUTH ON SUNDAY ONLY    MAGNESIUM GLYCINATE 100 MG MAGNESIUM CAP    Take 200 mg by mouth Daily.    METOPROLOL TARTRATE  "(LOPRESSOR) 25 MG TABLET    Take 1 tablet (25 mg total) by mouth as needed (1-2 tab for palpitations).    MIRABEGRON (MYRBETRIQ) 25 MG TB24 ER TABLET    Take 1 tablet (25 mg total) by mouth once daily.    MULTIVIT-MIN/FA/LYCOPEN/LUTEIN (CENTRUM SILVER MEN ORAL)    Take 1 tablet by mouth once daily.    NAFTIFINE 2 % CREA    Apply 1 application topically daily as needed.    NITROGLYCERIN (NITROSTAT) 0.3 MG SL TABLET    Place 1 tablet (0.3 mg total) under the tongue every 5 (five) minutes as needed for Chest pain.    PRASUGREL (EFFIENT) 10 MG TAB    TAKE 1 TABLET BY MOUTH EVERY DAY    TAMSULOSIN (FLOMAX) 0.4 MG CAP    TAKE 1 CAPSULE BY MOUTH EVERY DAY    TIZANIDINE (ZANAFLEX) 4 MG TABLET    Take 1 tablet (4 mg total) by mouth every 8 (eight) hours as needed (muscle spasms).       Review of Systems  Constitution: Denies chills, fever, and sweats.  HENT: Denies headaches or blurry vision.  Cardiovascular: Denies chest pain or irregular heart beat.  Respiratory: Denies cough or shortness of breath.  Gastrointestinal: Denies abdominal pain, nausea, or vomiting.  Musculoskeletal: Positive for varicose veins with pain.  Neurological: Denies dizziness or focal weakness.  Psychiatric/Behavioral: Normal mental status.  Hematologic/Lymphatic: Denies bleeding problem or easy bruising/bleeding.  Skin: Denies rash or suspicious lesions    Physical Examination  /70   Pulse (!) 52   Ht 5' 10" (1.778 m)   Wt 96.5 kg (212 lb 11.9 oz)   BMI 30.53 kg/m²     Constitutional: No acute distress, conversant  HEENT: Sclera anicteric, Pupils equal, round and reactive to light, extraocular motions intact, Oropharynx clear  Neck: No JVD, no carotid bruits  Cardiovascular: regular rate and rhythm, no murmur, rubs or gallops, normal S1/S2  Pulmonary: Clear to auscultation bilaterally  Abdominal: Abdomen soft, nontender, nondistended, positive bowel sounds  Extremities: BLE's with trace edema, prominent varicose veins, and changes " "consistent with lymphedema   Pulses:  Carotid pulses are 2+ on the right side, and 2+ on the left side.  Radial pulses are 2+ on the right side, and 2+ on the left side.   Femoral pulses are 2+ on the right side, and 2+ on the left side.  Popliteal pulses are 2+ on the right side, and 2+ on the left side.   Dorsalis pedis pulses are 2+ on the right side, and 2+ on the left side.   Posterior tibial pulses are 2+ on the right side, and 2+ on the left side.    Skin: No ecchymosis, erythema, or ulcers  Psych: Alert and oriented x 3, appropriate affect  Neuro: CNII-XII intact, no focal deficits    Labs:  Most Recent Data  CBC:   Lab Results   Component Value Date    WBC 3.50 (L) 01/16/2025    HGB 14.1 01/16/2025    HCT 43.0 01/16/2025     01/16/2025    MCV 91 01/16/2025    RDW 14.1 01/16/2025     BMP:   Lab Results   Component Value Date     01/16/2025    K 4.3 01/16/2025     01/16/2025    CO2 28 01/16/2025    BUN 17 01/16/2025    CREATININE 0.9 01/16/2025    GLU 97 01/16/2025    CALCIUM 9.4 01/16/2025     LFTS;   Lab Results   Component Value Date    PROT 7.4 01/16/2025    ALBUMIN 4.0 01/16/2025    BILITOT 0.8 01/16/2025    AST 30 01/16/2025    ALKPHOS 64 01/16/2025    ALT 42 01/16/2025     COAGS:   Lab Results   Component Value Date    INR 1.1 09/13/2021     FLP:   Lab Results   Component Value Date    CHOL 99 (L) 01/16/2025    HDL 35 (L) 01/16/2025    LDLCALC 51.6 (L) 01/16/2025    TRIG 62 01/16/2025    CHOLHDL 35.4 01/16/2025     CARDIAC: No results found for: "TROPONINI", "CKTOTAL", "CKMB", "BNP"    Imaging:      Assessment/Plan:  Dustin Lauren is a 74 y.o. male with  CAD s/p prox RCA BIANKA (2016), Afib s/p PVI (2021), HTN, HLD, GERD, obesity, who presents for an initial appointment.    Varicose veins of left lower extremity with inflammation- Pt with evidence of lymphedema and venous insufficiency. Check BLE venous reflux study and arterial ultrasound. Pt presents with findings consistent with " mild lymphedema.  Refer to lymphedema clinic.  Recommend wearing graduated compression hose.  Limit sodium intake to 2000 mg daily.  Limit volume intake to 1.5 L daily.  Elevate legs when resting.    2. CAD- Pt with no angina. Continue GDMT for CAD on current regimen.     3. HTN- Controlled. Continue current medications.    4. HLD- Continue atorvastatin 20 mg daily.     5. Afib s/p PVI- Stable. Continue Eliquis for anticoagulation. Continue metoprolol as needed for rate control.     6. Obesity- Encourage diet, exercise, and weight loss.     Follow up in 4 months    Total duration of face to face visit time 15 minutes.  Total time spent counseling greater than fifty percent of total visit time.  Counseling included discussion regarding imaging findings, diagnosis, possibilities, treatment options, risks and benefits.  The patient had many questions regarding the options and long-term effects.    Yamil Guadarrama MD, PhD  Interventional Cardiology

## 2025-02-12 RX ORDER — FINASTERIDE 5 MG/1
5 TABLET, FILM COATED ORAL
Qty: 90 TABLET | Refills: 3 | Status: SHIPPED | OUTPATIENT
Start: 2025-02-12

## 2025-02-17 RX ORDER — TAMSULOSIN HYDROCHLORIDE 0.4 MG/1
1 CAPSULE ORAL
Qty: 90 CAPSULE | Refills: 3 | Status: SHIPPED | OUTPATIENT
Start: 2025-02-17

## 2025-02-24 ENCOUNTER — TELEPHONE (OUTPATIENT)
Dept: ELECTROPHYSIOLOGY | Facility: CLINIC | Age: 75
End: 2025-02-24
Payer: COMMERCIAL

## 2025-02-24 ENCOUNTER — PATIENT MESSAGE (OUTPATIENT)
Dept: ELECTROPHYSIOLOGY | Facility: CLINIC | Age: 75
End: 2025-02-24
Payer: COMMERCIAL

## 2025-02-24 NOTE — TELEPHONE ENCOUNTER
----- Message from Jamil Telles sent at 2/24/2025  9:01 AM CST -----    ----- Message -----  From: Vikash Mcnally  Sent: 2/24/2025   8:35 AM CST  To: [facility] Staff    Patient is calling to speak with someone he is in afib. He can be reached at [phone].Thank you

## 2025-02-24 NOTE — TELEPHONE ENCOUNTER
Spoke with patient. He had his hand injected last week and woke up at 2am today with Afib (the same thing happened this past November following hand injections). He took 12.5mg of Lopressor (was concerned about his baseline HR being in the 50's). HR went from low 100's to 80-90. Nel confirms AF. Advised to take the other 12.5mg of Lopressor now and continue to monitor. Confirmed that he is compliant with Eliquis. He will reach out this afternoon to let us know if still in AF. He is not symptomatic.

## 2025-02-25 ENCOUNTER — HOSPITAL ENCOUNTER (OUTPATIENT)
Dept: CARDIOLOGY | Facility: HOSPITAL | Age: 75
Discharge: HOME | End: 2025-02-25
Attending: INTERNAL MEDICINE
Payer: COMMERCIAL

## 2025-02-25 ENCOUNTER — HOSPITAL ENCOUNTER (OUTPATIENT)
Dept: CARDIOLOGY | Facility: CLINIC | Age: 75
Discharge: HOME | End: 2025-02-25
Payer: COMMERCIAL

## 2025-02-25 ENCOUNTER — OFFICE VISIT (OUTPATIENT)
Dept: ELECTROPHYSIOLOGY | Facility: CLINIC | Age: 75
End: 2025-02-25
Payer: COMMERCIAL

## 2025-02-25 VITALS
HEART RATE: 87 BPM | SYSTOLIC BLOOD PRESSURE: 118 MMHG | DIASTOLIC BLOOD PRESSURE: 85 MMHG | WEIGHT: 205.69 LBS | BODY MASS INDEX: 29.45 KG/M2 | HEIGHT: 70 IN

## 2025-02-25 DIAGNOSIS — Z98.890 HISTORY OF RADIOFREQUENCY ABLATION (RFA) PROCEDURE FOR CARDIAC ARRHYTHMIA: ICD-10-CM

## 2025-02-25 DIAGNOSIS — I48.19 PERSISTENT ATRIAL FIBRILLATION: ICD-10-CM

## 2025-02-25 DIAGNOSIS — Z79.01 ON ANTICOAGULANT THERAPY: ICD-10-CM

## 2025-02-25 DIAGNOSIS — I10 PRIMARY HYPERTENSION: ICD-10-CM

## 2025-02-25 DIAGNOSIS — I48.19 PERSISTENT ATRIAL FIBRILLATION: Primary | ICD-10-CM

## 2025-02-25 DIAGNOSIS — I83.813 VARICOSE VEINS OF BOTH LOWER EXTREMITIES WITH PAIN: ICD-10-CM

## 2025-02-25 DIAGNOSIS — I49.5 TACHY-BRADY SYNDROME: ICD-10-CM

## 2025-02-25 LAB
OHS QRS DURATION: 86 MS
OHS QTC CALCULATION: 409 MS

## 2025-02-25 PROCEDURE — 93970 EXTREMITY STUDY: CPT | Mod: 26,,, | Performed by: INTERNAL MEDICINE

## 2025-02-25 PROCEDURE — 93970 EXTREMITY STUDY: CPT | Mod: TC,PO

## 2025-02-25 PROCEDURE — 99214 OFFICE O/P EST MOD 30 MIN: CPT | Mod: S$GLB,,, | Performed by: NURSE PRACTITIONER

## 2025-02-25 PROCEDURE — 99999 PR PBB SHADOW E&M-EST. PATIENT-LVL IV: CPT | Mod: PBBFAC,,, | Performed by: NURSE PRACTITIONER

## 2025-02-25 PROCEDURE — 93005 ELECTROCARDIOGRAM TRACING: CPT | Mod: S$GLB,,, | Performed by: INTERNAL MEDICINE

## 2025-02-25 PROCEDURE — 93010 ELECTROCARDIOGRAM REPORT: CPT | Mod: S$GLB,,, | Performed by: INTERNAL MEDICINE

## 2025-02-25 RX ORDER — METOPROLOL TARTRATE 25 MG/1
25 TABLET, FILM COATED ORAL
Qty: 30 TABLET | Refills: 1 | Status: SHIPPED | OUTPATIENT
Start: 2025-02-25 | End: 2026-02-25

## 2025-02-25 NOTE — Clinical Note
Pt s/p PVI 9/2021. Hx of tachy-darek syndrome which limits AAD. He has now had a number of AF breakthroughs and remains in AF since yesterday. Symptomatic. He is open to cardioversion short term and redo PVI long term if you agree. Thx.

## 2025-02-25 NOTE — Clinical Note
Can we schedule pt for HIEU/DCCV? (He missed a dose of eliquis). Also we can go ahead and schedule redo PVI for 30+ days after cardioversion. Won't need a HIEU for PVI if he is in SR. Hold eliquis AM of PVI. Thanks!

## 2025-02-25 NOTE — PROGRESS NOTES
Mr. Lauren is a patient of Dr. Rodríguez and was last seen in clinic 9/9/2024.      Subjective:   Patient ID:  Dustin Lauren is a 74 y.o. male who presents for follow up of Atrial Fibrillation  .     HPI:    Mr. Lauren is a 74 y.o. male with atrial fibrillation (PVI 9/2021), Htn, CAD s/p PCI, BPH, hypothyroidism (s/o thyroidectomy), low back pain, tachy-darek syndrome here for follow up.     Background:    Primary cardiologist is Dr. Vo.  Primary EP has been Dr. Burnett. Presents for second opinion.  He is the uncle of Libra Littlejohn  He owns [employer]  Initially developed symptomatic paroxysmal AF 9/16. Progressed to persistent AF. Underwent DCCV in 2016.  Ultimately developed recurrence.  DCCV 12/18/18.  48 hr holter 6/20 nsr  Developed recurrence. DCCV 2/2/21 4/21 had an event lasting a couple of hours.  Is symptomatic with the AF, noting palpitations, on occasion associated with lightheadedness.  Echo 7/30/20 normal biventricular structure and function normal left atrial size, trace MR  ECG reveals sinus bradycardia at 49 bpm.  There has been inability to add medications due to his bradycardia.  Has eliminated caffeine and alcohol.  Atrial fibrillation, symptomatic. Paroxysmal to persistent.  Has bradycardia limiting his medications. Recommend PVI.    9/16/2021: Successful pulmonary vein RF ablation.    12/16/2021: He is 3 mo s/p PVI.  Doing well since procedure, with no sustained palpitations during blanking period. Brief palps only. Pericarditis symptoms resolved with course of NSAIDs.  HIEU 9/2021 showed normal LVEF. Chronic bradycardia - not on AV blockers. CHADSVASc 3 and it is recommended he remain on OAC. He is on eliquis for CVA prophylaxis.  He exercises regularly.     3/12/2022: He is 6 months s/p PVI. He is doing well from a rhythm standpoint, with no documented or symptomatic recurrence of arrhythmia since procedure. No sustained palpitations; brief rare palps likely ectopy.    Bradycardic at baseline. Not on AVN blockers. CHADSVASc 3 on eliquis for CVA prophylaxis. He feels well and remains active.    9/19/2022: One year s/p PVI. Continues to do well from a rhythm standpoint, with no documented or symptomatic recurrence of arrhythmia since procedure. Bradycardic at baseline, asymptomatic.  CHADSVASc 3 on eliquis. No AAD or AVN blockers. Normally symptomatic with AF. He will notify clinic if he develops new symptoms. He is also considering switching general cardiology care to [facility]. Referral provided.    9/11/2023: He is 2 years s/p PVI. He continues to do well from a rhythm standpoint, with no documented or symptomatic recurrence of arrhythmia since procedure. HIEU 9/2021 EF 55%. CHADSVASc 3 on eliquis.  Not on AAD or any AVN RFA. Pt is active and exercises regularly. Follows with Dr. Elder in general cardiology.    9/9/2024: Pt is now 3 years s/p PVI. Overall continues to do well, but did have one 5 hour episode of AF back in Jan after colonoscopy. Suspect provoked episode. OK to take PRN lopressor but darek would limit AAD use if he has more frequent AF breakthrough and plan at that time would likely be for redo PVI. PARISHDSVASc 3 on eliquis.  Shoulder pain does not sound cardiac in nature. Follows with Dr. Elder in general cardiology and has an upcoming appt.      Update (02/25/2025):    11/2024: Pt in AF after hand injection. ECG showed return to SR.    2/24/2025: He had his hand injected last week and woke up at 2am today with Afib (the same thing happened this past November following hand injections). He took 12.5mg of Lopressor (was concerned about his baseline HR being in the 50's). HR went from low 100's to 80-90. Kardia confirms AF. Advised to take the other 12.5mg of Lopressor now and continue to monitor.    Today he says he does feel his AF (felt off when he woke up in the night yesterday AM which prompted him to use his kardia device to confirm AF). Breathing ok. No CP,  LH, syncope.   Gets periodic injections for trigger finger and arthritis.    He is currently taking eliquis 5mg BID for stroke prophylaxis and denies significant bleeding episodes. Kidney function is stable, with a creatinine of 0.9 on 1/16/2025.    I have personally reviewed the patient's EKG today, which shows AF at 87bpm. QRS is 86. QTc is 409.    Relevant Cardiac Test Results:    HIEU (9/16/2021):  Atrial fibrillation observed.  HIEU to evaluate for SHINE prior PVI for atrial fibrillation.  The left ventricle is normal in size with normal systolic function. The estimated ejection fraction is 55%.  Moderate right ventricular enlargement with low normal right ventricular systolic function.  Normal appearing left atrial appendage. No thrombus is present in the appendage. Normal appendage velocities 0.64 m/s.  Biatrial enlargement.  Moderate tricuspid regurgitation. There is prolapse of the septal TV leaflet.  Grade 3 plaque present in the descending aorta.  The ascending aorta is mildly dilated measured at 4 cm.    Current Outpatient Medications   Medication Sig    amlodipine-valsartan (EXFORGE) 5-160 mg per tablet Take 1 tablet by mouth once daily.    apixaban (ELIQUIS) 5 mg Tab Take 1 tablet (5 mg total) by mouth 2 (two) times daily.    atorvastatin (LIPITOR) 20 MG tablet TAKE 1 TABLET BY MOUTH EVERY DAY    cholecalciferol, vitamin D3, 25 mcg (1,000 unit) Chew Take 2,000 Int'l Units by mouth once daily.    ezetimibe (ZETIA) 10 mg tablet TAKE 1 TABLET BY MOUTH EVERY DAY    famotidine (PEPCID) 40 MG tablet Take 40 mg by mouth 2 (two) times daily.    finasteride (PROSCAR) 5 mg tablet TAKE 1 TABLET BY MOUTH EVERY DAY    fluticasone propionate (FLONASE) 50 mcg/actuation nasal spray 2 sprays by Each Nostril route as needed.    icosapent ethyL (VASCEPA) 1 gram Cap Take 1 capsule (1,000 mg total) by mouth 2 (two) times a day.    levothyroxine (SYNTHROID) 137 MCG Tab tablet TAKE 1 TABLET BY MOUTH MON-SAT AND TAKE 1.5 TABLET  "BY MOUTH ON SUNDAY ONLY    magnesium glycinate 100 mg magnesium Cap Take 200 mg by mouth Daily.    metoprolol tartrate (LOPRESSOR) 25 MG tablet Take 1 tablet (25 mg total) by mouth as needed (1-2 tab for palpitations).    mirabegron (MYRBETRIQ) 25 mg Tb24 ER tablet Take 1 tablet (25 mg total) by mouth once daily. (Patient taking differently: Take 25 mg by mouth as needed.)    multivit-min/FA/lycopen/lutein (CENTRUM SILVER MEN ORAL) Take 1 tablet by mouth once daily.    naftifine 2 % Crea Apply 1 application topically daily as needed.    nitroGLYCERIN (NITROSTAT) 0.3 MG SL tablet Place 1 tablet (0.3 mg total) under the tongue every 5 (five) minutes as needed for Chest pain.    prasugreL (EFFIENT) 10 mg Tab TAKE 1 TABLET BY MOUTH EVERY DAY    tamsulosin (FLOMAX) 0.4 mg Cap TAKE 1 CAPSULE BY MOUTH EVERY DAY    tiZANidine (ZANAFLEX) 4 MG tablet Take 1 tablet (4 mg total) by mouth every 8 (eight) hours as needed (muscle spasms).     No current facility-administered medications for this visit.       Review of Systems   Constitutional: Negative for malaise/fatigue.   Cardiovascular:  Positive for irregular heartbeat and palpitations. Negative for chest pain, dyspnea on exertion and leg swelling.   Respiratory:  Negative for shortness of breath.    Hematologic/Lymphatic: Negative for bleeding problem.   Skin:  Negative for rash.   Musculoskeletal:  Positive for arthritis (hand). Negative for myalgias.   Gastrointestinal:  Negative for hematemesis, hematochezia and nausea.   Genitourinary:  Negative for hematuria.   Neurological:  Negative for light-headedness.   Psychiatric/Behavioral:  Negative for altered mental status.    Allergic/Immunologic: Negative for persistent infections.       Objective:          /85 (BP Location: Left arm, Patient Position: Sitting)   Pulse 87   Ht 5' 10" (1.778 m)   Wt 93.3 kg (205 lb 11 oz)   BMI 29.51 kg/m²     Physical Exam  Vitals and nursing note reviewed.   Constitutional:       " Appearance: Normal appearance. He is well-developed.   HENT:      Head: Normocephalic.      Nose: Nose normal.   Eyes:      Pupils: Pupils are equal, round, and reactive to light.   Cardiovascular:      Rate and Rhythm: Normal rate. Rhythm irregularly irregular.   Pulmonary:      Effort: No respiratory distress.   Musculoskeletal:         General: Normal range of motion.   Skin:     General: Skin is warm and dry.      Findings: No erythema.   Neurological:      Mental Status: He is alert and oriented to person, place, and time.   Psychiatric:         Speech: Speech normal.         Behavior: Behavior normal.           Lab Results   Component Value Date     01/16/2025    K 4.3 01/16/2025    BUN 17 01/16/2025    CREATININE 0.9 01/16/2025    ALT 42 01/16/2025    AST 30 01/16/2025    HGB 14.1 01/16/2025    HCT 43.0 01/16/2025    TSH 2.513 01/16/2025    LDLCALC 51.6 (L) 01/16/2025           Assessment:     1. Persistent atrial fibrillation    2. Primary hypertension    3. Tachy-darek syndrome    4. History of radiofrequency ablation (RFA) procedure for cardiac arrhythmia    5. On anticoagulant therapy      Plan:     In summary, Mr. Lauren is a 74 y.o. male with atrial fibrillation (PVI 9/2021), Htn, CAD s/p PCI, BPH, hypothyroidism (s/o thyroidectomy), low back pain, tachy-darek syndrome here for follow up.   Pt back in AF since yesterday morning despite taking PRN lopressor. This is his 3rd breakthrough episode since his ablation. He is symptomatic - hx of tachy when in AF. He is compliant with eliquis.  Discussed options. Darek limits AAD. Discussed risks, benefits, and alternatives to redo PVI. He would like to proceed. Plan 1st for cardioversion given difficult to control rates when in AF. He did miss a dose of eliquis.   Advised him to continue lopressor 25mg BID if his HRs are >80s.    Confirm HIEU/DCCV then redo PVI (UPDATE: Will proceed - informed pt not to take metoprolol AM of cardioversion)  RTC as  scheduled     *A copy of this note has been sent to Dr. Rodríguez*    Follow up as scheduled.      ------------------------------------------------------------------    CASSANDRA Arenas, NP-C  Cardiac Electrophysiology

## 2025-02-26 ENCOUNTER — PATIENT MESSAGE (OUTPATIENT)
Dept: ELECTROPHYSIOLOGY | Facility: CLINIC | Age: 75
End: 2025-02-26
Payer: COMMERCIAL

## 2025-02-26 ENCOUNTER — TELEPHONE (OUTPATIENT)
Dept: ELECTROPHYSIOLOGY | Facility: CLINIC | Age: 75
End: 2025-02-26
Payer: COMMERCIAL

## 2025-02-26 DIAGNOSIS — I49.5 TACHY-BRADY SYNDROME: ICD-10-CM

## 2025-02-26 DIAGNOSIS — I48.19 PERSISTENT ATRIAL FIBRILLATION: Primary | ICD-10-CM

## 2025-02-26 LAB
LEFT GREAT SAPHENOUS DISTAL THIGH DIA: 0.25 CM
LEFT GREAT SAPHENOUS JUNCTION DIA: 0.3 CM
LEFT GREAT SAPHENOUS KNEE DIA: 0.26 CM
LEFT GREAT SAPHENOUS KNEE REFLUX: 5086 MS
LEFT GREAT SAPHENOUS MIDDLE THIGH DIA: 0.19 CM
LEFT GREAT SAPHENOUS PROXIMAL CALF DIA: 0.29 CM
LEFT GREAT SAPHENOUS PROXIMAL CALF REFLUX: 4978 MS
LEFT SMALL SAPHENOUS KNEE DIA: 0.23 CM
LEFT SMALL SAPHENOUS SPJ DIA: 0.22 CM
RIGHT GREAT SAPHENOUS DISTAL THIGH DIA: 0.24 CM
RIGHT GREAT SAPHENOUS DISTAL THIGH REFLUX: 924 MS
RIGHT GREAT SAPHENOUS JUNCTION DIA: 0.39 CM
RIGHT GREAT SAPHENOUS KNEE DIA: 0.16 CM
RIGHT GREAT SAPHENOUS KNEE REFLUX: 4823 MS
RIGHT GREAT SAPHENOUS MIDDLE THIGH DIA: 0.22 CM
RIGHT GREAT SAPHENOUS MIDDLE THIGH REFLUX: 827 MS
RIGHT GREAT SAPHENOUS PROXIMAL CALF DIA: 0.2 CM
RIGHT GREAT SAPHENOUS PROXIMAL CALF REFLUX: 4441 MS
RIGHT SMALL SAPHENOUS KNEE DIA: 0.24 CM
RIGHT SMALL SAPHENOUS SPJ DIA: 0.22 CM

## 2025-02-26 NOTE — TELEPHONE ENCOUNTER
Spoke with patient and scheduled procedure: HIEU/DCCV for 3/3/2025 with 7am arrival and PVI redo for 5/22/2025 with 8am arrival.  Labs to be done at: [facility] on 2/27/2025 and 5/15/2025  Confirmed that patient is not on GLP-1 agonist  Advised to hold the following meds:hold metoprolol on day of Cardioversion  Confirmed that patient does not have any implanted device that has remote or monitor that could cause electrical interference  Instructions will be sent via patient portal, as requested

## 2025-02-26 NOTE — TELEPHONE ENCOUNTER
----- Message from Gala Villasenor NP sent at 2/25/2025  5:42 PM CST -----  Can we schedule pt for HIEU/DCCV? (He missed a dose of eliquis). Also we can go ahead and schedule redo PVI for 30+ days after cardioversion. Won't need a HIEU for PVI if he is in SR. Hold eliquis AM of PVI. Thanks!

## 2025-02-27 ENCOUNTER — CLINICAL SUPPORT (OUTPATIENT)
Dept: REHABILITATION | Facility: HOSPITAL | Age: 75
End: 2025-02-27
Payer: COMMERCIAL

## 2025-02-27 ENCOUNTER — HOSPITAL ENCOUNTER (OUTPATIENT)
Dept: CARDIOLOGY | Facility: HOSPITAL | Age: 75
Discharge: HOME | End: 2025-02-27
Attending: INTERNAL MEDICINE
Payer: COMMERCIAL

## 2025-02-27 ENCOUNTER — TELEPHONE (OUTPATIENT)
Dept: CARDIOLOGY | Facility: CLINIC | Age: 75
End: 2025-02-27
Payer: COMMERCIAL

## 2025-02-27 DIAGNOSIS — I89.0 LYMPHEDEMA: Primary | ICD-10-CM

## 2025-02-27 DIAGNOSIS — I83.813 VARICOSE VEINS OF BOTH LOWER EXTREMITIES WITH PAIN: ICD-10-CM

## 2025-02-27 DIAGNOSIS — I89.0 LYMPHEDEMA OF BOTH LOWER EXTREMITIES: ICD-10-CM

## 2025-02-27 LAB
LEFT ANT TIBIAL SYS PSV: 49 CM/S
LEFT CFA PSV: 83 CM/S
LEFT EXTERNAL ILIAC PSV: 78 CM/S
LEFT PERONEAL SYS PSV: 38 CM/S
LEFT POPLITEAL PSV: 56 CM/S
LEFT POST TIBIAL SYS PSV: 31 CM/S
LEFT PROFUNDA SYS PSV: 107 CM/S
LEFT SUPER FEMORAL DIST SYS PSV: 137 CM/S
LEFT SUPER FEMORAL MID SYS PSV: 79 CM/S
LEFT SUPER FEMORAL OSTIAL SYS PSV: 60 CM/S
LEFT SUPER FEMORAL PROX SYS PSV: 62 CM/S
LEFT TIB/PER TRUNK SYS PSV: 57 CM/S
OHS CV LEFT COMMON ILIAC ARTERY PSV: 72 CM/S
OHS CV LEFT LOWER EXTREMITY ABI (NO CALC): 1.13
OHS CV RIGHT ABI LOWER EXTREMITY (NO CALC): 0.96
OHS CV US RIGHT COMMON ILIAC PSV: 74 CM/S
RIGHT ANT TIBIAL SYS PSV: 42 CM/S
RIGHT CFA PSV: 80 CM/S
RIGHT EXTERNAL ILLIAC PSV: 83 CM/S
RIGHT PERONEAL SYS PSV: 45 CM/S
RIGHT POPLITEAL PSV: 59 CM/S
RIGHT POST TIBIAL SYS PSV: 37 CM/S
RIGHT PROFUNDA SYS PSV: 67 CM/S
RIGHT SUPER FEMORAL DIST SYS PSV: 83 CM/S
RIGHT SUPER FEMORAL MID SYS PSV: 98 CM/S
RIGHT SUPER FEMORAL OSTIAL SYS PSV: 62 CM/S
RIGHT SUPER FEMORAL PROX SYS PSV: 60 CM/S
RIGHT TIB/PER TRUNK SYS PSV: 58 CM/S

## 2025-02-27 PROCEDURE — 93925 LOWER EXTREMITY STUDY: CPT | Mod: PO

## 2025-02-27 PROCEDURE — 97535 SELF CARE MNGMENT TRAINING: CPT

## 2025-02-27 PROCEDURE — 93925 LOWER EXTREMITY STUDY: CPT | Mod: 26,,, | Performed by: INTERNAL MEDICINE

## 2025-02-27 PROCEDURE — 97165 OT EVAL LOW COMPLEX 30 MIN: CPT

## 2025-02-27 RX ORDER — PRASUGREL 10 MG/1
10 TABLET, FILM COATED ORAL
Qty: 90 TABLET | Refills: 3 | Status: SHIPPED | OUTPATIENT
Start: 2025-02-27

## 2025-02-27 NOTE — TELEPHONE ENCOUNTER
reviewed recent lab results, advised Plavix could be taken in the future if pt has bleeding issues. Pt notified, verbalized understanding.

## 2025-02-28 ENCOUNTER — TELEPHONE (OUTPATIENT)
Dept: ELECTROPHYSIOLOGY | Facility: CLINIC | Age: 75
End: 2025-02-28
Payer: COMMERCIAL

## 2025-02-28 NOTE — TELEPHONE ENCOUNTER
Spoke to patient    CONFIRMED procedure arrival time of 7am on 3/3/2025 for HIEU/DCCV with Dr Rodríguez    Reiterated instructions including:  -Directions to check in desk  -NPO after midnight night prior to procedure; may have water up to 2 hours prior to procedure  -High importance of HOLDING Metoprolol on morning of procedure (due to baseline HR is 50's when in SR)  -Confirmed compliance of Eliquis; he understands not to miss any doses of Eliquis, including his am dose on day of procedure  -Pre-procedure LABS reviewed; no alerts noted  -Confirmed absence of implanted device/stimulator   -Confirmed no fever, cough, or shortness of breath in the past 30 days  -Reviewed current visitor policy    Patient verbalized understanding of above and appreciated the call.

## 2025-03-03 ENCOUNTER — HOSPITAL ENCOUNTER (OUTPATIENT)
Dept: CARDIOLOGY | Facility: HOSPITAL | Age: 75
Discharge: HOME | End: 2025-03-03
Attending: INTERNAL MEDICINE
Payer: COMMERCIAL

## 2025-03-03 ENCOUNTER — ANESTHESIA EVENT (OUTPATIENT)
Dept: MEDSURG UNIT | Facility: HOSPITAL | Age: 75
End: 2025-03-03
Payer: COMMERCIAL

## 2025-03-03 ENCOUNTER — HOSPITAL ENCOUNTER (OUTPATIENT)
Facility: HOSPITAL | Age: 75
Discharge: HOME | End: 2025-03-03
Attending: INTERNAL MEDICINE | Admitting: INTERNAL MEDICINE
Payer: COMMERCIAL

## 2025-03-03 ENCOUNTER — ANESTHESIA (OUTPATIENT)
Dept: MEDSURG UNIT | Facility: HOSPITAL | Age: 75
End: 2025-03-03
Payer: COMMERCIAL

## 2025-03-03 VITALS
BODY MASS INDEX: 29.35 KG/M2 | DIASTOLIC BLOOD PRESSURE: 94 MMHG | SYSTOLIC BLOOD PRESSURE: 134 MMHG | HEART RATE: 53 BPM | HEIGHT: 70 IN | WEIGHT: 205 LBS

## 2025-03-03 VITALS
OXYGEN SATURATION: 100 % | BODY MASS INDEX: 29.35 KG/M2 | RESPIRATION RATE: 18 BRPM | TEMPERATURE: 98 F | SYSTOLIC BLOOD PRESSURE: 126 MMHG | WEIGHT: 205 LBS | HEIGHT: 70 IN | DIASTOLIC BLOOD PRESSURE: 74 MMHG | HEART RATE: 49 BPM

## 2025-03-03 DIAGNOSIS — I48.91 ATRIAL FIBRILLATION: ICD-10-CM

## 2025-03-03 DIAGNOSIS — I49.5 TACHY-BRADY SYNDROME: ICD-10-CM

## 2025-03-03 DIAGNOSIS — I48.19 PERSISTENT ATRIAL FIBRILLATION: ICD-10-CM

## 2025-03-03 LAB
ASCENDING AORTA: 4.1 CM
BSA FOR ECHO PROCEDURE: 2.14 M2
EJECTION FRACTION: 50 %
OHS QRS DURATION: 82 MS
OHS QRS DURATION: 90 MS
OHS QTC CALCULATION: 398 MS
OHS QTC CALCULATION: 414 MS
SINUS: 3.8 CM
STJ: 3.6 CM

## 2025-03-03 PROCEDURE — 93325 DOPPLER ECHO COLOR FLOW MAPG: CPT | Mod: 26,,, | Performed by: STUDENT IN AN ORGANIZED HEALTH CARE EDUCATION/TRAINING PROGRAM

## 2025-03-03 PROCEDURE — 93005 ELECTROCARDIOGRAM TRACING: CPT

## 2025-03-03 PROCEDURE — 93010 ELECTROCARDIOGRAM REPORT: CPT | Mod: ,,, | Performed by: INTERNAL MEDICINE

## 2025-03-03 PROCEDURE — 37000009 HC ANESTHESIA EA ADD 15 MINS: Performed by: INTERNAL MEDICINE

## 2025-03-03 PROCEDURE — 93312 ECHO TRANSESOPHAGEAL: CPT | Mod: 26,,, | Performed by: STUDENT IN AN ORGANIZED HEALTH CARE EDUCATION/TRAINING PROGRAM

## 2025-03-03 PROCEDURE — 93320 DOPPLER ECHO COMPLETE: CPT | Mod: 26,,, | Performed by: STUDENT IN AN ORGANIZED HEALTH CARE EDUCATION/TRAINING PROGRAM

## 2025-03-03 PROCEDURE — 37000008 HC ANESTHESIA 1ST 15 MINUTES: Performed by: INTERNAL MEDICINE

## 2025-03-03 PROCEDURE — 92960 CARDIOVERSION ELECTRIC EXT: CPT | Mod: ,,, | Performed by: INTERNAL MEDICINE

## 2025-03-03 PROCEDURE — 92960 CARDIOVERSION ELECTRIC EXT: CPT | Performed by: INTERNAL MEDICINE

## 2025-03-03 PROCEDURE — 93010 ELECTROCARDIOGRAM REPORT: CPT | Mod: 76,,, | Performed by: INTERNAL MEDICINE

## 2025-03-03 PROCEDURE — 25000003 PHARM REV CODE 250

## 2025-03-03 PROCEDURE — D9220A PRA ANESTHESIA: Mod: ANES,,, | Performed by: ANESTHESIOLOGY

## 2025-03-03 PROCEDURE — 93320 DOPPLER ECHO COMPLETE: CPT

## 2025-03-03 PROCEDURE — 63600175 PHARM REV CODE 636 W HCPCS

## 2025-03-03 PROCEDURE — D9220A PRA ANESTHESIA: Mod: CRNA,,,

## 2025-03-03 RX ORDER — OXYCODONE HYDROCHLORIDE 5 MG/1
5 TABLET ORAL
Status: DISCONTINUED | OUTPATIENT
Start: 2025-03-03 | End: 2025-03-03 | Stop reason: HOSPADM

## 2025-03-03 RX ORDER — PROPOFOL 10 MG/ML
VIAL (ML) INTRAVENOUS
Status: DISCONTINUED | OUTPATIENT
Start: 2025-03-03 | End: 2025-03-03

## 2025-03-03 RX ORDER — ONDANSETRON HYDROCHLORIDE 2 MG/ML
INJECTION, SOLUTION INTRAVENOUS
Status: DISCONTINUED | OUTPATIENT
Start: 2025-03-03 | End: 2025-03-03

## 2025-03-03 RX ORDER — LIDOCAINE HYDROCHLORIDE 20 MG/ML
INJECTION, SOLUTION EPIDURAL; INFILTRATION; INTRACAUDAL; PERINEURAL
Status: DISCONTINUED | OUTPATIENT
Start: 2025-03-03 | End: 2025-03-03

## 2025-03-03 RX ORDER — HALOPERIDOL 5 MG/ML
0.5 INJECTION INTRAMUSCULAR EVERY 10 MIN PRN
Status: DISCONTINUED | OUTPATIENT
Start: 2025-03-03 | End: 2025-03-03 | Stop reason: HOSPADM

## 2025-03-03 RX ORDER — GLUCAGON 1 MG
1 KIT INJECTION
Status: DISCONTINUED | OUTPATIENT
Start: 2025-03-03 | End: 2025-03-03 | Stop reason: HOSPADM

## 2025-03-03 RX ORDER — FENTANYL CITRATE 50 UG/ML
25 INJECTION, SOLUTION INTRAMUSCULAR; INTRAVENOUS EVERY 5 MIN PRN
Status: DISCONTINUED | OUTPATIENT
Start: 2025-03-03 | End: 2025-03-03 | Stop reason: HOSPADM

## 2025-03-03 RX ORDER — SODIUM CHLORIDE 0.9 % (FLUSH) 0.9 %
10 SYRINGE (ML) INJECTION
Status: DISCONTINUED | OUTPATIENT
Start: 2025-03-03 | End: 2025-03-03 | Stop reason: HOSPADM

## 2025-03-03 RX ORDER — SILVER SULFADIAZINE 10 G/1000G
CREAM TOPICAL DAILY
Status: DISCONTINUED | OUTPATIENT
Start: 2025-03-03 | End: 2025-03-03 | Stop reason: HOSPADM

## 2025-03-03 RX ORDER — LIDOCAINE HYDROCHLORIDE 20 MG/ML
SOLUTION OROPHARYNGEAL
Status: DISCONTINUED | OUTPATIENT
Start: 2025-03-03 | End: 2025-03-03

## 2025-03-03 RX ADMIN — SODIUM CHLORIDE: 9 INJECTION, SOLUTION INTRAVENOUS at 09:03

## 2025-03-03 RX ADMIN — ONDANSETRON 4 MG: 2 INJECTION INTRAMUSCULAR; INTRAVENOUS at 09:03

## 2025-03-03 RX ADMIN — LIDOCAINE HYDROCHLORIDE 7 ML: 20 SOLUTION ORAL at 09:03

## 2025-03-03 RX ADMIN — GLYCOPYRROLATE 0.2 MG: 0.2 INJECTION, SOLUTION INTRAMUSCULAR; INTRAVENOUS at 09:03

## 2025-03-03 RX ADMIN — LIDOCAINE HYDROCHLORIDE 100 MG: 20 INJECTION, SOLUTION EPIDURAL; INFILTRATION; INTRACAUDAL; PERINEURAL at 09:03

## 2025-03-03 RX ADMIN — SILVER SULFADIAZINE: 10 CREAM TOPICAL at 10:03

## 2025-03-03 RX ADMIN — PROPOFOL 150 MCG/KG/MIN: 10 INJECTION, EMULSION INTRAVENOUS at 09:03

## 2025-03-03 RX ADMIN — PROPOFOL 40 MG: 10 INJECTION, EMULSION INTRAVENOUS at 09:03

## 2025-03-03 NOTE — PROGRESS NOTES
Pt DC'd per MD order. Discharge instructions given including activity, future appointments, and when to call MD. Medications reviewed including when to take next dose. PIV DC'd, catheter tip intact. Pt left unit via wheelchair with transport and family.

## 2025-03-03 NOTE — DISCHARGE SUMMARY
[facility]  Cardiology  Discharge Summary      Patient Name: Dustin Lauren  MRN: [id number]  Admission Date: 3/3/2025  Hospital Length of Stay: 0 days  Discharge Date and Time:  03/03/2025 10:44 AM  Attending Physician: Dhaval Rodríguez MD    Discharging Provider: Guillermina Hairston PA-C  Primary Care Physician: Law Gibbs MD    HPI:   Last OV with Gala Villasenor NP on 02/25/25.   Mr. Lauren is a 74 y.o. male with a PMHx of atrial fibrillation (PVI 9/2021), HTN, CAD s/p PCI, BPH, hypothyroidism (s/o thyroidectomy), low back pain, tachy-darek syndrome who presents for follow up. He had his hand injected last week and woke up at 2am 2/24/25 with Afib (the same thing happened this past November following hand injections). He took 12.5mg of Lopressor (was concerned about his baseline HR being in the 50's). HR went from low 100's to 80-90. Kardia confirms AF. Advised to take the other 12.5mg of Lopressor now and continue to monitor.     Today he says he does feel his AF (felt off when he woke up in the night yesterday AM which prompted him to use his kardia device to confirm AF). Breathing ok. No CP, LH, syncope.   Gets periodic injections for trigger finger and arthritis.     He is currently taking eliquis 5mg BID for stroke prophylaxis and denies significant bleeding episodes. Kidney function is stable, with a creatinine of 0.9 on 1/16/2025.     I have personally reviewed the patient's EKG today, which shows AF at 87bpm. QRS is 86. QTc is 409.        Today, in good spirits. He endorses some palpitations, no other cardiac complaints. Accompanied by his wife today.     Procedure(s) (LRB):  Cardioversion or Defibrillation (N/A)  Transesophageal echo (HIEU) intra-procedure log documentation (N/A)     Indwelling Lines/Drains at time of discharge:  Lines/Drains/Airways       None     Hospital Course:  Patient underwent HIEU without evidence of SHINE thrombus. Proceeded with DCCV, converting from atrial  fibrillation to sinus rhythm. Patient tolerated the procedure without any acute complications. Post-DCCV ECG revealed sinus bradycardia at 57 bpm. Plan to continue all home medications including Eliquis 5 mg BID, metoprolol 25 mg prn. Instructed to return in 1 week for follow up ECG and in 4 weeks for clinic follow up with Dr. Rodríguez or Gala Villasenor NP.   Patient was assessed at bedside prior to discharge, they reported feeling well and denied chest discomfort, shortness of breath, palpitations, lightheadedness, or any other acute symptoms. Discharge instructions were discussed with patient and all questions were answered. Patient was discharged home in stable condition.       Procedure:   Transesophageal echo (HIEU)  Result Date: 3/3/2025    Left Ventricle: The left ventricle is normal in size. There is low normal systolic function with a visually estimated ejection fraction of 50 - 55%. Ejection fraction is approximately 50%.   Right Ventricle: Right ventricular enlargement. Systolic function is borderline low.   Left Atrium: The left atrial appendage has a windsock morphology. Appendage velocity is normal at greater than 40 cm/sec. There is no thrombus in the left atrial appendage. The pulmonary veins have systolic blunting.   Right Atrium: Right atrium is dilated.   Aortic Valve: The aortic valve is a trileaflet valve. There is moderate aortic valve sclerosis. There is mild annular calcification present. Mildly restricted motion of the left coronary cusp   Mitral Valve: There is mild regurgitation.   Tricuspid Valve: There is mild regurgitation.   Aorta: Aortic root is normal in size measuring 3.8 cm. Aortic root at ST junction is normal. Ascending aorta is mildly dilated measuring 4.1 cm. Grade 2 atherosclerosis of the descending aorta and in the aortic arch with mild thickening.       Significant Diagnostic Studies: Cardiac Graphics: ECG: Sinus bradycardia at 53 bpm     Pending Diagnostic Studies:        None            There are no hospital problems to display for this patient.    No new Assessment & Plan notes have been filed under this hospital service since the last note was generated.  Service: Cardiology      Discharged Condition: stable    Disposition: Home or Self Care    Follow Up:   Follow-up Information       Gala Villasenor, NP. Schedule an appointment as soon as possible for a visit in 1 month(s).    Specialty: Cardiology  Contact information:  [address]  [facility] [id number] [phone]                           Patient Instructions:   No discharge procedures on file.  Medications:  Reconciled Home Medications:      Medication List        ASK your doctor about these medications      amlodipine-valsartan 5-160 mg per tablet  Commonly known as: EXFORGE  Take 1 tablet by mouth once daily.     apixaban 5 mg Tab  Commonly known as: ELIQUIS  Take 1 tablet (5 mg total) by mouth 2 (two) times daily.     atorvastatin 20 MG tablet  Commonly known as: LIPITOR  TAKE 1 TABLET BY MOUTH EVERY DAY     CENTRUM SILVER MEN ORAL  Take 1 tablet by mouth once daily.     cholecalciferol (vitamin D3) 25 mcg (1,000 unit) Chew  Take 2,000 Int'l Units by mouth once daily.     ezetimibe 10 mg tablet  Commonly known as: ZETIA  TAKE 1 TABLET BY MOUTH EVERY DAY     famotidine 40 MG tablet  Commonly known as: PEPCID  Take 40 mg by mouth 2 (two) times daily.     finasteride 5 mg tablet  Commonly known as: PROSCAR  TAKE 1 TABLET BY MOUTH EVERY DAY     fluticasone propionate 50 mcg/actuation nasal spray  Commonly known as: FLONASE  2 sprays by Each Nostril route as needed.     icosapent ethyL 1 gram Cap  Commonly known as: VASCEPA  Take 1 capsule (1,000 mg total) by mouth 2 (two) times a day.     levothyroxine 137 MCG Tab tablet  Commonly known as: SYNTHROID  TAKE 1 TABLET BY MOUTH MON-SAT AND TAKE 1.5 TABLET BY MOUTH ON SUNDAY ONLY     magnesium glycinate 100 mg magnesium Cap  Take 200 mg by mouth Daily.     metoprolol  tartrate 25 MG tablet  Commonly known as: LOPRESSOR  TAKE 1 TABLET (25 MG TOTAL) BY MOUTH AS NEEDED (1-2 TAB FOR PALPITATIONS).     mirabegron 25 mg Tb24 ER tablet  Commonly known as: MYRBETRIQ  Take 1 tablet (25 mg total) by mouth once daily.     naftifine 2 % Crea  Apply 1 application topically daily as needed.     nitroGLYCERIN 0.3 MG SL tablet  Commonly known as: NITROSTAT  Place 1 tablet (0.3 mg total) under the tongue every 5 (five) minutes as needed for Chest pain.     prasugreL HCl 10 mg Tab  Commonly known as: EFFIENT  TAKE 1 TABLET BY MOUTH EVERY DAY     tamsulosin 0.4 mg Cap  Commonly known as: FLOMAX  TAKE 1 CAPSULE BY MOUTH EVERY DAY     tiZANidine 4 MG tablet  Commonly known as: ZANAFLEX  Take 1 tablet (4 mg total) by mouth every 8 (eight) hours as needed (muscle spasms).              Time spent on the discharge of patient: 35 minutes    Guillermina Hairston PA-C  Cardiology  [facility]

## 2025-03-03 NOTE — DISCHARGE INSTRUCTIONS
Medications:  -Continue to take your home medications as listed on your medication list after you are discharged.    New Medications:  -None     Diet  -You may resume oral intake after you are discharged, as long you have no swallowing difficulties.    Because you have received sedation for this procedure:  -Limit activity for the remainder of the day.  -Do not smoke for at least 6 hours and until you are fully awake and alert.  -Do not drink alcoholic beverage for 24 hours.  -Do not drive for 24 hours.  -Defer important decision making until the following day.     Go to the Emergency Department if you develop:   -Bleeding  -Weakness or numbness  -Visual, gait or speech disturbance  -New chest pain, palpitations, shortness of breath, rapid heart beat, or fainting  -Fever    Follow up:  -EKG in 1 week.  -Dr. Rodríguez or Gala Villasenor NP in 1 month. Call or message the office to schedule.    Any need to reschedule or cancel procedures, or any questions regarding your procedures should be addressed directly with the Arrhythmia Department Nurses at the following phone number: [phone].

## 2025-03-03 NOTE — HOSPITAL COURSE
Patient underwent HIEU without evidence of SHINE thrombus. Proceeded with DCCV, converting from atrial fibrillation to sinus rhythm. Patient tolerated the procedure without any acute complications. Post-DCCV ECG revealed sinus bradycardia at 57 bpm. Plan to continue all home medications including Eliquis 5 mg BID, metoprolol 25 mg prn. Instructed to return in 1 week for follow up ECG and in 4 weeks for clinic follow up with Dr. Rodríguez or Gala Villasenor NP.   Patient was assessed at bedside prior to discharge, they reported feeling well and denied chest discomfort, shortness of breath, palpitations, lightheadedness, or any other acute symptoms. Discharge instructions were discussed with patient and all questions were answered. Patient was discharged home in stable condition.

## 2025-03-03 NOTE — ANESTHESIA PREPROCEDURE EVALUATION
03/03/2025  Dustin Lauren is a 74 y.o., male.    Problem List[1]  Past Surgical History:   Procedure Laterality Date    ABLATION OF ARRHYTHMOGENIC FOCUS FOR ATRIAL FIBRILLATION N/A 9/16/2021    Procedure: Ablation atrial fibrillation;  Surgeon: Dhaval Rodríguez MD;  Location: [facility] EP LAB;  Service: Cardiology;  Laterality: N/A;  AF, HIEU (Cx if compliant and in SR), PVI, RFA, DAWSON, Gen, SK, 3 Prep    CARDIOVERSION      for AFib    CHOLECYSTECTOMY      COLONOSCOPY W/ BIOPSIES AND POLYPECTOMY  10/2013    CORONARY ANGIOPLASTY WITH STENT PLACEMENT      Stent in RCA and another stent in ostium    KNEE ARTHROSCOPY  10-13-14    right TKA    KNEE ARTHROSCOPY Left 10-7-15    ascope    KNEE ARTHROSCOPY W/ MENISCAL REPAIR Left 10/7/15    THYROIDECTOMY      TREATMENT OF CARDIAC ARRHYTHMIA  9/16/2021    Procedure: Cardioversion or Defibrillation;  Surgeon: Dhaval Rodríguez MD;  Location: [facility] EP LAB;  Service: Cardiology;;       Pre-op Assessment    I have reviewed the Patient Summary Reports.     I have reviewed the Nursing Notes. I have reviewed the NPO Status.   I have reviewed the Medications.     Review of Systems      Physical Exam  General: Well nourished    Airway:  Mallampati: II   Mouth Opening: Normal  TM Distance: Normal  Tongue: Normal  Neck ROM: Normal ROM        Anesthesia Plan  Type of Anesthesia, risks & benefits discussed:    Anesthesia Type: Gen Natural Airway, MAC  Intra-op Monitoring Plan: Standard ASA Monitors  Post Op Pain Control Plan: multimodal analgesia  Induction:  IV  Informed Consent: Patient consented to blood products? No  ASA Score: 2  Day of Surgery Review of History & Physical: H&P Update referred to the surgeon/provider.    Ready For Surgery From Anesthesia Perspective.     .           [1]   Patient Active Problem List  Diagnosis    Hypertension    Knee injury    Osteoarthritis of lumbar  spine    DJD (degenerative joint disease) of thoracic spine    DJD (degenerative joint disease) of cervical spine    Chronic LBP    Carpal tunnel syndrome, bilateral, L>R.    Chronic neck pain    Obesity    Postsurgical hypothyroidism    Hyperlipidemia    Nuclear sclerosis - Both Eyes    Knee pain, left    Low back pain    Eyelid twitch    GE (gastroenteritis)    Other tear of cartilage or meniscus of knee, current    Right groin pain    Right LBP    Left knee pain    Status post arthroscopy of left knee 10/7/2015    Impaired glucose tolerance    Persistent atrial fibrillation    Benign prostatic hyperplasia with urinary frequency    Coronary artery disease    Stented coronary artery    Tachy-darek syndrome    Solitary pulmonary nodule on lung CT    History of radiofrequency ablation (RFA) procedure for cardiac arrhythmia    History of COVID-19    On anticoagulant therapy    Lymphedema of both lower extremities    Varicose veins of both lower extremities with pain

## 2025-03-03 NOTE — NURSING
Pre-procedure complete verification call made at 0851 for 0900 procedure. Unable to connect, will verify upon arrival to unit.

## 2025-03-03 NOTE — ANESTHESIA POSTPROCEDURE EVALUATION
Anesthesia Post Evaluation    Patient: Dustin Lauren    Procedure(s) Performed: Procedure(s) (LRB):  Cardioversion or Defibrillation (N/A)  Transesophageal echo (HIEU) intra-procedure log documentation (N/A)    Final Anesthesia Type: general      Patient location during evaluation: PACU  Patient participation: Yes- Able to Participate  Level of consciousness: awake and alert and oriented  Pain management: adequate  Airway patency: patent    PONV status at discharge: No PONV  Anesthetic complications: no      Cardiovascular status: blood pressure returned to baseline and hemodynamically stable  Respiratory status: unassisted  Hydration status: euvolemic  Follow-up not needed.              Vitals Value Taken Time   /74 03/03/25 10:38   Temp 36.4 °C (97.5 °F) 03/03/25 10:38   Pulse 49 03/03/25 10:38   Resp 18 03/03/25 10:38   SpO2 100 % 03/03/25 10:38         No case tracking events are documented in the log.      Pain/Evelyn Score: Pain Rating Prior to Med Admin: 0 (3/3/2025 10:30 AM)  Evelyn Score: 9 (3/3/2025 10:30 AM)

## 2025-03-03 NOTE — H&P
[facility]  Cardiology  HIEU/DCCV History & Physical      Dustin Lauren  YOB: 1950  Medical Record Number:  [id number]  Attending Physician:  Dhaval Rodríguez MD   Date of Admission: 3/3/2025       Hospital Day:  0  Current Principal Problem:  Atrial fibrillation     Patient information was obtained from patient and past medical records.  History     Cc: HIEU/DCCV for AF     HPI  Last OV with Gala Villasenor NP on 02/25/25.   Mr. Lauren is a 74 y.o. male with a PMHx of atrial fibrillation (PVI 9/2021), HTN, CAD s/p PCI, BPH, hypothyroidism (s/o thyroidectomy), low back pain, tachy-darek syndrome who presents for follow up. He had his hand injected last week and woke up at 2am 2/24/25 with Afib (the same thing happened this past November following hand injections). He took 12.5mg of Lopressor (was concerned about his baseline HR being in the 50's). HR went from low 100's to 80-90. Kardia confirms AF. Advised to take the other 12.5mg of Lopressor now and continue to monitor.     Today he says he does feel his AF (felt off when he woke up in the night yesterday AM which prompted him to use his kardia device to confirm AF). Breathing ok. No CP, LH, syncope.   Gets periodic injections for trigger finger and arthritis.     He is currently taking eliquis 5mg BID for stroke prophylaxis and denies significant bleeding episodes. Kidney function is stable, with a creatinine of 0.9 on 1/16/2025.     I have personally reviewed the patient's EKG today, which shows AF at 87bpm. QRS is 86. QTc is 409.      Today, in good spirits. He endorses some palpitations, no other cardiac complaints. Accompanied by his wife today.     Rate/rhythm control: metoprolol 25 mg prn  Anticoagulant/antiplatelets: Eliquis 5 mg BID   ECG: Atrial fibrillation at 76 bpm   Platelet count: 226  INR: 1.2    History of stroke:  no  Dysphagia or odynophagia:  no  Liver Disease, esophageal disease, or known  varices:  no  Upper GI Bleeding:  no  Snoring:  no   Sleep Apnea:  no  Prior neck surgery or radiation:  no  History of anesthetic difficulties:  no  Family history of anesthetic difficulties:  no  Last oral intake: last pm   Able to move neck in all directions:  yes  GLP-1 Use: no      Medications - Outpatient  Prior to Admission medications    Medication Sig Start Date End Date Taking? Authorizing Provider   amlodipine-valsartan (EXFORGE) 5-160 mg per tablet Take 1 tablet by mouth once daily. 1/16/25  Yes Law Gibbs MD   apixaban (ELIQUIS) 5 mg Tab Take 1 tablet (5 mg total) by mouth 2 (two) times daily. 9/17/24  Yes Gala Villasenor NP   atorvastatin (LIPITOR) 20 MG tablet TAKE 1 TABLET BY MOUTH EVERY DAY 8/13/24  Yes Osmar Elder MD   cholecalciferol, vitamin D3, 25 mcg (1,000 unit) Chew Take 2,000 Int'l Units by mouth once daily. 3/22/16  Yes Provider, Historical   ezetimibe (ZETIA) 10 mg tablet TAKE 1 TABLET BY MOUTH EVERY DAY 6/20/24  Yes Osmar Elder MD   famotidine (PEPCID) 40 MG tablet Take 40 mg by mouth 2 (two) times daily. 1/11/24  Yes Provider, Historical   finasteride (PROSCAR) 5 mg tablet TAKE 1 TABLET BY MOUTH EVERY DAY 2/12/25  Yes Katlyn Brody NP   fluticasone propionate (FLONASE) 50 mcg/actuation nasal spray 2 sprays by Each Nostril route as needed. 10/24/20  Yes Provider, Historical   icosapent ethyL (VASCEPA) 1 gram Cap Take 1 capsule (1,000 mg total) by mouth 2 (two) times a day. 9/17/24  Yes Osmar Elder MD   levothyroxine (SYNTHROID) 137 MCG Tab tablet TAKE 1 TABLET BY MOUTH MON-SAT AND TAKE 1.5 TABLET BY MOUTH ON SUNDAY ONLY 12/2/24  Yes Conchita Torres FNP-C   magnesium glycinate 100 mg magnesium Cap Take 200 mg by mouth Daily.   Yes Provider, Historical   metoprolol tartrate (LOPRESSOR) 25 MG tablet TAKE 1 TABLET (25 MG TOTAL) BY MOUTH AS NEEDED (1-2 TAB FOR PALPITATIONS). 2/25/25 2/25/26 Yes Osmar Elder MD    multivit-min/FA/lycopen/lutein (CENTRUM SILVER MEN ORAL) Take 1 tablet by mouth once daily. 1/28/21  Yes Provider, Historical   naftifine 2 % Crea Apply 1 application topically daily as needed.   Yes Provider, Historical   prasugreL HCl (EFFIENT) 10 mg Tab TAKE 1 TABLET BY MOUTH EVERY DAY 2/27/25  Yes Osmar Elder MD   tamsulosin (FLOMAX) 0.4 mg Cap TAKE 1 CAPSULE BY MOUTH EVERY DAY 2/17/25  Yes Katlyn Brody NP   mirabegron (MYRBETRIQ) 25 mg Tb24 ER tablet Take 1 tablet (25 mg total) by mouth once daily. 6/9/23 2/25/25  Isak Otto MD   nitroGLYCERIN (NITROSTAT) 0.3 MG SL tablet Place 1 tablet (0.3 mg total) under the tongue every 5 (five) minutes as needed for Chest pain. 9/17/24   Osmar Elder MD   tiZANidine (ZANAFLEX) 4 MG tablet Take 1 tablet (4 mg total) by mouth every 8 (eight) hours as needed (muscle spasms). 12/1/22   HARINDER Shin NP       Medications - Current  Scheduled Meds:  Continuous Infusions:  PRN Meds:.      Allergies  Review of patient's allergies indicates:   Allergen Reactions    Codeine Nausea Only    Crestor [rosuvastatin] Palpitations    Doxycycline Hives    Keflex [cephalexin] Rash    Celebrex [celecoxib] Other (See Comments)     Upset stomach    Mobic [meloxicam] Other (See Comments)     Stomach pain and nauseous    Niacin Diarrhea    Niacin preparations Diarrhea    Ampicillin Rash    Pcn [penicillins] Rash       Past Medical History  Past Medical History:   Diagnosis Date    A-fib     s/p cardioversion    Carpal tunnel syndrome     bilaterally    Cataract     Chronic LBP 11/8/2012    Chronic neck pain 11/8/2012    Coronary artery disease 03/24/2016    s/p 2 stents in RCA and ostium    Diverticulosis of colon     DJD (degenerative joint disease) of cervical spine 11/8/2012    DJD (degenerative joint disease) of lumbar spine 11/8/2012    DJD (degenerative joint disease) of thoracic spine 11/8/2012    GERD (gastroesophageal reflux disease)     Hyperlipidemia      hypertriglyceridemia    Hypertension     Hypothyroidism     s/p surgery    Nephrolithiasis, uric acid     stone evaluation showed uric acid and calcium oxalate    PONV (postoperative nausea and vomiting)     Rosacea     Thyroid disease     Ulcer     Unspecified disorder of kidney and ureter     Vitamin D deficiency disease        Past Surgical History  Past Surgical History:   Procedure Laterality Date    ABLATION OF ARRHYTHMOGENIC FOCUS FOR ATRIAL FIBRILLATION N/A 9/16/2021    Procedure: Ablation atrial fibrillation;  Surgeon: Dhaval Rodríguez MD;  Location: [facility] EP LAB;  Service: Cardiology;  Laterality: N/A;  AF, HIEU (Cx if compliant and in SR), PVI, RFA, DAWSON, Gen, SK, 3 Prep    CARDIOVERSION      for AFib    CHOLECYSTECTOMY      COLONOSCOPY W/ BIOPSIES AND POLYPECTOMY  10/2013    CORONARY ANGIOPLASTY WITH STENT PLACEMENT      Stent in RCA and another stent in ostium    KNEE ARTHROSCOPY  10-13-14    right TKA    KNEE ARTHROSCOPY Left 10-7-15    ascope    KNEE ARTHROSCOPY W/ MENISCAL REPAIR Left 10/7/15    THYROIDECTOMY      TREATMENT OF CARDIAC ARRHYTHMIA  9/16/2021    Procedure: Cardioversion or Defibrillation;  Surgeon: Dhaval Rodríguez MD;  Location: [facility] EP LAB;  Service: Cardiology;;       Social History  Social History     Socioeconomic History    Marital status:    Tobacco Use    Smoking status: Never     Passive exposure: Never    Smokeless tobacco: Never   Substance and Sexual Activity    Alcohol use: Yes     Alcohol/week: 0.0 standard drinks of alcohol     Comment: rarely    Drug use: No     Social Drivers of Health     Financial Resource Strain: Patient Declined (1/23/2025)    Overall Financial Resource Strain (CARDIA)     Difficulty of Paying Living Expenses: Patient declined   Food Insecurity: Patient Declined (1/23/2025)    Hunger Vital Sign     Worried About Running Out of Food in the Last Year: Patient declined     Ran Out of Food in the Last Year: Patient declined   Transportation Needs:  Patient Declined (1/8/2024)    PRAPARE - Transportation     Lack of Transportation (Medical): Patient declined     Lack of Transportation (Non-Medical): Patient declined   Physical Activity: Sufficiently Active (1/23/2025)    Exercise Vital Sign     Days of Exercise per Week: 3 days     Minutes of Exercise per Session: 80 min   Stress: No Stress Concern Present (1/23/2025)    [employer] - Occupational Stress Questionnaire     Feeling of Stress : Not at all   Housing Stability: Patient Declined (1/8/2024)    Housing Stability Vital Sign     Unable to Pay for Housing in the Last Year: Patient declined     Unstable Housing in the Last Year: Patient declined       ROS  Review of Systems   Constitutional: Negative for chills.   HENT: Negative.     Eyes: Negative.    Cardiovascular:  Negative for chest pain, dyspnea on exertion and palpitations.   Respiratory: Negative.  Negative for shortness of breath and sleep disturbances due to breathing.    Endocrine: Negative.    Musculoskeletal: Negative.    Gastrointestinal:  Negative for hematemesis, melena, nausea and vomiting.   Genitourinary: Negative.    Neurological: Negative.    Psychiatric/Behavioral: Negative.  Negative for altered mental status.    Allergic/Immunologic: Negative.    Physical Examination     Vital Signs  24 Hour VS Range    Temp:  [97.9 °F (36.6 °C)]   Pulse:  [76]   Resp:  [17]   BP: (126-134)/(76-94)   SpO2:  [100 %]   No intake or output data in the 24 hours ending 03/03/25 0815      Physical Exam:   Physical Exam  Constitutional:       General: He is not in acute distress.     Appearance: Normal appearance. He is normal weight. He is not ill-appearing.   HENT:      Head: Normocephalic and atraumatic.      Nose: Nose normal. No congestion or rhinorrhea.      Mouth/Throat:      Mouth: Mucous membranes are moist.      Pharynx: Oropharynx is clear. No oropharyngeal exudate or posterior oropharyngeal erythema.   Eyes:       "General:         Right eye: No discharge.         Left eye: No discharge.      Extraocular Movements: Extraocular movements intact.      Conjunctiva/sclera: Conjunctivae normal.   Cardiovascular:      Rate and Rhythm: Normal rate. Rhythm irregularly irregular.   Pulmonary:      Effort: No respiratory distress.      Breath sounds: Normal breath sounds. No stridor. No wheezing or rales.   Musculoskeletal:         General: No swelling. Normal range of motion.      Cervical back: Normal range of motion. No rigidity or tenderness.      Right lower leg: No edema.      Left lower leg: No edema.   Skin:     General: Skin is warm and dry.      Coloration: Skin is not jaundiced.      Findings: No bruising or lesion.   Neurological:      General: No focal deficit present.      Mental Status: He is alert and oriented to person, place, and time. Mental status is at baseline.      Cranial Nerves: No cranial nerve deficit.      Motor: No weakness.      Gait: Gait normal.   Psychiatric:         Mood and Affect: Mood normal.         Behavior: Behavior normal.         Thought Content: Thought content normal.         Judgment: Judgment normal.        Data       Recent Labs   Lab 02/27/25  0712   WBC 4.79   HGB 15.1   HCT 45.3           Recent Labs   Lab 02/27/25  0712   INR 1.2        Recent Labs   Lab 02/27/25  0712      K 4.6      CO2 28   BUN 24*   CREATININE 1.1   ANIONGAP 5*   CALCIUM 9.3        No results for input(s): "PROT", "ALBUMIN", "BILITOT", "ALKPHOS", "AST", "ALT" in the last 168 hours.     No results for input(s): "TROPONINI" in the last 168 hours.     No results found for: "BNP"    No results for input(s): "LABBLOO" in the last 168 hours.     Assessment & Plan     #Atrial fibrillation   -patient presents for HIEU/DCCV for AF  -on Eliquis for CVA prophylaxis, last dose this morning        SMITHA Villasenor Plan from 02/25/25:   HIEU/DCCV then redo PVI    HIEU 09/16/21  Atrial fibrillation " observed.  HIEU to evaluate for SHINE prior PVI for atrial fibrillation.  The left ventricle is normal in size with normal systolic function. The estimated ejection fraction is 55%.  Moderate right ventricular enlargement with low normal right ventricular systolic function.  Normal appearing left atrial appendage. No thrombus is present in the appendage. Normal appendage velocities 0.64 m/s.  Biatrial enlargement.  Moderate tricuspid regurgitation. There is prolapse of the septal TV leaflet.  Grade 3 plaque present in the descending aorta.  The ascending aorta is mildly dilated measured at 4 cm.      -No absolute contraindications of esophageal stricture, tumor, perforation, laceration,or diverticulum and/or active GI bleed.  -The risks, benefits & alternatives of the procedure were explained to the patient.   -The risks of transesophageal echo include but are not limited to:  Dental trauma, esophageal trauma/perforation, bleeding, laryngospasm/brochospasm, aspiration, sore throat/hoarseness, & dislodgement of the endotracheal tube/nasogastric tube (where applicable).    -The risks of moderate sedation include hypotension, respiratory depression, arrhythmias, bronchospasm, & death.    -Prior to procedure, extensive discussion with patient regarding risks and benefits of DCCV at bedside today. The patient voices understanding, all questions have been answered, and patient would like to proceed.  -Informed consent was obtained. The patient is agreeable to proceed with the procedure and all questions and concerns addressed.    Case was discussed with an attending physician prior to procedure.    Guillermina Hairston PA-C  [facility]

## 2025-03-03 NOTE — TRANSFER OF CARE
"Anesthesia Transfer of Care Note    Patient: Dustin Lauren    Procedure(s) Performed: Procedure(s) (LRB):  Cardioversion or Defibrillation (N/A)  Transesophageal echo (HIEU) intra-procedure log documentation (N/A)    Patient location: PACU    Anesthesia Type: MAC    Transport from OR: Transported from OR on 6-10 L/min O2 by face mask with adequate spontaneous ventilation    Post pain: adequate analgesia    Post assessment: no apparent anesthetic complications and tolerated procedure well    Post vital signs: stable    Level of consciousness: awake and sedated    Nausea/Vomiting: no nausea/vomiting    Complications: none    Transfer of care protocol was followed      Last vitals: Visit Vitals  BP (!) 134/94 (BP Location: Left arm, Patient Position: Lying)   Pulse 76   Temp 36.6 °C (97.9 °F)   Resp 17   Ht 5' 10" (1.778 m)   Wt 93 kg (205 lb)   SpO2 100%   BMI 29.41 kg/m²     "

## 2025-03-03 NOTE — NURSING TRANSFER
Nursing Transfer Note      3/3/2025   10:42 AM    Nurse giving handoff:jose de jesus dennis   Nurse receiving handoff:dami [facility]scu rn    Reason patient is being transferred: ep pacu 3 to sscu 8/home    Transfer To: ep pacu 3 to [facility]sc[facility] 8/home    Transfer via stretcher    Transfer with none discharge orders in place    Transported by jose de jesus dennis    Transfer Vital Signs:  Blood Pressure:120/76  Heart Rate:50  O2:99% room air  Temperature:97.9  Respirations:17    Telemetry: none  Order for Tele Monitor?  no    Additional Lines: none    Medicines sent: silvadene    Any special needs or follow-up needed: bedrest x1hr npo until 1007    Patient belongings transferred with patient:  ss[facility] locker    Chart send with patient: Yes    Notified: spouse    Patient reassessed at: 3/3/25 1015  Upon arrival to floor: patient oriented to room, call bell in reach, and bed in lowest position

## 2025-03-09 ENCOUNTER — PATIENT MESSAGE (OUTPATIENT)
Dept: ELECTROPHYSIOLOGY | Facility: CLINIC | Age: 75
End: 2025-03-09
Payer: COMMERCIAL

## 2025-03-10 ENCOUNTER — HOSPITAL ENCOUNTER (OUTPATIENT)
Dept: CARDIOLOGY | Facility: CLINIC | Age: 75
Discharge: HOME | End: 2025-03-10
Payer: COMMERCIAL

## 2025-03-10 DIAGNOSIS — I48.19 PERSISTENT ATRIAL FIBRILLATION: ICD-10-CM

## 2025-03-10 LAB
OHS QRS DURATION: 86 MS
OHS QTC CALCULATION: 373 MS

## 2025-03-10 PROCEDURE — 93005 ELECTROCARDIOGRAM TRACING: CPT | Mod: S$GLB,,, | Performed by: INTERNAL MEDICINE

## 2025-03-10 PROCEDURE — 93010 ELECTROCARDIOGRAM REPORT: CPT | Mod: S$GLB,,, | Performed by: INTERNAL MEDICINE

## 2025-03-10 NOTE — PROGRESS NOTES
Outpatient Rehab    Occupational Therapy Evaluation    Patient Name: Dustin Lauren  MRN: [id number]  YOB: 1950  Encounter Date: 2/27/2025    Therapy Diagnosis:   Encounter Diagnosis   Name Primary?    Lymphedema of both lower extremities      Physician: Yamil Guadarrama MD*    Physician Orders: Eval and Treat  Medical Diagnosis: Lymphedema of both lower extremities [I89.0]     Visit # / Visits Authorized:  1 / 1   Date of Evaluation:  2/27/2025   Insurance Authorization Period:  1/29/2025 - 1/29/2026   Plan of Care Certification:  2/27/2025 to 5/1/2025      Time In: 0800   Time Out: 0900  Total Time: 60   Total Billable Time: 60    Intake Outcome Measure for FOTO Survey    Therapist reviewed FOTO scores for Dustin Lauren on 2/27/2025.   FOTO report - see Media section or FOTO account episode details.     Intake Score:  %         Subjective   History of Present Illness  Dustin is a 74 y.o. male who reports to occupational therapy with a chief concern of Swelling of LLE.     The patient reports a medical diagnosis of Lymphedema of both lower extremities (I89.0).    Diagnostic tests related to this condition: Ultrasound.   Ultrasound Details:   There is no evidence of a right lower extremity DVT.    There is no evidence of a left lower extremity DVT.    The left greater saphenous vein has reflux.    History of Present Condition/Illness: Swelling presented in LLE several months ago. He denies swelling of RLE. He has chronic b/l knee pain. Dr. Gibbs recommends injections but he has not scheduled this yet. At first he suspected the discomfort in his leg was due to the knee pain but it then started to spread across entire leg. He has varicose veins that are intermittently painful and itchy particularly at night. Elevation improves swelling but does not resolve it. Of note, he has been on Amlodipine for 10+ years. He has been wearing knee high compression stockings of unknown strength purchased at  pharmacy/[facility] for approximately ten years. He denies swelling into stocking but notices swelling proximal to where stocking ends in knee and thigh.     Additional Lymphedema History  The patient's medical history relevant to lymphedema includes Surgery in region.         Activities of Daily Living  Social history was obtained from Patient.               Previously independent with activities of daily living? Yes     Currently independent with activities of daily living? Yes          Previously independent with instrumental activities of daily living? Yes     Currently independent with instrumental activities of daily living? Yes              Pain     Patient reports a current pain level of 2/10. Pain at best is reported as 2/10. Pain at worst is reported as 5/10.   Location: R thumb, L knee, lower back  Clinical Progression (since onset): Stable  Pain Qualities: Aching  Pain-Relieving Factors: Other (Comment)  Other Pain-Relieving Factors: Injections, topical cream  Pain-Aggravating Factors: Other (Comment)  Other Pain-Aggravating Factors: Overuse of thumb         Living Arrangements  Living Situation  Housing: Home independently  Living Arrangements: Spouse/significant other        Employment  Patient reports: Does the patient's condition impact their ability to work?  Employment Status: Employed full-time   Owner of [employer]       Past Medical History/Physical Systems Review:   Dustin Lauren  has a past medical history of A-fib, Carpal tunnel syndrome, Cataract, Chronic LBP, Chronic neck pain, Coronary artery disease, Diverticulosis of colon, DJD (degenerative joint disease) of cervical spine, DJD (degenerative joint disease) of lumbar spine, DJD (degenerative joint disease) of thoracic spine, GERD (gastroesophageal reflux disease), Hyperlipidemia, Hypertension, Hypothyroidism, Nephrolithiasis, uric acid, PONV (postoperative nausea and vomiting), Rosacea, Thyroid disease, Ulcer, Unspecified  disorder of kidney and ureter, and Vitamin D deficiency disease.    Dustin Lauren  has a past surgical history that includes Cholecystectomy; Thyroidectomy; Colonoscopy w/ biopsies and polypectomy (10/2013); Knee arthroscopy w/ meniscal repair (Left, 10/7/15); Knee arthroscopy (10-13-14); Knee arthroscopy (Left, 10-7-15); Coronary angioplasty with stent; Cardioversion; Ablation of arrhythmogenic focus for atrial fibrillation (N/A, 9/16/2021); Treatment of cardiac arrhythmia (9/16/2021); Treatment of cardiac arrhythmia (N/A, 3/3/2025); and echocardiogram,transesophageal (N/A, 3/3/2025).    Dustin has a current medication list which includes the following prescription(s): amlodipine-valsartan, apixaban, atorvastatin, cholecalciferol (vitamin d3), ezetimibe, famotidine, finasteride, fluticasone propionate, icosapent ethyl, levothyroxine, magnesium glycinate, metoprolol tartrate, mirabegron, mv-min/folic/k1/lycopen/lutein, naftifine, nitroglycerin, prasugrel hcl, tamsulosin, and tizanidine.    Review of patient's allergies indicates:   Allergen Reactions    Codeine Nausea Only    Crestor [rosuvastatin] Palpitations    Doxycycline Hives    Keflex [cephalexin] Rash    Celebrex [celecoxib] Other (See Comments)     Upset stomach    Mobic [meloxicam] Other (See Comments)     Stomach pain and nauseous    Niacin Diarrhea    Niacin preparations Diarrhea    Ampicillin Rash    Pcn [penicillins] Rash        Objective   Lower Extremity Skin Observations  Right Lower Extremity Skin Observations  Right Lower Extremity Edema Non-pitting or Pitting: Pitting  Right Lower Extremity Pitting Edema Severity: Mild pitting, indentation lasts less than 15 sec  Right Lower Extremity Vascular Changes: Varicosities  Right Lower Extremity Stemmer's Sign: Yes    Left Lower Extremity Skin Observations  Left Lower Extremity Edema Non-pitting or Pitting: Pitting  Left Lower Extremity Pitting Edema Severity: Mild pitting, indentation lasts less than  15 sec  Left Lower Extremity Vascular Changes: Varicosities  Left Lower Extremity Stemmer's Sign: Yes           Lower extremity Girth Measurements (in centimeters): taken in supine  [place] Left lower extremity  Date: 2/27/25 Right lower extremity   Date: 2/27/25   Metatarsal Head 25.5 cm 25.0 cm   Medial Malleolus 28.0 cm 29.0 cm   (+) 5cm 24.5 cm 24.5 cm   (+) 10cm 27.5 cm 26.5 cm   (+) 20cm 38.5 cm 38.0 cm   (+) 30cm 36.5 cm 38.0 cm   (+) 40cm 46.0 cm 44.0 cm   (+) 50cm 52.0 cm 52.0 cm          Treatment:  Self Cares and ADLs  Self Care/ADL Activity 1: see education section.    Other Activity  Other Activity 1: SEQUENTIAL COMPRESSION PUMP: full leg sleeve applied to RLE and LLE  Airos 6 with default setting with distal pressures starting at 45mmHg entire extremity simultaneous to education.    Assessment & Plan   Assessment  Dustin presents with a condition of Low complexity.   Will Comorbidities Impact Care: No          Functional Limitations: Standing tolerance                 Evaluation/Treatment Response: Patient responded to treatment well  Prognosis: Good  Assessment Details: Pt presents with stage 1 lymphedema of BLE. Anticipate resolution with medical grade 20-30 mm hg compression stockings, exercise, and elevation at rest. Pt to RTC in 2-4 weeks for reassessment. If swelling has not resolved, plan to initiate short complete decongestive therapy course at that time.      Plan  From an occupational therapy perspective, the patient would benefit from: Skilled Rehab Services    Planned therapy interventions include: Therapeutic exercise, Therapeutic activities, Manual therapy, Lymphatic compression wrapping, and ADLs/IADLs.            Visit Frequency: 1 times In Total          This plan was discussed with Patient.   Discussion participants: Agreed Upon Plan of Care  Plan details: Pt has stage 1 lymphedema secondary to CVI and is in the active (phase 1) stage of treatment. A compression garment is required  to reduce and maintain limb girth and prevent progression of lymphedema to prevent infections, wounds, pain, and orthopedic issues.           Patient's spiritual, cultural, and educational needs considered and patient agreeable to plan of care and goals.     Education  Education was done with Patient. The patient's learning style includes Demonstration and Listening. The patient Demonstrates understanding and Verbalizes understanding.          Patient was educated in  Compression needs: 20-30 mm hg knee highs and 20-30 mm hg thigh highs Home management plan: Wear schedule: Darryl first thing in the morning, remove at the end of the day as close to bedtime as possible. Do not sleep in garments. If using a home pneumatic pump, remove garments when using pump. If it is not yet bedtime, resume wearing garments until bed. Donning/doffing techniques Wash and management of products Cost/coverage of compression garments: Medicare now pays for compression. Private insurance coverage is on a case-by-case basis. In order to determine coverage an Rx with a diagnosis of lymphedema is sent to a participating DME for a benefits check.  Medicare Coverage: Daytime garments - 3 sets (one garment for each affected body part) every six months,  standard or custom fit, or a combination of both. Nighttime garments - 2 sets (one garment for each affected body part) every two years, standard or custom fit, or a combination of both. Bandaging supplies - no set limit in the rule. Accessories - no set limit, will be determined on a case-by-case basis depending on the needs of the patient. Coverage is under Medicare part B. After deductible is met, a 20% copayment is required for all items. Secondary insurance often covers copayment's.  Commitment to attendance and securing compression needs are critical to lymphedema management Pneumatic pump benefits, set-up, use, referral process, coverage through insurance. Coverage is based on insurance  plan as well as if deductible has been met. Pneumatic pump is an added tool for managing lymphedema at home and is meant to be used in addition to wearing compression garments daily but does not substitute compression garments.  Monitor for signs/symptoms of infection and seek medical attention immediately if symptoms occur.         Goals:   Active       Long Term Goals       Patient and/or caregiver to jennifer/doff compression garment independently and with daily compliance to assist in lymphedema management, skin elasticity, and tissue density        Start:  03/10/25    Expected End:  04/21/25            Patient and/or caregiver will be independent in use of pneumatic compression pump or self manual lymphatic drainage techniques to areas within reach to enhance lymphatic drainage and skin condition.         Start:  03/10/25    Expected End:  04/21/25            Patient to be independent and compliant with home exercise program to allow for increased function in affected limb.        Start:  03/10/25    Expected End:  04/21/25               Short Term Goals       Patient will demonstrate 100% knowledge of lymphedema precautions and signs of infection to allow for reduced lymphedema risk, infection risk, and/or exacerbation of condition.         Start:  03/10/25    Expected End:  03/31/25            Patient and/or caregiver will order/obtain appropriate compression garments to maintain lymphatic and venous support.        Start:  03/10/25    Expected End:  03/31/25            Patient will tolerate daily activities wearing compression garments to allow for lymphatic drainage support, skin elasticity, and reduction in shape and size of limb.         Start:  03/10/25    Expected End:  03/31/25                Selam Morgan, OT

## 2025-03-18 ENCOUNTER — TELEPHONE (OUTPATIENT)
Dept: CARDIOLOGY | Facility: CLINIC | Age: 75
End: 2025-03-18
Payer: COMMERCIAL

## 2025-03-18 DIAGNOSIS — I89.0 LYMPHEDEMA OF BOTH LOWER EXTREMITIES: Primary | ICD-10-CM

## 2025-03-18 NOTE — TELEPHONE ENCOUNTER
Followed up with patient regarding conservative management of lymphedema. States he is making good progress wearing compression and declines need for lymphedema pump at this time. Patient requested two more pairs of compression. Will determine if patient able to obtain at next lymphedema therapy visit.

## 2025-03-21 DIAGNOSIS — I48.19 PERSISTENT ATRIAL FIBRILLATION: Primary | ICD-10-CM

## 2025-03-21 DIAGNOSIS — I49.5 TACHY-BRADY SYNDROME: ICD-10-CM

## 2025-03-31 ENCOUNTER — CLINICAL SUPPORT (OUTPATIENT)
Dept: REHABILITATION | Facility: HOSPITAL | Age: 75
End: 2025-03-31
Payer: COMMERCIAL

## 2025-03-31 DIAGNOSIS — I89.0 LYMPHEDEMA OF BOTH LOWER EXTREMITIES: Primary | ICD-10-CM

## 2025-03-31 DIAGNOSIS — I89.0 LYMPHEDEMA: Primary | ICD-10-CM

## 2025-03-31 PROCEDURE — 97016 VASOPNEUMATIC DEVICE THERAPY: CPT

## 2025-03-31 PROCEDURE — 97535 SELF CARE MNGMENT TRAINING: CPT

## 2025-03-31 RX ORDER — EZETIMIBE 10 MG/1
10 TABLET ORAL
Qty: 90 TABLET | Refills: 3 | Status: SHIPPED | OUTPATIENT
Start: 2025-03-31

## 2025-03-31 NOTE — PROGRESS NOTES
Outpatient Rehab    Occupational Therapy Discharge    Patient Name: Dustin Lauren  MRN: [id number]  YOB: 1950  Encounter Date: 3/31/2025    Therapy Diagnosis:   Encounter Diagnosis   Name Primary?    Lymphedema of both lower extremities Yes     Physician: Yamil Guadarrama MD*    Physician Orders: Eval and Treat  Medical Diagnosis: Lymphedema, not elsewhere classified    Visit # / Visits Authorized: 1 / 12  Insurance Authorization Period: 3/31/2025 to 7/30/2025  Date of Evaluation:  2/27/2025   Plan of Care Certification:  2/27/2025 to 5/1/2025        Time In: 0800   Time Out: 0900  Total Time: 60   Total Billable Time:  60         Subjective   Compression stockings issued last session are working well. His LLE is less swollen and he no longer has pain or itching on large varicose vein in thigh. RLE continues without issues. He is not interested in a home pump at this time but will reconsider if swelling worsens in the future..  Pain reported as 0/10.      Objective         Lower extremity Girth Measurements (in centimeters): taken in supine  [place] Left lower extremity  Date: 2/27/25 Right lower extremity   Date: 2/27/25 LLE 3/31/25 RLE 3/31/25   Metatarsal Head 25.5 cm 25.0 cm 25.5 cm 25.0 cm   Medial Malleolus 28.0 cm 29.0 cm 29.0 cm 30.0 cm   (+) 5cm 24.5 cm 24.5 cm 24.5 cm 24.5 cm   (+) 10cm 27.5 cm 26.5 cm 27.0 cm 27.0 cm   (+) 20cm 38.5 cm 38.0 cm 39.0 cm 39.0 cm   (+) 30cm 36.5 cm 38.0 cm 36.0 cm 36.0 cm   (+) 40cm 46.0 cm 44.0 cm - -   (+) 50cm 52.0 cm 52.0 cm - -         Treatment:  Manual Therapy  MT 1: MULTILAYERED BANDAGING:  issued supplies and bandaged RLE and LLE  base of toes to below knee with cotton stockinette, cellona padding, 2 6 cm, 2 8 cm, and 2 10 cm Rosidal K bandages to leave intact 24-72 hrs as tolerated, discontinue with any problems, return rolled bandages next session. Wash and wear schedules confirmed.  Self Care and ADLs  ADL 1: See education section.  Other  Activities  Activity 1: SEQUENTIAL COMPRESSION PUMP: full leg sleeve applied to RLE and LLE  Airos 6 with default setting with distal pressures starting at 45mmHg entire extremity simultaneous to education.    Time Entry(in minutes):  Vasopneumatic Devices Time Entry: 35  Self Care/Home Management (ADLs) Time Entry: 40    Assessment & Plan   Assessment: Swelling and pain greatly improved in LLE since wearing 20-30 mm hg thigh highs most days. He reports still experiencing slight refill but suspects that occurs on days he is not wearing the medical grade stockings. Also expect 2/2 Amlodipine use. Pt will go [facility] to determine if insurance covers additional pairs. Provided product information for ready to wear flat knit L&R Exostrong stocking to self purchase and assess containment compard to circualr knit stockings. Pt will contact Dr. Guadarrama's office if he wants to move forward with home pump. All questions and concerns addressed. He is ready for discharge to home management. Pt has been compliant with compression, elevation, and exercise for 4 weeks but swelling persists. A home pump is recommended for lifetime management.  Evaluation/Treatment Tolerance: Patient tolerated treatment well    Patient's spiritual, cultural, and educational needs considered and patient agreeable to plan of care and goals.     Education  Education was done with Patient. The patient's learning style includes Demonstration and Listening. The patient Demonstrates understanding and Verbalizes understanding.          Patient was educated in  Compression needs: 20-30 mm hg thigh highs Home management plan: Wear schedule: Darryl first thing in the morning, remove at the end of the day as close to bedtime as possible. Do not sleep in garments. If using a home pneumatic pump, remove garments when using pump. If it is not yet bedtime, resume wearing garments until bed. Donning/doffing techniques Wash and management of products Cost/coverage of  compression garments: Medicare now pays for compression. Private insurance coverage is on a case-by-case basis. In order to determine coverage an Rx with a diagnosis of lymphedema is sent to a participating DME for a benefits check.  Medicare Coverage: Daytime garments - 3 sets (one garment for each affected body part) every six months,  standard or custom fit, or a combination of both. Nighttime garments - 2 sets (one garment for each affected body part) every two years, standard or custom fit, or a combination of both. Bandaging supplies - no set limit in the rule. Accessories - no set limit, will be determined on a case-by-case basis depending on the needs of the patient. Coverage is under Medicare part B. After deductible is met, a 20% copayment is required for all items. Secondary insurance often covers copayment's.  Commitment to attendance and securing compression needs are critical to lymphedema management Pneumatic pump benefits, set-up, use, referral process, coverage through insurance. Coverage is based on insurance plan as well as if deductible has been met. Pneumatic pump is an added tool for managing lymphedema at home and is meant to be used in addition to wearing compression garments daily but does not substitute compression garments.  Seek new referral if edema status worsens or changes in the future despite compliance with home management techniques.  Monitor for signs/symptoms of infection and seek medical attention immediately if symptoms occur. Do not need to return to therapy for new compression garments. PCP/referring provider can re-order compression garment Rx. Rx must include i89.0 lymphedema dx code. DME requires a face-to-face provider appointment within 6 months with i89.0 lymphedema dx code and medical justification statement for compression garments.         Plan:      Goals:   Resolved       Long Term Goals       Patient and/or caregiver to jennifer/doff compression garment independently  and with daily compliance to assist in lymphedema management, skin elasticity, and tissue density  (Met)       Start:  03/10/25    Expected End:  04/21/25    Resolved:  03/31/25         Patient and/or caregiver will be independent in use of pneumatic compression pump or self manual lymphatic drainage techniques to areas within reach to enhance lymphatic drainage and skin condition.   (Met)       Start:  03/10/25    Expected End:  04/21/25    Resolved:  03/31/25         Patient to be independent and compliant with home exercise program to allow for increased function in affected limb.  (Met)       Start:  03/10/25    Expected End:  04/21/25    Resolved:  03/31/25            Short Term Goals       Patient will demonstrate 100% knowledge of lymphedema precautions and signs of infection to allow for reduced lymphedema risk, infection risk, and/or exacerbation of condition.   (Met)       Start:  03/10/25    Expected End:  03/31/25    Resolved:  03/31/25         Patient and/or caregiver will order/obtain appropriate compression garments to maintain lymphatic and venous support.  (Met)       Start:  03/10/25    Expected End:  03/31/25    Resolved:  03/31/25         Patient will tolerate daily activities wearing compression garments to allow for lymphatic drainage support, skin elasticity, and reduction in shape and size of limb.   (Met)       Start:  03/10/25    Expected End:  03/31/25    Resolved:  03/31/25             Selam Morgan OT

## 2025-03-31 NOTE — TELEPHONE ENCOUNTER
Refill Decision Note   Dustin Lauren  is requesting a refill authorization.  Brief Assessment and Rationale for Refill:  Approve     Medication Therapy Plan:         Comments:     Note composed:5:41 AM 03/31/2025

## 2025-04-09 NOTE — PROGRESS NOTES
Electrophysiology Clinic Progress Note     EP Attending: Dr. Dhaval Rodríguez   PCP - [facility], Law DAVIS MD  Cardiology: Dr. Elder    Patient was last seen in clinic on 2/25/2025 by Gala Villasenor NP.    Subjective:   Patient ID:   Dustin Lauren is a 74 y.o. male with past medical history of: atrial fibrillation (PVI 9/2021), Htn, CAD s/p PCI, BPH, hypothyroidism (s/o thyroidectomy), low back pain, tachy-darek syndrome  who presents for follow up of Atrial Fibrillation    HPI:    Background:    Primary cardiologist was Dr. Vo.  Primary EP has been Dr. Burnett. Presents for second opinion.  He is the uncle of Libra Littlejohn  He owns [employer]  Initially developed symptomatic paroxysmal AF 9/16. Progressed to persistent AF. Underwent DCCV in 2016.  Ultimately developed recurrence.  DCCV 12/18/18.  48 hr holter 6/20 nsr  Developed recurrence. DCCV 2/2/21 4/21 had an event lasting a couple of hours.  Is symptomatic with the AF, noting palpitations, on occasion associated with lightheadedness.  Echo 7/30/20 normal biventricular structure and function normal left atrial size, trace MR  ECG reveals sinus bradycardia at 49 bpm.  There has been inability to add medications due to his bradycardia.  Has eliminated caffeine and alcohol.  Atrial fibrillation, symptomatic. Paroxysmal to persistent.  Has bradycardia limiting his medications. Recommend PVI.     9/16/2021: Successful pulmonary vein RF ablation.     12/16/2021: He is 3 mo s/p PVI.  Doing well since procedure, with no sustained palpitations during blanking period. Brief palps only. Pericarditis symptoms resolved with course of NSAIDs.  HIEU 9/2021 showed normal LVEF. Chronic bradycardia - not on AV blockers. CHADSVASc 3 and it is recommended he remain on OAC. He is on eliquis for CVA prophylaxis.  He exercises regularly.      3/12/2022: He is 6 months s/p PVI. He is doing well from a rhythm standpoint, with no documented or symptomatic recurrence  of arrhythmia since procedure. No sustained palpitations; brief rare palps likely ectopy.   Bradycardic at baseline. Not on AVN blockers. CHADSVASc 3 on eliquis for CVA prophylaxis. He feels well and remains active.     9/19/2022: One year s/p PVI. Continues to do well from a rhythm standpoint, with no documented or symptomatic recurrence of arrhythmia since procedure. Bradycardic at baseline, asymptomatic.  CHADSVASc 3 on eliquis. No AAD or AVN blockers. Normally symptomatic with AF. He will notify clinic if he develops new symptoms. He is also considering switching general cardiology care to [facility]. Referral provided.     9/11/2023: He is 2 years s/p PVI. He continues to do well from a rhythm standpoint, with no documented or symptomatic recurrence of arrhythmia since procedure. HIEU 9/2021 EF 55%. CHADSVASc 3 on eliquis.  Not on AAD or any AVN RFA. Pt is active and exercises regularly. Follows with Dr. Elder in general cardiology.     9/9/2024: Pt is now 3 years s/p PVI. Overall continues to do well, but did have one 5 hour episode of AF back in Jan after colonoscopy. Suspect provoked episode. OK to take PRN lopressor but darek would limit AAD use if he has more frequent AF breakthrough and plan at that time would likely be for redo PVI. CHADSVASc 3 on eliquis.  Shoulder pain does not sound cardiac in nature. Follows with Dr. Elder in general cardiology and has an upcoming appt.    11/2024: Pt in AF after hand injection. ECG showed return to SR.     2/24/2025: He had his hand injected last week and woke up at 2am today with Afib (the same thing happened this past November following hand injections). He took 12.5mg of Lopressor (was concerned about his baseline HR being in the 50's). HR went from low 100's to 80-90. Nel confirms AF. Advised to take the other 12.5mg of Lopressor now and continue to monitor.     2/25/2025: Pt back in AF since yesterday morning despite taking PRN lopressor. This is his 3rd  breakthrough episode since his ablation. He is symptomatic - hx of tachy when in AF. He is compliant with eliquis. Discussed options. Philippe limits AAD. Discussed risks, benefits, and alternatives to redo PVI. He would like to proceed. Plan 1st for cardioversion given difficult to control rates when in AF. He did miss a dose of eliquis. Advised him to continue lopressor 25mg BID if his HRs are >80s. Confirm HIEU/DCCV then redo PVI (UPDATE: Will proceed - informed pt not to take metoprolol AM of cardioversion)    3/3/2025: HIEU/DCCV. Eliquis 5 mg BID, metoprolol 25 mg prn was continued    Update (04/10/2025):    Patient is scheduled for a redo PVI ablation with Dr. Rodríguez on 5/22/2025    Today he tells me he feels good. He felt palpitations once since his DCCV but it was short in duration, probably less than one minute.     I have personally reviewed the patient's EKG today, which shows sinus bradycardia at 49 bpm. NH interval is 174 ms. QRS is 94 ms. QT/QTc is 448/404 ms.    Relevant Cardiac Test Results:    ECHO:  Transesophageal echo (HIEU)  Result Date: 3/3/2025    HIEU for evaluation of SHINE/LA prior to cardioversion. No thrombus noted   in the appendage or atria.    Left Ventricle: The left ventricle is normal in size. There is low   normal systolic function with a visually estimated ejection fraction of 50   - 55%. Ejection fraction is approximately 50%.    Right Ventricle: Right ventricular enlargement. Systolic function is   borderline low.    Left Atrium: The left atrial appendage has a windsock morphology.   Appendage velocity is normal at greater than 40 cm/sec. There is no   thrombus in the left atrial appendage. The pulmonary veins have systolic   blunting.    Right Atrium: Right atrium is dilated.    Aortic Valve: The aortic valve is a trileaflet valve. There is moderate   aortic valve sclerosis. There is mild annular calcification present.   Mildly restricted motion of the left coronary cusp    Mitral  Valve: There is mild regurgitation.    Tricuspid Valve: There is mild regurgitation.    Aorta: Aortic root is normal in size measuring 3.8 cm. Aortic root at   ST junction is normal. Ascending aorta is mildly dilated measuring 4.1 cm.   Grade 2 atherosclerosis of the descending aorta and in the aortic arch   with mild thickening.        Current Outpatient Medications   Medication Sig    amlodipine-valsartan (EXFORGE) 5-160 mg per tablet Take 1 tablet by mouth once daily.    apixaban (ELIQUIS) 5 mg Tab Take 1 tablet (5 mg total) by mouth 2 (two) times daily.    atorvastatin (LIPITOR) 20 MG tablet TAKE 1 TABLET BY MOUTH EVERY DAY    cholecalciferol, vitamin D3, 25 mcg (1,000 unit) Chew Take 2,000 Int'l Units by mouth once daily.    ezetimibe (ZETIA) 10 mg tablet TAKE 1 TABLET BY MOUTH EVERY DAY    famotidine (PEPCID) 40 MG tablet Take 40 mg by mouth 2 (two) times daily.    finasteride (PROSCAR) 5 mg tablet TAKE 1 TABLET BY MOUTH EVERY DAY    fluticasone propionate (FLONASE) 50 mcg/actuation nasal spray 2 sprays by Each Nostril route as needed.    icosapent ethyL (VASCEPA) 1 gram Cap Take 1 capsule (1,000 mg total) by mouth 2 (two) times a day.    levothyroxine (SYNTHROID) 137 MCG Tab tablet TAKE 1 TABLET BY MOUTH MON-SAT AND TAKE 1.5 TABLET BY MOUTH ON SUNDAY ONLY    magnesium glycinate 100 mg magnesium Cap Take 200 mg by mouth Daily.    metoprolol tartrate (LOPRESSOR) 25 MG tablet TAKE 1 TABLET (25 MG TOTAL) BY MOUTH AS NEEDED (1-2 TAB FOR PALPITATIONS).    multivit-min/FA/lycopen/lutein (CENTRUM SILVER MEN ORAL) Take 1 tablet by mouth once daily.    naftifine 2 % Crea Apply 1 application topically daily as needed.    nitroGLYCERIN (NITROSTAT) 0.3 MG SL tablet Place 1 tablet (0.3 mg total) under the tongue every 5 (five) minutes as needed for Chest pain.    prasugreL HCl (EFFIENT) 10 mg Tab TAKE 1 TABLET BY MOUTH EVERY DAY    tamsulosin (FLOMAX) 0.4 mg Cap TAKE 1 CAPSULE BY MOUTH EVERY DAY    tiZANidine (ZANAFLEX) 4  "MG tablet Take 1 tablet (4 mg total) by mouth every 8 (eight) hours as needed (muscle spasms).    mirabegron (MYRBETRIQ) 25 mg Tb24 ER tablet Take 1 tablet (25 mg total) by mouth once daily.     No current facility-administered medications for this visit.     Review of Systems   Constitutional: Negative. Negative for chills, fever and malaise/fatigue.   HENT:  Negative for nosebleeds.    Cardiovascular:  Positive for palpitations. Negative for chest pain, dyspnea on exertion, near-syncope and syncope.   Respiratory:  Negative for shortness of breath.    Hematologic/Lymphatic: Does not bruise/bleed easily.   Skin:  Negative for itching and rash.   Gastrointestinal:  Negative for hematochezia and melena.   Genitourinary:  Negative for hematuria.   Neurological:  Negative for dizziness and light-headedness.   Psychiatric/Behavioral:  Negative for altered mental status.      Objective:     BP (!) 141/67   Pulse (!) 49   Ht 5' 10" (1.778 m)   Wt 94.8 kg (208 lb 15.9 oz)   BMI 29.99 kg/m²     Physical Exam  Vitals and nursing note reviewed.   Constitutional:       General: He is not in acute distress.     Appearance: Normal appearance.   HENT:      Head: Normocephalic.      Mouth/Throat:      Mouth: Mucous membranes are moist.   Eyes:      Extraocular Movements: Extraocular movements intact.   Cardiovascular:      Rate and Rhythm: Regular rhythm. Bradycardia present.      Pulses: Normal pulses.   Pulmonary:      Effort: Pulmonary effort is normal. No respiratory distress.   Abdominal:      General: There is no distension.      Palpations: Abdomen is soft.   Musculoskeletal:         General: Normal range of motion.      Cervical back: Normal range of motion.   Neurological:      General: No focal deficit present.      Mental Status: He is alert.   Psychiatric:         Mood and Affect: Mood normal.         Behavior: Behavior normal.         Thought Content: Thought content normal.         Judgment: Judgment normal. "     Lab Results   Component Value Date     02/27/2025    K 4.6 02/27/2025     02/27/2025    CO2 28 02/27/2025    BUN 24 (H) 02/27/2025    CREATININE 1.1 02/27/2025    TSH 2.513 01/16/2025    ALT 42 01/16/2025    AST 30 01/16/2025       Lab Results   Component Value Date    HGBA1C 5.6 01/16/2025    Lab Results   Component Value Date    WBC 4.79 02/27/2025    HGB 15.1 02/27/2025    HCT 45.3 02/27/2025     02/27/2025    GRAN 1.5 (L) 01/16/2025    GRAN 42.6 01/16/2025    INR 1.2 02/27/2025    APTT 29.1 02/27/2025                No results found.    Assessment:     1. Persistent atrial fibrillation    2. On anticoagulant therapy    3. Tachy-darek syndrome    4. Primary hypertension    5. Coronary artery disease involving native coronary artery of native heart without angina pectoris        Plan:     In summary, Dustin Lauren is a 74 y.o. male with past medical history of: atrial fibrillation (PVI 9/2021), Htn, CAD s/p PCI, BPH, hypothyroidism (s/o thyroidectomy), low back pain, tachy-darek syndrome  who presents for follow up of Atrial Fibrillation    Persistent atrial fibrillation  -s/p HIEU/DCCV on 3/3/2025  -ECG today shows sinus bradycardia at 49 bpm.   -He is asymptomatic with bradycardia.   -Plan to hold course with scheduled ablation on 5/22/2025 with Dr. Rodríguez. Discuss risks and benefits of procedure and patient wishes to proceed.     On anticoagulant therapy  -[id number] Score is 3 (AGE(65-74 (1), CAD(1), HTN(1))  -Continue Eliquis 5 mg BID     Tachy-darek syndrome  -He is bradycardic at baseline but will have be tachycardic with his AF.   -He has metoprolol tartrate 25 mg as needed for AF with RVR    Primary hypertension  -Stable on current regimen.     Coronary artery disease involving native coronary artery of native heart without angina pectoris  -No complaints of chest pain.   -Continue management per cardiology     Update: Patient notified us after his clinic visit, he started with  AF with RVR. HR 110bpm. He took dose of metoprolol and HR now 77bpm. He feels better now that his heart rate is better controlled. He will continue metoprolol tartrate 25 mg BID while in AF. Will discuss with Dr. Rodríguez.     Addendum on 4/11/2025: Case discussed with Dr. Rodríguez. Plan to continue metoprolol 25 mg BID while patient is in AF. Keep scheduled procedure on 5/22/2025. Spoke with patient over the phone. He feels better with his rate controlled in AF vs. RVR. He agrees with plan of metoprolol BID until his PVI ablation. He will let us know he develops any symptomatic bradycardia.     *A copy of this note has been sent to Dr. Rodríguez*    No follow-ups on file.    ------------------------------------------------------------------    CASSANDRA Riggs, NP-C  Cardiac Electrophysiology

## 2025-04-10 ENCOUNTER — OFFICE VISIT (OUTPATIENT)
Dept: ELECTROPHYSIOLOGY | Facility: CLINIC | Age: 75
End: 2025-04-10
Payer: COMMERCIAL

## 2025-04-10 ENCOUNTER — HOSPITAL ENCOUNTER (OUTPATIENT)
Dept: CARDIOLOGY | Facility: CLINIC | Age: 75
Discharge: HOME | End: 2025-04-10
Payer: COMMERCIAL

## 2025-04-10 ENCOUNTER — PATIENT MESSAGE (OUTPATIENT)
Dept: ELECTROPHYSIOLOGY | Facility: CLINIC | Age: 75
End: 2025-04-10

## 2025-04-10 ENCOUNTER — PATIENT MESSAGE (OUTPATIENT)
Dept: CARDIOLOGY | Facility: CLINIC | Age: 75
End: 2025-04-10
Payer: COMMERCIAL

## 2025-04-10 VITALS
DIASTOLIC BLOOD PRESSURE: 67 MMHG | BODY MASS INDEX: 29.92 KG/M2 | HEART RATE: 49 BPM | HEIGHT: 70 IN | SYSTOLIC BLOOD PRESSURE: 141 MMHG | WEIGHT: 209 LBS

## 2025-04-10 DIAGNOSIS — I25.10 CORONARY ARTERY DISEASE INVOLVING NATIVE CORONARY ARTERY OF NATIVE HEART WITHOUT ANGINA PECTORIS: ICD-10-CM

## 2025-04-10 DIAGNOSIS — I10 PRIMARY HYPERTENSION: ICD-10-CM

## 2025-04-10 DIAGNOSIS — I49.5 TACHY-BRADY SYNDROME: ICD-10-CM

## 2025-04-10 DIAGNOSIS — I48.19 PERSISTENT ATRIAL FIBRILLATION: ICD-10-CM

## 2025-04-10 DIAGNOSIS — I48.19 PERSISTENT ATRIAL FIBRILLATION: Primary | ICD-10-CM

## 2025-04-10 DIAGNOSIS — Z79.01 ON ANTICOAGULANT THERAPY: ICD-10-CM

## 2025-04-10 LAB
OHS QRS DURATION: 94 MS
OHS QTC CALCULATION: 404 MS

## 2025-04-10 PROCEDURE — 93010 ELECTROCARDIOGRAM REPORT: CPT | Mod: S$GLB,,, | Performed by: INTERNAL MEDICINE

## 2025-04-10 PROCEDURE — 99999 PR PBB SHADOW E&M-EST. PATIENT-LVL IV: CPT | Mod: PBBFAC,,, | Performed by: NURSE PRACTITIONER

## 2025-04-10 PROCEDURE — 99214 OFFICE O/P EST MOD 30 MIN: CPT | Mod: S$GLB,,, | Performed by: NURSE PRACTITIONER

## 2025-04-10 PROCEDURE — 93005 ELECTROCARDIOGRAM TRACING: CPT | Mod: S$GLB,,, | Performed by: INTERNAL MEDICINE

## 2025-04-10 NOTE — TELEPHONE ENCOUNTER
Pt states he took metoprolol tartrate 25mg 1 tablet a few minutes ago.  notified, states pt can take another metoprolol tartrate 25mg tablet now, no driving today.Pt verbalized understanding. Pt states he is to have ablation next month. Pt states he also sent a message to Akiko Car in EP clinic. Pt will also call that office to discuss his atrial fibrillation.

## 2025-04-10 NOTE — Clinical Note
Mary Kay Puentes,   Patient is in SB after his DCCV on 3/3/2025. He has scheduled redo PVI with you on 5/22/2025. He is on Eliquis. After he left clinic visit. He sent apple watch tracings that he is in AF with RVR. HR 110bpm. He took dose of metoprolol tartrate 25 mg and now HR is 77bpm. He feels better now that his heart rate is controlled. He will continue with metoprolol BID while he is AF to control his heart rate. Did you want me to continue with rate control until his ablation?  none

## 2025-04-11 ENCOUNTER — PATIENT MESSAGE (OUTPATIENT)
Dept: ELECTROPHYSIOLOGY | Facility: CLINIC | Age: 75
End: 2025-04-11
Payer: COMMERCIAL

## 2025-04-11 RX ORDER — METOPROLOL TARTRATE 25 MG/1
25 TABLET, FILM COATED ORAL 2 TIMES DAILY
Qty: 30 TABLET | Refills: 1 | Status: SHIPPED | OUTPATIENT
Start: 2025-04-11

## 2025-04-14 ENCOUNTER — PATIENT MESSAGE (OUTPATIENT)
Dept: ELECTROPHYSIOLOGY | Facility: CLINIC | Age: 75
End: 2025-04-14
Payer: COMMERCIAL

## 2025-04-21 ENCOUNTER — PATIENT MESSAGE (OUTPATIENT)
Dept: ELECTROPHYSIOLOGY | Facility: CLINIC | Age: 75
End: 2025-04-21
Payer: COMMERCIAL

## 2025-04-24 ENCOUNTER — PATIENT MESSAGE (OUTPATIENT)
Dept: ELECTROPHYSIOLOGY | Facility: CLINIC | Age: 75
End: 2025-04-24
Payer: COMMERCIAL

## 2025-04-25 NOTE — TELEPHONE ENCOUNTER
----- Message from Luz Maria Leal sent at 7/14/2022 11:41 AM CDT -----  Contact: pt [phone]  [email or web address]     Patient is calling for Medical Advice regarding: Tested Positive for Covid on 7/13/2022    Hoarse, fever 101, headache, body aches, slight cough    How long has patient had these symptoms:7/12/2022    Pharmacy name and phone#:  [id number] - [place] - [address]  [place] [address]  Phone: [phone] Fax: [phone]      Would like response via mychart:  phone    Comments:        
Pt tested positive for COVID on 07/13    Pt is having  fever 101, headache, body aches, slight cough along with being hoarse    PT would like to know if you want him to start OTC treatment or something else    LOV:    010/14/22  
The patient has a COVID risk score of 6. Paxil of it is contraindicated secondary to the patient being on both Eliquis and Effient which cannot be stopped.  I have ordered the COVID antibody infusion for further treatment but in the meantime I recommend the patient start treatment with over-the-counter Flonase nasal spray and Claritin as needed for congestion, Tylenol as needed for fever or pain, and Mucinex as needed for cough.  Please inform the patient.  Thank you.    
yes

## 2025-05-15 ENCOUNTER — LAB VISIT (OUTPATIENT)
Dept: LAB | Facility: HOSPITAL | Age: 75
End: 2025-05-15
Attending: INTERNAL MEDICINE
Payer: COMMERCIAL

## 2025-05-15 DIAGNOSIS — I48.19 PERSISTENT ATRIAL FIBRILLATION: ICD-10-CM

## 2025-05-15 DIAGNOSIS — I49.5 TACHY-BRADY SYNDROME: ICD-10-CM

## 2025-05-15 LAB
ANION GAP (OHS): 4 MMOL/L (ref 8–16)
APTT PPP: 30 SECONDS (ref 21–32)
BUN SERPL-MCNC: 22 MG/DL (ref 8–23)
CALCIUM SERPL-MCNC: 9.2 MG/DL (ref 8.7–10.5)
CHLORIDE SERPL-SCNC: 103 MMOL/L (ref 95–110)
CO2 SERPL-SCNC: 30 MMOL/L (ref 23–29)
CREAT SERPL-MCNC: 1 MG/DL (ref 0.5–1.4)
ERYTHROCYTE [DISTWIDTH] IN BLOOD BY AUTOMATED COUNT: 13.5 % (ref 11.5–14.5)
GFR SERPLBLD CREATININE-BSD FMLA CKD-EPI: >60 ML/MIN/1.73/M2
GLUCOSE SERPL-MCNC: 89 MG/DL (ref 70–110)
HCT VFR BLD AUTO: 41 % (ref 40–54)
HGB BLD-MCNC: 13.4 GM/DL (ref 14–18)
INR PPP: 1.2 (ref 0.8–1.2)
MCH RBC QN AUTO: 29.8 PG (ref 27–31)
MCHC RBC AUTO-ENTMCNC: 32.7 G/DL (ref 32–36)
MCV RBC AUTO: 91 FL (ref 82–98)
PLATELET # BLD AUTO: 227 K/UL (ref 150–450)
PMV BLD AUTO: 10.5 FL (ref 9.2–12.9)
POTASSIUM SERPL-SCNC: 4.2 MMOL/L (ref 3.5–5.1)
PROTHROMBIN TIME: 12.6 SECONDS (ref 9–12.5)
RBC # BLD AUTO: 4.49 M/UL (ref 4.6–6.2)
SODIUM SERPL-SCNC: 137 MMOL/L (ref 136–145)
WBC # BLD AUTO: 3.8 K/UL (ref 3.9–12.7)

## 2025-05-15 PROCEDURE — 36415 COLL VENOUS BLD VENIPUNCTURE: CPT | Mod: PO

## 2025-05-15 PROCEDURE — 85610 PROTHROMBIN TIME: CPT

## 2025-05-15 PROCEDURE — 85027 COMPLETE CBC AUTOMATED: CPT

## 2025-05-15 PROCEDURE — 85730 THROMBOPLASTIN TIME PARTIAL: CPT

## 2025-05-15 PROCEDURE — 82310 ASSAY OF CALCIUM: CPT

## 2025-05-19 ENCOUNTER — OFFICE VISIT (OUTPATIENT)
Dept: CARDIOLOGY | Facility: CLINIC | Age: 75
End: 2025-05-19
Payer: COMMERCIAL

## 2025-05-19 VITALS
HEIGHT: 70 IN | WEIGHT: 209.19 LBS | DIASTOLIC BLOOD PRESSURE: 74 MMHG | HEART RATE: 52 BPM | SYSTOLIC BLOOD PRESSURE: 132 MMHG | BODY MASS INDEX: 29.95 KG/M2

## 2025-05-19 DIAGNOSIS — I10 PRIMARY HYPERTENSION: Primary | ICD-10-CM

## 2025-05-19 DIAGNOSIS — Z95.5 STENTED CORONARY ARTERY: ICD-10-CM

## 2025-05-19 DIAGNOSIS — I25.10 CORONARY ARTERY DISEASE INVOLVING NATIVE CORONARY ARTERY OF NATIVE HEART WITHOUT ANGINA PECTORIS: ICD-10-CM

## 2025-05-19 DIAGNOSIS — I49.5 TACHY-BRADY SYNDROME: ICD-10-CM

## 2025-05-19 DIAGNOSIS — E78.2 MIXED HYPERLIPIDEMIA: ICD-10-CM

## 2025-05-19 DIAGNOSIS — I83.813 VARICOSE VEINS OF BOTH LOWER EXTREMITIES WITH PAIN: ICD-10-CM

## 2025-05-19 DIAGNOSIS — I87.2 VENOUS INSUFFICIENCY OF BOTH LOWER EXTREMITIES: ICD-10-CM

## 2025-05-19 DIAGNOSIS — Z79.01 ON ANTICOAGULANT THERAPY: ICD-10-CM

## 2025-05-19 DIAGNOSIS — Z98.890 HISTORY OF RADIOFREQUENCY ABLATION (RFA) PROCEDURE FOR CARDIAC ARRHYTHMIA: ICD-10-CM

## 2025-05-19 DIAGNOSIS — I48.19 PERSISTENT ATRIAL FIBRILLATION: ICD-10-CM

## 2025-05-19 DIAGNOSIS — I89.0 LYMPHEDEMA OF BOTH LOWER EXTREMITIES: ICD-10-CM

## 2025-05-19 PROCEDURE — 99999 PR PBB SHADOW E&M-EST. PATIENT-LVL III: CPT | Mod: PBBFAC,,, | Performed by: INTERNAL MEDICINE

## 2025-05-19 PROCEDURE — 99215 OFFICE O/P EST HI 40 MIN: CPT | Mod: S$GLB,,, | Performed by: INTERNAL MEDICINE

## 2025-05-19 NOTE — PATIENT INSTRUCTIONS
Assessment/Plan:  Dustin Lauren is a 74 y.o. male with  CAD s/p prox RCA BIANKA (2016), Afib s/p PVI (2021), HTN, HLD, GERD, obesity, who presents for a follow up appointment.    Varicose veins of left lower extremity with inflammation- Pt with evidence of lymphedema and venous insufficiency. Mr. Lauren reports significant improvement in leg swelling and pain in varicose veins. He has completed lymphedema clinic therapy and wears compression hose daily. BLE Arterial Ultrasound on 2/27/2025 revealed duplex ultrasound shows bilateral lower extremity arteries with no hemodynamically significant stenosis. BLE Venous Reflux Study on 2/25/2025 demonstrated left GSV reflux and no DVT. Continue graduated compression hose.  Limit sodium intake to 2000 mg daily.  Limit volume intake to 1.5 L daily.  Elevate legs when resting.    2. CAD- Pt with no angina. Continue GDMT for CAD on current regimen.     3. HTN- Controlled. Continue current medications.    4. HLD- Continue atorvastatin 20 mg daily.     5. Afib s/p PVI- Stable. Has upcoming Afib ablation on 5/22/2025. Continue Eliquis for anticoagulation. Continue metoprolol as needed for rate control.     6. Obesity- Encourage diet, exercise, and weight loss.     Follow up in 6 months

## 2025-05-19 NOTE — PROGRESS NOTES
[facility]      Chief Complaint   Patient presents with    Varicose Veins       Patient ID: Dustin Lauren is a 74 y.o. male with  CAD s/p prox RCA BIANKA (2016), Afib s/p PVI (2021), HTN, HLD, GERD, obesity, who presents for a follow up appointment. Pertinent history/events are as follows:     -Pt kindly referred by Dr. Gibbs for evaluation of Varicose veins of left lower extremity with inflammation.    -At our initial clinic visit on 1/29/2025, Mr. Lauren reported varicose veins with pain for several years. He has no claudication or tissue loss.   Plan:  Varicose veins of left lower extremity with inflammation- Pt with evidence of lymphedema and venous insufficiency. Check BLE venous reflux study and arterial ultrasound. Pt presents with findings consistent with mild lymphedema.  Refer to lymphedema clinic.  Recommend wearing graduated compression hose.  Limit sodium intake to 2000 mg daily.  Limit volume intake to 1.5 L daily.  Elevate legs when resting.  CAD- Pt with no angina. Continue GDMT for CAD on current regimen.   HTN- Controlled. Continue current medications.  HLD- Continue atorvastatin 20 mg daily.   Afib s/p PVI- Stable. Continue Eliquis for anticoagulation. Continue metoprolol as needed for rate control.   Obesity- Encourage diet, exercise, and weight loss.     HPI:  Mr. Lauren reports significant improvement in leg swelling and pain in varicose veins. He has completed lymphedema clinic therapy and wears compression hose daily. Has upcoming Afib ablation on 5/22/2025. BLE Arterial Ultrasound on 2/27/2025 revealed duplex ultrasound shows bilateral lower extremity arteries with no hemodynamically significant stenosis. BLE Venous Reflux Study on 2/25/2025 demonstrated left GSV reflux and no DVT.     Past Medical History:   Diagnosis Date    A-fib     s/p cardioversion    Carpal tunnel syndrome     bilaterally    Cataract     Chronic LBP 11/8/2012    Chronic neck pain 11/8/2012     Coronary artery disease 03/24/2016    s/p 2 stents in RCA and ostium    Diverticulosis of colon     DJD (degenerative joint disease) of cervical spine 11/8/2012    DJD (degenerative joint disease) of lumbar spine 11/8/2012    DJD (degenerative joint disease) of thoracic spine 11/8/2012    GERD (gastroesophageal reflux disease)     Hyperlipidemia     hypertriglyceridemia    Hypertension     Hypothyroidism     s/p surgery    Nephrolithiasis, uric acid     stone evaluation showed uric acid and calcium oxalate    PONV (postoperative nausea and vomiting)     Rosacea     Thyroid disease     Ulcer     Unspecified disorder of kidney and ureter     Vitamin D deficiency disease      Past Surgical History:   Procedure Laterality Date    ABLATION OF ARRHYTHMOGENIC FOCUS FOR ATRIAL FIBRILLATION N/A 9/16/2021    Procedure: Ablation atrial fibrillation;  Surgeon: Dhaval Rodríguez MD;  Location: [facility] EP LAB;  Service: Cardiology;  Laterality: N/A;  AF, HIEU (Cx if compliant and in SR), PVI, RFA, DAWSON, Gen, SK, 3 Prep    CARDIOVERSION      for AFib    CHOLECYSTECTOMY      COLONOSCOPY W/ BIOPSIES AND POLYPECTOMY  10/2013    CORONARY ANGIOPLASTY WITH STENT PLACEMENT      Stent in RCA and another stent in ostium    ECHOCARDIOGRAM,TRANSESOPHAGEAL N/A 3/3/2025    Procedure: Transesophageal echo (HIEU) intra-procedure log documentation;  Surgeon: Flakito Espinal MD;  Location: [facility] EP LAB;  Service: Cardiology;  Laterality: N/A;    KNEE ARTHROSCOPY  10-13-14    right TKA    KNEE ARTHROSCOPY Left 10-7-15    ascope    KNEE ARTHROSCOPY W/ MENISCAL REPAIR Left 10/7/15    THYROIDECTOMY      TREATMENT OF CARDIAC ARRHYTHMIA  9/16/2021    Procedure: Cardioversion or Defibrillation;  Surgeon: Dhaval Rodríguez MD;  Location: [facility] EP LAB;  Service: Cardiology;;    TREATMENT OF CARDIAC ARRHYTHMIA N/A 3/3/2025    Procedure: Cardioversion or Defibrillation;  Surgeon: Dhaval Rodríguez MD;  Location: [facility] EP LAB;  Service: Cardiology;  Laterality: N/A;   AF, HIEU, DCCV, MAC, SK, 3 Prep     Social History     Socioeconomic History    Marital status:    Tobacco Use    Smoking status: Never     Passive exposure: Never    Smokeless tobacco: Never   Substance and Sexual Activity    Alcohol use: Yes     Alcohol/week: 0.0 standard drinks of alcohol     Comment: rarely    Drug use: No     Social Drivers of Health     Financial Resource Strain: Patient Declined (1/23/2025)    Overall Financial Resource Strain (CARDIA)     Difficulty of Paying Living Expenses: Patient declined   Food Insecurity: Patient Declined (1/23/2025)    Hunger Vital Sign     Worried About Running Out of Food in the Last Year: Patient declined     Ran Out of Food in the Last Year: Patient declined   Transportation Needs: Patient Declined (1/8/2024)    PRAPARE - Transportation     Lack of Transportation (Medical): Patient declined     Lack of Transportation (Non-Medical): Patient declined   Physical Activity: Sufficiently Active (1/23/2025)    Exercise Vital Sign     Days of Exercise per Week: 3 days     Minutes of Exercise per Session: 80 min   Stress: No Stress Concern Present (1/23/2025)    [employer] - Occupational Stress Questionnaire     Feeling of Stress : Not at all   Housing Stability: Patient Declined (1/8/2024)    Housing Stability Vital Sign     Unable to Pay for Housing in the Last Year: Patient declined     Unstable Housing in the Last Year: Patient declined     Family History   Problem Relation Name Age of Onset    Hypertension Mother      Stroke Mother      Cancer Father          pancreas    Heart disease Father          MI    Thyroid disease Sister      Hyperlipidemia Sister      Thalassemia Sister          alpha Thalassemia anemia    Kidney disease Sister      Thyroid disease Sister      Arthritis Sister          RA    Thalassemia Sister          alpha Thalassemia anemia    Seizures Brother          s/p meningioma resection    Hypertension Brother       Cancer Brother          lung     Coronary artery disease Brother          stent in place    Arthritis Brother      Hypertension Brother      Arthritis Brother         Review of patient's allergies indicates:   Allergen Reactions    Codeine Nausea Only    Crestor [rosuvastatin] Palpitations    Doxycycline Hives    Keflex [cephalexin] Rash    Celebrex [celecoxib] Other (See Comments)     Upset stomach    Mobic [meloxicam] Other (See Comments)     Stomach pain and nauseous    Niacin Diarrhea    Niacin preparations Diarrhea    Ampicillin Rash    Pcn [penicillins] Rash       Medication List with Changes/Refills   Current Medications    AMLODIPINE-VALSARTAN (EXFORGE) 5-160 MG PER TABLET    Take 1 tablet by mouth once daily.    APIXABAN (ELIQUIS) 5 MG TAB    Take 1 tablet (5 mg total) by mouth 2 (two) times daily.    ATORVASTATIN (LIPITOR) 20 MG TABLET    TAKE 1 TABLET BY MOUTH EVERY DAY    CHOLECALCIFEROL, VITAMIN D3, 25 MCG (1,000 UNIT) CHEW    Take 2,000 Int'l Units by mouth once daily.    EZETIMIBE (ZETIA) 10 MG TABLET    TAKE 1 TABLET BY MOUTH EVERY DAY    FAMOTIDINE (PEPCID) 40 MG TABLET    Take 40 mg by mouth 2 (two) times daily.    FINASTERIDE (PROSCAR) 5 MG TABLET    TAKE 1 TABLET BY MOUTH EVERY DAY    FLUTICASONE PROPIONATE (FLONASE) 50 MCG/ACTUATION NASAL SPRAY    2 sprays by Each Nostril route as needed.    ICOSAPENT ETHYL (VASCEPA) 1 GRAM CAP    Take 1 capsule (1,000 mg total) by mouth 2 (two) times a day.    LEVOTHYROXINE (SYNTHROID) 137 MCG TAB TABLET    TAKE 1 TABLET BY MOUTH MON-SAT AND TAKE 1.5 TABLET BY MOUTH ON SUNDAY ONLY    MAGNESIUM GLYCINATE 100 MG MAGNESIUM CAP    Take 200 mg by mouth Daily.    METOPROLOL TARTRATE (LOPRESSOR) 25 MG TABLET    Take 1 tablet (25 mg total) by mouth 2 (two) times daily.    MULTIVIT-MIN/FA/LYCOPEN/LUTEIN (CENTRUM SILVER MEN ORAL)    Take 1 tablet by mouth once daily.    NAFTIFINE 2 % CREA    Apply 1 application topically daily as needed.    NITROGLYCERIN (NITROSTAT)  "0.3 MG SL TABLET    Place 1 tablet (0.3 mg total) under the tongue every 5 (five) minutes as needed for Chest pain.    PRASUGREL HCL (EFFIENT) 10 MG TAB    TAKE 1 TABLET BY MOUTH EVERY DAY    TAMSULOSIN (FLOMAX) 0.4 MG CAP    TAKE 1 CAPSULE BY MOUTH EVERY DAY    TIZANIDINE (ZANAFLEX) 4 MG TABLET    Take 1 tablet (4 mg total) by mouth every 8 (eight) hours as needed (muscle spasms).   Discontinued Medications    MIRABEGRON (MYRBETRIQ) 25 MG TB24 ER TABLET    Take 1 tablet (25 mg total) by mouth once daily.       Review of Systems  Constitution: Denies chills, fever, and sweats.  HENT: Denies headaches or blurry vision.  Cardiovascular: Denies chest pain or irregular heart beat.  Respiratory: Denies cough or shortness of breath.  Gastrointestinal: Denies abdominal pain, nausea, or vomiting.  Musculoskeletal: Positive for varicose veins with pain.  Neurological: Denies dizziness or focal weakness.  Psychiatric/Behavioral: Normal mental status.  Hematologic/Lymphatic: Denies bleeding problem or easy bruising/bleeding.  Skin: Denies rash or suspicious lesions    Physical Examination  /74   Pulse (!) 52   Ht 5' 10" (1.778 m)   Wt 94.9 kg (209 lb 3.5 oz)   BMI 30.02 kg/m²     Constitutional: No acute distress, conversant  HEENT: Sclera anicteric, Pupils equal, round and reactive to light, extraocular motions intact, Oropharynx clear  Neck: No JVD, no carotid bruits  Cardiovascular: regular rate and rhythm, no murmur, rubs or gallops, normal S1/S2  Pulmonary: Clear to auscultation bilaterally  Abdominal: Abdomen soft, nontender, nondistended, positive bowel sounds  Extremities: BLE's with trace edema, prominent varicose veins, and changes consistent with lymphedema   Pulses:  Carotid pulses are 2+ on the right side, and 2+ on the left side.  Radial pulses are 2+ on the right side, and 2+ on the left side.   Femoral pulses are 2+ on the right side, and 2+ on the left side.  Popliteal pulses are 2+ on the right " "side, and 2+ on the left side.   Dorsalis pedis pulses are 2+ on the right side, and 2+ on the left side.   Posterior tibial pulses are 2+ on the right side, and 2+ on the left side.    Skin: No ecchymosis, erythema, or ulcers  Psych: Alert and oriented x 3, appropriate affect  Neuro: CNII-XII intact, no focal deficits    Labs:  Most Recent Data  CBC:   Lab Results   Component Value Date    WBC 3.80 (L) 05/15/2025    HGB 13.4 (L) 05/15/2025    HCT 41.0 05/15/2025     05/15/2025    MCV 91 05/15/2025    RDW 13.5 05/15/2025     BMP:   Lab Results   Component Value Date     05/15/2025    K 4.2 05/15/2025     05/15/2025    CO2 30 (H) 05/15/2025    BUN 22 05/15/2025    CREATININE 1.0 05/15/2025    GLU 89 05/15/2025    CALCIUM 9.2 05/15/2025     LFTS;   Lab Results   Component Value Date    PROT 7.4 01/16/2025    ALBUMIN 4.0 01/16/2025    BILITOT 0.8 01/16/2025    AST 30 01/16/2025    ALKPHOS 64 01/16/2025    ALT 42 01/16/2025     COAGS:   Lab Results   Component Value Date    INR 1.2 05/15/2025    PROTIME 12.6 (H) 05/15/2025     FLP:   Lab Results   Component Value Date    CHOL 99 (L) 01/16/2025    HDL 35 (L) 01/16/2025    LDLCALC 51.6 (L) 01/16/2025    TRIG 62 01/16/2025    CHOLHDL 35.4 01/16/2025     CARDIAC: No results found for: "TROPONINI", "CKTOTAL", "CKMB", "BNP"    Imaging:    BLE Arterial Ultrasound 2/27/2025:    Right resting SOO 0.96, is suggestive of minimal right lower extremity arterial disease.    Left resting SOO 1.13, is normal.    Duplex ultrasound shows bilateral lower extremity arteries with no hemodynamically significant stenosis.    BLE Venous Reflux Study 2/25/2025:    There is no evidence of a right lower extremity DVT.    There is no evidence of a left lower extremity DVT.    The left greater saphenous vein has reflux.    Assessment/Plan:  Dustin Lauren is a 74 y.o. male with  CAD s/p prox RCA BIANKA (2016), Afib s/p PVI (2021), HTN, HLD, GERD, obesity, who presents for a follow " up appointment.    Varicose veins of left lower extremity with inflammation- Pt with evidence of lymphedema and venous insufficiency. Mr. Lauren reports significant improvement in leg swelling and pain in varicose veins. He has completed lymphedema clinic therapy and wears compression hose daily. BLE Arterial Ultrasound on 2/27/2025 revealed duplex ultrasound shows bilateral lower extremity arteries with no hemodynamically significant stenosis. BLE Venous Reflux Study on 2/25/2025 demonstrated left GSV reflux and no DVT. Continue graduated compression hose.  Limit sodium intake to 2000 mg daily.  Limit volume intake to 1.5 L daily.  Elevate legs when resting.    2. CAD- Pt with no angina. Continue GDMT for CAD on current regimen.     3. HTN- Controlled. Continue current medications.    4. HLD- Continue atorvastatin 20 mg daily.     5. Afib s/p PVI- Stable. Has upcoming Afib ablation on 5/22/2025. Continue Eliquis for anticoagulation. Continue metoprolol as needed for rate control.     6. Obesity- Encourage diet, exercise, and weight loss.     Follow up in 6 months    Total duration of face to face visit time 15 minutes.  Total time spent counseling greater than fifty percent of total visit time.  Counseling included discussion regarding imaging findings, diagnosis, possibilities, treatment options, risks and benefits.  The patient had many questions regarding the options and long-term effects.    Yamil Guadarrama MD, PhD  Interventional Cardiology

## 2025-05-21 ENCOUNTER — ANESTHESIA EVENT (OUTPATIENT)
Dept: MEDSURG UNIT | Facility: HOSPITAL | Age: 75
End: 2025-05-21
Payer: COMMERCIAL

## 2025-05-21 ENCOUNTER — TELEPHONE (OUTPATIENT)
Dept: ELECTROPHYSIOLOGY | Facility: CLINIC | Age: 75
End: 2025-05-21
Payer: COMMERCIAL

## 2025-05-21 NOTE — TELEPHONE ENCOUNTER
Spoke to patient    CONFIRMED procedure arrival time of 8am tomorrow for PVI redo with Dr Rodríguez    Reiterated instructions including:  -Directions to check in desk  -NPO after midnight night prior to procedure  -Confirmed compliance of Eliquis; he understands to take it this evening but NOT in am prior to arrival  -Pre-procedure LABS reviewed; no alerts noted  -Confirmed absence of implanted device/stimulator   -Confirmed no fever, cough, or shortness of breath in the past 30 days  -Confirmed no redness, rash, irritation, or yeast infection to groin area.   -Reviewed current visitor policy    Patient verbalized understanding of above and appreciated the call.

## 2025-05-22 ENCOUNTER — ANESTHESIA (OUTPATIENT)
Dept: MEDSURG UNIT | Facility: HOSPITAL | Age: 75
End: 2025-05-22
Payer: COMMERCIAL

## 2025-05-22 ENCOUNTER — HOSPITAL ENCOUNTER (OUTPATIENT)
Facility: HOSPITAL | Age: 75
Discharge: HOME | End: 2025-05-23
Attending: INTERNAL MEDICINE | Admitting: INTERNAL MEDICINE
Payer: COMMERCIAL

## 2025-05-22 ENCOUNTER — PATIENT MESSAGE (OUTPATIENT)
Dept: ELECTROPHYSIOLOGY | Facility: CLINIC | Age: 75
End: 2025-05-22
Payer: COMMERCIAL

## 2025-05-22 DIAGNOSIS — I49.9 ARRHYTHMIA: ICD-10-CM

## 2025-05-22 DIAGNOSIS — I48.91 ATRIAL FIBRILLATION: ICD-10-CM

## 2025-05-22 DIAGNOSIS — I49.5 TACHY-BRADY SYNDROME: ICD-10-CM

## 2025-05-22 DIAGNOSIS — I48.19 PERSISTENT ATRIAL FIBRILLATION: ICD-10-CM

## 2025-05-22 LAB
OHS QRS DURATION: 88 MS
OHS QRS DURATION: 92 MS
OHS QTC CALCULATION: 388 MS
OHS QTC CALCULATION: 425 MS

## 2025-05-22 PROCEDURE — C1730 CATH, EP, 19 OR FEW ELECT: HCPCS | Performed by: INTERNAL MEDICINE

## 2025-05-22 PROCEDURE — 25000003 PHARM REV CODE 250: Performed by: INTERNAL MEDICINE

## 2025-05-22 PROCEDURE — 63600175 PHARM REV CODE 636 W HCPCS: Performed by: NURSE ANESTHETIST, CERTIFIED REGISTERED

## 2025-05-22 PROCEDURE — 63600175 PHARM REV CODE 636 W HCPCS: Performed by: ANESTHESIOLOGY

## 2025-05-22 PROCEDURE — C2630 CATH, EP, COOL-TIP: HCPCS | Performed by: INTERNAL MEDICINE

## 2025-05-22 PROCEDURE — 93010 ELECTROCARDIOGRAM REPORT: CPT | Mod: 76,,, | Performed by: INTERNAL MEDICINE

## 2025-05-22 PROCEDURE — 37000008 HC ANESTHESIA 1ST 15 MINUTES: Performed by: INTERNAL MEDICINE

## 2025-05-22 PROCEDURE — 63600175 PHARM REV CODE 636 W HCPCS: Performed by: INTERNAL MEDICINE

## 2025-05-22 PROCEDURE — C1753 CATH, INTRAVAS ULTRASOUND: HCPCS | Performed by: INTERNAL MEDICINE

## 2025-05-22 PROCEDURE — 93010 ELECTROCARDIOGRAM REPORT: CPT | Mod: ,,, | Performed by: INTERNAL MEDICINE

## 2025-05-22 PROCEDURE — 37000009 HC ANESTHESIA EA ADD 15 MINS: Performed by: INTERNAL MEDICINE

## 2025-05-22 PROCEDURE — 27201423 OPTIME MED/SURG SUP & DEVICES STERILE SUPPLY: Performed by: INTERNAL MEDICINE

## 2025-05-22 PROCEDURE — 93656 COMPRE EP EVAL ABLTJ ATR FIB: CPT | Mod: ,,, | Performed by: INTERNAL MEDICINE

## 2025-05-22 PROCEDURE — C1732 CATH, EP, DIAG/ABL, 3D/VECT: HCPCS | Performed by: INTERNAL MEDICINE

## 2025-05-22 PROCEDURE — C1766 INTRO/SHEATH,STRBLE,NON-PEEL: HCPCS | Performed by: INTERNAL MEDICINE

## 2025-05-22 PROCEDURE — 25000003 PHARM REV CODE 250

## 2025-05-22 PROCEDURE — 93005 ELECTROCARDIOGRAM TRACING: CPT

## 2025-05-22 PROCEDURE — C1894 INTRO/SHEATH, NON-LASER: HCPCS | Performed by: INTERNAL MEDICINE

## 2025-05-22 PROCEDURE — 25000003 PHARM REV CODE 250: Performed by: NURSE ANESTHETIST, CERTIFIED REGISTERED

## 2025-05-22 PROCEDURE — 93656 COMPRE EP EVAL ABLTJ ATR FIB: CPT | Performed by: INTERNAL MEDICINE

## 2025-05-22 RX ORDER — PROTAMINE SULFATE 10 MG/ML
INJECTION, SOLUTION INTRAVENOUS
Status: DISCONTINUED | OUTPATIENT
Start: 2025-05-22 | End: 2025-05-22

## 2025-05-22 RX ORDER — PANTOPRAZOLE SODIUM 40 MG/1
40 TABLET, DELAYED RELEASE ORAL DAILY
Qty: 30 TABLET | Refills: 0 | Status: SHIPPED | OUTPATIENT
Start: 2025-05-22 | End: 2026-05-22

## 2025-05-22 RX ORDER — FENTANYL CITRATE 50 UG/ML
INJECTION, SOLUTION INTRAMUSCULAR; INTRAVENOUS
Status: DISCONTINUED | OUTPATIENT
Start: 2025-05-22 | End: 2025-05-22

## 2025-05-22 RX ORDER — GLUCAGON 1 MG
1 KIT INJECTION
Status: DISCONTINUED | OUTPATIENT
Start: 2025-05-22 | End: 2025-05-23 | Stop reason: HOSPADM

## 2025-05-22 RX ORDER — ATORVASTATIN CALCIUM 20 MG/1
20 TABLET, FILM COATED ORAL DAILY
Status: DISCONTINUED | OUTPATIENT
Start: 2025-05-23 | End: 2025-05-23 | Stop reason: HOSPADM

## 2025-05-22 RX ORDER — ACETAMINOPHEN 10 MG/ML
1000 INJECTION, SOLUTION INTRAVENOUS ONCE
Status: COMPLETED | OUTPATIENT
Start: 2025-05-22 | End: 2025-05-22

## 2025-05-22 RX ORDER — TAMSULOSIN HYDROCHLORIDE 0.4 MG/1
1 CAPSULE ORAL NIGHTLY
Status: DISCONTINUED | OUTPATIENT
Start: 2025-05-22 | End: 2025-05-23 | Stop reason: HOSPADM

## 2025-05-22 RX ORDER — ONDANSETRON HYDROCHLORIDE 2 MG/ML
4 INJECTION, SOLUTION INTRAVENOUS DAILY PRN
Status: DISCONTINUED | OUTPATIENT
Start: 2025-05-22 | End: 2025-05-23 | Stop reason: HOSPADM

## 2025-05-22 RX ORDER — ROCURONIUM BROMIDE 10 MG/ML
INJECTION, SOLUTION INTRAVENOUS
Status: DISCONTINUED | OUTPATIENT
Start: 2025-05-22 | End: 2025-05-22

## 2025-05-22 RX ORDER — FENTANYL CITRATE 50 UG/ML
25 INJECTION, SOLUTION INTRAMUSCULAR; INTRAVENOUS EVERY 5 MIN PRN
Status: DISCONTINUED | OUTPATIENT
Start: 2025-05-22 | End: 2025-05-23 | Stop reason: HOSPADM

## 2025-05-22 RX ORDER — LIDOCAINE HYDROCHLORIDE 20 MG/ML
INJECTION, SOLUTION INFILTRATION; PERINEURAL
Status: DISCONTINUED | OUTPATIENT
Start: 2025-05-22 | End: 2025-05-23 | Stop reason: HOSPADM

## 2025-05-22 RX ORDER — PRASUGREL 10 MG/1
10 TABLET, FILM COATED ORAL DAILY
Status: DISCONTINUED | OUTPATIENT
Start: 2025-05-23 | End: 2025-05-23 | Stop reason: HOSPADM

## 2025-05-22 RX ORDER — DEXAMETHASONE SODIUM PHOSPHATE 4 MG/ML
INJECTION, SOLUTION INTRA-ARTICULAR; INTRALESIONAL; INTRAMUSCULAR; INTRAVENOUS; SOFT TISSUE
Status: DISCONTINUED | OUTPATIENT
Start: 2025-05-22 | End: 2025-05-22

## 2025-05-22 RX ORDER — LIDOCAINE HYDROCHLORIDE 20 MG/ML
INJECTION INTRAVENOUS
Status: DISCONTINUED | OUTPATIENT
Start: 2025-05-22 | End: 2025-05-22

## 2025-05-22 RX ORDER — PROCHLORPERAZINE EDISYLATE 5 MG/ML
5 INJECTION INTRAMUSCULAR; INTRAVENOUS EVERY 30 MIN PRN
Status: DISCONTINUED | OUTPATIENT
Start: 2025-05-22 | End: 2025-05-23 | Stop reason: HOSPADM

## 2025-05-22 RX ORDER — HEPARIN SOD,PORCINE/0.9 % NACL 1000/500ML
INTRAVENOUS SOLUTION INTRAVENOUS
Status: DISCONTINUED | OUTPATIENT
Start: 2025-05-22 | End: 2025-05-23 | Stop reason: HOSPADM

## 2025-05-22 RX ORDER — HEPARIN SODIUM 1000 [USP'U]/ML
INJECTION, SOLUTION INTRAVENOUS; SUBCUTANEOUS
Status: DISCONTINUED | OUTPATIENT
Start: 2025-05-22 | End: 2025-05-22

## 2025-05-22 RX ORDER — PROPOFOL 10 MG/ML
VIAL (ML) INTRAVENOUS
Status: DISCONTINUED | OUTPATIENT
Start: 2025-05-22 | End: 2025-05-22

## 2025-05-22 RX ORDER — ONDANSETRON HYDROCHLORIDE 2 MG/ML
INJECTION, SOLUTION INTRAVENOUS
Status: DISCONTINUED | OUTPATIENT
Start: 2025-05-22 | End: 2025-05-22

## 2025-05-22 RX ORDER — ACETAMINOPHEN 325 MG/1
650 TABLET ORAL EVERY 4 HOURS PRN
Status: DISCONTINUED | OUTPATIENT
Start: 2025-05-22 | End: 2025-05-23 | Stop reason: HOSPADM

## 2025-05-22 RX ORDER — FUROSEMIDE 10 MG/ML
20 INJECTION INTRAMUSCULAR; INTRAVENOUS ONCE
Status: COMPLETED | OUTPATIENT
Start: 2025-05-22 | End: 2025-05-22

## 2025-05-22 RX ORDER — PHENYLEPHRINE HYDROCHLORIDE 10 MG/ML
INJECTION INTRAVENOUS
Status: DISCONTINUED | OUTPATIENT
Start: 2025-05-22 | End: 2025-05-22

## 2025-05-22 RX ADMIN — PHENYLEPHRINE HYDROCHLORIDE 0.3 MCG/KG/MIN: 10 INJECTION INTRAVENOUS at 11:05

## 2025-05-22 RX ADMIN — PROTAMINE SULFATE 5 MG: 10 INJECTION, SOLUTION INTRAVENOUS at 01:05

## 2025-05-22 RX ADMIN — APIXABAN 5 MG: 5 TABLET, FILM COATED ORAL at 10:05

## 2025-05-22 RX ADMIN — SUGAMMADEX 200 MG: 100 INJECTION, SOLUTION INTRAVENOUS at 01:05

## 2025-05-22 RX ADMIN — PROTAMINE SULFATE 65 MG: 10 INJECTION, SOLUTION INTRAVENOUS at 01:05

## 2025-05-22 RX ADMIN — LIDOCAINE HYDROCHLORIDE 100 MG: 20 INJECTION INTRAVENOUS at 10:05

## 2025-05-22 RX ADMIN — ACETAMINOPHEN 1000 MG: 10 INJECTION, SOLUTION INTRAVENOUS at 02:05

## 2025-05-22 RX ADMIN — PHENYLEPHRINE HYDROCHLORIDE 50 MCG: 10 INJECTION INTRAVENOUS at 11:05

## 2025-05-22 RX ADMIN — PROPOFOL 140 MG: 10 INJECTION, EMULSION INTRAVENOUS at 10:05

## 2025-05-22 RX ADMIN — ROCURONIUM BROMIDE 50 MG: 10 INJECTION INTRAVENOUS at 10:05

## 2025-05-22 RX ADMIN — HEPARIN SODIUM 6000 UNITS: 1000 INJECTION, SOLUTION INTRAVENOUS; SUBCUTANEOUS at 11:05

## 2025-05-22 RX ADMIN — SODIUM CHLORIDE: 0.9 INJECTION, SOLUTION INTRAVENOUS at 10:05

## 2025-05-22 RX ADMIN — ONDANSETRON 4 MG: 2 INJECTION INTRAMUSCULAR; INTRAVENOUS at 03:05

## 2025-05-22 RX ADMIN — SODIUM CHLORIDE, SODIUM GLUCONATE, SODIUM ACETATE, POTASSIUM CHLORIDE, MAGNESIUM CHLORIDE, SODIUM PHOSPHATE, DIBASIC, AND POTASSIUM PHOSPHATE: .53; .5; .37; .037; .03; .012; .00082 INJECTION, SOLUTION INTRAVENOUS at 10:05

## 2025-05-22 RX ADMIN — DEXAMETHASONE SODIUM PHOSPHATE 4 MG: 4 INJECTION, SOLUTION INTRAMUSCULAR; INTRAVENOUS at 10:05

## 2025-05-22 RX ADMIN — HEPARIN SODIUM 3000 UNITS: 1000 INJECTION, SOLUTION INTRAVENOUS; SUBCUTANEOUS at 11:05

## 2025-05-22 RX ADMIN — TAMSULOSIN HYDROCHLORIDE 0.4 MG: 0.4 CAPSULE ORAL at 10:05

## 2025-05-22 RX ADMIN — HEPARIN SODIUM 14000 UNITS: 1000 INJECTION, SOLUTION INTRAVENOUS; SUBCUTANEOUS at 11:05

## 2025-05-22 RX ADMIN — ONDANSETRON 4 MG: 2 INJECTION INTRAMUSCULAR; INTRAVENOUS at 10:05

## 2025-05-22 RX ADMIN — FENTANYL CITRATE 100 MCG: 50 INJECTION, SOLUTION INTRAMUSCULAR; INTRAVENOUS at 10:05

## 2025-05-22 RX ADMIN — FUROSEMIDE 20 MG: 10 INJECTION, SOLUTION INTRAMUSCULAR; INTRAVENOUS at 05:05

## 2025-05-22 NOTE — H&P
[facility] [place]  Electrophysiology  H&P      Dustin Lauren   YOB: 1950   Medical Record Number: [id number]   Attending Physician:    Date of Admission: 05/22/2025       Hospital Day:  0  Current Principal Problem:  <principal problem not specified>      History     Cc: atrial fibrillation     HPI  Mr Dustin Lauren is a 74 year old gentleman with PMH of atrial fibrillation s/p RF PVI 9/2021, HTN, CAD s/p PCI, hypothyroidism who presents to [facility] for redo pulmonary vein isolation. Initially diagnosed 2016, underwent DCCV and later devloped recurrence, ultimately undergoing RF PVI 9/2021. He developed recurrence earlier this year. He has resting bradycardia limiting anti arrhythmic therapy options. He was offered redo PVI and agreed to proceed.       Presenting EKG: NSR  CHADS-VASc: 3 (age, HTN, CAD)   Anticoagulants: Apixaban 5 mg BID   Antiarrhythmics: None   LV EF: 50% (HIEU 3/2025)   Pertinent labs: Hgb 13.4, INR 1.2, Cr 1.0         Medications - Outpatient  Prior to Admission medications    Medication Sig Start Date End Date Taking? Authorizing Provider   amlodipine-valsartan (EXFORGE) 5-160 mg per tablet Take 1 tablet by mouth once daily. 1/16/25  Yes Law Gibbs MD   apixaban (ELIQUIS) 5 mg Tab Take 1 tablet (5 mg total) by mouth 2 (two) times daily. 9/17/24  Yes Gala Villasenor NP   atorvastatin (LIPITOR) 20 MG tablet TAKE 1 TABLET BY MOUTH EVERY DAY 8/13/24  Yes Osmar Elder MD   cholecalciferol, vitamin D3, 25 mcg (1,000 unit) Chew Take 2,000 Int'l Units by mouth once daily. 3/22/16  Yes Provider, Historical   ezetimibe (ZETIA) 10 mg tablet TAKE 1 TABLET BY MOUTH EVERY DAY 3/31/25  Yes Osmar Elder MD   famotidine (PEPCID) 40 MG tablet Take 40 mg by mouth 2 (two) times daily. 1/11/24  Yes Provider, Historical   finasteride (PROSCAR) 5 mg tablet TAKE 1 TABLET BY MOUTH EVERY DAY 2/12/25  Yes Ronn, Katlyn L., NP   fluticasone propionate (FLONASE) 50  mcg/actuation nasal spray 2 sprays by Each Nostril route as needed. 10/24/20  Yes Provider, Historical   icosapent ethyL (VASCEPA) 1 gram Cap Take 1 capsule (1,000 mg total) by mouth 2 (two) times a day. 9/17/24  Yes Osmar Elder MD   levothyroxine (SYNTHROID) 137 MCG Tab tablet TAKE 1 TABLET BY MOUTH MON-SAT AND TAKE 1.5 TABLET BY MOUTH ON SUNDAY ONLY 12/2/24  Yes Conchita Torres FNP-C   magnesium glycinate 100 mg magnesium Cap Take 200 mg by mouth Daily.   Yes Provider, Historical   metoprolol tartrate (LOPRESSOR) 25 MG tablet Take 1 tablet (25 mg total) by mouth 2 (two) times daily.  Patient taking differently: Take 25 mg by mouth 2 (two) times daily as needed. 4/11/25  Yes Akiko Car FNP-C   multivit-min/FA/lycopen/lutein (CENTRUM SILVER MEN ORAL) Take 1 tablet by mouth once daily. 1/28/21  Yes Provider, Historical   naftifine 2 % Crea Apply 1 application topically daily as needed.   Yes Provider, Historical   prasugreL HCl (EFFIENT) 10 mg Tab TAKE 1 TABLET BY MOUTH EVERY DAY 2/27/25  Yes Osmar Elder MD   tamsulosin (FLOMAX) 0.4 mg Cap TAKE 1 CAPSULE BY MOUTH EVERY DAY 2/17/25  Yes Katlyn Brody NP   nitroGLYCERIN (NITROSTAT) 0.3 MG SL tablet Place 1 tablet (0.3 mg total) under the tongue every 5 (five) minutes as needed for Chest pain. 9/17/24   Osmar Elder MD   tiZANidine (ZANAFLEX) 4 MG tablet Take 1 tablet (4 mg total) by mouth every 8 (eight) hours as needed (muscle spasms). 12/1/22   HARINDER Shin, SMITHA         Medications - Current  Scheduled Meds:  Continuous Infusions:  PRN Meds:.      Allergies  Review of patient's allergies indicates:   Allergen Reactions    Codeine Nausea Only    Crestor [rosuvastatin] Palpitations    Doxycycline Hives    Keflex [cephalexin] Rash    Celebrex [celecoxib] Other (See Comments)     Upset stomach    Mobic [meloxicam] Other (See Comments)     Stomach pain and nauseous    Niacin Diarrhea    Niacin preparations Diarrhea    Ampicillin Rash    Pcn  [penicillins] Rash         Past Medical History  Past Medical History:   Diagnosis Date    A-fib     s/p cardioversion    Carpal tunnel syndrome     bilaterally    Cataract     Chronic LBP 11/8/2012    Chronic neck pain 11/8/2012    Coronary artery disease 03/24/2016    s/p 2 stents in RCA and ostium    Diverticulosis of colon     DJD (degenerative joint disease) of cervical spine 11/8/2012    DJD (degenerative joint disease) of lumbar spine 11/8/2012    DJD (degenerative joint disease) of thoracic spine 11/8/2012    GERD (gastroesophageal reflux disease)     Hyperlipidemia     hypertriglyceridemia    Hypertension     Hypothyroidism     s/p surgery    Nephrolithiasis, uric acid     stone evaluation showed uric acid and calcium oxalate    PONV (postoperative nausea and vomiting)     Rosacea     Thyroid disease     Ulcer     Unspecified disorder of kidney and ureter     Vitamin D deficiency disease          Past Surgical History  Past Surgical History:   Procedure Laterality Date    ABLATION OF ARRHYTHMOGENIC FOCUS FOR ATRIAL FIBRILLATION N/A 9/16/2021    Procedure: Ablation atrial fibrillation;  Surgeon: Dhaval Rodríguez MD;  Location: [facility] EP LAB;  Service: Cardiology;  Laterality: N/A;  AF, HIEU (Cx if compliant and in SR), PVI, RFA, DAWSON, Gen, SK, 3 Prep    CARDIOVERSION      for AFib    CHOLECYSTECTOMY      COLONOSCOPY W/ BIOPSIES AND POLYPECTOMY  10/2013    CORONARY ANGIOPLASTY WITH STENT PLACEMENT      Stent in RCA and another stent in ostium    ECHOCARDIOGRAM,TRANSESOPHAGEAL N/A 3/3/2025    Procedure: Transesophageal echo (HIEU) intra-procedure log documentation;  Surgeon: Flakito Espinal MD;  Location: [facility] EP LAB;  Service: Cardiology;  Laterality: N/A;    KNEE ARTHROSCOPY  10-13-14    right TKA    KNEE ARTHROSCOPY Left 10-7-15    ascope    KNEE ARTHROSCOPY W/ MENISCAL REPAIR Left 10/7/15    THYROIDECTOMY      TREATMENT OF CARDIAC ARRHYTHMIA  9/16/2021    Procedure: Cardioversion or Defibrillation;   Surgeon: Dhaval Rodríguez MD;  Location: [facility] EP LAB;  Service: Cardiology;;    TREATMENT OF CARDIAC ARRHYTHMIA N/A 3/3/2025    Procedure: Cardioversion or Defibrillation;  Surgeon: Dhaval Rodríguez MD;  Location: [facility] EP LAB;  Service: Cardiology;  Laterality: N/A;  AF, HIEU, DCCV, MAC, SK, 3 Prep         Social History  Social History     Socioeconomic History    Marital status:    Tobacco Use    Smoking status: Never     Passive exposure: Never    Smokeless tobacco: Never   Substance and Sexual Activity    Alcohol use: Yes     Alcohol/week: 0.0 standard drinks of alcohol     Comment: rarely    Drug use: No     Social Drivers of Health     Financial Resource Strain: Patient Declined (1/23/2025)    Overall Financial Resource Strain (CARDIA)     Difficulty of Paying Living Expenses: Patient declined   Food Insecurity: Patient Declined (1/23/2025)    Hunger Vital Sign     Worried About Running Out of Food in the Last Year: Patient declined     Ran Out of Food in the Last Year: Patient declined   Transportation Needs: Patient Declined (1/8/2024)    PRAPARE - Transportation     Lack of Transportation (Medical): Patient declined     Lack of Transportation (Non-Medical): Patient declined   Physical Activity: Sufficiently Active (1/23/2025)    Exercise Vital Sign     Days of Exercise per Week: 3 days     Minutes of Exercise per Session: 80 min   Stress: No Stress Concern Present (1/23/2025)    [employer] - Occupational Stress Questionnaire     Feeling of Stress : Not at all   Housing Stability: Patient Declined (1/8/2024)    Housing Stability Vital Sign     Unable to Pay for Housing in the Last Year: Patient declined     Unstable Housing in the Last Year: Patient declined         ROS  10 point ROS performed and negative except as stated in HPI     Physical Examination         Vital Signs  Vitals  Temp: 97.7 °F (36.5 °C)  Temp Source: Temporal  Pulse: (!) 46  Resp: 18  SpO2: 99 %  BP:  "136/74  BP Location: Left arm  BP Method: Automatic  Patient Position: Lying          24 Hour VS Range    Temp:  [97.7 °F (36.5 °C)]   Pulse:  [46]   Resp:  [18]   BP: (136-148)/(74-75)   SpO2:  [99 %]   No intake or output data in the 24 hours ending 05/22/25 0930        Physical Exam:   Constitutional: no acute distress  HEENT: NCAT, EOMI, no scleral icterus  Cardiovascular: Regular rate and rhythm  Pulmonary: Normal respiratory effort   Abdomen: nontender, non-distended   Neuro: alert and oriented, no focal deficits  Extremities: warm, no edema   MSK: no deformities  Integument: intact, no rashes       Data       No results for input(s): "WBC", "HGB", "HCT", "PLT" in the last 168 hours.     No results for input(s): "PROTIME", "INR" in the last 168 hours.     No results for input(s): "NA", "K", "CL", "CO2", "BUN", "CREATININE", "ANIONGAP", "CALCIUM" in the last 168 hours.     No results for input(s): "PROT", "ALBUMIN", "BILITOT", "ALKPHOS", "AST", "ALT" in the last 168 hours.     No results for input(s): "TROPONINI" in the last 168 hours.     No results found for: "BNP"    No results for input(s): "LABBLOO" in the last 168 hours.         Assessment & Plan   74 year old gentleman with PMH of atrial fibrillation s/p RF PVI 9/2021, HTN, CAD s/p PCI, hypothyroidism who presents to [facility] for redo pulmonary vein isolation.     Persistent atrial fibrillation:   Risks/benefits/alternatives discussed with patient and he agrees to proceed. Consents signed.   -To EP lab for redo PVI  -Cancel HIEU       Jeffry Bonilla MD  [facility]  Electrophysiology, PGY-VII      "

## 2025-05-22 NOTE — ANESTHESIA PROCEDURE NOTES
Intubation    Date/Time: 5/22/2025 10:34 AM    Performed by: Toño Quiñones CRNA  Authorized by: Héctor Enriquez MD    Intubation:     Induction:  Intravenous    Intubated:  Postinduction    Mask Ventilation:  Easy with oral airway    Attempts:  1    Attempted By:  CRNA    Method of Intubation:  Video laryngoscopy    Blade:  Leone 3    Laryngeal View Grade: Grade I - full view of cords      Difficult Airway Encountered?: No      Complications:  None    Airway Device:  Oral endotracheal tube    Airway Device Size:  7.5    Style/Cuff Inflation:  Cuffed    Inflation Amount (mL):  6    Tube secured:  23    Secured at:  The lips    Placement Verified By:  Capnometry    Complicating Factors:  None    Findings Post-Intubation:  BS equal bilateral and atraumatic/condition of teeth unchanged

## 2025-05-22 NOTE — NURSING
Stop and sutures removed from left groin site. Minimal bleeding. Quick clot applied with tegaderm.  Attempted to remove the right stop cock and puncture site is still oozing some blood. Given patients history with bleeding from the last groin puncture we agreed to leave it till hemostasis is achieved.

## 2025-05-22 NOTE — ANESTHESIA PROCEDURE NOTES
Arterial    Diagnosis: atrial fibrillation    Patient location during procedure: done in OR  Procedure end time: 5/22/2025 10:38 AM    Staffing  Authorizing Provider: Héctor Enriquez MD  Performing Provider: Héctor Enriquez MD    Staffing  Performed by: Héctor Enriquez MD  Authorized by: Héctor Enriquez MD    Anesthesiologist was present at the time of the procedure.    Preanesthetic Checklist  Completed: patient identified, IV checked, risks and benefits discussed, surgical consent, monitors and equipment checked, pre-op evaluation, timeout performed and anesthesia consent givenArterial  Skin Prep: chlorhexidine gluconate  Local Infiltration: none  Orientation: right  Location: radial    Catheter Size: 20 G  Catheter placement by Anatomical landmarks. Heme positive aspiration all ports. Insertion Attempts: 1  Assessment  Dressing: secured with tape and tegaderm  Patient: Tolerated well

## 2025-05-22 NOTE — NURSING
Patient arrived from EP lab via stretcher. AAOx4.Stop cocks noted to both groins. Right groin is oozing but no hematoma to either. Palpable pedal pulses.No complaints voiced at this time. Family at bedside. CB near.

## 2025-05-22 NOTE — TRANSFER OF CARE
"Anesthesia Transfer of Care Note    Patient: Dustin Lauren    Procedure(s) Performed: Procedure(s) (LRB):  Ablation atrial fibrillation (N/A)    Patient location: PACU    Anesthesia Type: general    Transport from OR: Transported from OR on 6-10 L/min O2 by face mask with adequate spontaneous ventilation    Post pain: adequate analgesia    Post assessment: no apparent anesthetic complications and tolerated procedure well    Post vital signs: stable    Level of consciousness: awake and alert    Nausea/Vomiting: no nausea/vomiting    Complications: none    Transfer of care protocol was followed      Last vitals: Visit Vitals  /74 (BP Location: Left arm, Patient Position: Lying)   Pulse (!) 46   Temp 36.5 °C (97.7 °F) (Temporal)   Resp 18   Ht 5' 10" (1.778 m)   Wt 93 kg (205 lb)   SpO2 99%   BMI 29.41 kg/m²     "

## 2025-05-22 NOTE — DISCHARGE SUMMARY
[facility] - [facility]iology  Cardiac Electrophysiology  Discharge Summary        Patient Name: Dustin Lauren   MRN: [id number]   Admission Date: 5/22/2025    Hospital Length of Stay: 0   Discharge Date: 05/23/2025   Attending Physician: Dhaval Rodríguez MD   Discharging Provider: Jeffry Bonilla MD      HPI:   Mr Dustin Lauren is a 74 year old gentleman with PMH of atrial fibrillation s/p RF PVI 9/2021, HTN, CAD s/p PCI, hypothyroidism who presents to [facility] for redo pulmonary vein isolation. Initially diagnosed 2016, underwent DCCV and later devloped recurrence, ultimately undergoing RF PVI 9/2021. He developed recurrence earlier this year. He has resting bradycardia limiting anti arrhythmic therapy options. He was offered redo PVI and agreed to proceed.         Presenting EKG: NSR  CHADS-VASc: 3 (age, HTN, CAD)   Anticoagulants: Apixaban 5 mg BID   Antiarrhythmics: None   LV EF: 50% (HIEU 3/2025)   Pertinent labs: Hgb 13.4, INR 1.2, Cr 1.0     Hospital Course:   Mr Lauren underwent successful redo pulmonary vein isolation and posterior wall isolation. He tolerated the procedure well with no immediate complications. He completed bed rest and ambulated without bleeding/hematoma. He was monitored overnight and discharged home in stable condition the morning following procedure.       Follow up:   Follow up with Dr. Rodríguez in 3 months       Disposition:   Home or Self Care         Medication List        START taking these medications      pantoprazole 40 MG tablet  Commonly known as: PROTONIX  Take 1 tablet (40 mg total) by mouth once daily.            CONTINUE taking these medications      amlodipine-valsartan 5-160 mg per tablet  Commonly known as: EXFORGE  Take 1 tablet by mouth once daily.     apixaban 5 mg Tab  Commonly known as: ELIQUIS  Take 1 tablet (5 mg total) by mouth 2 (two) times daily.     atorvastatin 20 MG tablet  Commonly known as: LIPITOR  TAKE 1 TABLET BY MOUTH EVERY DAY     [facility]      cholecalciferol (vitamin D3) 25 mcg (1,000 unit) Chew     ezetimibe 10 mg tablet  Commonly known as: ZETIA  TAKE 1 TABLET BY MOUTH EVERY DAY     famotidine 40 MG tablet  Commonly known as: PEPCID     finasteride 5 mg tablet  Commonly known as: PROSCAR  TAKE 1 TABLET BY MOUTH EVERY DAY     fluticasone propionate 50 mcg/actuation nasal spray  Commonly known as: FLONASE     icosapent ethyL 1 gram Cap  Commonly known as: VASCEPA  Take 1 capsule (1,000 mg total) by mouth 2 (two) times a day.     levothyroxine 137 MCG Tab tablet  Commonly known as: SYNTHROID  TAKE 1 TABLET BY MOUTH MON-SAT AND TAKE 1.5 TABLET BY MOUTH ON SUNDAY ONLY     magnesium glycinate 100 mg magnesium Cap     metoprolol tartrate 25 MG tablet  Commonly known as: LOPRESSOR  Take 1 tablet (25 mg total) by mouth 2 (two) times daily.     naftifine 2 % Crea     nitroGLYCERIN 0.3 MG SL tablet  Commonly known as: NITROSTAT  Place 1 tablet (0.3 mg total) under the tongue every 5 (five) minutes as needed for Chest pain.     prasugreL HCl 10 mg Tab  Commonly known as: EFFIENT  TAKE 1 TABLET BY MOUTH EVERY DAY     tamsulosin 0.4 mg Cap  Commonly known as: FLOMAX  TAKE 1 CAPSULE BY MOUTH EVERY DAY     tiZANidine 4 MG tablet  Commonly known as: ZANAFLEX  Take 1 tablet (4 mg total) by mouth every 8 (eight) hours as needed (muscle spasms).               Where to Get Your Medications        These medications were sent to [facility]  [address] [address]      Hours: Always Open Phone: [phone]   pantoprazole 40 MG tablet           Jeffry Bonilla MD  [facility]  Electrophysiology, PGY-VII

## 2025-05-22 NOTE — ANESTHESIA PREPROCEDURE EVALUATION
05/22/2025  Dustin Lauren is a 74 y.o., male with atrial fibrillation here for ablation.    Pre-operative evaluation for Procedure(s) (LRB):  Ablation atrial fibrillation (N/A)  Transesophageal echo (HIEU) intra-procedure log documentation (N/A)      Problem List[1]    Review of patient's allergies indicates:   Allergen Reactions    Codeine Nausea Only    Crestor [rosuvastatin] Palpitations    Doxycycline Hives    Keflex [cephalexin] Rash    Celebrex [celecoxib] Other (See Comments)     Upset stomach    Mobic [meloxicam] Other (See Comments)     Stomach pain and nauseous    Niacin Diarrhea    Niacin preparations Diarrhea    Ampicillin Rash    Pcn [penicillins] Rash       Medications Ordered Prior to Encounter[2]    Past Surgical History:   Procedure Laterality Date    ABLATION OF ARRHYTHMOGENIC FOCUS FOR ATRIAL FIBRILLATION N/A 9/16/2021    Procedure: Ablation atrial fibrillation;  Surgeon: Dhaval Rodríguez MD;  Location: [facility] EP LAB;  Service: Cardiology;  Laterality: N/A;  AF, HIEU (Cx if compliant and in SR), PVI, RFA, DAWSON, Gen, SK, 3 Prep    CARDIOVERSION      for AFib    CHOLECYSTECTOMY      COLONOSCOPY W/ BIOPSIES AND POLYPECTOMY  10/2013    CORONARY ANGIOPLASTY WITH STENT PLACEMENT      Stent in RCA and another stent in ostium    ECHOCARDIOGRAM,TRANSESOPHAGEAL N/A 3/3/2025    Procedure: Transesophageal echo (HIEU) intra-procedure log documentation;  Surgeon: Flakito Espinal MD;  Location: [facility] EP LAB;  Service: Cardiology;  Laterality: N/A;    KNEE ARTHROSCOPY  10-13-14    right TKA    KNEE ARTHROSCOPY Left 10-7-15    ascope    KNEE ARTHROSCOPY W/ MENISCAL REPAIR Left 10/7/15    THYROIDECTOMY      TREATMENT OF CARDIAC ARRHYTHMIA  9/16/2021    Procedure: Cardioversion or Defibrillation;  Surgeon: Dhaval Rodríguez MD;  Location: [facility] EP LAB;  Service: Cardiology;;    TREATMENT OF CARDIAC ARRHYTHMIA  "N/A 3/3/2025    Procedure: Cardioversion or Defibrillation;  Surgeon: Dhaval Rodríguez MD;  Location: [facility] LAB;  Service: Cardiology;  Laterality: N/A;  AF, HIEU, DCCV, MAC, SK, 3 Prep       Social History[3]      CBC: No results for input(s): "WBC", "RBC", "HGB", "HCT", "PLT", "MCV", "MCH", "MCHC" in the last 72 hours.    CMP: No results for input(s): "NA", "K", "CL", "CO2", "BUN", "CREATININE", "GLU", "MG", "PHOS", "CALCIUM", "ALBUMIN", "PROT", "ALKPHOS", "ALT", "AST", "BILITOT" in the last 72 hours.    INR  No results for input(s): "PT", "INR", "PROTIME", "APTT" in the last 72 hours.            2D Echo:  No results found for this or any previous visit.        Pre-op Assessment    I have reviewed the Patient Summary Reports.     I have reviewed the Nursing Notes.    I have reviewed the Medications.     Review of Systems  Anesthesia Hx:  No problems with previous Anesthesia                Hematology/Oncology:  Hematology Normal   Oncology Normal                                   EENT/Dental:  EENT/Dental Normal           Cardiovascular:     Hypertension    Dysrhythmias atrial fibrillation                                     Pulmonary:  Pulmonary Normal                       Renal/:  Renal/ Normal                 Hepatic/GI:  Hepatic/GI Normal                    Musculoskeletal:  Musculoskeletal Normal                Neurological:  Neurology Normal                                      Endocrine:  Endocrine Normal            Dermatological:  Skin Normal    Psych:  Psychiatric Normal                    Physical Exam  General: Well nourished    Airway:  Mallampati: II   Mouth Opening: Normal  TM Distance: Normal  Tongue: Normal  Neck ROM: Normal ROM    Dental:  Intact    Chest/Lungs:  Clear to auscultation, Normal Respiratory Rate    Heart:  Rate: Normal  Rhythm: Regular Rhythm  Sounds: Normal        Anesthesia Plan  Type of Anesthesia, risks & benefits discussed:    Anesthesia Type: Gen ETT  Intra-op Monitoring " Plan: Standard ASA Monitors and Art Line  Post Op Pain Control Plan: multimodal analgesia and IV/PO Opioids PRN  Induction:  IV  Airway Plan: Direct, Post-Induction  Informed Consent: Informed consent signed with the Patient and all parties understand the risks and agree with anesthesia plan.  All questions answered. Patient consented to blood products? Yes  ASA Score: 3  Day of Surgery Review of History & Physical: H&P Update referred to the surgeon/provider.  Anesthesia Plan Notes: Discussed plan for General endotracheal anesthesia    Ready For Surgery From Anesthesia Perspective.     .           [1]   Patient Active Problem List  Diagnosis    Hypertension    Knee injury    Osteoarthritis of lumbar spine    DJD (degenerative joint disease) of thoracic spine    DJD (degenerative joint disease) of cervical spine    Chronic LBP    Carpal tunnel syndrome, bilateral, L>R.    Chronic neck pain    Obesity    Postsurgical hypothyroidism    Hyperlipidemia    Nuclear sclerosis - Both Eyes    Knee pain, left    Low back pain    Eyelid twitch    GE (gastroenteritis)    Other tear of cartilage or meniscus of knee, current    Right groin pain    Right LBP    Left knee pain    Status post arthroscopy of left knee 10/7/2015    Impaired glucose tolerance    Persistent atrial fibrillation    Benign prostatic hyperplasia with urinary frequency    Coronary artery disease    Stented coronary artery    Tachy-darek syndrome    Solitary pulmonary nodule on lung CT    History of radiofrequency ablation (RFA) procedure for cardiac arrhythmia    History of COVID-19    On anticoagulant therapy    Lymphedema of both lower extremities    Varicose veins of both lower extremities with pain    Venous insufficiency of both lower extremities   [2]   No current facility-administered medications on file prior to encounter.     Current Outpatient Medications on File Prior to Encounter   Medication Sig Dispense Refill    amlodipine-valsartan (EXFORGE)  5-160 mg per tablet Take 1 tablet by mouth once daily. 90 tablet 3    apixaban (ELIQUIS) 5 mg Tab Take 1 tablet (5 mg total) by mouth 2 (two) times daily. 180 tablet 3    atorvastatin (LIPITOR) 20 MG tablet TAKE 1 TABLET BY MOUTH EVERY DAY 90 tablet 3    cholecalciferol, vitamin D3, 25 mcg (1,000 unit) Chew Take 2,000 Int'l Units by mouth once daily.      famotidine (PEPCID) 40 MG tablet Take 40 mg by mouth 2 (two) times daily.      finasteride (PROSCAR) 5 mg tablet TAKE 1 TABLET BY MOUTH EVERY DAY 90 tablet 3    fluticasone propionate (FLONASE) 50 mcg/actuation nasal spray 2 sprays by Each Nostril route as needed.      icosapent ethyL (VASCEPA) 1 gram Cap Take 1 capsule (1,000 mg total) by mouth 2 (two) times a day. 60 capsule 11    levothyroxine (SYNTHROID) 137 MCG Tab tablet TAKE 1 TABLET BY MOUTH MON-SAT AND TAKE 1.5 TABLET BY MOUTH ON SUNDAY ONLY 96 tablet 3    magnesium glycinate 100 mg magnesium Cap Take 200 mg by mouth Daily.      multivit-min/FA/lycopen/lutein (CENTRUM SILVER MEN ORAL) Take 1 tablet by mouth once daily.      naftifine 2 % Crea Apply 1 application topically daily as needed.      prasugreL HCl (EFFIENT) 10 mg Tab TAKE 1 TABLET BY MOUTH EVERY DAY 90 tablet 3    tamsulosin (FLOMAX) 0.4 mg Cap TAKE 1 CAPSULE BY MOUTH EVERY DAY 90 capsule 3    nitroGLYCERIN (NITROSTAT) 0.3 MG SL tablet Place 1 tablet (0.3 mg total) under the tongue every 5 (five) minutes as needed for Chest pain. 100 tablet 3    tiZANidine (ZANAFLEX) 4 MG tablet Take 1 tablet (4 mg total) by mouth every 8 (eight) hours as needed (muscle spasms). 50 tablet 2   [3]   Social History  Socioeconomic History    Marital status:    Tobacco Use    Smoking status: Never     Passive exposure: Never    Smokeless tobacco: Never   Substance and Sexual Activity    Alcohol use: Yes     Alcohol/week: 0.0 standard drinks of alcohol     Comment: rarely    Drug use: No     Social Drivers of Health     Financial Resource Strain: Patient  Declined (1/23/2025)    Overall Financial Resource Strain (CARDIA)     Difficulty of Paying Living Expenses: Patient declined   Food Insecurity: Patient Declined (1/23/2025)    Hunger Vital Sign     Worried About Running Out of Food in the Last Year: Patient declined     Ran Out of Food in the Last Year: Patient declined   Transportation Needs: Patient Declined (1/8/2024)    PRAPARE - Transportation     Lack of Transportation (Medical): Patient declined     Lack of Transportation (Non-Medical): Patient declined   Physical Activity: Sufficiently Active (1/23/2025)    Exercise Vital Sign     Days of Exercise per Week: 3 days     Minutes of Exercise per Session: 80 min   Stress: No Stress Concern Present (1/23/2025)    [employer] [place] [employer] - Occupational Stress Questionnaire     Feeling of Stress : Not at all   Housing Stability: Patient Declined (1/8/2024)    Housing Stability Vital Sign     Unable to Pay for Housing in the Last Year: Patient declined     Unstable Housing in the Last Year: Patient declined

## 2025-05-22 NOTE — Clinical Note
A transseptal puncture was performed. Asthma is improving with treatment.  The patient is experiencing no daytime asthma symptoms. He is experiencing no nighttime asthma symptoms.  Patient to keep asthma diary.  Discussed monitoring symptoms and use of quick-relief medications and contacting us early in the course of exacerbations.

## 2025-05-22 NOTE — PROGRESS NOTES
Patient admitted to recovery see [facility] for complete assessment pacu bcg's maintained safety measures verified patient instructed on pain scale and patient verbalized understanding. Called for EKG and it was done and placed in chart also called patient's wife and updated on patient location with the permission of patient.

## 2025-05-22 NOTE — NURSING TRANSFER
Nursing Transfer Note      5/22/2025   4:28 PM    Nurse giving handoff:florecita dela cruz   Nurse receiving handoff:antonia [facility] nurse     Reason patient is being transferred: evelyn/c critieria met     Transfer To: 310    Transfer via stretcher    Transfer with cardiac monitoring(box [address]) registered and verified able to see patient on monitor     Transported by florecita rn and katerine dela cruz     Transfer Vital Signs:  Blood Pressure:see epic   Heart Rate:see epic   O2:room air   Temperature:see epic   Respirations:see epic     Telemetry: yes   Order for Tele Monitor? Yes    Additional Lines: bilateral groins with sutures and stopcock intact went over suture removal time with nurse     Medicines sent: n'/a     Any special needs or follow-up needed: also lasix iv when walk     Patient belongings transferred with patient: Yes belongings from [facility] with patient     Chart send with patient: yes     Notified: reported to antonia rn     Patient reassessed at: see epic  (date, time)  1  Upon arrival to floor: to room no complaints no distress noted.

## 2025-05-22 NOTE — Clinical Note
Concentration Of Kenalog Solution Injected (Mg/Ml): 40.0 The sheath was exchanged in the right femoral vein. Bill For Wasted Drug (Kenalog)?: no How Many Mls Were Removed From The 40 Mg/Ml (10ml) Vial When Preparing The Injectable Solution?: 0 Kenalog Preparation: Kenalog Ndc# For Kenalog Only: [id number] Detail Level: Detailed Medical Necessity Clause: This procedure was medically necessary because the lesions that were treated were: Kenalog Type Of Vial: Multiple Dose Administered By (Optional): NEVILLE Thomas Validate Note Data When Using Inventory: Yes Total Volume (Ccs): 1 Show Inventory Tab: Hide Consent: The risks of atrophy were reviewed with the patient. Expiration Date For Kenalog (Optional): 5/2025 Lot # For Kenalog (Optional): [id number]

## 2025-05-23 VITALS
RESPIRATION RATE: 16 BRPM | SYSTOLIC BLOOD PRESSURE: 142 MMHG | OXYGEN SATURATION: 99 % | TEMPERATURE: 98 F | HEART RATE: 53 BPM | DIASTOLIC BLOOD PRESSURE: 71 MMHG | BODY MASS INDEX: 29.35 KG/M2 | HEIGHT: 70 IN | WEIGHT: 205 LBS

## 2025-05-23 PROCEDURE — 25000003 PHARM REV CODE 250: Performed by: INTERNAL MEDICINE

## 2025-05-23 RX ADMIN — PRASUGREL 10 MG: 10 TABLET, FILM COATED ORAL at 08:05

## 2025-05-23 RX ADMIN — APIXABAN 5 MG: 5 TABLET, FILM COATED ORAL at 08:05

## 2025-05-23 RX ADMIN — ATORVASTATIN CALCIUM 20 MG: 20 TABLET, FILM COATED ORAL at 08:05

## 2025-05-23 RX ADMIN — LEVOTHYROXINE SODIUM 137 MCG: 25 TABLET ORAL at 05:05

## 2025-05-23 NOTE — PLAN OF CARE
Alert and oriented X4, denies pain, no s/s of distress, small amount of bleeding noted to right groin puncture, sutures removed, applied gauze/transparent, tolerated well, no bleeding noted post dressing, plan of care explained to patient, no questions at this time, call light within reach, will continue to monitor.    Problem: Adult Inpatient Plan of Care  Goal: Plan of Care Review  Outcome: Progressing  Goal: Readiness for Transition of Care  Outcome: Progressing     Problem: Wound  Goal: Absence of Infection Signs and Symptoms  Outcome: Progressing  Goal: Optimal Wound Healing  Outcome: Progressing

## 2025-05-23 NOTE — ANESTHESIA POSTPROCEDURE EVALUATION
Anesthesia Post Evaluation    Patient: Dustin Lauren    Procedure(s) Performed: Procedure(s) (LRB):  Ablation atrial fibrillation (N/A)    Final Anesthesia Type: general      Patient location during evaluation: PACU  Patient participation: Yes- Able to Participate  Level of consciousness: awake and alert  Post-procedure vital signs: reviewed and stable  Pain management: adequate  Airway patency: patent    PONV status at discharge: No PONV  Anesthetic complications: no      Cardiovascular status: blood pressure returned to baseline  Respiratory status: unassisted  Hydration status: euvolemic  Follow-up not needed.              Vitals Value Taken Time   /71 05/23/25 08:30   Temp 36.7 °C (98 °F) 05/23/25 08:30   Pulse 53 05/23/25 10:00   Resp 16 05/23/25 08:30   SpO2 99 % 05/23/25 08:30         No case tracking events are documented in the log.      Pain/Evelyn Score: Pain Rating Prior to Med Admin: 3 (5/22/2025  2:18 PM)  Pain Rating Post Med Admin: 2 (5/22/2025  3:30 PM)  Evelyn Score: 10 (5/22/2025  2:00 PM)

## 2025-05-23 NOTE — CARE UPDATE
I have reviewed the chart of Dustin Lauren who is hospitalized for the following:    Active Hospital Problems    Diagnosis    *Persistent atrial fibrillation     s/p RF PVI 9/2021   Cardioversion 5/22/25  Routine post op care  Eliquis       On anticoagulant therapy     Eliquis for Afib      Tachy-darek syndrome    Coronary artery disease     S/p PCI  AC, SAPT, and statin      Benign prostatic hyperplasia with urinary frequency     Continue tamsulosin      Hyperlipidemia     Continue statin      Hypertension     Continue to monitor      Hypothyroid     Continue Synthroid           Madeline Carver PA-C  [facility]

## 2025-06-04 ENCOUNTER — HOSPITAL ENCOUNTER (EMERGENCY)
Facility: HOSPITAL | Age: 75
Discharge: HOME | End: 2025-06-04
Attending: EMERGENCY MEDICINE
Payer: COMMERCIAL

## 2025-06-04 VITALS
TEMPERATURE: 98 F | HEART RATE: 52 BPM | OXYGEN SATURATION: 100 % | HEIGHT: 70 IN | RESPIRATION RATE: 16 BRPM | DIASTOLIC BLOOD PRESSURE: 85 MMHG | WEIGHT: 205 LBS | SYSTOLIC BLOOD PRESSURE: 145 MMHG | BODY MASS INDEX: 29.35 KG/M2
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
## 2025-06-04 DIAGNOSIS — S69.92XA INJURY OF LEFT WRIST, INITIAL ENCOUNTER: ICD-10-CM

## 2025-06-04 DIAGNOSIS — S09.90XA TRAUMATIC INJURY OF HEAD, INITIAL ENCOUNTER: ICD-10-CM

## 2025-06-04 PROCEDURE — 25000003 PHARM REV CODE 250: Performed by: PHYSICIAN ASSISTANT

## 2025-06-04 PROCEDURE — 90471 IMMUNIZATION ADMIN: CPT | Performed by: PHYSICIAN ASSISTANT

## 2025-06-04 PROCEDURE — 63600175 PHARM REV CODE 636 W HCPCS: Performed by: PHYSICIAN ASSISTANT

## 2025-06-04 PROCEDURE — 99285 EMERGENCY DEPT VISIT HI MDM: CPT | Mod: 25

## 2025-06-04 PROCEDURE — 90715 TDAP VACCINE 7 YRS/> IM: CPT | Performed by: PHYSICIAN ASSISTANT

## 2025-06-04 RX ORDER — BACITRACIN ZINC 500 [USP'U]/G
1 OINTMENT TOPICAL
Status: COMPLETED | OUTPATIENT
Start: 2025-06-04 | End: 2025-06-04

## 2025-06-04 RX ADMIN — CLOSTRIDIUM TETANI TOXOID ANTIGEN (FORMALDEHYDE INACTIVATED), CORYNEBACTERIUM DIPHTHERIAE TOXOID ANTIGEN (FORMALDEHYDE INACTIVATED), BORDETELLA PERTUSSIS TOXOID ANTIGEN (GLUTARALDEHYDE INACTIVATED), BORDETELLA PERTUSSIS FILAMENTOUS HEMAGGLUTININ ANTIGEN (FORMALDEHYDE INACTIVATED), BORDETELLA PERTUSSIS PERTACTIN ANTIGEN, AND BORDETELLA PERTUSSIS FIMBRIAE 2/3 ANTIGEN 0.5 ML: 5; 2; 2.5; 5; 3; 5 INJECTION, SUSPENSION INTRAMUSCULAR at 07:06

## 2025-06-04 RX ADMIN — BACITRACIN 1 EACH: 500 OINTMENT TOPICAL at 07:06

## 2025-06-04 NOTE — ED PROVIDER NOTES
Encounter Date: 6/4/2025       History     Chief Complaint   Patient presents with    Fall     Pt had a trip and fall, hit both of his knees and pt states he fell forward and hit the side of his face on the door. Denies LOC.      The history is provided by the patient and medical records. No  was used.     Dustin Lauren is a 74 y.o. male with medical history of AFib s/p ablation, HTN, CAD s/p stent, Hypothyroidism presenting to the ED with the chief complaint of fall.     Experienced a non-syncopal, mechanical, ground-level fall about 1 hour PTA. Patient tripped while stepping up onto his patio causing him to fall forward on both hands and knees. Reports hitting his R eyebrow against a doorframe. No LOC. He was able to stand up and ambulate after the fall. He has an abrasion to his R knee and L palm. He has swelling to his R eyebrow. No eye pain, vision changes, speech changes, numbness, paresthesias, extremity weakness. He is on Eliquis and Prasugrel     Review of patient's allergies indicates:   Allergen Reactions    Codeine Nausea Only    Crestor [rosuvastatin] Palpitations    Doxycycline Hives    Keflex [cephalexin] Rash    Celebrex [celecoxib] Other (See Comments)     Upset stomach    Mobic [meloxicam] Other (See Comments)     Stomach pain and nauseous    Niacin Diarrhea    Niacin preparations Diarrhea    Ampicillin Rash    Pcn [penicillins] Rash     Past Medical History:   Diagnosis Date    A-fib     s/p cardioversion    Carpal tunnel syndrome     bilaterally    Cataract     Chronic LBP 11/8/2012    Chronic neck pain 11/8/2012    Coronary artery disease 03/24/2016    s/p 2 stents in RCA and ostium    Diverticulosis of colon     DJD (degenerative joint disease) of cervical spine 11/8/2012    DJD (degenerative joint disease) of lumbar spine 11/8/2012    DJD (degenerative joint disease) of thoracic spine 11/8/2012    GERD (gastroesophageal reflux disease)     Hyperlipidemia      hypertriglyceridemia    Hypertension     Hypothyroidism     s/p surgery    Nephrolithiasis, uric acid     stone evaluation showed uric acid and calcium oxalate    PONV (postoperative nausea and vomiting)     Rosacea     Thyroid disease     Ulcer     Unspecified disorder of kidney and ureter     Vitamin D deficiency disease      Past Surgical History:   Procedure Laterality Date    ABLATION OF ARRHYTHMOGENIC FOCUS FOR ATRIAL FIBRILLATION N/A 9/16/2021    Procedure: Ablation atrial fibrillation;  Surgeon: Dhaval Rodríguez MD;  Location: [facility] EP LAB;  Service: Cardiology;  Laterality: N/A;  AF, HIEU (Cx if compliant and in SR), PVI, RFA, DAWSON, Gen, SK, 3 Prep    ABLATION OF ARRHYTHMOGENIC FOCUS FOR ATRIAL FIBRILLATION N/A 5/22/2025    Procedure: Ablation atrial fibrillation;  Surgeon: Dhaval Rodríguez MD;  Location: [facility] EP LAB;  Service: Cardiology;  Laterality: N/A;  AF, HIEU (Cx if SR), PVI redo, RFA, DAWSON, Gen, SK, 3 Prep    CARDIOVERSION      for AFib    CHOLECYSTECTOMY      COLONOSCOPY W/ BIOPSIES AND POLYPECTOMY  10/2013    CORONARY ANGIOPLASTY WITH STENT PLACEMENT      Stent in RCA and another stent in ostium    ECHOCARDIOGRAM,TRANSESOPHAGEAL N/A 3/3/2025    Procedure: Transesophageal echo (HIEU) intra-procedure log documentation;  Surgeon: Flakito Espinal MD;  Location: [facility] EP LAB;  Service: Cardiology;  Laterality: N/A;    KNEE ARTHROSCOPY  10-13-14    right TKA    KNEE ARTHROSCOPY Left 10-7-15    ascope    KNEE ARTHROSCOPY W/ MENISCAL REPAIR Left 10/7/15    THYROIDECTOMY      TREATMENT OF CARDIAC ARRHYTHMIA  9/16/2021    Procedure: Cardioversion or Defibrillation;  Surgeon: Dhaval Rodríguez MD;  Location: [facility] EP LAB;  Service: Cardiology;;    TREATMENT OF CARDIAC ARRHYTHMIA N/A 3/3/2025    Procedure: Cardioversion or Defibrillation;  Surgeon: Dahval Rodríguez MD;  Location: [facility] EP LAB;  Service: Cardiology;  Laterality: N/A;  AF, HIEU, DCCV, MAC, SK, 3 Prep     Family History   Problem Relation Name Age of Onset     Hypertension Mother      Stroke Mother      Cancer Father          pancreas    Heart disease Father          MI    Thyroid disease Sister      Hyperlipidemia Sister      Thalassemia Sister          alpha Thalassemia anemia    Kidney disease Sister      Thyroid disease Sister      Arthritis Sister          RA    Thalassemia Sister          alpha Thalassemia anemia    Seizures Brother          s/p meningioma resection    Hypertension Brother      Cancer Brother          lung     Coronary artery disease Brother          stent in place    Arthritis Brother      Hypertension Brother      Arthritis Brother       Social History[1]  Review of Systems   Constitutional:  Negative for fever.   Musculoskeletal:  Positive for arthralgias.       Physical Exam     Initial Vitals [06/04/25 1816]   BP Pulse Resp Temp SpO2   (!) 172/88 (!) 56 16 98.3 °F (36.8 °C) 99 %      MAP       --         Physical Exam    Constitutional: He appears well-developed and well-nourished. He is not diaphoretic. He is easily aroused.   HENT:   Head: Normocephalic and atraumatic. Mouth/Throat: Oropharynx is clear and moist. No oropharyngeal exudate.   Mild edema to R lateral eyebrow. No open wound. Forehead muscles raise symmetrically.   Eyes: EOM and lids are normal. Pupils are equal, round, and reactive to light. No scleral icterus.   Neck: Phonation normal. Neck supple. No stridor present.   Normal range of motion.  Cardiovascular:  Regular rhythm.   Bradycardia present.         Pulmonary/Chest: Breath sounds normal. No stridor. No respiratory distress. He has no wheezes. He has no rales.   Abdominal: Abdomen is soft. He exhibits no distension. There is no abdominal tenderness. There is no rebound.   Musculoskeletal:         General: No tenderness or edema. Normal range of motion.      Cervical back: Normal range of motion and neck supple.      Comments: Superficial abrasion overlying R patella  Small area of ecchymosis noted to R palm over thenar  eminence   No snuff box tenderness. Full A/P ROM of extremities     Neurological: He is alert, oriented to person, place, and time and easily aroused. He has normal strength. No sensory deficit.   Skin: Skin is warm and dry. No rash noted. No erythema.   Psychiatric: He has a normal mood and affect. His speech is normal.         ED Course   Procedures  Labs Reviewed - No data to display         Imaging Results              CT Cervical Spine Without Contrast (Final result)  Result time 06/04/25 20:16:42      Final result by Eder Castellano MD (06/04/25 20:16:42)                   Impression:      No acute intracranial pathology, specifically no calvarial fracture or intracranial hemorrhage.    Degenerative changes of the cervical spine without evidence of acute fracture or traumatic malalignment.    Electronically signed by resident: Renato Reese  Date:    06/04/2025  Time:    19:51    Electronically signed by: Eder Castellano MD  Date:    06/04/2025  Time:    20:16               Narrative:    EXAMINATION:  CT HEAD WITHOUT CONTRAST; CT CERVICAL SPINE WITHOUT CONTRAST    CLINICAL HISTORY:  Head trauma, minor (Age >= 65y); Neck trauma (Age >= 65y)    TECHNIQUE:  Low dose axial CT images obtained throughout the head without the use of intravenous contrast.  Axial, sagittal and coronal reconstructions were performed.    Low dose axial images, sagittal and coronal reformations were performed though the cervical spine.  Contrast was not administered.    COMPARISON:  CT head 03/05/2012    X-ray of the cervical spine dated 11/18/2012.    FINDINGS:  CT head:    The brain parenchyma appears unremarkable.  No new hemorrhage or edema.  No new mass.  Gray-white matter differentiation is preserved.  No sulcal effacement.  No acute major vascular distribution infarct.  No mass effect or midline shift.    Ventricles are unchanged in size and configuration.  No hydrocephalus.  Basal cisterns are patent.    No new extra-axial blood  or fluid collections.    No displaced calvarial fracture.    Mastoid air cells and paranasal sinuses are essentially clear.    CT cervical spine:    Spinal alignment: Normal cervical lordosis.  2 mm of anterolisthesis C4 on C5.    Vertebrae: Anterior and posterior arches of C1 are intact.  Odontoid process is intact. Vertebral body heights are well maintained. No acute fracture or dislocation. No osseous destructive lesion.    Discs: Multilevel disc height loss most severe at C3-C4, C5-C7    Degenerative changes: Posterior disc osteophyte complexes and ligamentum flavum hypertrophy at multiple levels resulting in moderate spinal canal stenosis at C3-C6.  Uncovertebral spurring and facet arthropathy result and moderate neural foraminal narrowing on the left at C3-C4, C4-C5, moderate right neural foraminal narrowing at C5-C6 and moderate bilateral neural foraminal narrowing at C6-C7.    Soft tissues: Calcific atherosclerosis of the bilateral carotid.  There are calcifications in the nuchal ligament.  The visualized lung apices are unremarkable.                        Preliminary result by Renato Reese MD (06/04/25 20:02:15)                   Impression:      No acute intracranial pathology, specifically no calvarial fracture or   intracranial hemorrhage.    Degenerative changes of the cervical spine without evidence of acute   fracture or traumatic malalignment.    Electronically signed by resident: Renato Reese  Date:    06/04/2025  Time:    19:51                 Narrative:    EXAMINATION:  CT HEAD WITHOUT CONTRAST; CT CERVICAL SPINE WITHOUT CONTRAST    CLINICAL HISTORY:  Head trauma, minor (Age >= 65y);; Neck trauma (Age >= 65y);    TECHNIQUE:  Low dose axial CT images obtained throughout the head without the use of   intravenous contrast.  Axial, sagittal and coronal reconstructions were   performed.    Low dose axial images, sagittal and coronal reformations were performed   though the cervical  spine.  Contrast was not administered.    COMPARISON:  CT head 03/05/2012    FINDINGS:  CT head:    The brain parenchyma appears unremarkable.  No new hemorrhage or edema.    No new mass.  Gray-white matter differentiation is preserved.  No sulcal   effacement.  No acute major vascular distribution infarct.  No mass effect   or midline shift.    Ventricles are unchanged in size and configuration.  No hydrocephalus.    Basal cisterns are patent.    No new extra-axial blood or fluid collections.    No displaced calvarial fracture.    Mastoid air cells and paranasal sinuses are essentially clear.    CT cervical spine:    Spinal alignment: Normal cervical lordosis.  One 2 mm of anterolisthesis   C4 on C5.    Vertebrae: Anterior and posterior arches of C1 are intact.  Odontoid   process is intact. Vertebral body heights are well maintained. No acute   fracture or dislocation. No osseous destructive lesion.    Discs: Multilevel disc height loss most severe at C3-C4, C5-C7    Degenerative changes: Posterior disc osteophyte complexes and ligamentum   flavum hypertrophy at multiple levels resulting in moderate spinal canal   stenosis at C3-C6.  Uncovertebral spurring and facet arthropathy result   and moderate neural foraminal narrowing on the left at C3-C4, C4-C5,   moderate right neural foraminal narrowing at C5-C6 and moderate bilateral   neural foraminal narrowing at C6-C7.    Soft tissues: Calcific atherosclerosis of the bilateral carotid                                       CT Head Without Contrast (Final result)  Result time 06/04/25 20:16:42      Final result by Eder Castellano MD (06/04/25 20:16:42)                   Impression:      No acute intracranial pathology, specifically no calvarial fracture or intracranial hemorrhage.    Degenerative changes of the cervical spine without evidence of acute fracture or traumatic malalignment.    Electronically signed by resident: Renato  Hugh  Date:    06/04/2025  Time:    19:51    Electronically signed by: Eder Castellano MD  Date:    06/04/2025  Time:    20:16               Narrative:    EXAMINATION:  CT HEAD WITHOUT CONTRAST; CT CERVICAL SPINE WITHOUT CONTRAST    CLINICAL HISTORY:  Head trauma, minor (Age >= 65y); Neck trauma (Age >= 65y)    TECHNIQUE:  Low dose axial CT images obtained throughout the head without the use of intravenous contrast.  Axial, sagittal and coronal reconstructions were performed.    Low dose axial images, sagittal and coronal reformations were performed though the cervical spine.  Contrast was not administered.    COMPARISON:  CT head 03/05/2012    X-ray of the cervical spine dated 11/18/2012.    FINDINGS:  CT head:    The brain parenchyma appears unremarkable.  No new hemorrhage or edema.  No new mass.  Gray-white matter differentiation is preserved.  No sulcal effacement.  No acute major vascular distribution infarct.  No mass effect or midline shift.    Ventricles are unchanged in size and configuration.  No hydrocephalus.  Basal cisterns are patent.    No new extra-axial blood or fluid collections.    No displaced calvarial fracture.    Mastoid air cells and paranasal sinuses are essentially clear.    CT cervical spine:    Spinal alignment: Normal cervical lordosis.  2 mm of anterolisthesis C4 on C5.    Vertebrae: Anterior and posterior arches of C1 are intact.  Odontoid process is intact. Vertebral body heights are well maintained. No acute fracture or dislocation. No osseous destructive lesion.    Discs: Multilevel disc height loss most severe at C3-C4, C5-C7    Degenerative changes: Posterior disc osteophyte complexes and ligamentum flavum hypertrophy at multiple levels resulting in moderate spinal canal stenosis at C3-C6.  Uncovertebral spurring and facet arthropathy result and moderate neural foraminal narrowing on the left at C3-C4, C4-C5, moderate right neural foraminal narrowing at C5-C6 and moderate  bilateral neural foraminal narrowing at C6-C7.    Soft tissues: Calcific atherosclerosis of the bilateral carotid.  There are calcifications in the nuchal ligament.  The visualized lung apices are unremarkable.                        Preliminary result by Renato Reese MD (06/04/25 20:02:15)                   Impression:      No acute intracranial pathology, specifically no calvarial fracture or intracranial hemorrhage.    Degenerative changes of the cervical spine without evidence of acute fracture or traumatic malalignment.    Electronically signed by resident: Renato Reese  Date:    06/04/2025  Time:    19:51                 Narrative:    EXAMINATION:  CT HEAD WITHOUT CONTRAST; CT CERVICAL SPINE WITHOUT CONTRAST    CLINICAL HISTORY:  Head trauma, minor (Age >= 65y);; Neck trauma (Age >= 65y);    TECHNIQUE:  Low dose axial CT images obtained throughout the head without the use of intravenous contrast.  Axial, sagittal and coronal reconstructions were performed.    Low dose axial images, sagittal and coronal reformations were performed though the cervical spine.  Contrast was not administered.    COMPARISON:  CT head 03/05/2012    FINDINGS:  CT head:    The brain parenchyma appears unremarkable.  No new hemorrhage or edema.  No new mass.  Gray-white matter differentiation is preserved.  No sulcal effacement.  No acute major vascular distribution infarct.  No mass effect or midline shift.    Ventricles are unchanged in size and configuration.  No hydrocephalus.  Basal cisterns are patent.    No new extra-axial blood or fluid collections.    No displaced calvarial fracture.    Mastoid air cells and paranasal sinuses are essentially clear.    CT cervical spine:    Spinal alignment: Normal cervical lordosis.  One 2 mm of anterolisthesis C4 on C5.    Vertebrae: Anterior and posterior arches of C1 are intact.  Odontoid process is intact. Vertebral body heights are well maintained. No acute fracture or  dislocation. No osseous destructive lesion.    Discs: Multilevel disc height loss most severe at C3-C4, C5-C7    Degenerative changes: Posterior disc osteophyte complexes and ligamentum flavum hypertrophy at multiple levels resulting in moderate spinal canal stenosis at C3-C6.  Uncovertebral spurring and facet arthropathy result and moderate neural foraminal narrowing on the left at C3-C4, C4-C5, moderate right neural foraminal narrowing at C5-C6 and moderate bilateral neural foraminal narrowing at C6-C7.    Soft tissues: Calcific atherosclerosis of the bilateral carotid                                       X-Ray Wrist Complete Left (Final result)  Result time 06/04/25 19:42:24      Final result by Fly Nuñez MD (06/04/25 19:42:24)                   Impression:      Degenerative changes throughout the wrist with no evidence of acute fracture or dislocation.      Electronically signed by: Fly Nuñez  Date:    06/04/2025  Time:    19:42               Narrative:    EXAMINATION:  XR WRIST COMPLETE 3 VIEWS LEFT    CLINICAL HISTORY:  Unspecified fall, initial encounter    TECHNIQUE:  PA, lateral, and oblique views of the left wrist were performed.    COMPARISON:  None    FINDINGS:  Diffuse osteoarthritis most severe at the triscaphe joint.  No acute fracture or dislocation.  Chondrocalcinosis in the fibro triangular cartilage.                                       Medications   bacitracin zinc ointment 1 each (1 each Topical (Top) Given 6/4/25 1927)   Tdap vaccine injection 0.5 mL (0.5 mLs Intramuscular Given 6/4/25 1932)     Medical Decision Making  74 y.o. male with medical history of AFib s/p ablation, HTN, CAD s/p stent, Hypothyroidism presenting to the ED c/o non-syncopal, mechanical, ground-level fall about 1 hour PTA. Tripped while stepping up onto his patio. No LOC. +Blood thinners.     DDx includes but not limited to traumatic brain injury, hematoma, skull fracture, contusion, sprain/strain,  fracture, ligament injury, dislocation. Will update tetanus.    Amount and/or Complexity of Data Reviewed  External Data Reviewed: labs, radiology and notes.  Radiology: ordered and independent interpretation performed.    Risk  OTC drugs.  Prescription drug management.               ED Course as of 06/04/25 2133 Wed Jun 04, 2025 2124 CT head with no acute intracranial bleed. No skull fracture  CT cervical spine with degenerative changes. No fracture or dislocation. [BA]   2124 XR L wrist with no acute fracture or dislocation [BA]   2124 Okay for outpatient follow-up with PCP for ongoing management. Advised Tylenol and ice packs for pain. Patient expresses understanding and agreeable to the plan. Return to ED precautions given for new, worsening, or concerning symptoms. [BA]      ED Course User Index  [BA] Toño Ray PA-C                           Clinical Impression:  Final diagnoses:  [W19.XXXA] Fall (Primary)  [S09.90XA] Traumatic injury of head, initial encounter  [S69.92XA] Injury of left wrist, initial encounter          ED Disposition Condition    Discharge Stable          ED Prescriptions    None       Follow-up Information       Follow up With Specialties Details Why Contact Info    Law Gibbs MD Family Medicine   [address] [facility] [address]  [phone]                     [1]   Social History  Tobacco Use    Smoking status: Never     Passive exposure: Never    Smokeless tobacco: Never   Substance Use Topics    Alcohol use: Yes     Alcohol/week: 0.0 standard drinks of alcohol     Comment: rarely    Drug use: No        Toño Ray PA-C  06/04/25 2133

## 2025-06-04 NOTE — ED TRIAGE NOTES
Patient arrives to the ED with complaints of a fall. Patient states he tripped on his deck outside and hit his face on a door. Patient reports he is on 2 blood thinners but denies LOC or headache.

## 2025-06-05 NOTE — DISCHARGE INSTRUCTIONS
Follow-up with your primary care provider for further evaluation.    Return to the emergency room for new, worsening, or concerning symptoms.     Future Appointments   Date Time Provider Department Center   7/10/2025  9:30 AM [facility] OIC-US1 MASTER [facility] ULTR [facility]   8/18/2025  9:00 AM LAB, [place] [facility] [place]   8/25/2025 11:00 AM Malick Malik MD [facility] ENDODIA [facility]   9/8/2025  7:30 AM EKG, APPT [facility] EKG [facility]   9/8/2025  8:20 AM Dhaval Rodríguez MD [facility] ARRHYTH [facility]   1/9/2026 11:00 AM Katlyn Brody NP [facility] UROLOGY [facility]

## 2025-06-27 DIAGNOSIS — E78.2 MIXED HYPERLIPIDEMIA: ICD-10-CM

## 2025-06-27 RX ORDER — ICOSAPENT ETHYL 1 G/1
1 CAPSULE ORAL 2 TIMES DAILY
Qty: 180 CAPSULE | Refills: 1 | Status: SHIPPED | OUTPATIENT
Start: 2025-06-27

## 2025-06-27 NOTE — TELEPHONE ENCOUNTER
Refill Decision Note   Dustin Lauren  is requesting a refill authorization.  Brief Assessment and Rationale for Refill:  Approve     Medication Therapy Plan: LOV 1/29/25 Yamil Guadarrama MD PhD      Comments:     Note composed:12:16 PM 06/27/2025

## 2025-07-10 ENCOUNTER — HOSPITAL ENCOUNTER (OUTPATIENT)
Dept: RADIOLOGY | Facility: HOSPITAL | Age: 75
Discharge: HOME | End: 2025-07-10
Attending: NURSE PRACTITIONER
Payer: COMMERCIAL

## 2025-07-10 DIAGNOSIS — N28.1 RENAL CYST: ICD-10-CM

## 2025-07-10 PROCEDURE — 76770 US EXAM ABDO BACK WALL COMP: CPT | Mod: TC

## 2025-07-10 PROCEDURE — 76770 US EXAM ABDO BACK WALL COMP: CPT | Mod: 26,,, | Performed by: STUDENT IN AN ORGANIZED HEALTH CARE EDUCATION/TRAINING PROGRAM

## 2025-08-18 ENCOUNTER — LAB VISIT (OUTPATIENT)
Dept: LAB | Facility: HOSPITAL | Age: 75
End: 2025-08-18
Payer: COMMERCIAL

## 2025-08-18 DIAGNOSIS — R73.02 IMPAIRED GLUCOSE TOLERANCE: ICD-10-CM

## 2025-08-18 DIAGNOSIS — E89.0 POSTSURGICAL HYPOTHYROIDISM: ICD-10-CM

## 2025-08-18 LAB
EAG (OHS): 114 MG/DL (ref 68–131)
HBA1C MFR BLD: 5.6 % (ref 4–5.6)
TSH SERPL-ACNC: 2.86 UIU/ML (ref 0.4–4)

## 2025-08-18 PROCEDURE — 83036 HEMOGLOBIN GLYCOSYLATED A1C: CPT

## 2025-08-18 PROCEDURE — 36415 COLL VENOUS BLD VENIPUNCTURE: CPT | Mod: PO

## 2025-08-18 PROCEDURE — 84443 ASSAY THYROID STIM HORMONE: CPT

## 2025-08-20 ENCOUNTER — LAB VISIT (OUTPATIENT)
Dept: LAB | Facility: HOSPITAL | Age: 75
End: 2025-08-20
Payer: COMMERCIAL

## 2025-08-20 DIAGNOSIS — E78.2 MIXED HYPERLIPIDEMIA: ICD-10-CM

## 2025-08-20 DIAGNOSIS — I25.10 CORONARY ARTERY DISEASE INVOLVING NATIVE CORONARY ARTERY OF NATIVE HEART WITHOUT ANGINA PECTORIS: ICD-10-CM

## 2025-08-20 LAB
ALBUMIN SERPL BCP-MCNC: 4.1 G/DL (ref 3.5–5.2)
ALP SERPL-CCNC: 59 UNIT/L (ref 40–150)
ALT SERPL W/O P-5'-P-CCNC: 45 UNIT/L (ref 0–55)
ANION GAP (OHS): 7 MMOL/L (ref 8–16)
AST SERPL-CCNC: 33 UNIT/L (ref 0–50)
BILIRUB SERPL-MCNC: 0.7 MG/DL (ref 0.1–1)
BUN SERPL-MCNC: 19 MG/DL (ref 8–23)
CALCIUM SERPL-MCNC: 8.8 MG/DL (ref 8.7–10.5)
CHLORIDE SERPL-SCNC: 105 MMOL/L (ref 95–110)
CHOLEST SERPL-MCNC: 95 MG/DL (ref 120–199)
CHOLEST/HDLC SERPL: 2.8 {RATIO} (ref 2–5)
CO2 SERPL-SCNC: 27 MMOL/L (ref 23–29)
CREAT SERPL-MCNC: 1 MG/DL (ref 0.5–1.4)
GFR SERPLBLD CREATININE-BSD FMLA CKD-EPI: >60 ML/MIN/1.73/M2
GLUCOSE SERPL-MCNC: 95 MG/DL (ref 70–110)
HDLC SERPL-MCNC: 34 MG/DL (ref 40–75)
HDLC SERPL: 35.8 % (ref 20–50)
LDLC SERPL CALC-MCNC: 46.6 MG/DL (ref 63–159)
NONHDLC SERPL-MCNC: 61 MG/DL
POTASSIUM SERPL-SCNC: 4.3 MMOL/L (ref 3.5–5.1)
PROT SERPL-MCNC: 7 GM/DL (ref 6–8.4)
SODIUM SERPL-SCNC: 139 MMOL/L (ref 136–145)
TRIGL SERPL-MCNC: 72 MG/DL (ref 30–150)

## 2025-08-20 PROCEDURE — 36415 COLL VENOUS BLD VENIPUNCTURE: CPT | Mod: PO

## 2025-08-20 PROCEDURE — 82465 ASSAY BLD/SERUM CHOLESTEROL: CPT

## 2025-08-20 PROCEDURE — 80053 COMPREHEN METABOLIC PANEL: CPT

## 2025-08-21 ENCOUNTER — PATIENT MESSAGE (OUTPATIENT)
Dept: INTERNAL MEDICINE | Facility: CLINIC | Age: 75
End: 2025-08-21
Payer: COMMERCIAL

## 2025-08-21 ENCOUNTER — OFFICE VISIT (OUTPATIENT)
Dept: CARDIOLOGY | Facility: CLINIC | Age: 75
End: 2025-08-21
Payer: COMMERCIAL

## 2025-08-21 VITALS
SYSTOLIC BLOOD PRESSURE: 133 MMHG | HEART RATE: 44 BPM | BODY MASS INDEX: 29.88 KG/M2 | WEIGHT: 208.69 LBS | DIASTOLIC BLOOD PRESSURE: 74 MMHG | HEIGHT: 70 IN

## 2025-08-21 DIAGNOSIS — I83.813 VARICOSE VEINS OF BOTH LOWER EXTREMITIES WITH PAIN: ICD-10-CM

## 2025-08-21 DIAGNOSIS — Z79.01 ON ANTICOAGULANT THERAPY: ICD-10-CM

## 2025-08-21 DIAGNOSIS — I10 PRIMARY HYPERTENSION: ICD-10-CM

## 2025-08-21 DIAGNOSIS — I25.10 CORONARY ARTERY DISEASE INVOLVING NATIVE CORONARY ARTERY OF NATIVE HEART WITHOUT ANGINA PECTORIS: Primary | ICD-10-CM

## 2025-08-21 DIAGNOSIS — Z95.5 STENTED CORONARY ARTERY: ICD-10-CM

## 2025-08-21 DIAGNOSIS — E78.2 MIXED HYPERLIPIDEMIA: ICD-10-CM

## 2025-08-21 DIAGNOSIS — Z98.890 HISTORY OF RADIOFREQUENCY ABLATION (RFA) PROCEDURE FOR CARDIAC ARRHYTHMIA: ICD-10-CM

## 2025-08-21 DIAGNOSIS — I48.19 PERSISTENT ATRIAL FIBRILLATION: ICD-10-CM

## 2025-08-21 PROCEDURE — 99215 OFFICE O/P EST HI 40 MIN: CPT | Mod: S$GLB,,, | Performed by: INTERNAL MEDICINE

## 2025-08-21 PROCEDURE — 99999 PR PBB SHADOW E&M-EST. PATIENT-LVL IV: CPT | Mod: PBBFAC,,, | Performed by: INTERNAL MEDICINE

## 2025-08-21 RX ORDER — PRASUGREL 5 MG/1
5 TABLET, FILM COATED ORAL DAILY
Qty: 180 TABLET | Refills: 3 | Status: SHIPPED | OUTPATIENT
Start: 2025-08-21

## 2025-08-25 ENCOUNTER — OFFICE VISIT (OUTPATIENT)
Dept: ENDOCRINOLOGY | Facility: CLINIC | Age: 75
End: 2025-08-25
Payer: COMMERCIAL

## 2025-08-25 VITALS
HEART RATE: 55 BPM | HEIGHT: 70 IN | BODY MASS INDEX: 29.98 KG/M2 | SYSTOLIC BLOOD PRESSURE: 104 MMHG | WEIGHT: 209.44 LBS | DIASTOLIC BLOOD PRESSURE: 62 MMHG

## 2025-08-25 DIAGNOSIS — I10 PRIMARY HYPERTENSION: ICD-10-CM

## 2025-08-25 DIAGNOSIS — R73.02 IMPAIRED GLUCOSE TOLERANCE: ICD-10-CM

## 2025-08-25 DIAGNOSIS — E78.2 MIXED HYPERLIPIDEMIA: ICD-10-CM

## 2025-08-25 DIAGNOSIS — E89.0 POSTSURGICAL HYPOTHYROIDISM: Primary | ICD-10-CM

## 2025-08-25 PROCEDURE — 99999 PR PBB SHADOW E&M-EST. PATIENT-LVL IV: CPT | Mod: PBBFAC,,, | Performed by: INTERNAL MEDICINE

## 2025-08-25 PROCEDURE — 99214 OFFICE O/P EST MOD 30 MIN: CPT | Mod: S$GLB,,, | Performed by: INTERNAL MEDICINE

## 2025-08-25 PROCEDURE — G2211 COMPLEX E/M VISIT ADD ON: HCPCS | Mod: S$GLB,,, | Performed by: INTERNAL MEDICINE

## 2025-08-25 RX ORDER — LEVOTHYROXINE SODIUM 137 UG/1
TABLET ORAL
Qty: 96 TABLET | Refills: 3 | Status: SHIPPED | OUTPATIENT
Start: 2025-08-25

## 2025-08-26 ENCOUNTER — OFFICE VISIT (OUTPATIENT)
Dept: INTERNAL MEDICINE | Facility: CLINIC | Age: 75
End: 2025-08-26
Payer: COMMERCIAL

## 2025-08-26 ENCOUNTER — PATIENT MESSAGE (OUTPATIENT)
Dept: INTERNAL MEDICINE | Facility: CLINIC | Age: 75
End: 2025-08-26
Payer: COMMERCIAL

## 2025-08-26 VITALS
HEART RATE: 84 BPM | WEIGHT: 209.44 LBS | HEIGHT: 70 IN | OXYGEN SATURATION: 96 % | BODY MASS INDEX: 29.98 KG/M2 | TEMPERATURE: 99 F | SYSTOLIC BLOOD PRESSURE: 138 MMHG | DIASTOLIC BLOOD PRESSURE: 72 MMHG | RESPIRATION RATE: 14 BRPM

## 2025-08-26 DIAGNOSIS — B34.9 VIRAL ILLNESS: ICD-10-CM

## 2025-08-26 DIAGNOSIS — R52 BODY ACHES: ICD-10-CM

## 2025-08-26 DIAGNOSIS — K52.9 GASTROENTERITIS: Primary | ICD-10-CM

## 2025-08-26 DIAGNOSIS — I10 HYPERTENSION, UNSPECIFIED TYPE: ICD-10-CM

## 2025-08-26 DIAGNOSIS — R11.0 NAUSEA: ICD-10-CM

## 2025-08-26 LAB
CTP QC/QA: YES
CTP QC/QA: YES
POC MOLECULAR INFLUENZA A AGN: NEGATIVE
POC MOLECULAR INFLUENZA B AGN: NEGATIVE
SARS-COV+SARS-COV-2 AG RESP QL IA.RAPID: NEGATIVE

## 2025-08-26 PROCEDURE — 99999 PR PBB SHADOW E&M-EST. PATIENT-LVL V: CPT | Mod: PBBFAC,,,

## 2025-08-26 RX ORDER — ONDANSETRON 4 MG/1
4 TABLET, FILM COATED ORAL EVERY 8 HOURS PRN
Qty: 21 TABLET | Refills: 0 | Status: SHIPPED | OUTPATIENT
Start: 2025-08-26

## 2025-08-27 ENCOUNTER — HOSPITAL ENCOUNTER (EMERGENCY)
Facility: HOSPITAL | Age: 75
Discharge: HOME | End: 2025-08-28
Attending: EMERGENCY MEDICINE
Payer: COMMERCIAL

## 2025-08-27 DIAGNOSIS — Z13.6 SCREENING FOR CARDIOVASCULAR CONDITION: ICD-10-CM

## 2025-08-27 DIAGNOSIS — I25.10 CORONARY ARTERY DISEASE INVOLVING NATIVE CORONARY ARTERY OF NATIVE HEART WITHOUT ANGINA PECTORIS: Primary | ICD-10-CM

## 2025-08-27 DIAGNOSIS — I89.0 LYMPHEDEMA OF BOTH LOWER EXTREMITIES: ICD-10-CM

## 2025-08-27 DIAGNOSIS — L03.90 CELLULITIS, UNSPECIFIED CELLULITIS SITE: ICD-10-CM

## 2025-08-27 DIAGNOSIS — Z79.01 ON ANTICOAGULANT THERAPY: ICD-10-CM

## 2025-08-27 DIAGNOSIS — R52 PAIN: ICD-10-CM

## 2025-08-27 DIAGNOSIS — I83.812 VARICOSE VEINS OF LEFT LOWER EXTREMITY WITH PAIN: ICD-10-CM

## 2025-08-27 LAB
BIPAP: 0
HCT VFR BLD CALC: 43.6 % (ref 36–54)
LDH SERPL L TO P-CCNC: 0.9 MMOL/L (ref 0.5–2.2)
PCO2 BLDA: 35.7 MMHG (ref 35–45)
PH SMN: 7.49 [PH] (ref 7.35–7.45)
PO2 BLDA: 71.3 MMHG (ref 40–60)
POC BASE DEFICIT: 3.8 MMOL/L
POC HCO3: 27.8 MMOL/L (ref 24–28)
POC PERFORMED BY: ABNORMAL
SPECIMEN SOURCE: ABNORMAL

## 2025-08-27 PROCEDURE — 93005 ELECTROCARDIOGRAM TRACING: CPT

## 2025-08-27 PROCEDURE — 87389 HIV-1 AG W/HIV-1&-2 AB AG IA: CPT | Performed by: PHYSICIAN ASSISTANT

## 2025-08-27 PROCEDURE — 82803 BLOOD GASES ANY COMBINATION: CPT

## 2025-08-27 PROCEDURE — 99900035 HC TECH TIME PER 15 MIN (STAT)

## 2025-08-27 PROCEDURE — 85025 COMPLETE CBC W/AUTO DIFF WBC: CPT

## 2025-08-27 PROCEDURE — 87040 BLOOD CULTURE FOR BACTERIA: CPT

## 2025-08-27 PROCEDURE — 93010 ELECTROCARDIOGRAM REPORT: CPT | Mod: ,,, | Performed by: STUDENT IN AN ORGANIZED HEALTH CARE EDUCATION/TRAINING PROGRAM

## 2025-08-27 PROCEDURE — 83605 ASSAY OF LACTIC ACID: CPT

## 2025-08-27 PROCEDURE — 80053 COMPREHEN METABOLIC PANEL: CPT

## 2025-08-27 PROCEDURE — 99285 EMERGENCY DEPT VISIT HI MDM: CPT | Mod: 25

## 2025-08-27 PROCEDURE — 86803 HEPATITIS C AB TEST: CPT | Performed by: PHYSICIAN ASSISTANT

## 2025-08-28 VITALS
WEIGHT: 205 LBS | OXYGEN SATURATION: 99 % | SYSTOLIC BLOOD PRESSURE: 117 MMHG | HEART RATE: 52 BPM | HEIGHT: 70 IN | TEMPERATURE: 98 F | DIASTOLIC BLOOD PRESSURE: 69 MMHG | BODY MASS INDEX: 29.35 KG/M2 | RESPIRATION RATE: 18 BRPM

## 2025-08-28 LAB
ABSOLUTE EOSINOPHIL (OHS): 0.01 K/UL
ABSOLUTE MONOCYTE (OHS): 0.87 K/UL (ref 0.3–1)
ABSOLUTE NEUTROPHIL COUNT (OHS): 5.75 K/UL (ref 1.8–7.7)
ALBUMIN SERPL BCP-MCNC: 3.8 G/DL (ref 3.5–5.2)
ALP SERPL-CCNC: 57 UNIT/L (ref 40–150)
ALT SERPL W/O P-5'-P-CCNC: 26 UNIT/L (ref 0–55)
ANION GAP (OHS): 12 MMOL/L (ref 8–16)
AST SERPL-CCNC: 32 UNIT/L (ref 0–50)
BASOPHILS # BLD AUTO: 0.03 K/UL
BASOPHILS NFR BLD AUTO: 0.4 %
BILIRUB SERPL-MCNC: 0.9 MG/DL (ref 0.1–1)
BILIRUB UR QL STRIP.AUTO: NEGATIVE
BUN SERPL-MCNC: 25 MG/DL (ref 8–23)
CALCIUM SERPL-MCNC: 8.5 MG/DL (ref 8.7–10.5)
CHLORIDE SERPL-SCNC: 101 MMOL/L (ref 95–110)
CLARITY UR: CLEAR
CO2 SERPL-SCNC: 21 MMOL/L (ref 23–29)
COLOR UR AUTO: YELLOW
CREAT SERPL-MCNC: 1.1 MG/DL (ref 0.5–1.4)
ERYTHROCYTE [DISTWIDTH] IN BLOOD BY AUTOMATED COUNT: 13.5 % (ref 11.5–14.5)
GFR SERPLBLD CREATININE-BSD FMLA CKD-EPI: >60 ML/MIN/1.73/M2
GLUCOSE SERPL-MCNC: 109 MG/DL (ref 70–110)
GLUCOSE UR QL STRIP: NEGATIVE
HCT VFR BLD AUTO: 38.2 % (ref 40–54)
HCV AB SERPL QL IA: NORMAL
HGB BLD-MCNC: 13.4 GM/DL (ref 14–18)
HGB UR QL STRIP: ABNORMAL
HIV 1+2 AB+HIV1 P24 AG SERPL QL IA: NORMAL
IMM GRANULOCYTES # BLD AUTO: 0.03 K/UL (ref 0–0.04)
IMM GRANULOCYTES NFR BLD AUTO: 0.4 % (ref 0–0.5)
KETONES UR QL STRIP: NEGATIVE
LEUKOCYTE ESTERASE UR QL STRIP: NEGATIVE
LYMPHOCYTES # BLD AUTO: 1.34 K/UL (ref 1–4.8)
MCH RBC QN AUTO: 30.2 PG (ref 27–31)
MCHC RBC AUTO-ENTMCNC: 35.1 G/DL (ref 32–36)
MCV RBC AUTO: 86 FL (ref 82–98)
MICROSCOPIC COMMENT: NORMAL
NITRITE UR QL STRIP: NEGATIVE
NUCLEATED RBC (/100WBC) (OHS): 0 /100 WBC
OHS QRS DURATION: 88 MS
OHS QTC CALCULATION: 438 MS
PH UR STRIP: 6 [PH]
PLATELET # BLD AUTO: 165 K/UL (ref 150–450)
PMV BLD AUTO: 10.9 FL (ref 9.2–12.9)
POTASSIUM SERPL-SCNC: 3.7 MMOL/L (ref 3.5–5.1)
PROT SERPL-MCNC: 6.9 GM/DL (ref 6–8.4)
PROT UR QL STRIP: NEGATIVE
RBC # BLD AUTO: 4.43 M/UL (ref 4.6–6.2)
RBC #/AREA URNS AUTO: 1 /HPF (ref 0–4)
RELATIVE EOSINOPHIL (OHS): 0.1 %
RELATIVE LYMPHOCYTE (OHS): 16.7 % (ref 18–48)
RELATIVE MONOCYTE (OHS): 10.8 % (ref 4–15)
RELATIVE NEUTROPHIL (OHS): 71.6 % (ref 38–73)
SODIUM SERPL-SCNC: 134 MMOL/L (ref 136–145)
SP GR UR STRIP: 1.01
UROBILINOGEN UR STRIP-ACNC: NEGATIVE EU/DL
WBC # BLD AUTO: 8.03 K/UL (ref 3.9–12.7)
WBC #/AREA URNS AUTO: <1 /HPF (ref 0–5)

## 2025-08-28 PROCEDURE — 87040 BLOOD CULTURE FOR BACTERIA: CPT

## 2025-08-28 PROCEDURE — 81001 URINALYSIS AUTO W/SCOPE: CPT

## 2025-08-28 RX ORDER — CLINDAMYCIN HYDROCHLORIDE 150 MG/1
300 CAPSULE ORAL 4 TIMES DAILY
Qty: 56 CAPSULE | Refills: 0 | Status: SHIPPED | OUTPATIENT
Start: 2025-08-28 | End: 2025-08-29 | Stop reason: SDUPTHER

## 2025-08-29 ENCOUNTER — HOSPITAL ENCOUNTER (OUTPATIENT)
Dept: CARDIOLOGY | Facility: HOSPITAL | Age: 75
Discharge: HOME | End: 2025-08-29
Attending: INTERNAL MEDICINE
Payer: COMMERCIAL

## 2025-08-29 ENCOUNTER — OFFICE VISIT (OUTPATIENT)
Dept: CARDIOLOGY | Facility: CLINIC | Age: 75
End: 2025-08-29
Payer: COMMERCIAL

## 2025-08-29 VITALS
WEIGHT: 209 LBS | SYSTOLIC BLOOD PRESSURE: 113 MMHG | DIASTOLIC BLOOD PRESSURE: 63 MMHG | HEART RATE: 66 BPM | BODY MASS INDEX: 29.92 KG/M2 | HEIGHT: 70 IN

## 2025-08-29 DIAGNOSIS — Z95.5 STENTED CORONARY ARTERY: ICD-10-CM

## 2025-08-29 DIAGNOSIS — Z79.01 ON ANTICOAGULANT THERAPY: ICD-10-CM

## 2025-08-29 DIAGNOSIS — I25.10 CORONARY ARTERY DISEASE INVOLVING NATIVE CORONARY ARTERY OF NATIVE HEART WITHOUT ANGINA PECTORIS: ICD-10-CM

## 2025-08-29 DIAGNOSIS — I87.2 VENOUS INSUFFICIENCY OF BOTH LOWER EXTREMITIES: ICD-10-CM

## 2025-08-29 DIAGNOSIS — I49.5 TACHY-BRADY SYNDROME: ICD-10-CM

## 2025-08-29 DIAGNOSIS — I10 PRIMARY HYPERTENSION: ICD-10-CM

## 2025-08-29 DIAGNOSIS — I89.0 LYMPHEDEMA OF BOTH LOWER EXTREMITIES: ICD-10-CM

## 2025-08-29 DIAGNOSIS — L03.116 CELLULITIS OF LEFT LOWER EXTREMITY: Primary | ICD-10-CM

## 2025-08-29 DIAGNOSIS — E78.2 MIXED HYPERLIPIDEMIA: ICD-10-CM

## 2025-08-29 DIAGNOSIS — I83.813 VARICOSE VEINS OF BOTH LOWER EXTREMITIES WITH PAIN: ICD-10-CM

## 2025-08-29 DIAGNOSIS — I48.19 PERSISTENT ATRIAL FIBRILLATION: ICD-10-CM

## 2025-08-29 DIAGNOSIS — E66.01 SEVERE OBESITY DUE TO EXCESS CALORIES WITH SERIOUS COMORBIDITY AND BODY MASS INDEX (BMI) 120% OF 95TH PERCENTILE TO LESS THAN 140% OF 95TH PERCENTILE FOR AGE IN PEDIATRIC PATIENT: ICD-10-CM

## 2025-08-29 DIAGNOSIS — Z98.890 HISTORY OF RADIOFREQUENCY ABLATION (RFA) PROCEDURE FOR CARDIAC ARRHYTHMIA: ICD-10-CM

## 2025-08-29 LAB — HOLD SPECIMEN: NORMAL

## 2025-08-29 PROCEDURE — 99999 PR PBB SHADOW E&M-EST. PATIENT-LVL IV: CPT | Mod: PBBFAC,,, | Performed by: INTERNAL MEDICINE

## 2025-08-29 RX ORDER — BUMETANIDE 0.5 MG/1
0.5 TABLET ORAL
Qty: 24 TABLET | Refills: 3 | Status: SHIPPED | OUTPATIENT
Start: 2025-09-01 | End: 2026-09-01

## 2025-08-29 RX ORDER — CLINDAMYCIN HYDROCHLORIDE 150 MG/1
300 CAPSULE ORAL 4 TIMES DAILY
Qty: 56 CAPSULE | Refills: 1 | Status: SHIPPED | OUTPATIENT
Start: 2025-08-29 | End: 2025-09-05

## 2025-08-31 LAB — HOLD SPECIMEN: NORMAL

## 2025-09-02 LAB
BACTERIA BLD CULT: NORMAL
BACTERIA BLD CULT: NORMAL

## 2025-09-02 RX ORDER — ATORVASTATIN CALCIUM 20 MG/1
20 TABLET, FILM COATED ORAL
Qty: 90 TABLET | Refills: 3 | Status: SHIPPED | OUTPATIENT
Start: 2025-09-02

## 2025-09-03 PROBLEM — T81.718A FEMORAL ARTERY PSEUDOANEURYSM COMPLICATING CARDIAC CATHETERIZATION: Status: ACTIVE | Noted: 2025-09-03

## 2025-09-03 PROBLEM — I72.4 FEMORAL ARTERY PSEUDOANEURYSM COMPLICATING CARDIAC CATHETERIZATION: Status: ACTIVE | Noted: 2025-09-03

## 2025-09-04 ENCOUNTER — HOSPITAL ENCOUNTER (OUTPATIENT)
Dept: RADIOLOGY | Facility: OTHER | Age: 75
Discharge: HOME | End: 2025-09-04
Attending: INTERNAL MEDICINE
Payer: COMMERCIAL

## 2025-09-04 DIAGNOSIS — I74.3 EMBOLISM AND THROMBOSIS OF ARTERIES OF THE LOWER EXTREMITIES: ICD-10-CM

## 2025-09-04 PROCEDURE — 73706 CT ANGIO LWR EXTR W/O&W/DYE: CPT | Mod: TC,LT

## 2025-09-04 PROCEDURE — 25500020 PHARM REV CODE 255: Performed by: INTERNAL MEDICINE

## 2025-09-04 RX ADMIN — IOHEXOL 100 ML: 350 INJECTION, SOLUTION INTRAVENOUS at 11:09

## 2025-09-05 ENCOUNTER — OFFICE VISIT (OUTPATIENT)
Dept: CARDIOLOGY | Facility: CLINIC | Age: 75
End: 2025-09-05
Payer: COMMERCIAL

## 2025-09-05 VITALS
HEIGHT: 70 IN | WEIGHT: 209 LBS | BODY MASS INDEX: 29.92 KG/M2 | SYSTOLIC BLOOD PRESSURE: 112 MMHG | HEART RATE: 51 BPM | DIASTOLIC BLOOD PRESSURE: 63 MMHG

## 2025-09-05 DIAGNOSIS — Z95.5 STENTED CORONARY ARTERY: ICD-10-CM

## 2025-09-05 DIAGNOSIS — I83.813 VARICOSE VEINS OF BOTH LOWER EXTREMITIES WITH PAIN: ICD-10-CM

## 2025-09-05 DIAGNOSIS — L03.116 CELLULITIS OF LEFT LOWER EXTREMITY: ICD-10-CM

## 2025-09-05 DIAGNOSIS — E78.2 MIXED HYPERLIPIDEMIA: ICD-10-CM

## 2025-09-05 DIAGNOSIS — I48.19 PERSISTENT ATRIAL FIBRILLATION: ICD-10-CM

## 2025-09-05 DIAGNOSIS — I87.2 VENOUS INSUFFICIENCY OF BOTH LOWER EXTREMITIES: ICD-10-CM

## 2025-09-05 DIAGNOSIS — I25.10 CORONARY ARTERY DISEASE INVOLVING NATIVE CORONARY ARTERY OF NATIVE HEART WITHOUT ANGINA PECTORIS: ICD-10-CM

## 2025-09-05 DIAGNOSIS — I10 PRIMARY HYPERTENSION: ICD-10-CM

## 2025-09-05 DIAGNOSIS — Z98.890 HISTORY OF RADIOFREQUENCY ABLATION (RFA) PROCEDURE FOR CARDIAC ARRHYTHMIA: ICD-10-CM

## 2025-09-05 DIAGNOSIS — I89.0 LYMPHEDEMA OF BOTH LOWER EXTREMITIES: ICD-10-CM

## 2025-09-05 DIAGNOSIS — M79.605 LEFT LEG PAIN: Primary | ICD-10-CM

## 2025-09-05 PROBLEM — T81.718A FEMORAL ARTERY PSEUDOANEURYSM COMPLICATING CARDIAC CATHETERIZATION: Status: RESOLVED | Noted: 2025-09-03 | Resolved: 2025-09-05

## 2025-09-05 PROBLEM — I72.4 FEMORAL ARTERY PSEUDOANEURYSM COMPLICATING CARDIAC CATHETERIZATION: Status: RESOLVED | Noted: 2025-09-03 | Resolved: 2025-09-05

## 2025-09-05 PROCEDURE — 99999 PR PBB SHADOW E&M-EST. PATIENT-LVL IV: CPT | Mod: PBBFAC,,, | Performed by: INTERNAL MEDICINE

## (undated) DEVICE — PACK EP DRAPE OMC

## (undated) DEVICE — INTRODUCER HEMOSTASIS 7.5F

## (undated) DEVICE — PAD DEFIB CADENCE ADULT R2

## (undated) DEVICE — KIT PROBE COVER WITH GEL

## (undated) DEVICE — ELECTRODE POLYHESIVEPRE-ATTACH

## (undated) DEVICE — COVER PRB TRNSDUC 7.6X183CM

## (undated) DEVICE — PATCH ENSITE PRECISION NAVX SE

## (undated) DEVICE — SET TUBING COOL POINT IRR

## (undated) DEVICE — PACK EP DRAPE

## (undated) DEVICE — NDL TRNSSPTL BRK-1 18GA 71CM

## (undated) DEVICE — SEE MEDLINE ITEM 107746

## (undated) DEVICE — R CATH ACUSON ACUNAV 8FR

## (undated) DEVICE — INTRO AGILIS MED CRL 8.5F 71CM

## (undated) DEVICE — CATH ADVISOR FI SENS ENBL 20MM

## (undated) DEVICE — CATH ADVISOR HD GRID X SENSOR

## (undated) DEVICE — INTRO FAST-CATH SL1 8.5FR 63CM

## (undated) DEVICE — R CATH BIDIRECTIONL DF CRV 7FR

## (undated) DEVICE — PAD RADI FEMORAL

## (undated) DEVICE — LINE PRESSURE MONITORING 96IN

## (undated) DEVICE — KIT ENSITE ELECTRODE SURFACE

## (undated) DEVICE — CATH BIDIRECTIONAL DF CRV 7FR

## (undated) DEVICE — CATH TACTFLEX SE BID CURVE F-J

## (undated) DEVICE — CATH TACTICATH ABLAT BIDIR F-J

## (undated) DEVICE — REPROCESSED CATH ACUNAV 8FR

## (undated) DEVICE — COVER DRAPE ACUSON STERILE

## (undated) DEVICE — BOWL FLUID - BACK STOP

## (undated) DEVICE — ELECTRODE REM PLYHSV RETURN 9

## (undated) DEVICE — SHEATH HEMOSTASIS 8.5FR